# Patient Record
Sex: MALE | Race: WHITE | Employment: OTHER | URBAN - METROPOLITAN AREA
[De-identification: names, ages, dates, MRNs, and addresses within clinical notes are randomized per-mention and may not be internally consistent; named-entity substitution may affect disease eponyms.]

---

## 2022-03-16 DIAGNOSIS — R06.02 SHORTNESS OF BREATH: ICD-10-CM

## 2022-03-16 DIAGNOSIS — C82.90 NHL (NODULAR HISTIOCYTIC LYMPHOMA) (HCC): Primary | ICD-10-CM

## 2022-03-21 ENCOUNTER — TELEPHONE (OUTPATIENT)
Dept: SURGERY | Age: 82
End: 2022-03-21

## 2022-03-21 ENCOUNTER — HOSPITAL ENCOUNTER (OUTPATIENT)
Dept: NON INVASIVE DIAGNOSTICS | Age: 82
Discharge: HOME | End: 2022-03-21
Payer: MEDICARE

## 2022-03-21 ENCOUNTER — HOSPITAL ENCOUNTER (OUTPATIENT)
Dept: PULMONOLOGY | Age: 82
Discharge: HOME | End: 2022-03-21
Payer: MEDICARE

## 2022-03-21 ENCOUNTER — HOSPITAL ENCOUNTER (OUTPATIENT)
Dept: ONCOLOGY | Age: 82
Setting detail: INFUSION SERIES
Discharge: HOME | End: 2022-03-21
Payer: MEDICARE

## 2022-03-21 ENCOUNTER — HOSPITAL ENCOUNTER (OUTPATIENT)
Dept: GENERAL RADIOLOGY | Age: 82
Discharge: HOME | End: 2022-03-21
Payer: MEDICARE

## 2022-03-21 VITALS
DIASTOLIC BLOOD PRESSURE: 77 MMHG | SYSTOLIC BLOOD PRESSURE: 153 MMHG | WEIGHT: 188.49 LBS | HEART RATE: 77 BPM | BODY MASS INDEX: 25.53 KG/M2 | RESPIRATION RATE: 17 BRPM | HEIGHT: 72 IN | OXYGEN SATURATION: 97 % | TEMPERATURE: 98 F

## 2022-03-21 DIAGNOSIS — R06.02 SHORTNESS OF BREATH: ICD-10-CM

## 2022-03-21 DIAGNOSIS — C82.90 NHL (NODULAR HISTIOCYTIC LYMPHOMA) (HCC): ICD-10-CM

## 2022-03-21 DIAGNOSIS — C82.00 FOLLICULAR LYMPHOMA GRADE I, UNSPECIFIED BODY REGION (HCC): Primary | ICD-10-CM

## 2022-03-21 DIAGNOSIS — C83.30 DIFFUSE LARGE B-CELL LYMPHOMA, UNSPECIFIED BODY REGION (HCC): ICD-10-CM

## 2022-03-21 LAB
A/G RATIO: 1.4 (ref 1.1–2.2)
ABO/RH: NORMAL
ALBUMIN SERPL-MCNC: 3.9 G/DL (ref 3.4–5)
ALP BLD-CCNC: 88 U/L (ref 40–129)
ALT SERPL-CCNC: 13 U/L (ref 10–40)
AMPHETAMINE SCREEN, URINE: NORMAL
ANION GAP SERPL CALCULATED.3IONS-SCNC: 13 MMOL/L (ref 3–16)
ANTIBODY SCREEN: NORMAL
AST SERPL-CCNC: 17 U/L (ref 15–37)
BARBITURATE SCREEN URINE: NORMAL
BASOPHILS ABSOLUTE: 0.1 K/UL (ref 0–0.2)
BASOPHILS RELATIVE PERCENT: 0.4 %
BENZODIAZEPINE SCREEN, URINE: NORMAL
BILIRUB SERPL-MCNC: 0.3 MG/DL (ref 0–1)
BILIRUBIN URINE: NEGATIVE
BLOOD, URINE: NEGATIVE
BUN BLDV-MCNC: 19 MG/DL (ref 7–20)
C-REACTIVE PROTEIN: 22.2 MG/L (ref 0–5.1)
CALCIUM SERPL-MCNC: 9.5 MG/DL (ref 8.3–10.6)
CANNABINOID SCREEN URINE: NORMAL
CHLORIDE BLD-SCNC: 101 MMOL/L (ref 99–110)
CLARITY: CLEAR
CO2: 24 MMOL/L (ref 21–32)
COCAINE METABOLITE SCREEN URINE: NORMAL
COLOR: YELLOW
CREAT SERPL-MCNC: 1 MG/DL (ref 0.8–1.3)
EOSINOPHILS ABSOLUTE: 0.4 K/UL (ref 0–0.6)
EOSINOPHILS RELATIVE PERCENT: 3.2 %
FERRITIN: 551.3 NG/ML (ref 30–400)
GFR AFRICAN AMERICAN: >60
GFR NON-AFRICAN AMERICAN: >60
GLUCOSE BLD-MCNC: 122 MG/DL (ref 70–99)
GLUCOSE URINE: NEGATIVE MG/DL
HBV SURFACE AB TITR SER: <3.5 MIU/ML
HCT VFR BLD CALC: 30.6 % (ref 40.5–52.5)
HEMOGLOBIN: 9.9 G/DL (ref 13.5–17.5)
HEPATITIS B CORE IGM ANTIBODY: NORMAL
IGG: 776 MG/DL (ref 700–1600)
INR BLD: 1.31 (ref 0.88–1.12)
KETONES, URINE: NEGATIVE MG/DL
LACTATE DEHYDROGENASE: 231 U/L (ref 100–190)
LEUKOCYTE ESTERASE, URINE: NEGATIVE
LV EF: 58 %
LVEF MODALITY: NORMAL
LYMPHOCYTES ABSOLUTE: 1.9 K/UL (ref 1–5.1)
LYMPHOCYTES RELATIVE PERCENT: 15.7 %
Lab: NORMAL
MAGNESIUM: 1.8 MG/DL (ref 1.8–2.4)
MCH RBC QN AUTO: 30.4 PG (ref 26–34)
MCHC RBC AUTO-ENTMCNC: 32.2 G/DL (ref 31–36)
MCV RBC AUTO: 94.6 FL (ref 80–100)
METHADONE SCREEN, URINE: NORMAL
MICROSCOPIC EXAMINATION: NORMAL
MONOCYTES ABSOLUTE: 1 K/UL (ref 0–1.3)
MONOCYTES RELATIVE PERCENT: 7.8 %
NEUTROPHILS ABSOLUTE: 9 K/UL (ref 1.7–7.7)
NEUTROPHILS RELATIVE PERCENT: 72.9 %
NITRITE, URINE: NEGATIVE
OPIATE SCREEN URINE: NORMAL
OXYCODONE URINE: NORMAL
PDW BLD-RTO: 16.2 % (ref 12.4–15.4)
PH UA: 6
PH UA: 6 (ref 5–8)
PHENCYCLIDINE SCREEN URINE: NORMAL
PLATELET # BLD: 195 K/UL (ref 135–450)
PMV BLD AUTO: 9 FL (ref 5–10.5)
POTASSIUM SERPL-SCNC: 3.9 MMOL/L (ref 3.5–5.1)
PROPOXYPHENE SCREEN: NORMAL
PROTEIN UA: NEGATIVE MG/DL
PROTHROMBIN TIME: 15 SEC (ref 9.9–12.7)
RBC # BLD: 3.24 M/UL (ref 4.2–5.9)
SODIUM BLD-SCNC: 138 MMOL/L (ref 136–145)
SPECIFIC GRAVITY UA: 1.02 (ref 1–1.03)
TOTAL PROTEIN: 6.7 G/DL (ref 6.4–8.2)
URINE TYPE: NORMAL
UROBILINOGEN, URINE: 0.2 E.U./DL
WBC # BLD: 12.3 K/UL (ref 4–11)

## 2022-03-21 PROCEDURE — 82784 ASSAY IGA/IGD/IGG/IGM EACH: CPT

## 2022-03-21 PROCEDURE — 86787 VARICELLA-ZOSTER ANTIBODY: CPT

## 2022-03-21 PROCEDURE — 81003 URINALYSIS AUTO W/O SCOPE: CPT

## 2022-03-21 PROCEDURE — 86665 EPSTEIN-BARR CAPSID VCA: CPT

## 2022-03-21 PROCEDURE — 94761 N-INVAS EAR/PLS OXIMETRY MLT: CPT

## 2022-03-21 PROCEDURE — 93356 MYOCRD STRAIN IMG SPCKL TRCK: CPT

## 2022-03-21 PROCEDURE — 85610 PROTHROMBIN TIME: CPT

## 2022-03-21 PROCEDURE — 86707 HEPATITIS BE ANTIBODY: CPT

## 2022-03-21 PROCEDURE — 94060 EVALUATION OF WHEEZING: CPT

## 2022-03-21 PROCEDURE — 86645 CMV ANTIBODY IGM: CPT

## 2022-03-21 PROCEDURE — 6360000002 HC RX W HCPCS: Performed by: NURSE PRACTITIONER

## 2022-03-21 PROCEDURE — 86901 BLOOD TYPING SEROLOGIC RH(D): CPT

## 2022-03-21 PROCEDURE — 94729 DIFFUSING CAPACITY: CPT

## 2022-03-21 PROCEDURE — 86140 C-REACTIVE PROTEIN: CPT

## 2022-03-21 PROCEDURE — 86664 EPSTEIN-BARR NUCLEAR ANTIGEN: CPT

## 2022-03-21 PROCEDURE — 85025 COMPLETE CBC W/AUTO DIFF WBC: CPT

## 2022-03-21 PROCEDURE — 83735 ASSAY OF MAGNESIUM: CPT

## 2022-03-21 PROCEDURE — 94760 N-INVAS EAR/PLS OXIMETRY 1: CPT

## 2022-03-21 PROCEDURE — 86850 RBC ANTIBODY SCREEN: CPT

## 2022-03-21 PROCEDURE — 86694 HERPES SIMPLEX NES ANTBDY: CPT

## 2022-03-21 PROCEDURE — 94726 PLETHYSMOGRAPHY LUNG VOLUMES: CPT

## 2022-03-21 PROCEDURE — 36591 DRAW BLOOD OFF VENOUS DEVICE: CPT

## 2022-03-21 PROCEDURE — 83615 LACTATE (LD) (LDH) ENZYME: CPT

## 2022-03-21 PROCEDURE — 80053 COMPREHEN METABOLIC PANEL: CPT

## 2022-03-21 PROCEDURE — 94664 DEMO&/EVAL PT USE INHALER: CPT

## 2022-03-21 PROCEDURE — 6360000002 HC RX W HCPCS: Performed by: INTERNAL MEDICINE

## 2022-03-21 PROCEDURE — 82728 ASSAY OF FERRITIN: CPT

## 2022-03-21 PROCEDURE — 80307 DRUG TEST PRSMV CHEM ANLYZR: CPT

## 2022-03-21 PROCEDURE — 86706 HEP B SURFACE ANTIBODY: CPT

## 2022-03-21 PROCEDURE — 93005 ELECTROCARDIOGRAM TRACING: CPT | Performed by: INTERNAL MEDICINE

## 2022-03-21 PROCEDURE — 86705 HEP B CORE ANTIBODY IGM: CPT

## 2022-03-21 PROCEDURE — 86663 EPSTEIN-BARR ANTIBODY: CPT

## 2022-03-21 PROCEDURE — 93306 TTE W/DOPPLER COMPLETE: CPT

## 2022-03-21 PROCEDURE — 86900 BLOOD TYPING SEROLOGIC ABO: CPT

## 2022-03-21 PROCEDURE — 71046 X-RAY EXAM CHEST 2 VIEWS: CPT

## 2022-03-21 RX ORDER — HEPARIN SODIUM (PORCINE) LOCK FLUSH IV SOLN 100 UNIT/ML 100 UNIT/ML
500 SOLUTION INTRAVENOUS PRN
Status: DISCONTINUED | OUTPATIENT
Start: 2022-03-21 | End: 2022-03-22 | Stop reason: HOSPADM

## 2022-03-21 RX ORDER — HEPARIN SODIUM (PORCINE) LOCK FLUSH IV SOLN 100 UNIT/ML 100 UNIT/ML
500 SOLUTION INTRAVENOUS ONCE
Status: COMPLETED | OUTPATIENT
Start: 2022-03-21 | End: 2022-03-21

## 2022-03-21 RX ORDER — ALBUTEROL SULFATE 2.5 MG/3ML
2.5 SOLUTION RESPIRATORY (INHALATION) ONCE
Status: COMPLETED | OUTPATIENT
Start: 2022-03-21 | End: 2022-03-21

## 2022-03-21 RX ADMIN — HEPARIN 500 UNITS: 100 SYRINGE at 09:51

## 2022-03-21 RX ADMIN — ALBUTEROL SULFATE 2.5 MG: 2.5 SOLUTION RESPIRATORY (INHALATION) at 14:42

## 2022-03-21 NOTE — TELEPHONE ENCOUNTER
Patient has been scheduled for:    Procedure: Insert Central Line Trifusion  Date: 4/18/22  Time: 7:15AM  Arrival: 5:30AM  [facility]  ASA?: N/A  Prep? NPO    Pre-op? N/A  Post-op Appt? N/A      Orders faxed to surgery scheduling. [facility] has contacted patient about procedure. I will still call to remind him of procedure.

## 2022-03-21 NOTE — ONCOLOGY
Transplant coordinator met with patient for pre-CAR-T evaluation and education on CAR-T therapy. Discussed purpose of CAR-T (chimergic antigen receptor) T-cells. The entire process of collection of T cells, the re-engineering process, lymphocyte depleting chemotherapy, reinfusion of CAR cells and associated side effects and toxicities were discussed. I specifically went over in detail the expected side effects such as cytokine release syndrome as well as the potential neuro toxicities associated with CAR T-cell infusion. Patient and caregiver made aware of need to have 24 hour caregiver for 30 days after CAR-T infusion and must be within 45 minutes of the hospital. Keegan Yang will be given CAR-T wallet card to carry at all times once CAR-T is infused and will need to present the wallet card when coming to the [facility] Emergency Department . Aware will be on anti-seizure medication for 8 weeks and can not drive or operate machinery x 8 weeks. Discussed daily Outpatient Infusion Department routine up to 30 days. I also explained the potential long-term hypogammaglobulinemia. Patient is interested in proceeding with CAR T-cell infusion. Information on CAR-T given and reviewed. Tentative calendar including collection date, return of cells, dates of lymphodepleting chemotherapy and infusion of cells all discussed.

## 2022-03-21 NOTE — PROGRESS NOTES
Pre-CAR-T Psychological Evaluation- 3/21/2022    PSYCHOSOCIAL ASSESSMENT/RECOMMENDATIONS    Based on this evaluation, the recommendation is to continue without reservation. No recommendations made. Identified caregivers: Donald Allan (Spouse), Olimpia Paredes (daughter), 5 other adult children  Protective factors: motivation for treatment, positive mindset, physically active  DSM-5 Diagnoses: None  ____________________________________________________________________________________________    Sanna Rapp is an 72-year-old   male with diffuse large B-cell non-Hodgkin's lymphoma referred for a psychological evaluation in preparation for CAR-T. He presented with his identified primary caregiver, Donald Allan (his spouse). PAST MEDICAL HISTORY  Sanna Rapp reports that he was diagnosed with diffuse large B cell lymphoma in 1/2022. He explains his experience with treatment thus far to be \"go and get treated and go home\". Explains that it has not been difficult at all. Past medical history is significant for follicular lymphoma (diagnosed 1995) and no chronic medical conditions.     KNOWLEDGE RELATED TO PROCEDURE    Diagnosis: Excellent  Procedure: Excellent  Risks and Benefits: Excellent  Lifestyle changes:    Hand-washing and hygiene:Excellent   People and Places: Excellent   Bleeding precautions: Poor, was provided additional education and expressed understanding   Pets: Excellent   Sun exposure: Poor, was provided additional education and expressed understanding   Driving restrictions: Excellent   Staying within 45 minutes of the hospital: Poor, was provided additional education and expressed understanding   Physical activity: Poor, was provided additional education and expressed understanding   Sexual activity: Poor, was provided additional education and expressed understanding   Tobacco use: Excellent   Alcohol use: Excellent   Illicit drug use (including marijuana): Excellent   Food safety: Excellent   Medication management: Excellent   Frequency of appointments: Excellent   When to call BMT doctor: Excellent   Going to the [facility]. in case of emergency: Excellent   Possibility for admission and re-admission to the hospital: Excellent    ADHERENCE    No-showed appointments: Denies  Reports forgetting medications: Denies  Using pill box: No; Use of pill box was recommended: Yes  Is willing to allow caregiver to manage medications immediately following procedure: Yes  Currently requires assistance with taking medications: Denies  Endorses history of going against the medical advise of a provider: Denies  History of issues with adherence to medical recommendations for comorbidities: Denies  Motivation for procedure: 10/10    PSYCHOSOCIAL HISTORY    Marital Status:  for 61 years   If , describes marriage as: \"typical\"  Race/Ethnicity: \"\"  Lives: [place], [place] (approximately 15 minutes from [facility] with wife  Reports stable housing: Yes  Reliable transportation: Yes  Children: 6 (ages 57-41)  Employment: Retired 12 years ago from [employer] design  Financial concerns related to this procedure: Denies  Childhood history: He was raised by mother, father, and grandparents in [place], [place] and has 1 sibling. Sonam Simms describes his childhood as \"perfect\"  Educational history: Denies difficulties in school (e.g., learning disability, accommodations). Highest level of education completed is a bachelor's degree in architecture.    Amish/spiritual beliefs: Catholic and denies Bahai beliefs that would affect his medical care   service: Denies  Legal history (e.g. 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   Coping mechanisms: \"face them and solve problems\", hunting, fishing, gardening  Physical activity: 3 times a week, but stays busy throughout the week  Specific dietary needs: Denies; denies being a picky eater  Supplements or over-the-counter drugs: daily vitamins, cats claw, astragalus root    SUPPORT SYSTEM    Identified primary caregiver: Flower Navarro  Relationship to patient: Spouse  Age: 80  Lives: [place] to stay with patient: Yes  Employment: Denies  Adequate knowledge regarding procedure: Yes  Diagnosis: Excellent  Procedure: Excellent  Risks and Benefits: Excellent  Lifestyle changes:     Hand-washing and hygiene:Excellent    People and Places: Excellent    Bleeding precautions: Excellent    Pets: Excellent    Sun exposure: Excellent    Driving restrictions: Excellent    Staying within 45 minutes of the hospital: Excellent    Physical activity: Excellent    Sexual activity: Excellent    Tobacco use: Excellent    Alcohol use: Excellent    Illicit drug use (including marijuana): Excellent    Food safety: Excellent    Medication management: Excellent    Frequency of appointments: Excellent    When to call BMT doctor: Excellent    Going to the [facility] in case of emergency: Excellent    Possibility for admission and re-admission to the hospital: Excellent  Willingness to be a caregiver: Yes  Significant mental health conditions that would affect care giving: Denies  Significant substance abuse that would affect care giving: Denies  Significant medical conditions that would affect care giving: Denies  Smoke tobacco in the home: Denies  Known cognitive impairment: Denies  COVID vaccine: Yes    CAREGIVER: RAPID ESTIMATE OF ADULT LITERACY IN MEDICINE  REALM-R: 8/8; REALM-SF Score: N/A  (0= <3rd grade reading level; 1-3= 4-6th grade reading level; 4-6= 7-8th grade reading level; >7= >9th grade reading level)    CAREGIVER: ALAN COGNITIVE ASSESSMENT (Version 1): 28/30 (Within Normal Limits)  Difficulty noted in the area(s) of: None  Normal scores are ?26     Additional support people:   1) Lamonteemory ÁlvarezChristina   Relationship to patient: Daughter  Age: 66  Lives: [place], [place]  Employment: physical therapist full-time  COVID vaccine: Yes      3) 5 other adult children    Has completed an advanced directive: Yes    PSYCHIATRIC HISTORY    Current mental health treatment: Denies  Past mental health treatment: Denies  Family psychiatric history: Denies    Depression: Denies  Suicidal ideation: Denies  Homicidal ideation: Denies  Past suicide attempts: Denies  Anxiety (i.e. Generalized Anxiety Disorder, health or treatment related anxiety, panic attacks, obsessions, or compulsions): Denies  Medical Anxiety (e.g. needles, pills, MRI, hospitals): Denies  Eating disordered behavior (I.e. binging, purging, other compensatory behaviors, concern about body image): Denies  Hanh: Denies   Psychosis: Denies  Post-traumatic stress disorder: Denies  Developmental or learning disability: Denies  Sleep: 8 hours per night; Denies difficulties with sleep    THE DEPRESSION, ANXIETY, STRESS SCALE- 21 ITEMS (JORDYN-21) : Typical cutoff scores:  Depression = 0 (Normal) (0-9=Normal, 10-13= Mild, 14-20= Moderate, 21-27= Severe, 28+= Extremely Severe)  Anxiety = 0 (Normal) (0-9=Normal, 10-13= Mild, 14-20= Moderate, 21-27= Severe, 28+= Extremely Severe)  Stress= 0 (Normal) (0-14=Normal, 15-18= Mild, 19-25= Moderate, 26-33= Severe, 34+= Extremely Severe)    When compared to a sample of the general population he scored in the following percentiles:  Depression= 23.2 (Normal range)  Anxiety= 26.2 (Normal range)  Stress= 13 (Normal range)    BRIEF COPING ORIENTATION TO PROBLEMS EXPERIENCED INVENTORY (BRIEF-COPE):    Results indicate primarily an Approach coping style. This coping style is characterized by the subscales of active coping, positive reframing, planning, acceptance, seeking emotional support, and seeking informational support.  Approach Coping is associated with more helpful responses to adversity, including adaptive practical adjustment, better physical health outcomes and more stable emotional responding. Approach Coping:   Percentile= 23.5 (Raw score= 15)  Avoidant Coping   Percentile= 0.5 (Raw score=15)    Notably, patient does not identify with utilizing many coping strategies with the exception of avoidance and prayer. WORLD HEALTH ORGANIZATION QUALITY OF LIFE ASSESSMENT (WHOQOL-BREF):     Scores indicate his quality of life in the following areas in percentiles:  Physical Health: 92.8 percentile (Transformed score= 100)  Psychological Health: 98.2 percentile (Transformed score= 100)  Social Relationships: 94.1 percentile (Transformed score= 100)  Environment: 97.3 percentile (Transformed score= 100)    SUBSTANCE USE HISTORY    Tobacco: Denies  Alcohol: Reports drinking once per week, 2 drinks per occasion. Denies history of problematic alcohol use. Illicit drugs: Denies     Kaye Artis does not foresee difficulties from abstaining from these substances immediately following his procedure. NEUROCOGNITIVE FUNCTIONING    Kaye Artis denies a history of closed head injury, seizures, or stroke. PATIENT: RAPID ESTIMATE OF ADULT LITERACY IN MEDICINE  REALM-R: 8/8; REALM-SF Score: N/A  (0= <3rd grade reading level; 1-3= 4-6th grade reading level; 4-6= 7-8th grade reading level; >7= >9th grade reading level)    PATIENT ALAN COGNITIVE ASSESSMENT (Version 1): 28/30 (Within Normal Limits)  Difficulty noted in the area(s) of: None  Normal scores are ?26     BEHAVIORAL OBSERVATIONS    He arrived on time for his scheduled appointment. He presented with his wife as his primary caregiver and was dressed appropriately and groomed neatly. During the evaluation, he presented with normal speech and logical/linear thought processes. He was cooperative and friendly. His mood was good and affect was euthymic. Notably, patient had significant difficulties hearing writer.  He wore hearing aids, but still reported difficulty hearing. Patient's primary caregiver presented as cooperative and friendly. STANDARD ASSESSMENT FOR INTEGRATING DATA  WILMER INTEGRATED PSYCHOSOCIAL ASSESSMENT FOR TRANSPLANT (SIPAT)= 6 ((0-6) Excellent Candidate: Recommendation is to continue without reservations)   SIPAT Score Interpretation: 0-6 Excellent Candidate, 7-20 Good Candidate, 21-39 Minimally Acceptance Candidate, 40-69 Poor Candidate, >70 High Risk Candidate  The total score was obtained through summing patient's score on the following categories:    Patient's Readiness Level  Knowledge and Understanding of Medical Illness Process: 0) Excellent Understanding: Patient and support system are fully aware of the causes of illness leading to need for transplantation. Both patient and support system demonstrate a high degree of self-directed learning  Knowledge and Understanding of the Process of Transplantation: 1) Good Understanding: Patient & support have studied & understood provided literature. Willingness/Desire for Treatment: 0) Excellent: Patient is highly motivated and proactively involved in his/her medical care. Treatment Compliance/Adherence: 0) Excellent: Patient is fully compliant and is an effective partner with medical staff. Lifestyle Factors: 0) Able to modify & sustain needed changes- self initiated. Patient's Readiness Level Total Score: 1    Social Support System  Availability of Social Support System: 2) Good: Various individuals (e.g., minimum of two people) have been identified and are actively engaged in the patient's care. A back-up system, albeit limited, seems feasible. Functionality of Social Support System: 0) Excellent: Members of the support team have demonstrated initiative in learning and are already committed to and actively and effectively engaged in the patient's care.   Appropriateness of Physical Living Space and Environment: 0) Excellent: Patient has excellent, long-term, permanent and adequate housing. Social Support System Total Score: 2    Psychological Stability and Psychopathology   Presence of Psychopathology: 0) None: No history of psychiatric problems. Assessment of Depression: 0) No Clinical Depression  Assessment of Anxiety: 0) No Clinical Anxiety  History of Organic Psychopathology or Neurocognitive Impairment: 0) None: No history of disease or treatment induced psychiatric problem. Assessment of Current Cognitive Functionin) Cognitive Functioning Within Normal Limits; or MoCA / MMSE . 26. Influence of Personality Traits vs. Disorder: 0) None: No history of significant personality disorder or psychopathology/traits. Effect of Truthfulness vs. Deceptive Behavior in Presentation: 0) No evidence of deceptive behavior in history or at present. Overall Risk for Psychopathology: 0) None: No history of personal or familial psychiatric problems; no psychiatric complications in response to illness, medical treatment or psychosocial stressors. Psychological Stability and Psychopathology Total Score: 0    Lifestyle and Effect of Substance Use  Alcohol Use/Abuse/Dependence: 2) ALCOHOL USE - NO ABUSE: History of minimal alcohol use which has caused no social or medical problems (i.e., no abuse). If requested by the team the patient promptly discontinued all alcohol use. Alcohol Risk for Recidivism: 1) Low Risk: (AUDIT 1 - 7). Substance Use/Abuse/Dependence: 0) None: No history of illicit substance use; or abuse of prescribed substances. Substance Use Risk for Recidivism: 0) None: No history of illicit substance Use; or abuse of prescribed substances (DAST = 0). Nicotine Use/Abuse/Dependence: 0) None: Never used tobacco in any form. No history of Nicotine Use/Abuse.    Lifestyle and Effect of Substance Use Total Score: 3    -------------------------------------------------------------------------------------    Jennifer Keys Psy.D., Clinical Psychologist      Dave Bazan was seen for a pre-BMT/CAR-T psychological evaluation. There were no urgent psychological concerns (e.g. suicidal or homicidal ideation). Full report to follow. Prior to the evaluation, patient and/or support system was informed of the purposes of the evaluation, which include identification of any psychosocial barriers to successful BMT/CAR-T. Educated patient regarding psychologist's role in making treatment recommendations to patient's treatment team regarding patient's candidacy for BMT/CAR-T from a psychological standpoint. Discussed limits of confidentiality (e.g., documenting in patient's medical record, coordinating with team, abuse of vulnerable person, court subpoena, and/or being a risk to self or others). Also explained to patient that provider sees patient's while hospitalized in the [place] [address]. Patient expressed understanding and was agreeable to complete the evaluation. All measures completed today were sent to medical records to be uploaded into patient's chart.

## 2022-03-22 DIAGNOSIS — C83.30 DIFFUSE LARGE B-CELL LYMPHOMA, UNSPECIFIED BODY REGION (HCC): Primary | ICD-10-CM

## 2022-03-22 LAB
EKG ATRIAL RATE: 70 BPM
EKG DIAGNOSIS: NORMAL
EKG P AXIS: 70 DEGREES
EKG P-R INTERVAL: 214 MS
EKG Q-T INTERVAL: 392 MS
EKG QRS DURATION: 116 MS
EKG QTC CALCULATION (BAZETT): 423 MS
EKG R AXIS: -43 DEGREES
EKG T AXIS: 46 DEGREES
EKG VENTRICULAR RATE: 70 BPM
VARICELLA-ZOSTER VIRUS AB, IGG: NORMAL

## 2022-03-22 PROCEDURE — 93010 ELECTROCARDIOGRAM REPORT: CPT | Performed by: INTERNAL MEDICINE

## 2022-03-23 LAB
HEPATITIS BE ANTIBODY: NEGATIVE
HERPES TYPE 1/2 IGM COMBINED: 0.52 IV
VARICELLA ZOSTER AB IGM: 0.04 ISR

## 2022-03-23 NOTE — PROCEDURES
[address]               [address]                               PULMONARY FUNCTION    PATIENT NAME: Claude Songster              :        1940  MED REC NO:   [id number]                          ROOM:  ACCOUNT NO:   [de-identified]                           ADMIT DATE: 2022  PROVIDER:     Jessica Peoples MD    DATE OF PROCEDURE:  2022    Forced vital capacity and FEV1 mildly reduced, FEV1 to FVC ratio normal.  No change after bronchodilator. Lung volume determinations by  plethysmography show normal total lung capacity and FRC, mildly reduced  residual volume. Single-breath diffusion capacity moderately reduced,  but normal when compared to alveolar volume. IMPRESSION:  Mild restrictive ventilatory defect seen on spirometry. Findings may be consistent with neuromuscular, chest wall, pleural or  parenchymal disorder.         Vidya Chandler MD    D: 2022 15:52:28       T: 2022 15:54:42     [email or web address]  Job#: [id number]     Doc#: [id number]    CC:

## 2022-03-24 ENCOUNTER — HOSPITAL ENCOUNTER (OUTPATIENT)
Age: 82
Setting detail: SPECIMEN
Discharge: HOME | End: 2022-03-24

## 2022-03-24 LAB — HERPES TYPE I/II IGG COMBINED: 11.4 IV

## 2022-03-26 LAB
CYTOMEGALOVIRUS IGM ANTIBODY: <8 AU/ML
EPSTEIN BARR VIRUS NUCLEAR AB IGG: 185 U/ML (ref 0–21.9)
EPSTEIN-BARR EARLY ANTIGEN ANTIBODY: <5 U/ML (ref 0–10.9)
EPSTEIN-BARR VCA IGG: 25.4 U/ML (ref 0–21.9)
EPSTEIN-BARR VCA IGM: 18.9 U/ML (ref 0–43.9)

## 2022-03-29 ENCOUNTER — HOSPITAL ENCOUNTER (OUTPATIENT)
Dept: PHYSICAL THERAPY | Age: 82
Setting detail: THERAPIES SERIES
Discharge: HOME | End: 2022-03-29
Payer: MEDICARE

## 2022-03-29 PROCEDURE — 97110 THERAPEUTIC EXERCISES: CPT

## 2022-03-29 PROCEDURE — 97161 PT EVAL LOW COMPLEX 20 MIN: CPT

## 2022-03-29 NOTE — FLOWSHEET NOTE
[facility] Outpatient Therapy  [address] [place] [address], [address] [address]  Phone: [phone]   Fax: [phone]    Physical Therapy Treatment Note/ Progress Report:     Date:  3/29/2022    Patient Name:  Cristhian Valdovinos    :  1940  MRN: [id number]    Medical/Treatment Diagnosis Information:  Diagnosis: Diffuse large B-cell lymphoma, unspecified body region ([facility] [address] (C83.30)  Treatment Diagnosis: Cancer related fatigue U42.7  Insurance/Certification information:  PT Insurance Information: Medicare, [id number], [address] [facility] [place]  Physician Information:  Referring Practitioner: Destinee Rea MD  Plan of care signed:    [] Yes  [x] No    Date of Patient follow up with Physician: 2022     Progress Report: []  Yes  [x]  No     Date Range for reporting period:  Beginnin2022  Ending:  NA    Progress report due (10 Rx/or 30 days whichever is less):      Recertification due (POC duration/ or 90 days whichever is less):      Visit # Insurance Allowable Auth Needed    BMN []Yes   [x]No     RESTRICTIONS/PRECAUTIONS: lymphoma history and current treatment, hard of hearing  Latex Allergy:  [x]NO      []YES  Preferred Language for Healthcare:   [x]English       []other:  Functional Scale: FOTO Med. Neuro Date assessed:2022    Pain level:  0/10     SUBJECTIVE:  See eval    OBJECTIVE: See eval   Observation:    Test measurements:      Exercises/Interventions: Exercises in bold performed in department today. Items not bolded are carried forward from prior visits for continuity of the record. Exercise/Equipment Resistance/Repetitions Other comments                                                                                               Home Exercise Program:Pt. demonstrated good understanding and knowledge of HEP. Written instructions provided. Access Code: [email or web address]  URL: [email or web address]  Date: 2022  Prepared by:  Lilly Deleon allowing for proper ROM for normal function with self-care, mobility, lifting and ambulation. Home Exercise Program:    [x] (90294) Reviewed/Progressed HEP activities related to strengthening, flexibility, endurance, ROM of core, proximal hip and LE for functional self-care, mobility, lifting and ambulation/stair navigation   [] (00900) Reviewed/Progressed HEP activities related to improving balance, coordination, kinesthetic sense, posture, motor skill, proprioception of core, proximal hip and LE for self-care, mobility, lifting, and ambulation/stair navigation      Modalities:    [] Electric Stimulation:   [] Ultrasound:   [] Other:       Charges:  Timed Code Treatment Minutes: TE: 8   Total Treatment Minutes: 35      [x] EVAL (LOW) 56847 (typically 20 minutes face-to-face)  [] EVAL (MOD) 80145 (typically 30 minutes face-to-face)  [] EVAL (HIGH) 54142 (typically 45 minutes face-to-face)  [] RE-EVAL     [x] FK(47650) x 1      [] NMR (53086) x       [] Manual (44419) x       [] TA (36046) x       [] Gait Training [phone] x       [] ES(attended) (85434)  [] ES (un) ([phone])   [] DRY NEEDLE 1 OR 2 MUSCLES  [] DRY NEEDLE 3+ MUSCLES  [] Mech Traction (95587)  [] Ultrasound (00130)  [] Other:    GOALS:    Patient stated goal: \"evaluation\"  []? Progressing: [x]? Met: []? Not Met: []? Adjusted     Therapist goals for Patient:   Short Term Goals: To be achieved in: 1 weeks  1. Dispense and review with pt HEP to initiate after CAR-T immunotherapy for maximizing functional mobility   []? Progressing: [x]? Met: []? Not Met: []? Adjusted    ASSESSMENT:  See eval      Treatment/Activity Tolerance:  [x] Patient tolerated treatment well [] Patient limited by fatique  [] Patient limited by pain  [] Patient limited by other medical complications  [] Other:     Overall Progression Towards Functional goals/ Treatment Progress Update:  [] Patient is progressing as expected towards functional goals listed.     [] Progression is slowed due to complexities/Impairments listed. [] Progression has been slowed due to co-morbidities. [] Plan just implemented, too soon to assess goals progression <30days   [] Goals require adjustment due to lack of progress  [] Patient is not progressing as expected and requires additional follow up with physician  [x] Other: goals met    Prognosis for POC: [x] Good [] Fair  [] Poor    Patient requires continued skilled intervention: [] Yes  [x] No        PLAN: See eval  [] Continue per plan of care [] Alter current plan (see comments)  [x] Plan of care initiated [] Hold pending MD visit [x] Discharge    Electronically signed by: Evangelina Louise, PT, DPT [id number]    Note: If patient does not return for scheduled/recommended follow up visits, this note will serve as a discharge from care along with the most recent update on progress.

## 2022-03-29 NOTE — PLAN OF CARE
[facility] Outpatient Therapy  [address], [address]  Phone: [phone]   Fax: [phone]       Physical Therapy Certification/Treatment/Discharge    Dear Referring Practitioner: Armani Hancock MD,    We had the pleasure of evaluating the following patient for physical therapy services at [facility] ([facility]). A summary of our findings can be found in the initial assessment below. This includes our plan of care. If you have any questions or concerns regarding these findings, please do not hesitate to contact me at the office phone number checked above. Thank you for the referral.       Physician Signature:_______________________________Date:__________________  By signing above (or electronic signature), therapist's plan is approved by physician      Patient: Zaid Peter   : 1940   MRN: [id number]  Referring Physician: Referring Practitioner: Armani Hancock MD      Evaluation Date: 3/29/2022      Medical Diagnosis Information:  Diagnosis: Diffuse large B-cell lymphoma, unspecified body region ([facility] [address] (C83.30)   Treatment Diagnosis: Cancer related fatigue R53.0                                         Insurance information: PT Insurance Information: Medicare, BMN    Preferred Language for Healthcare:   [x]English       []Other:    C-SSRS Triggered by Intake questionnaire (Past 2 wk assessment ):   [x] No, Questionnaire did not trigger screening.   [] Yes, Patient intake triggered C-SSRS Screening     [] Completed, no further action required. [] Completed, PCP notified via Epic      Precautions/ Contra-indications: lymphoma history and current treatment, hard of hearing  Latex Allergy:  [x]NO      []YES    Relevant Medical History:  [x] Patient history, allergies, meds reviewed. Medical chart reviewed. See intake form. Review Of Systems (ROS):  [x]Performed Review of systems (Integumentary, CardioPulmonary, Neurological) by intake and observation. Intake form has been scanned into medical record. Patient has been instructed to contact their primary care physician regarding ROS issues if not already being addressed at this time. Co-morbidities/Complexities (which will affect course of rehabilitation):   []None        []Hx of COVID   Arthritic conditions   []Rheumatoid arthritis (M05.9)  []Osteoarthritis (M19.91)  []Gout   Cardiovascular conditions   []Hypertension (I10)  []Hyperlipidemia (E78.5)  []Angina pectoris (I20)  []Atherosclerosis (I70)  []Pacemaker  []Hx of CABG/stent/  cardiac surgeries   Musculoskeletal conditions   []Disc pathology   []Congenital spine pathologies   []Osteoporosis (M81.8)  []Osteopenia (M85.8)  []Scoliosis       Endocrine conditions   []Hypothyroid (E03.9)  []Hyperthyroid Gastrointestinal conditions   []Constipation (K59.00)  [] Diarrhea   Metabolic conditions   []Morbid obesity (E66.01)  []Diabetes type 1(E10.65) or 2 (E11.65)   []Neuropathy (G60.9)     Cardio/Pulmonary conditions   []Asthma (J45)  []Coughing   []COPD (J44.9)  []CHF  []A-fib   Psychological Disorders  []Anxiety (F41.9)  []Depression (F32.9)   []Other:   Developmental Disorders  []Autism (F84.0)  []CP (G80)  []Down Syndrome (Q90.9)  []Developmental delay     Neurological conditions  []Prior Stroke (I69.30)  []Parkinson's (G20)  []Encephalopathy (G93.40)  []MS (G35)  []Post-polio (G14)  []SCI  []TBI  []ALS Other conditions  []Fibromyalgia (M79.7)  []Vertigo  []Syncope  []Kidney Failure  [x]Cancer      [x]currently undergoing                treatment  []Pregnancy  []Incontinence   Prior surgeries  []involved limb  []previous spinal surgery  [] section birth  []hysterectomy  []bowel / bladder surgery  []other relevant surgeries   [x]Other: hard of hearing             SUBJECTIVE:    ONSET: History of non-hodgkins lymphoma in ,  and  each treated with chemo.   This year noted swelling in left leg and found issues with lymph nodes in left knee - has had a couple rounds of chemo, most recently last [de-identified]. Treatment Plan for cancer:  Plan for CAR-T immunotherapy - harvest 4/19 and reinfuse 5/23        Current Level of Function:  Do you use ambulatory aids? No   How far on average can you walk? [x] 2 blocks+  [] 1-2 blocks  [] mostly house bound   What is you physical activity level? [x] highly active [] active  [] somewhat active  [] sedentary    Prior Level of Function:Prior to this injury/incident, pt was independent with ADLs and IADLs    Did you use ambulatory aids? No   How far on average could you walk? [x] 2 blocks+  [] 1-2 blocks  [] mostly house bound   What wass you physical activity level? [x] highly active [] active  [] somewhat active  [] sedentary    Occupation/School: retired    Sports/Hobbies/Recreational Activities: just enjoy life - whatever is at hand    Functional Outcomes:   Brief Fatigue Inventory: not given to pt as pt reports no fatigue  Fatigue: 0/10  FOTO Med. Neuro. = 97 = 3% impaired  Frailty Assessment:  Shrinking       Have you lost > 10 lbs in the past year? []YES [x]NO Yes to any = 1 point    Unintentional loss of > 5%? []YES [x]NO     Or BMI < 18.5 []YES [x]NO  []          Physical Endurance/Energy       Do you feel full of energy? [x]YES []NO Must answer \"NO\", AND \"every day\" or \"every week\"  = 1 point           During last 4 weeks, how often have you rested in bed during the day?  []Every Day       []Every Week       []Once       [x]Not at all   []          Low Physical Activity       Frequency of Mildly Energetic Activity [x]>3 times per week      []1-2 times per week      []1-3 times per month      []hardly ever/never        Must answer \"hardly ever/never\" for Moderately AND Very Energetic Activity = 1 point    Frequency of Moderately Energetic Activity [x]> 3 times per week      []1-2 times per week      []1-3 times per month      []hardly ever/never            Frequency of Very Energetic Activity []> 3 times per week [x]1-2 times per week      []1-3 times per month      []hardly ever/never  []          Weakness       Hand Held Dynamometer  Strength in kg, avg of 3 measures on dominant hand Men kg      []BMI < 24 < 29      [x]BMI 24.1-26 < 30 If less than or equal to kg cut-off (lowest 20%) for gender and BMI range = 1 point 39.7    []BMI 26.1-28 < 30      []BMI > 28 < 32             Women kg      []BMI < 23 < 17      []BMI 23.1-26 < 17.3      []BMI 26.1-29 < 18      []BMI >c29 < 21  []          Slow Walking Speed       Walking time in seconds over 15 ft Men Speed      []height <173 cm []>7 sec      [x]height >173 cm []>6 sec 3.61       Slowest 20% speed based on gender and height, OR TUG score = 1 point     Women Speed      []height <159 cm []>7 sec      []height > 159 cm []>6 sec     OR              TUG  []> 19 sec  9.58 []          []Frail = > 3 points       []Intermediate or Pre-Frail = 1-2 points       [x]Robust = 0 points           PAIN:  Pain Scale:0/10  Easing factors: NA  Provocative factors: NA        OBJECTIVE:     Body weight: 188  Height: 5'11.58\"  BMI: 25.87    Observation:    Gait/Steps/Balance       [x]Gait WNL                           []Deviations on a level linoleum surface include:      Posture: [x]WNL  []Forward head  []Forward flexed trunk   []Pronounced CT junction    []Increased thoracic kyphosis   []Scoliosis  []Scapular winging  []Decreased WB on   []R   []L     []Other:         Functional Testing  Intervention Date  Prehab Date   03/29/2022  Re-Eval Date  4 week F/U date  8 week F/U date  D/C Date       TUG (sec) 9.58       30 second sit to stand (reps) 15       6 minute walk (m) 398          Balance:    Narrowed JESSENIA (sec)        Semi Tandem (sec) R LE in back:  L LE in back: R LE in back:  L LE in back: R LE in back:  L LE in back: R LE in back:  L LE in back: R LE in back:  L LE in back:   Tandem (sec) R LE in back:  L LE in back: R LE in back:  L LE in back: R LE in back:  L LE in back: R LE in back:  L LE in back: R LE in back:  L LE in back:   SLS (sec) R:  L: R:  L: R:  L: R:  L: R:  L:          Upper Extremity ROM & Strength AROM  Right AROM  Left PROM  Right PROM Left Strength Right Strength Left Comments   Shoulder flex     5/5 5/5    Shoulder AB     5/5 5/5    Shoulder ER     5/5 5/5    Shoulder IR     5/5 5/5     strength in kg (3 trials)      39.7 39              Lower Extremity ROM & Strength          Hip flexion      5/5 5/5    Hip ER            Hip IR          Hip extension          Hip abduction          Knee extension      5/5 5/5    Knee flexion      5/5 5/5    Ankle DF      5/5 5/5    Ankle PF          Ankle inversion           Ankle eversion                     Trunk strength          TrA  iso                    Flexibility                            Barriers to/and or personal factors that will affect rehab potential:              []Age  []Sex    []Smoker              []Motivation/Lack of Motivation                        [x]Co-Morbidities              []Cognitive Function, education/learning barriers              []Environmental, home barriers              []profession/work barriers  []past PT/medical experience  []other:    Falls Risk Assessment (30 days):   [x] Falls Risk assessed and no intervention required. [] Falls Risk assessed and Patient requires intervention due to being higher risk   TUG score (>12s at risk):     [] Falls education provided, including         ASSESSMENT: Pt is an [de-identified] y.o.male presenting with a frailty assessment score of 0 indicating that the patient falls into a robust category as no frailty deficits were noted. Pt performing well on  strength and walking speed categories of frailty assessment and not meeting criteria for frail for the shrinking, physical endurance/energy and physical activity categories. Pt also exceeding averages in 30 second sit to stand test for males his age indicating good functional strength of lower extremities.   Pt already performing exercise routine including weight training 3x per week at this time currently. Dispensed and reviewed handout on HEP for pt to perform in hospital after procedure. No further therapy needs at this time. Pt education and HEP instruction:  Patient was thoroughly educated regarding prehabilitation goals, importance of PT sessions in improving overall ROM, strength, function prior to medical intervention. The patient was educated on and instructed in HEP as listed. The patient was given a detailed handout of exercises with frequency, duration, to initiate in the hospital post-operatively with guidance/modifications as needed from IP PT:     Access Code: [email or web address]  URL: [email or web address]  Date: 03/29/2022  Prepared by: Rafael Gallardo    Exercises  Seated Long Arc Quad - 1 x daily - 7 x weekly - 3 sets - 10 reps  Seated March - 1 x daily - 7 x weekly - 3 sets - 10 reps  Supine Heel Slide - 1 x daily - 7 x weekly - 3 sets - 10 reps  Supine Ankle Pumps - 1 x daily - 7 x weekly - 3 sets - 10 reps  Supine Hip Abduction - 1 x daily - 7 x weekly - 3 sets - 10 reps  Supine Quad Set - 1 x daily - 7 x weekly - 3 sets - 10 reps - 5 hold  Supine Gluteal Sets - 1 x daily - 7 x weekly - 3 sets - 10 reps - 5 hold    The patient was educated regarding the benefits of early physical therapy post-intervention to regain normal ROM, strength, functional mobility. The patient was also educated regarding goals and expectations of physical therapy to restore normal function.     Functional Impairments:   [] Impaired gait  [] Impaired functional mobility   [] Decreased functional strength of   [] Reduced balance/proprioceptive control    [] Reduced functional ROM of    [] Swelling of extremity   [] Myofascial changes and pain at    [] Postural impairments:   [x] Other: NA     Functional Activity Limitations (from functional questionnaire and intake)  [] Reduced ability to use affected limb for ADLs/IADLs due to swelling causing symptoms of heaviness, skin tightness, pain  [] Reduced ability to perform lifting, reaching, carrying tasks  [] Reduced ability to wear his/her normal clothes/shoes due to swelling    [] Reduced ability to tolerate prolonged functional positions  [] Reduced ability or difficulty with changes of positions or transfers between positions  [] Reduced ability to maintain good posture and demonstrate good body mechanics with sitting, bending, and lifting   [] Reduced ability to sleep   [] Reduced ability or tolerance with driving and/or computer work   [] Reduced ability to squat   [] Reduced ability to forward bend   [] Reduced ability to ambulate prolonged functional periods/distances/surfaces   [] Reduced ability to ascend/descend stairs    [] Reduced ability to tolerate any impact through UE or spine   [x] Other: NA     Participation Restrictions - NA   [] Reduced participation in self care activities   [] Reduced participation in home management activities   [] Reduced participation in work activities   [] Reduced participation in social activities. [] Reduced participation in sport/recreational activities.     Prognosis/Rehab Potential:      [] Excellent   [x] Good    [] Fair   [] Poor    Tolerance of evaluation/treatment:    [] Excellent   [x] Good    [] Fair   [] Poor     Physical Therapy Evaluation Complexity Justification  [x] A history of present problem with:  [] no personal factors and/or comorbidities that impact the plan of care;  [x] 1-2 personal factors and/or comorbidities that impact the plan of care  [] 3 personal factors and/or comorbidities that impact the plan of care  [x] An examination of body systems using standardized tests and measures addressing any of the following: body structures and functions (impairments), activity limitations, and/or participation restrictions;  [] a total of 1-2 or more elements   [] a total of 3 or more elements   [x] a total of 4 or more elements [x] A clinical presentation with:  [x] stable and/or uncomplicated characteristics   [] evolving clinical presentation with changing characteristics  [] unstable and unpredictable characteristics;   [x] Clinical decision making of [x] low, [] moderate, [] high complexity using standardized patient assessment instrument and/or measurable assessment of functional outcome. [x] EVAL (LOW) 39576 (typically 15 minutes face-to-face)  [] EVAL (MOD) 39013 (typically 30 minutes face-to-face)  [] EVAL (HIGH) 81693 (typically 45 minutes face-to-face)  [] RE-EVAL     PLAN:    [x] PT intervention to include ROM/strength/balance exercises, gait, NR re-ed, energy conservation, postural/body mechs ed, manual therapy, HEP, education on cancer/lymphedema management, to restore PLOF. [x] Pt instructed to contact PT clinic to schedule f/u appts after upcoming medical intervention per Dr recommendation    Frequency/Duration: 1 days per week for  1 Weeks:  Interventions:  []  Neuro re-ed to restore balance, posture, muscle balance  []  Manual therapy as indicated for  to include: STM, ROM as appropriate. []  Modalities as needed that may include: thermal agents, kinesiotape as indicated  [x]  Patient education on symptom management, activity modification, progression of HEP. [x]  Therapeutic exercise including: strength/ROM/flexibility  []  NMR activation and proprioception  including postural re-education  []  Therapeutic Activities  []  Gait Training    GOALS:  Patient stated goal: \"evaluation\"  [] Progressing: [x] Met: [] Not Met: [] Adjusted    Therapist goals for Patient:   Short Term Goals: To be achieved in: 1 weeks  1. Dispense and review with pt HEP to initiate after CAR-T immunotherapy for maximizing functional mobility   [] Progressing: [x] Met: [] Not Met: [] Adjusted    Expected Discharge Disposition:  [x] Home   [] [facility]   [] SNF/Inpatient Rehab  [] Other     Electronically signed by:   Karrie Contreras Mayelin Oliver, [place], [address]

## 2022-04-07 NOTE — PROGRESS NOTES
Pt Inr Ptt done 3/21 routed to Dr Kait Nunez, also, will repeat day of surgery per Dr. Kemal Dixon, anesthesia. -db  4/8 CBC from 3/21 routed to Dr. Kait Nunez in epic. Anil Myers, at [facility]. Patient saw Dr. Jordyn Anderson on 3/30. She will fax that office note and labs.  Tone Rowe

## 2022-04-07 NOTE — PROGRESS NOTES
Place patient label inside box (if no patient label, complete below)  Name:  :  MR#:   Og Montez / PROCEDURE  1. I (we), iSncere Peralta (Patient Name) authorize Courtney Wynn MD (Provider / Alondra Rhoades) and/or such assistants as may be selected by him/her, to perform the following operation/procedure(s): INSERT TRIFUSION CENTRAL LINE       Note: If unable to obtain consent prior to an emergent procedure, document the emergent reason in the medical record. This procedure has been explained to my (our) satisfaction and included in the explanation was:  A) The intended benefit, nature, and extent of the procedure to be performed;  B) The significant risks involved and the probability of success;  C) Alternative procedures and methods of treatment;  D) The dangers and probable consequences of such alternatives (including no procedure or treatment); E) The expected consequences of the procedure on my future health;  F) Whether other qualified individuals would be performing important surgical tasks and/or whether  would be present to advise or support the procedure. I (we) understand that there are other risks of infection and other serious complications in the pre-operative/procedural and postoperative/procedural stages of my (our) care. I (we) have asked all of the questions which I (we) thought were important in deciding whether or not to undergo treatment or diagnosis. These questions have been answered to my (our) satisfaction. I (we) understand that no assurance can be given that the procedure will be a success, and no guarantee or warranty of success has been given to me (us). 2. It has been explained to me (us) that during the course of the operation/procedure, unforeseen conditions may be revealed that necessitate extension of the original procedure(s) or different procedure(s) than those set forth in Paragraph 1.  I (we) authorize and request that the above-named physician, his/her assistants or his/her designees, perform procedures as necessary and desirable if deemed to be in my (our) best interest.     Revised 8/2/2021                                                                          Page 1 of 2       3. I acknowledge that health care personnel may be observing this procedure for the purpose of medical education or other specified purposes as may be necessary as requested and/or approved by my (our) physician. 4. I (we) consent to the disposal by the hospital Pathologist of the removed tissue, parts or organs in accordance with hospital policy. 5. I do ____ do not ____ consent to the use of a local infiltration pain blocking agent that will be used by my provider/surgical provider to help alleviate pain during my procedure. 6. I do ____ do not ____ consent to an emergent blood transfusion in the case of a life-threatening situation that requires blood components to be administered. This consent is valid for 24 hours from the beginning of the procedure. 7. This patient does ____ or does not ____ currently have a DNR status/order. If DNR order is in place, obtain Addendum to the Surgical Consent for ALL Patients with a DNR Order to address blake-operative status for limited intervention or DNR suspension.      8. I have read and fully understand the above Consent for Operation/Procedure and that all blanks were completed before I signed the consent.   _____________________________       _____________________      ____/____am/pm  Signature of Patient or legal representative      Printed Name / Relationship            Date / Time   ____________________________       _____________________      ____/____am/pm  Witness to Signature                                    Printed Name                    Date / Time     If patient is unable to sign or is a minor, complete the following)  Patient is a minor, ____ years of age, or unable to sign because:   ______________________________________________________________________________________________    [employer] If a phone consent is obtained, consent will be documented by using two health care professionals, each affirming that the consenting party has no questions and gives consent for the procedure discussed with the physician/provider.   _____________________          ____________________       _____/_____am/pm   2nd witness to phone consent        Printed name           Date / Time    Informed Consent:  I have provided the explanation described above in section 1 to the patient and/or legal representative.  I have provided the patient and/or legal representative with an opportunity to ask any questions about the proposed operation/procedure.   ___________________________          ____________________         ____/____am/pm  Provider / Proceduralist                            Printed name            Date / Time  Revised 8/2/2021                                                                      Page 2 of 2

## 2022-04-08 RX ORDER — PROCHLORPERAZINE MALEATE 10 MG
TABLET ORAL
COMMUNITY
Start: 2022-01-24 | End: 2022-04-19

## 2022-04-08 RX ORDER — APIXABAN 2.5 MG/1
TABLET, FILM COATED ORAL
COMMUNITY
Start: 2022-03-18 | End: 2022-05-18 | Stop reason: HOSPADM

## 2022-04-08 RX ORDER — PREDNISONE 20 MG/1
TABLET ORAL
COMMUNITY
Start: 2022-01-24 | End: 2022-04-19

## 2022-04-08 RX ORDER — PROCHLORPERAZINE MALEATE 10 MG
10 TABLET ORAL
COMMUNITY
Start: 2022-01-30

## 2022-04-08 RX ORDER — DEXAMETHASONE 4 MG/1
TABLET ORAL
COMMUNITY
Start: 2022-01-25 | End: 2022-04-19

## 2022-04-08 RX ORDER — ALLOPURINOL 300 MG/1
TABLET ORAL
COMMUNITY
Start: 2022-01-31 | End: 2022-05-19 | Stop reason: HOSPADM

## 2022-04-08 RX ORDER — DEXAMETHASONE 4 MG/1
4 TABLET ORAL 2 TIMES DAILY
COMMUNITY
Start: 2022-01-30 | End: 2022-04-19

## 2022-04-08 RX ORDER — ACYCLOVIR 400 MG/1
TABLET ORAL
COMMUNITY
Start: 2022-03-03 | End: 2022-05-18 | Stop reason: SDUPTHER

## 2022-04-08 NOTE — PROGRESS NOTES
PRE-OP INSTRUCTIONS FOR THE SURGICAL PATIENT YOU ARE UNABLE TO MAKE CONTACT FOR AN INTERVIEW:        1. Follow all instructions provided to you from your surgeons office, including your ARRIVAL TIME. 2. Enter the MAIN entrance located on [address] and report to the desk. 3. Bring your insurance & photo ID with you. You may also be asked to pay a co-pay, as you may want to bring a check or credit card with you. 4. Leave all other valuables at home. 5. Arrange for someone to drive you home and be with you for the first 24 hours after discharge. 6. Bring your medication list with you day of surgery with doses and frequency listed (including over the counter medications)  7. You must contact your surgeon for Instructions regarding:              - ALL medication instructions, especially if taking blood thinners, aspirin, or diabetic medication.         -Bariatric patients call surgeon if on diabetic medications as some need to be stopped 1 week preop  - IF  there is a change in your physical condition such as a cold, fever, rash, cuts, sores or any other infection, especially near your surgical site. 8. A Pre-op History and Physical for surgery MUST be completed by your Physician or an Urgent Care within 30 days of your procedure date. Please bring a copy with you on the day of your procedure and along with any other testing performed. 9. DO NOT EAT ANYTHING eight hours prior to arrival for surgery. May have up to 8 ounces of water 4 hours prior to arrival for surgery. NOTE: ALL Gastric, Bariatric and Bowel surgery patients MUST follow their surgeon's instructions. 10. No gum, candy, mints, or ice chips day of procedure. 11. Please refrain from drinking alcohol the day before or day of your procedure. 12. Please do not smoke the day of your procedure. 13. Dress in loose, comfortable clothing appropriate for redressing after your procedure.  Do not wear jewelry (including body piercings), make-up, fingernail polish, lotion, powders or metal hairclips. 15. Contacts will need to be removed prior to surgery. You may want to bring your eye glasses to wear immediately before and after surgery. 14. Dentures will need to be removed before your procedure. 13. Bring cases for your glasses, contacts, dentures, or hearing aids to protect them while you are in surgery. 16. If you use a CPAP, please bring it with you on the day of your procedure. 17. Do not shave or wax for 72 hours prior to procedure near your operative site  18. FOR WOMAN OF CHILDBEARING AGE ONLY- please make sure we can collect a urine sample on arrival.    FOLLOW SURGEON INSTRUCTIONS REGARDING WHEN TO HOLD ELIQUIS. IF NOT INSTRUCTIONS RECEIVED YET, CALL SURGEON IMMEDIATELY. HOLD GLUCOSAMINE, VIT E, AND ALL HERBAL PRODUCTS WEEK PRIOR TO SURGERY. If you have further questions, you may contact your surgeon's office or us at [phone]     Left instructions on patient's voicemail AND PATIENT'S WIFE VM .     Dania Oakes RN.4/8/2022 .1:50 PM

## 2022-04-08 NOTE — PROGRESS NOTES
Place patient label inside box (if no patient label, complete below)  Name:  :  MR#:   Marino Orozco / PROCEDURE  1. I (we), Juani Leija (Patient Name) authorize DR. Kiera Naqvi  (Provider / Drew Berumen) and/or such assistants as may be selected by him/her, to perform the following operation/procedure(s): INSERT TRIFUSION CENTRAL LINE       Note: If unable to obtain consent prior to an emergent procedure, document the emergent reason in the medical record. This procedure has been explained to my (our) satisfaction and included in the explanation was:  A) The intended benefit, nature, and extent of the procedure to be performed;  B) The significant risks involved and the probability of success;  C) Alternative procedures and methods of treatment;  D) The dangers and probable consequences of such alternatives (including no procedure or treatment); E) The expected consequences of the procedure on my future health;  F) Whether other qualified individuals would be performing important surgical tasks and/or whether  would be present to advise or support the procedure. I (we) understand that there are other risks of infection and other serious complications in the pre-operative/procedural and postoperative/procedural stages of my (our) care. I (we) have asked all of the questions which I (we) thought were important in deciding whether or not to undergo treatment or diagnosis. These questions have been answered to my (our) satisfaction. I (we) understand that no assurance can be given that the procedure will be a success, and no guarantee or warranty of success has been given to me (us). 2. It has been explained to me (us) that during the course of the operation/procedure, unforeseen conditions may be revealed that necessitate extension of the original procedure(s) or different procedure(s) than those set forth in Paragraph 1.  I (we) authorize and request that the above-named physician, his/her assistants or his/her designees, perform procedures as necessary and desirable if deemed to be in my (our) best interest.     Revised 8/2/2021                                                                          Page 1 of 2       3. I acknowledge that health care personnel may be observing this procedure for the purpose of medical education or other specified purposes as may be necessary as requested and/or approved by my (our) physician. 4. I (we) consent to the disposal by the hospital Pathologist of the removed tissue, parts or organs in accordance with hospital policy. 5. I do ____ do not ____ consent to the use of a local infiltration pain blocking agent that will be used by my provider/surgical provider to help alleviate pain during my procedure. 6. I do ____ do not ____ consent to an emergent blood transfusion in the case of a life-threatening situation that requires blood components to be administered. This consent is valid for 24 hours from the beginning of the procedure. 7. This patient does ____ or does not ____ currently have a DNR status/order. If DNR order is in place, obtain Addendum to the Surgical Consent for ALL Patients with a DNR Order to address blake-operative status for limited intervention or DNR suspension.      8. I have read and fully understand the above Consent for Operation/Procedure and that all blanks were completed before I signed the consent.   _____________________________       _____________________      ____/____am/pm  Signature of Patient or legal representative      Printed Name / Relationship            Date / Time   ____________________________       _____________________      ____/____am/pm  Witness to Signature                                    Printed Name                    Date / Time     If patient is unable to sign or is a minor, complete the following)  Patient is a minor, ____ years of age, or unable to sign because:   ______________________________________________________________________________________________    [place]mahon If a phone consent is obtained, consent will be documented by using two health care professionals, each affirming that the consenting party has no questions and gives consent for the procedure discussed with the physician/provider.   _____________________          ____________________       _____/_____am/pm   2nd witness to phone consent        Printed name           Date / Time    Informed Consent:  I have provided the explanation described above in section 1 to the patient and/or legal representative.  I have provided the patient and/or legal representative with an opportunity to ask any questions about the proposed operation/procedure.   ___________________________          ____________________         ____/____am/pm  Provider / Proceduralist                            Printed name            Date / Time  Revised 8/2/2021                                                                      Page 2 of 2

## 2022-04-13 DIAGNOSIS — C83.30 DIFFUSE LARGE B-CELL LYMPHOMA, UNSPECIFIED BODY REGION (HCC): ICD-10-CM

## 2022-04-13 RX ORDER — HEPARIN SODIUM (PORCINE) LOCK FLUSH IV SOLN 100 UNIT/ML 100 UNIT/ML
1500 SOLUTION INTRAVENOUS PRN
Status: CANCELLED
Start: 2022-04-19

## 2022-04-13 RX ORDER — PROCHLORPERAZINE MALEATE 10 MG
10 TABLET ORAL EVERY 6 HOURS PRN
Status: CANCELLED | OUTPATIENT
Start: 2022-04-19

## 2022-04-15 ENCOUNTER — TELEPHONE (OUTPATIENT)
Dept: SURGERY | Age: 82
End: 2022-04-15

## 2022-04-15 NOTE — TELEPHONE ENCOUNTER
I have placed a reminder call to patient for upcoming procedure. Did you speak directly to patient or leave a voicemail? Voicemail    Prep? NPO 12AM    Must have a  that is over the age of 25. Must be a friend or family member that can be responsible for signing them out after surgery.     Arrive at the main entrance [facility] at 5:30AM

## 2022-04-17 ENCOUNTER — ANESTHESIA EVENT (OUTPATIENT)
Dept: OPERATING ROOM | Age: 82
End: 2022-04-17
Payer: MEDICARE

## 2022-04-18 ENCOUNTER — HOSPITAL ENCOUNTER (OUTPATIENT)
Age: 82
Setting detail: OUTPATIENT SURGERY
Discharge: HOME | End: 2022-04-18
Attending: SURGERY | Admitting: SURGERY
Payer: MEDICARE

## 2022-04-18 ENCOUNTER — APPOINTMENT (OUTPATIENT)
Dept: GENERAL RADIOLOGY | Age: 82
End: 2022-04-18
Attending: SURGERY
Payer: MEDICARE

## 2022-04-18 ENCOUNTER — ANESTHESIA (OUTPATIENT)
Dept: OPERATING ROOM | Age: 82
End: 2022-04-18
Payer: MEDICARE

## 2022-04-18 VITALS
BODY MASS INDEX: 25.19 KG/M2 | DIASTOLIC BLOOD PRESSURE: 84 MMHG | HEART RATE: 67 BPM | WEIGHT: 186 LBS | OXYGEN SATURATION: 100 % | TEMPERATURE: 97 F | SYSTOLIC BLOOD PRESSURE: 152 MMHG | RESPIRATION RATE: 14 BRPM | HEIGHT: 72 IN

## 2022-04-18 VITALS
RESPIRATION RATE: 10 BRPM | SYSTOLIC BLOOD PRESSURE: 106 MMHG | TEMPERATURE: 96.8 F | OXYGEN SATURATION: 87 % | DIASTOLIC BLOOD PRESSURE: 66 MMHG

## 2022-04-18 LAB
APTT: 34.7 SEC (ref 26.2–38.6)
GLUCOSE BLD-MCNC: 112 MG/DL (ref 70–99)
INR BLD: 1.07 (ref 0.88–1.12)
PERFORMED ON: ABNORMAL
PROTHROMBIN TIME: 12.1 SEC (ref 9.9–12.7)

## 2022-04-18 PROCEDURE — 3700000000 HC ANESTHESIA ATTENDED CARE: Performed by: SURGERY

## 2022-04-18 PROCEDURE — 6360000002 HC RX W HCPCS: Performed by: SURGERY

## 2022-04-18 PROCEDURE — 77001 FLUOROGUIDE FOR VEIN DEVICE: CPT | Performed by: SURGERY

## 2022-04-18 PROCEDURE — 2580000003 HC RX 258: Performed by: SURGERY

## 2022-04-18 PROCEDURE — 6360000002 HC RX W HCPCS: Performed by: NURSE ANESTHETIST, CERTIFIED REGISTERED

## 2022-04-18 PROCEDURE — 77001 FLUOROGUIDE FOR VEIN DEVICE: CPT

## 2022-04-18 PROCEDURE — 7100000011 HC PHASE II RECOVERY - ADDTL 15 MIN: Performed by: SURGERY

## 2022-04-18 PROCEDURE — 2500000003 HC RX 250 WO HCPCS: Performed by: SURGERY

## 2022-04-18 PROCEDURE — 2500000003 HC RX 250 WO HCPCS: Performed by: NURSE ANESTHETIST, CERTIFIED REGISTERED

## 2022-04-18 PROCEDURE — 36558 INSERT TUNNELED CV CATH: CPT | Performed by: SURGERY

## 2022-04-18 PROCEDURE — 3600000002 HC SURGERY LEVEL 2 BASE: Performed by: SURGERY

## 2022-04-18 PROCEDURE — 2580000003 HC RX 258: Performed by: ANESTHESIOLOGY

## 2022-04-18 PROCEDURE — 71045 X-RAY EXAM CHEST 1 VIEW: CPT

## 2022-04-18 PROCEDURE — 85730 THROMBOPLASTIN TIME PARTIAL: CPT

## 2022-04-18 PROCEDURE — 7100000010 HC PHASE II RECOVERY - FIRST 15 MIN: Performed by: SURGERY

## 2022-04-18 PROCEDURE — 3600000012 HC SURGERY LEVEL 2 ADDTL 15MIN: Performed by: SURGERY

## 2022-04-18 PROCEDURE — 76937 US GUIDE VASCULAR ACCESS: CPT | Performed by: SURGERY

## 2022-04-18 PROCEDURE — 3700000001 HC ADD 15 MINUTES (ANESTHESIA): Performed by: SURGERY

## 2022-04-18 PROCEDURE — 85610 PROTHROMBIN TIME: CPT

## 2022-04-18 PROCEDURE — 2709999900 HC NON-CHARGEABLE SUPPLY: Performed by: SURGERY

## 2022-04-18 PROCEDURE — 7100000000 HC PACU RECOVERY - FIRST 15 MIN: Performed by: SURGERY

## 2022-04-18 PROCEDURE — C1751 CATH, INF, PER/CENT/MIDLINE: HCPCS | Performed by: SURGERY

## 2022-04-18 PROCEDURE — A4217 STERILE WATER/SALINE, 500 ML: HCPCS | Performed by: SURGERY

## 2022-04-18 PROCEDURE — 7100000001 HC PACU RECOVERY - ADDTL 15 MIN: Performed by: SURGERY

## 2022-04-18 RX ORDER — PHENYLEPHRINE HYDROCHLORIDE 10 MG/ML
INJECTION INTRAVENOUS PRN
Status: DISCONTINUED | OUTPATIENT
Start: 2022-04-18 | End: 2022-04-18 | Stop reason: SDUPTHER

## 2022-04-18 RX ORDER — BUPIVACAINE HYDROCHLORIDE AND EPINEPHRINE 5; 5 MG/ML; UG/ML
INJECTION, SOLUTION EPIDURAL; INTRACAUDAL; PERINEURAL PRN
Status: DISCONTINUED | OUTPATIENT
Start: 2022-04-18 | End: 2022-04-18 | Stop reason: ALTCHOICE

## 2022-04-18 RX ORDER — PROPOFOL 10 MG/ML
INJECTION, EMULSION INTRAVENOUS PRN
Status: DISCONTINUED | OUTPATIENT
Start: 2022-04-18 | End: 2022-04-18 | Stop reason: SDUPTHER

## 2022-04-18 RX ORDER — SODIUM CHLORIDE 0.9 % (FLUSH) 0.9 %
5-40 SYRINGE (ML) INJECTION EVERY 12 HOURS SCHEDULED
Status: DISCONTINUED | OUTPATIENT
Start: 2022-04-18 | End: 2022-04-18 | Stop reason: HOSPADM

## 2022-04-18 RX ORDER — ONDANSETRON 2 MG/ML
4 INJECTION INTRAMUSCULAR; INTRAVENOUS
Status: DISCONTINUED | OUTPATIENT
Start: 2022-04-18 | End: 2022-04-18 | Stop reason: HOSPADM

## 2022-04-18 RX ORDER — HYDRALAZINE HYDROCHLORIDE 20 MG/ML
10 INJECTION INTRAMUSCULAR; INTRAVENOUS
Status: DISCONTINUED | OUTPATIENT
Start: 2022-04-18 | End: 2022-04-18 | Stop reason: HOSPADM

## 2022-04-18 RX ORDER — DIPHENHYDRAMINE HYDROCHLORIDE 50 MG/ML
12.5 INJECTION INTRAMUSCULAR; INTRAVENOUS
Status: DISCONTINUED | OUTPATIENT
Start: 2022-04-18 | End: 2022-04-18 | Stop reason: HOSPADM

## 2022-04-18 RX ORDER — SODIUM CHLORIDE 0.9 % (FLUSH) 0.9 %
5-40 SYRINGE (ML) INJECTION PRN
Status: DISCONTINUED | OUTPATIENT
Start: 2022-04-18 | End: 2022-04-18 | Stop reason: HOSPADM

## 2022-04-18 RX ORDER — MAGNESIUM HYDROXIDE 1200 MG/15ML
LIQUID ORAL CONTINUOUS PRN
Status: COMPLETED | OUTPATIENT
Start: 2022-04-18 | End: 2022-04-18

## 2022-04-18 RX ORDER — DEXAMETHASONE SODIUM PHOSPHATE 4 MG/ML
INJECTION, SOLUTION INTRA-ARTICULAR; INTRALESIONAL; INTRAMUSCULAR; INTRAVENOUS; SOFT TISSUE PRN
Status: DISCONTINUED | OUTPATIENT
Start: 2022-04-18 | End: 2022-04-18 | Stop reason: SDUPTHER

## 2022-04-18 RX ORDER — BUPIVACAINE HYDROCHLORIDE 5 MG/ML
INJECTION, SOLUTION EPIDURAL; INTRACAUDAL PRN
Status: DISCONTINUED | OUTPATIENT
Start: 2022-04-18 | End: 2022-04-18 | Stop reason: ALTCHOICE

## 2022-04-18 RX ORDER — HEPARIN SODIUM (PORCINE) LOCK FLUSH IV SOLN 100 UNIT/ML 100 UNIT/ML
SOLUTION INTRAVENOUS PRN
Status: DISCONTINUED | OUTPATIENT
Start: 2022-04-18 | End: 2022-04-18 | Stop reason: ALTCHOICE

## 2022-04-18 RX ORDER — SODIUM CHLORIDE, SODIUM LACTATE, POTASSIUM CHLORIDE, CALCIUM CHLORIDE 600; 310; 30; 20 MG/100ML; MG/100ML; MG/100ML; MG/100ML
INJECTION, SOLUTION INTRAVENOUS CONTINUOUS
Status: DISCONTINUED | OUTPATIENT
Start: 2022-04-18 | End: 2022-04-18 | Stop reason: HOSPADM

## 2022-04-18 RX ORDER — SODIUM CHLORIDE 9 MG/ML
INJECTION, SOLUTION INTRAVENOUS PRN
Status: DISCONTINUED | OUTPATIENT
Start: 2022-04-18 | End: 2022-04-18 | Stop reason: HOSPADM

## 2022-04-18 RX ORDER — LIDOCAINE HYDROCHLORIDE 20 MG/ML
INJECTION, SOLUTION INTRAVENOUS PRN
Status: DISCONTINUED | OUTPATIENT
Start: 2022-04-18 | End: 2022-04-18 | Stop reason: SDUPTHER

## 2022-04-18 RX ORDER — ONDANSETRON 2 MG/ML
INJECTION INTRAMUSCULAR; INTRAVENOUS PRN
Status: DISCONTINUED | OUTPATIENT
Start: 2022-04-18 | End: 2022-04-18 | Stop reason: SDUPTHER

## 2022-04-18 RX ORDER — FENTANYL CITRATE 50 UG/ML
INJECTION, SOLUTION INTRAMUSCULAR; INTRAVENOUS PRN
Status: DISCONTINUED | OUTPATIENT
Start: 2022-04-18 | End: 2022-04-18 | Stop reason: SDUPTHER

## 2022-04-18 RX ORDER — MEPERIDINE HYDROCHLORIDE 25 MG/ML
12.5 INJECTION INTRAMUSCULAR; INTRAVENOUS; SUBCUTANEOUS EVERY 5 MIN PRN
Status: DISCONTINUED | OUTPATIENT
Start: 2022-04-18 | End: 2022-04-18 | Stop reason: HOSPADM

## 2022-04-18 RX ADMIN — LIDOCAINE HYDROCHLORIDE 80 MG: 20 INJECTION, SOLUTION INTRAVENOUS at 07:16

## 2022-04-18 RX ADMIN — PROPOFOL 80 MCG/KG/MIN: 10 INJECTION, EMULSION INTRAVENOUS at 07:17

## 2022-04-18 RX ADMIN — SODIUM CHLORIDE, POTASSIUM CHLORIDE, SODIUM LACTATE AND CALCIUM CHLORIDE: 600; 310; 30; 20 INJECTION, SOLUTION INTRAVENOUS at 06:14

## 2022-04-18 RX ADMIN — PROPOFOL 50 MG: 10 INJECTION, EMULSION INTRAVENOUS at 07:16

## 2022-04-18 RX ADMIN — PHENYLEPHRINE HYDROCHLORIDE 100 MCG: 10 INJECTION INTRAVENOUS at 07:41

## 2022-04-18 RX ADMIN — PHENYLEPHRINE HYDROCHLORIDE 100 MCG: 10 INJECTION INTRAVENOUS at 07:34

## 2022-04-18 RX ADMIN — DEXAMETHASONE SODIUM PHOSPHATE 4 MG: 4 INJECTION, SOLUTION INTRAMUSCULAR; INTRAVENOUS at 07:21

## 2022-04-18 RX ADMIN — ONDANSETRON 4 MG: 2 INJECTION INTRAMUSCULAR; INTRAVENOUS at 07:21

## 2022-04-18 RX ADMIN — CEFAZOLIN SODIUM 2000 MG: 10 INJECTION, POWDER, FOR SOLUTION INTRAVENOUS at 07:08

## 2022-04-18 RX ADMIN — FENTANYL CITRATE 25 MCG: 50 INJECTION, SOLUTION INTRAMUSCULAR; INTRAVENOUS at 07:16

## 2022-04-18 RX ADMIN — PHENYLEPHRINE HYDROCHLORIDE 100 MCG: 10 INJECTION INTRAVENOUS at 07:26

## 2022-04-18 RX ADMIN — FENTANYL CITRATE 25 MCG: 50 INJECTION, SOLUTION INTRAMUSCULAR; INTRAVENOUS at 07:20

## 2022-04-18 RX ADMIN — PHENYLEPHRINE HYDROCHLORIDE 100 MCG: 10 INJECTION INTRAVENOUS at 07:46

## 2022-04-18 RX ADMIN — PHENYLEPHRINE HYDROCHLORIDE 100 MCG: 10 INJECTION INTRAVENOUS at 07:29

## 2022-04-18 RX ADMIN — FAMOTIDINE 20 MG: 10 INJECTION, SOLUTION INTRAVENOUS at 07:09

## 2022-04-18 ASSESSMENT — PULMONARY FUNCTION TESTS
PIF_VALUE: 0

## 2022-04-18 ASSESSMENT — PAIN SCALES - GENERAL
PAINLEVEL_OUTOF10: 0
PAINLEVEL_OUTOF10: 0

## 2022-04-18 ASSESSMENT — LIFESTYLE VARIABLES: SMOKING_STATUS: 0

## 2022-04-18 ASSESSMENT — PAIN - FUNCTIONAL ASSESSMENT: PAIN_FUNCTIONAL_ASSESSMENT: 0-10

## 2022-04-18 NOTE — OP NOTE
OPERATIVE NOTE     Patient name: Lyndon Elliott  : 1940  MRN: [id number]     Pre-operative Diagnosis: Diffuse Large B Cell Lymphoma     Post-operative Diagnosis: same     Procedure: RIGHT INTERNAL JUGULAR TUNNELED TRIPLE LUMEN TRIFUSION CATHETER (23 cm) INSERTION WITH ULTRASOUND AND FLUOROSCOPIC GUIDANCE     Surgeon: Florecita Boyd MD    Assistant: Diomedes Aldana, PGY-2 resident    Anesthesia: MAC and Local     Estimated Blood Loss: minimal     Complications: None    Indication for procedure: Patient required Trifusion catheter placement as recommended by patient's oncologist.    Risks, benefits, and alternatives discussed with patient and any available family members in the preoperative area. Risks including but not limited to: bleeding, infection, hematoma, malposition, need for re-operation or removal, and pneumothorax. All questions answered and patient consented to proceed. Procedure Details: The patient was brought to the operating room and placed in the supine position with arms padded and tucked at sides. A towel roll was placed between the scapulae. The patient's bilateral upper chest and neck was then prepped and draped in sterile fashion using chlorhexidine solution. A time-out was performed confirming the patient's identity and operative site. Antibiotics were confirmed to be infusing. SCDs were on and functioning. Sedation was started by anesthesia. Patient was placed in Trendelenburg position. The right infraclavicular fossa was anesthetized with local anesthetic. The right subclavian vein was then attempted but unable to be cannulated after 2 attempts. The ultrasound probe was prepped into the sterile field. After additional local anesthetic, right internal jugular vein was entered  with return of venous blood using ultrasound guidance on the first attempt. Ultrasound picture obtained, printed, and placed into the chart for photodocumentation.  Guidewire was then positioned in the

## 2022-04-18 NOTE — H&P
Last H & P within the last 30 days. DIAGNOSIS:   FOLLICULAR DIFFUSE LARGE B CELL LYMPHOMA    Procedure(s):  INSERT TRIFUSION CENTRAL LINE     History obtained from: Patient interview and EHR     HISTORY OF PRESENT ILLNESS:   The patient is a [de-identified] y.o. male who has been diagnosed with follicular diffuse large B cell lymphoma. They present today for central line placement in anticipation of planned treatment. Illness Screening: Patient denies fever, chills, worsening cough, or close contact with sick individuals. Past Medical History: Follicular lymphoma ([facility] [address]     Past Surgical History:    ANKLE FRACTURE SURGERY Left   BIOPSY/EXCISION LYMPH NODE   FRACTURE SURGERY Left   ankle & leg   TONSILECTOMY, ADENOIDECTOMY, BILATERAL MYRINGOTOMY AND TUBES 1948           Medications Prior to Admission:      Prior to Admission medications    Medication Sig Start Date End Date Taking?  Authorizing Provider   prochlorperazine (COMPAZINE) 10 MG tablet Take 10 mg by mouth every 4-6 hours as needed 1/30/22  Yes Historical Provider, MD   prochlorperazine (COMPAZINE) 10 MG tablet  1/24/22   Historical Provider, MD   predniSONE (DELTASONE) 20 MG tablet  1/24/22   Historical Provider, MD   dexamethasone (DECADRON) 4 MG tablet  1/25/22   Historical Provider, MD   dexamethasone (DECADRON) 4 MG tablet Take 4 mg by mouth 2 times daily  Patient not taking: Reported on 4/18/2022 1/30/22   Historical Provider, MD   ELIQUIS 2.5 MG TABS tablet  3/18/22   Historical Provider, MD   allopurinol (ZYLOPRIM) 300 MG tablet  1/31/22   Historical Provider, MD   acyclovir (ZOVIRAX) 400 MG tablet  3/3/22   Historical Provider, MD   VITAMIN E PO Take 1 capsule by mouth daily    Historical Provider, MD   MULTIPLE VITAMINS PO Take 1 tablet by mouth 2 times daily    Historical Provider, MD   GLUCOSAMINE HCL PO Take 2 capsules by mouth 2 times daily    Historical Provider, MD   BETA CAROTENE PO Take 1 capsule by mouth daily    Historical Provider, MD   ASTRAGALUS PO Take 2 capsules by mouth 2 times daily    Historical Provider, MD         Allergies:  Patient has no known allergies. PHYSICAL EXAM:      /68   Pulse 72   Temp 97.8 °F (36.6 °C) (Temporal)   Resp 15   Ht 5' 11.75\" (1.822 m)   Wt 186 lb (84.4 kg)   SpO2 98%   BMI 25.40 kg/m²      Airway:  Airway patent with no audible stridor    Heart:  Regular rate and rhythm, No murmur noted    Lungs:  No increased work of breathing, good air exchange, clear to auscultation bilaterally, no crackles or wheezing    Abdomen:  Soft, non-distended, non-tender, no rebound tenderness or guarding, and no masses palpated    ASSESSMENT AND PLAN     Patient is a [de-identified] y.o. male with above specified procedure planned. 1.  The patients history and physical was obtained and signed off by the pre-admission testing department. Patient seen and focused exam done today- no new changes since last physical exam on 3/30/22    2. Access to ancillary services are available per request of the provider.     JOAQUIM Daniels - CNP     4/18/2022

## 2022-04-18 NOTE — BRIEF OP NOTE
Brief Postoperative Note      Patient: Aniya Dean  YOB: 1940  MRN: [id number]    Date of Procedure: 4/18/2022    Pre-Op Diagnosis: FOLLICULAR DIFFUSE LARGE B CELL LYMPHOMA    Post-Op Diagnosis: Same       Procedure(s):  INSERT TRIFUSION CENTRAL LINE     Surgeon(s):  Sj Lincoln MD    Assistant:  Resident: Michelle Wilson MD    Anesthesia: Monitor Anesthesia Care    Estimated Blood Loss (mL): 20    Complications: None    Specimens:   * No specimens in log *    Implants:  * No implants in log *      Drains: * No LDAs found *    Findings: Tunneled R IJ triple-lumen central venous catheter placement under ultrasound and fluoroscopic guidance; R subclavian access attempts unsuccessful    Electronically signed by Michelle Wilson MD on 4/18/2022 at 8:02 AM

## 2022-04-18 NOTE — ANESTHESIA POSTPROCEDURE EVALUATION
Department of Anesthesiology  Postprocedure Note    Patient: Silvina Rodríguez  MRN: [id number]  YOB: 1940  Date of evaluation: 4/18/2022  Time:  8:36 AM     Procedure Summary     Date: 04/18/22 Room / Location: [address] [place] [address]    Anesthesia Start: 0835 Anesthesia Stop: 7748    Procedure: RIGHT INTERNAL JUGULAR TRIFUSION CENTRAL LINE PLACEMENT (N/A Chest) Diagnosis: (FOLLICULAR DIFFUSE LARGE B CELL LYMPHOMA)    Surgeons: Blessing Zuniga MD Responsible Provider: Dahiana Loo DO    Anesthesia Type: MAC ASA Status: 3          Anesthesia Type: MAC    Gillian Phase I: Gillian Score: 9    Gillian Phase II:      Last vitals: Reviewed and per EMR flowsheets.        Anesthesia Post Evaluation    Patient location during evaluation: PACU  Patient participation: complete - patient participated  Level of consciousness: awake and alert  Pain score: 0  Airway patency: patent  Nausea & Vomiting: no nausea and no vomiting  Complications: no  Cardiovascular status: hemodynamically stable  Respiratory status: acceptable  Hydration status: stable

## 2022-04-18 NOTE — PROGRESS NOTES
Pt arrived from [address] no CO pain or nausea report received from DR oakes and MARIUSZ Drake

## 2022-04-18 NOTE — ANESTHESIA PRE PROCEDURE
Department of Anesthesiology  Preprocedure Note       Name:  Ngoc Neff   Age:  [de-identified] y.o.  :  1940                                          MRN:  [id number]         Date:  2022      Surgeon: Angela Clark):  Isabel Piper MD    Procedure: Procedure(s):  INSERT TRIFUSION CENTRAL LINE    Medications prior to admission:   Prior to Admission medications    Medication Sig Start Date End Date Taking?  Authorizing Provider   prochlorperazine (COMPAZINE) 10 MG tablet Take 10 mg by mouth every 4-6 hours as needed 22  Yes Historical Provider, MD   prochlorperazine (COMPAZINE) 10 MG tablet  22   Historical Provider, MD   predniSONE (DELTASONE) 20 MG tablet  22   Historical Provider, MD   dexamethasone (DECADRON) 4 MG tablet  22   Historical Provider, MD   dexamethasone (DECADRON) 4 MG tablet Take 4 mg by mouth 2 times daily  Patient not taking: Reported on 2022   Historical Provider, MD   ELIQUIS 2.5 MG TABS tablet  3/18/22   Historical Provider, MD   allopurinol (ZYLOPRIM) 300 MG tablet  22   Historical Provider, MD   acyclovir (ZOVIRAX) 400 MG tablet  3/3/22   Historical Provider, MD   VITAMIN E PO Take 1 capsule by mouth daily    Historical Provider, MD   MULTIPLE VITAMINS PO Take 1 tablet by mouth 2 times daily    Historical Provider, MD   GLUCOSAMINE HCL PO Take 2 capsules by mouth 2 times daily    Historical Provider, MD   BETA CAROTENE PO Take 1 capsule by mouth daily    Historical Provider, MD   ASTRAGALUS PO Take 2 capsules by mouth 2 times daily    Historical Provider, MD       Current medications:    Current Facility-Administered Medications   Medication Dose Route Frequency Provider Last Rate Last Admin    ceFAZolin (ANCEF) 2000 mg in dextrose 5 % 50 mL IVPB  2,000 mg IntraVENous Once Isabel Piper MD        lactated ringers infusion   IntraVENous Continuous Isaiah Ames  mL/hr at 22 0614 New Bag at 22 0614    sodium chloride flush 0.9 % injection 5-40 mL  5-40 mL IntraVENous 2 times per day Eusebio Platts, DO        sodium chloride flush 0.9 % injection 5-40 mL  5-40 mL IntraVENous PRN Eusebio Platts, DO        0.9 % sodium chloride infusion   IntraVENous PRN Eusebio Platts, DO        meperidine (DEMEROL) injection 12.5 mg  12.5 mg IntraVENous Q5 Min PRN Eusebio Platts, DO        HYDROmorphone (DILAUDID) injection 0.25 mg  0.25 mg IntraVENous Q5 Min PRN Eusebio Platts, DO        HYDROmorphone (DILAUDID) injection 0.5 mg  0.5 mg IntraVENous Q5 Min PRN Eusebio Platts, DO        ondansetron [facility]) injection 4 mg  4 mg IntraVENous Once PRN Eusebio Platts, DO        prochlorperazine (COMPAZINE) 5 mg in sodium chloride (PF) 10 mL injection  5 mg IntraVENous Once PRN Eusebio Platts, DO        diphenhydrAMINE (BENADRYL) injection 12.5 mg  12.5 mg IntraVENous Once PRN Eusebio Platts, DO        hydrALAZINE (APRESOLINE) injection 10 mg  10 mg IntraVENous Q15 Min PRN Eusebio Platts, DO           Allergies:  No Known Allergies    Problem List:    Patient Active Problem List   Diagnosis Code    [facility] cell [facility] C83.30       Past Medical History:  History reviewed. No pertinent past medical history. Past Surgical History:  History reviewed. No pertinent surgical history.     Social History:    Social History     Tobacco Use    Smoking status: Not on file    Smokeless tobacco: Not on file   Substance Use Topics    Alcohol use: Not on file                                Counseling given: Not Answered      Vital Signs (Current):   Vitals:    04/07/22 1535 04/18/22 0558 04/18/22 0601   BP:   134/68   Pulse:  72    Resp:  15    Temp:  97.8 °F (36.6 °C)    TempSrc:  Temporal    SpO2:  98%    Weight: 188 lb (85.3 kg) 186 lb (84.4 kg)    Height: 5' 11.5\" (1.816 m) 5' 11.75\" (1.822 m)                                               BP Readings from Last 3 Encounters:   04/18/22 134/68   03/21/22 (!) 153/77       NPO Status: Time of last liquid consumption: 2300                        Time of last solid consumption: 1800                        Date of last liquid consumption: 04/17/22                        Date of last solid food consumption: 04/17/22    BMI:   Wt Readings from Last 3 Encounters:   04/18/22 186 lb (84.4 kg)   03/21/22 188 lb 7.9 oz (85.5 kg)     Body mass index is 25.4 kg/m².     CBC:   Lab Results   Component Value Date    WBC 12.3 03/21/2022    RBC 3.24 03/21/2022    HGB 9.9 03/21/2022    HCT 30.6 03/21/2022    MCV 94.6 03/21/2022    RDW 16.2 03/21/2022     03/21/2022       CMP:   Lab Results   Component Value Date     03/21/2022    K 3.9 03/21/2022     03/21/2022    CO2 24 03/21/2022    BUN 19 03/21/2022    CREATININE 1.0 03/21/2022    GFRAA >60 03/21/2022    AGRATIO 1.4 03/21/2022    LABGLOM >60 03/21/2022    GLUCOSE 122 03/21/2022    PROT 6.7 03/21/2022    CALCIUM 9.5 03/21/2022    BILITOT 0.3 03/21/2022    ALKPHOS 88 03/21/2022    AST 17 03/21/2022    ALT 13 03/21/2022       POC Tests:   Recent Labs     04/18/22  0634   POCGLU 112*       Coags:   Lab Results   Component Value Date    PROTIME 12.1 04/18/2022    INR 1.07 04/18/2022    APTT 34.7 04/18/2022       HCG (If Applicable): No results found for: PREGTESTUR, PREGSERUM, HCG, HCGQUANT     ABGs: No results found for: PHART, PO2ART, ECS5OKX, KIC9URQ, BEART, [id number]     Type & Screen (If Applicable):  No results found for: LABABO, LABRH    Drug/Infectious Status (If Applicable):  No results found for: HIV, HEPCAB    COVID-19 Screening (If Applicable): No results found for: COVID19        Anesthesia Evaluation  Patient summary reviewed and Nursing notes reviewed no history of anesthetic complications:   Airway: Mallampati: II  TM distance: >3 FB   Neck ROM: full  Mouth opening: > = 3 FB Dental: normal exam         Pulmonary: breath sounds clear to auscultation      (-) not a current smoker (never)                           Cardiovascular:  Exercise tolerance: good (>4 METS),       (-) past MI    NYHA Classification: II    Rhythm: regular  Rate: normal           Beta Blocker:  Not on Beta Blocker         Neuro/Psych:   Negative Neuro/Psych ROS              GI/Hepatic/Renal:        (-) GERD       Endo/Other:    (+) malignancy/cancer (lymphoma). Abdominal:             Vascular: negative vascular ROS. Other Findings:             Anesthesia Plan      MAC     ASA 3       Induction: intravenous. MIPS: Prophylactic antiemetics administered. Anesthetic plan and risks discussed with patient and spouse. Plan discussed with CRNA.     Attending anesthesiologist reviewed and agrees with Preprocedure content              Abel Bain DO   4/18/2022

## 2022-04-18 NOTE — PROGRESS NOTES
Ambulatory Surgery/Procedure Discharge Note    Vitals:    04/18/22 0931   BP: (!) 152/84   Pulse: 67   Resp: 14   Temp: 97 °F (36.1 °C)   SpO2: 100%       In: 960 [P.O.:360; I.V.:550]  Out: 20      BP meets vanessa standards. Restroom use offered before discharge. Yes - void per bathroom    Pain assessment:  none  Pain Level: 0    Tegaderm on right trifusion catheter surgical site is clean;dry;intact. Pt tolerating water with no nausea or vomiting. Patient discharged to home/self care.  Patient discharged via wheel chair by transporter to waiting family/S.O.       4/18/2022 9:49 AM

## 2022-04-18 NOTE — PROGRESS NOTES
PACU Transfer to [facility]    Vitals:    04/18/22 0855   BP: 135/84   Pulse: 53   Resp: 11   Temp: 97.4 °F (36.3 °C)   SpO2: 100%         Intake/Output Summary (Last 24 hours) at 4/18/2022 0923  Last data filed at 4/18/2022 0855  Gross per 24 hour   Intake 960 ml   Output 20 ml   Net 940 ml       Pain assessment:  present - adequately treated  Pain Level: 0    Patient transferred to care of MINNIE RN.    4/18/2022 9:23 AM

## 2022-04-19 ENCOUNTER — HOSPITAL ENCOUNTER (OUTPATIENT)
Dept: ONCOLOGY | Age: 82
Setting detail: INFUSION SERIES
Discharge: HOME | End: 2022-04-19
Payer: MEDICARE

## 2022-04-19 DIAGNOSIS — C83.30 DIFFUSE LARGE B-CELL LYMPHOMA, UNSPECIFIED BODY REGION (HCC): Primary | ICD-10-CM

## 2022-04-19 LAB
A/G RATIO: 2 (ref 1.1–2.2)
ALBUMIN SERPL-MCNC: 4.3 G/DL (ref 3.4–5)
ALP BLD-CCNC: 71 U/L (ref 40–129)
ALT SERPL-CCNC: 13 U/L (ref 10–40)
ANION GAP SERPL CALCULATED.3IONS-SCNC: 12 MMOL/L (ref 3–16)
AST SERPL-CCNC: 16 U/L (ref 15–37)
BASOPHILS ABSOLUTE: 0 K/UL (ref 0–0.2)
BASOPHILS ABSOLUTE: 0 K/UL (ref 0–0.2)
BASOPHILS RELATIVE PERCENT: 0.5 %
BASOPHILS RELATIVE PERCENT: 0.6 %
BILIRUB SERPL-MCNC: <0.2 MG/DL (ref 0–1)
BUN BLDV-MCNC: 22 MG/DL (ref 7–20)
CALCIUM SERPL-MCNC: 9.2 MG/DL (ref 8.3–10.6)
CHLORIDE BLD-SCNC: 103 MMOL/L (ref 99–110)
CO2: 23 MMOL/L (ref 21–32)
CREAT SERPL-MCNC: 1.1 MG/DL (ref 0.8–1.3)
EOSINOPHILS ABSOLUTE: 0.3 K/UL (ref 0–0.6)
EOSINOPHILS ABSOLUTE: 0.3 K/UL (ref 0–0.6)
EOSINOPHILS RELATIVE PERCENT: 3.8 %
EOSINOPHILS RELATIVE PERCENT: 4.5 %
GFR AFRICAN AMERICAN: >60
GFR NON-AFRICAN AMERICAN: >60
GLUCOSE BLD-MCNC: 148 MG/DL (ref 70–99)
HCT VFR BLD CALC: 29.2 % (ref 40.5–52.5)
HCT VFR BLD CALC: 31.3 % (ref 40.5–52.5)
HEMOGLOBIN: 10.5 G/DL (ref 13.5–17.5)
HEMOGLOBIN: 9.7 G/DL (ref 13.5–17.5)
LYMPHOCYTES ABSOLUTE: 0.9 K/UL (ref 1–5.1)
LYMPHOCYTES ABSOLUTE: 1.6 K/UL (ref 1–5.1)
LYMPHOCYTES RELATIVE PERCENT: 13 %
LYMPHOCYTES RELATIVE PERCENT: 18.7 %
MAGNESIUM: 1.9 MG/DL (ref 1.8–2.4)
MCH RBC QN AUTO: 32.1 PG (ref 26–34)
MCH RBC QN AUTO: 32.3 PG (ref 26–34)
MCHC RBC AUTO-ENTMCNC: 33.2 G/DL (ref 31–36)
MCHC RBC AUTO-ENTMCNC: 33.5 G/DL (ref 31–36)
MCV RBC AUTO: 96.5 FL (ref 80–100)
MCV RBC AUTO: 96.7 FL (ref 80–100)
MONOCYTES ABSOLUTE: 0.6 K/UL (ref 0–1.3)
MONOCYTES ABSOLUTE: 0.8 K/UL (ref 0–1.3)
MONOCYTES RELATIVE PERCENT: 8.1 %
MONOCYTES RELATIVE PERCENT: 9.5 %
NEUTROPHILS ABSOLUTE: 5.3 K/UL (ref 1.7–7.7)
NEUTROPHILS ABSOLUTE: 5.7 K/UL (ref 1.7–7.7)
NEUTROPHILS RELATIVE PERCENT: 67.5 %
NEUTROPHILS RELATIVE PERCENT: 73.8 %
PDW BLD-RTO: 16.2 % (ref 12.4–15.4)
PDW BLD-RTO: 16.6 % (ref 12.4–15.4)
PLATELET # BLD: 138 K/UL (ref 135–450)
PLATELET # BLD: 178 K/UL (ref 135–450)
PMV BLD AUTO: 8.6 FL (ref 5–10.5)
PMV BLD AUTO: 9.1 FL (ref 5–10.5)
POTASSIUM SERPL-SCNC: 3.9 MMOL/L (ref 3.5–5.1)
RBC # BLD: 3.02 M/UL (ref 4.2–5.9)
RBC # BLD: 3.25 M/UL (ref 4.2–5.9)
SODIUM BLD-SCNC: 138 MMOL/L (ref 136–145)
TOTAL PROTEIN: 6.4 G/DL (ref 6.4–8.2)
WBC # BLD: 7.2 K/UL (ref 4–11)
WBC # BLD: 8.4 K/UL (ref 4–11)

## 2022-04-19 PROCEDURE — 36592 COLLECT BLOOD FROM PICC: CPT

## 2022-04-19 PROCEDURE — 80053 COMPREHEN METABOLIC PANEL: CPT

## 2022-04-19 PROCEDURE — 83735 ASSAY OF MAGNESIUM: CPT

## 2022-04-19 PROCEDURE — 85025 COMPLETE CBC W/AUTO DIFF WBC: CPT

## 2022-04-19 RX ORDER — CALCIUM GLUCONATE 20 MG/ML
2000 INJECTION, SOLUTION INTRAVENOUS
Status: ACTIVE | OUTPATIENT
Start: 2022-04-19 | End: 2022-04-19

## 2022-04-19 RX ORDER — PROCHLORPERAZINE MALEATE 10 MG
10 TABLET ORAL EVERY 6 HOURS PRN
Status: CANCELLED | OUTPATIENT
Start: 2022-04-20

## 2022-04-19 RX ORDER — PROCHLORPERAZINE MALEATE 10 MG
10 TABLET ORAL EVERY 6 HOURS PRN
Status: DISCONTINUED | OUTPATIENT
Start: 2022-04-19 | End: 2022-04-19

## 2022-04-19 RX ORDER — HEPARIN SODIUM (PORCINE) LOCK FLUSH IV SOLN 100 UNIT/ML 100 UNIT/ML
1500 SOLUTION INTRAVENOUS PRN
Status: CANCELLED
Start: 2022-04-20

## 2022-04-19 NOTE — H&P
CAR-T Cell Collection History and Physical        Attending Physician: No att. providers found    Primary Care: Hailey Cohen       Referring MD: Crystal Gonzalez MD  [address] [facility] [address]  [place],  [address]    Name: Miko Hall :  1940  MRN:  [id number]    Admission: 2022      Date: 2022    CAR-T Cell Collection    Dx: [facility] NHL    Indication: Anticipation of CAR-T cell Infusion    Day 1 of CAR-T cell collection     History of Present Illness: The patient is an 47-year-old man with a history of diffuse large cell B-cell non-Hodgkin's lymphoma. He presented 2021 with knee pain and swelling. Biopsy showed B-cell non-Hodgkin's lymphoma with a Ki-67 of about 60%. Baseline PET/CT from 2022 shows extensive hypermetabolic metabolism in the retroperitoneum, left pelvis and left lower extremity consistent with lymphoma. He has a history of follicular lymphoma diagnosed  (bone marrow involvement). He was treated with CHOP chemotherapy from 1995 to 1996. He then had a recurrence and received rituximab times 2003 followed by maintenance rituximab 2004 and 2004. He then received R-CHOP beginning 2007 followed by maintenance rituximab from 2008 to 2009. Most recently he received R-[place]/Ox X 2 with F/U PET 3/1/22 showing progression in the retroperitoneum, left hemipelvis and left LE. He now presents to [facility] for CAR-T cell collection in preporation for [place] CAR-T Infusion. He will receive Bridging chemotherapy tomorrow at [facility] and then return to [facility] on , , &  for Lymphodepleting Chemo, followed by [facility] CAR-T Infusion on 2022. He feels well today but has a chronically swollen left leg. He remains active on a daily basis. He denies fever, chills, bleeding, or GI changes. Eating and drinking well.          Pre Transplant Workup:        Pre Transplant labs:     3/21/2022 09:40   Total IgG 776.0   Herpes Type 1/2 IgM Combined 0.52   Hep B E Ab Negative   Hep B S Ab <3.50   Hep B Core Ab, IgM Non-reactive        3/21/2022 09:40   EBV VCA IgG 25.4 (H)   EBV VCA IgM 18.9   CMV IgM <8.0   EBV Early Ag Ab <5.0   VARICELLA ZOSTER AB IGM 0.04   Herpes Type 1/2 IgM Combined 0.52   Ari Barr virus nuclear Ab IgG 185.0 (H)       Past Surgical History:   Procedure Laterality Date    CATHETER INSERTION N/A 4/18/2022    RIGHT INTERNAL JUGULAR TRIFUSION CENTRAL LINE PLACEMENT performed by César Conte MD at [address]       No past medical history on file. Prior to Admission medications    Medication Sig Start Date End Date Taking? Authorizing Provider   prochlorperazine (COMPAZINE) 10 MG tablet Take 10 mg by mouth every 4-6 hours as needed 1/30/22   Historical Provider, MD   ELIQUIS 2.5 MG TABS tablet  3/18/22   Historical Provider, MD   allopurinol (ZYLOPRIM) 300 MG tablet  1/31/22   Historical Provider, MD   acyclovir (ZOVIRAX) 400 MG tablet  3/3/22   Historical Provider, MD   VITAMIN E PO Take 1 capsule by mouth daily    Historical Provider, MD   MULTIPLE VITAMINS PO Take 1 tablet by mouth 2 times daily    Historical Provider, MD   GLUCOSAMINE HCL PO Take 2 capsules by mouth 2 times daily    Historical Provider, MD   BETA CAROTENE PO Take 1 capsule by mouth daily  Patient not taking: Reported on 4/19/2022    Historical Provider, MD   ASTRAGALUS PO Take 2 capsules by mouth 2 times daily    Historical Provider, MD       No Known Allergies    No family history on file.      Social History     Socioeconomic History    Marital status:      Spouse name: Not on file    Number of children: Not on file    Years of education: Not on file    Highest education level: Not on file   Occupational History    Not on file   Tobacco Use    Smoking status: Not on file    Smokeless tobacco: Not on file   Substance and Sexual Activity    Alcohol use: Not on file    Drug use: Not on file    Sexual activity: Not on file   Other Topics Concern    Not on file   Social History Narrative    Not on file     Social Determinants of Health     Financial Resource Strain:     Difficulty of Paying Living Expenses: Not on file   Food Insecurity:     Worried About Running Out of Food in the Last Year: Not on file    Xavi of Food in the Last Year: Not on file   Transportation Needs:     Lack of Transportation (Medical): Not on file    Lack of Transportation (Non-Medical): Not on file   Physical Activity:     Days of Exercise per Week: Not on file    Minutes of Exercise per Session: Not on file   Stress:     Feeling of Stress : Not on file   Social Connections:     Frequency of Communication with Friends and Family: Not on file    Frequency of Social Gatherings with Friends and Family: Not on file    Attends Sikh Services: Not on file    Active Member of [address] [place] [address] or Organizations: Not on file    Attends Club or Organization Meetings: Not on file    Marital Status: Not on file   Intimate Partner Violence:     Fear of Current or Ex-Partner: Not on file    Emotionally Abused: Not on file    Physically Abused: Not on file    Sexually Abused: Not on file   Housing Stability:     Unable to Pay for Housing in the Last Year: Not on file    Number of [place] in the Last Year: Not on file    Unstable Housing in the Last Year: Not on file        ROS:  As noted above, otherwise remainder of 10-point ROS negative      Physical Exam:     Vital Signs: There were no vitals taken for this visit. Weight:    Wt Readings from Last 3 Encounters:   04/18/22 186 lb (84.4 kg)   03/21/22 188 lb 7.9 oz (85.5 kg)       KPS: 90% Able to carry on normal activity; minor signs or symptoms of disease    ECOG PS:  (1) Restricted in physically strenuous activity, ambulatory and able to do work of light nature    General: Awake, alert and oriented.   HEENT: normocephalic, alopecia, PERRL, no scleral erythema or icterus, Oral mucosa moist and intact, throat clear  NECK: supple without palpable adenopathy  BACK: Straight negative CVAT  SKIN: warm dry and intact without lesions rashes or masses  CHEST: CTA bilaterally without use of accessory muscles  CV: Normal S1 S2, RRR, no MRG  ABD: NT ND normoactive BS, no palpable masses or hepatosplenomegaly  EXTREMITIES: LLE Edema from the inguinal region distally. It is improved. He can extend to 180 degrees and flex to 90. The right lower extremity is normal, denies calf tenderness  NEURO: CN II - XII grossly intact  CATHETER: RIJ Trifusion (IR: Jermaine, 4/15/22): CDI    Laboratory Data:  CBC:   Recent Labs     04/19/22  0757 04/19/22  1015   WBC 8.4 7.2   HGB 10.5* 9.7*   HCT 31.3* 29.2*   MCV 96.5 96.7    138     BMP/Mag:  Recent Labs     04/19/22  0757      K 3.9      CO2 23   BUN 22*   CREATININE 1.1   MG 1.90     LIVP:   Recent Labs     04/19/22  0757   AST 16   ALT 13   BILITOT <0.2   ALKPHOS 71     Coags:   Recent Labs     04/18/22  0623   PROTIME 12.1   INR 1.07   APTT 34.7     Uric Acid No results for input(s): LABURIC in the last 72 hours. PROBLEM LIST:          1. Follicular lymphoma with BM involvement  2. DLBC NHL      TREATMENT:            1. FL: 1/1995 CHOP             11/2003 to 8/2004 Rituxan             7/2007 R-CHOP  2. DLBCL: R-[place]/Ox C1 1/31/2022, C2 02/14/2022                     Chip-BR C1D1: 3/22/22              C2D1: 4/20/22     ASSESSMENT AND PLAN:            1. FL transformed into DLBC NHL: progressive disease     Description of procedure:   Pt underwent a 7-12 lt collection at a flow rate of 20mls/min. Pt tolerated the procedure well with no complications. He received Ca gluconate empirically. Access was via [email or web address] cath which is intact at the insertion site. Day + 1 CAR-T cell collection, goal is ALL cd 3+/kg.  he will be notified of the cell number later this evening. Toxicities: mild weakness and fatigue     2. ID:  Afebrile, no evidence of infection.    - Start Levaquin, Valtrex & Diflucan when ANC < 1.5    3. Heme: Anemia d/t chemotherapy  - Transfuse for Hgb < 7 and Platelets < 77P  - No transfusion today    4. Metabolic: Mild Hyperglycemia, otherwise Electrolytes are WNL and renal fxn stable. - Replace potassium and magnesium per policy. 5. Nutrition:  Appetite and oral intake is good. - Start low microbial diet   - Follow closely with dietary    - Disposition: Patient will be notified of cell number later this evening. He will return to [facility] tomorrow, 4/20/22, for bridging chemotherapy. The patient was seen and examined by Dr. Michael Mancuso. This admission history and physical has been discussed and agreed upon by Dr. Michael Mancuso.     Sissy Mcqueen MD

## 2022-04-19 NOTE — H&P
CAR-T Cell Collection History and Physical        Attending Physician: No att. providers found    Primary Care: Mylene Lam       Referring MD: Thomas Tarango MD  [address] [facility] [address]  [place],  [address]    Name: Jose Garner :  1940  MRN:  [id number]    Admission: 2022      Date: 2022    CAR-T Cell Collection    Dx: [facility] NHL    Indication: Anticipation of CAR-T cell Infusion    Day 1 of CAR-T cell collection     History of Present Illness: The patient is an 57-year-old man with a history of diffuse large cell B-cell non-Hodgkin's lymphoma. He presented 2021 with knee pain and swelling. Biopsy showed B-cell non-Hodgkin's lymphoma with a Ki-67 of about 60%. Baseline PET/CT from 2022 shows extensive hypermetabolic metabolism in the retroperitoneum, left pelvis and left lower extremity consistent with lymphoma. He has a history of follicular lymphoma diagnosed  (bone marrow involvement). He was treated with CHOP chemotherapy from 1995 to 1996. He then had a recurrence and received rituximab times 2003 followed by maintenance rituximab 2004 and 2004. He then received R-CHOP beginning 2007 followed by maintenance rituximab from 2008 to 2009. Most recently he received R-[place]/Ox X 2 with F/U PET 3/1/22 showing progression in the retroperitoneum, left hemipelvis and left LE. He now presents to [facility] for CAR-T cell collection in preporation for [facility] CAR-T Infusion. He will receive Bridging chemotherapy tomorrow at [facility] and then return to [facility] on , , &  for Lymphodepleting Chemo, followed by [facility] CAR-T Infusion on 2022. He feels well today but has a chronically swollen left leg. He remains active on a daily basis. He denies fever, chills, bleeding, or GI changes. Eating and drinking well.          Pre Transplant Workup:        Pre Transplant labs:     3/21/2022 09:40   Total IgG 776.0   Herpes Type 1/2 IgM Combined 0.52   Hep B E Ab Negative   Hep B S Ab <3.50   Hep B Core Ab, IgM Non-reactive        3/21/2022 09:40   EBV VCA IgG 25.4 (H)   EBV VCA IgM 18.9   CMV IgM <8.0   EBV Early Ag Ab <5.0   VARICELLA ZOSTER AB IGM 0.04   Herpes Type 1/2 IgM Combined 0.52   Ari Barr virus nuclear Ab IgG 185.0 (H)       Past Surgical History:   Procedure Laterality Date    CATHETER INSERTION N/A 4/18/2022    RIGHT INTERNAL JUGULAR TRIFUSION CENTRAL LINE PLACEMENT performed by Gaby Bowman MD at [address]       No past medical history on file. Prior to Admission medications    Medication Sig Start Date End Date Taking? Authorizing Provider   prochlorperazine (COMPAZINE) 10 MG tablet Take 10 mg by mouth every 4-6 hours as needed 1/30/22   Historical Provider, MD   ELIQUIS 2.5 MG TABS tablet  3/18/22   Historical Provider, MD   allopurinol (ZYLOPRIM) 300 MG tablet  1/31/22   Historical Provider, MD   acyclovir (ZOVIRAX) 400 MG tablet  3/3/22   Historical Provider, MD   VITAMIN E PO Take 1 capsule by mouth daily    Historical Provider, MD   MULTIPLE VITAMINS PO Take 1 tablet by mouth 2 times daily    Historical Provider, MD   GLUCOSAMINE HCL PO Take 2 capsules by mouth 2 times daily    Historical Provider, MD   BETA CAROTENE PO Take 1 capsule by mouth daily  Patient not taking: Reported on 4/19/2022    Historical Provider, MD   ASTRAGALUS PO Take 2 capsules by mouth 2 times daily    Historical Provider, MD       No Known Allergies    No family history on file.      Social History     Socioeconomic History    Marital status:      Spouse name: Not on file    Number of children: Not on file    Years of education: Not on file    Highest education level: Not on file   Occupational History    Not on file   Tobacco Use    Smoking status: Not on file    Smokeless tobacco: Not on file   Substance and Sexual Activity    Alcohol use: Not on file    Drug use: Not on file    Sexual activity: Not on file   Other Topics Concern    Not on file   Social History Narrative    Not on file     Social Determinants of Health     Financial Resource Strain:     Difficulty of Paying Living Expenses: Not on file   Food Insecurity:     Worried About Running Out of Food in the Last Year: Not on file    Xavi of Food in the Last Year: Not on file   Transportation Needs:     Lack of Transportation (Medical): Not on file    Lack of Transportation (Non-Medical): Not on file   Physical Activity:     Days of Exercise per Week: Not on file    Minutes of Exercise per Session: Not on file   Stress:     Feeling of Stress : Not on file   Social Connections:     Frequency of Communication with Friends and Family: Not on file    Frequency of Social Gatherings with Friends and Family: Not on file    Attends Jainism Services: Not on file    Active Member of [address] [place] [address] or Organizations: Not on file    Attends Club or Organization Meetings: Not on file    Marital Status: Not on file   Intimate Partner Violence:     Fear of Current or Ex-Partner: Not on file    Emotionally Abused: Not on file    Physically Abused: Not on file    Sexually Abused: Not on file   Housing Stability:     Unable to Pay for Housing in the Last Year: Not on file    Number of [place] in the Last Year: Not on file    Unstable Housing in the Last Year: Not on file        ROS:  As noted above, otherwise remainder of 10-point ROS negative      Physical Exam:     Vital Signs: There were no vitals taken for this visit. Weight:    Wt Readings from Last 3 Encounters:   04/18/22 186 lb (84.4 kg)   03/21/22 188 lb 7.9 oz (85.5 kg)       KPS: 90% Able to carry on normal activity; minor signs or symptoms of disease    ECOG PS:  (1) Restricted in physically strenuous activity, ambulatory and able to do work of light nature    General: Awake, alert and oriented.   HEENT: normocephalic, alopecia, PERRL, no scleral erythema or icterus, Oral mucosa moist and intact, throat clear  NECK: supple without palpable adenopathy  BACK: Straight negative CVAT  SKIN: warm dry and intact without lesions rashes or masses  CHEST: CTA bilaterally without use of accessory muscles  CV: Normal S1 S2, RRR, no MRG  ABD: NT ND normoactive BS, no palpable masses or hepatosplenomegaly  EXTREMITIES: LLE Edema from the inguinal region distally. It is improved. He can extend to 180 degrees and flex to 90. The right lower extremity is normal, denies calf tenderness  NEURO: CN II - XII grossly intact  CATHETER: RIJ Trifusion (IR: Jermaine, 4/15/22): CDI    Laboratory Data:  CBC:   Recent Labs     04/19/22  0757   WBC 8.4   HGB 10.5*   HCT 31.3*   MCV 96.5        BMP/Mag:  Recent Labs     04/19/22  0757      K 3.9      CO2 23   BUN 22*   CREATININE 1.1   MG 1.90     LIVP:   Recent Labs     04/19/22  0757   AST 16   ALT 13   BILITOT <0.2   ALKPHOS 71     Coags:   Recent Labs     04/18/22  0623   PROTIME 12.1   INR 1.07   APTT 34.7     Uric Acid No results for input(s): LABURIC in the last 72 hours. PROBLEM LIST:          1. Follicular lymphoma with BM involvement  2. DLBC NHL      TREATMENT:            1. FL: 1/1995 CHOP             11/2003 to 8/2004 Rituxan             7/2007 R-CHOP  2. DLBCL: R-[place]/Ox C1 1/31/2022, C2 02/14/2022                     Chip-BR C1D1: 3/22/22              C2D1: 4/20/22     ASSESSMENT AND PLAN:            1. FL transformed into DLBC NHL: progressive disease     Description of procedure:   Pt underwent a 7-12 lt collection at a flow rate of 20mls/min. Pt tolerated the procedure well with no complications. He received Ca gluconate empirically. Access was via [email or web address] cath which is intact at the insertion site. Day + 1 CAR-T cell collection, goal is ALL cd 3+/kg. he will be notified of the cell number later this evening.      Toxicities: mild weakness and fatigue     2. ID:  Afebrile, no evidence of infection.    - Start Levaquin, Valtrex & Diflucan when ANC < 1.5    3. Heme: Anemia d/t chemotherapy  - Transfuse for Hgb < 7 and Platelets < 65A  - No transfusion today    4. Metabolic: Mild Hyperglycemia, otherwise Electrolytes are WNL and renal fxn stable. - Replace potassium and magnesium per policy. 5. Nutrition:  Appetite and oral intake is good. - Start low microbial diet   - Follow closely with dietary    - Disposition: Patient will be notified of cell number later this evening. He will return to [facility] tomorrow, 4/20/22, for bridging chemotherapy. The patient was seen and examined by Dr. Kendrick Brooks. This admission history and physical has been discussed and agreed upon by Dr. Kendrick Brooks.     JOAQUIM Vega - NP

## 2022-04-21 RX ORDER — HEPARIN SODIUM (PORCINE) LOCK FLUSH IV SOLN 100 UNIT/ML 100 UNIT/ML
1500 SOLUTION INTRAVENOUS PRN
Status: CANCELLED
Start: 2022-04-22

## 2022-04-21 RX ORDER — PROCHLORPERAZINE MALEATE 10 MG
10 TABLET ORAL EVERY 6 HOURS PRN
Status: CANCELLED | OUTPATIENT
Start: 2022-04-22

## 2022-05-10 RX ORDER — 0.9 % SODIUM CHLORIDE 0.9 %
1000 INTRAVENOUS SOLUTION INTRAVENOUS ONCE
Status: CANCELLED | OUTPATIENT
Start: 2022-05-18

## 2022-05-10 RX ORDER — ONDANSETRON 4 MG/1
16 TABLET, FILM COATED ORAL ONCE
Status: CANCELLED | OUTPATIENT
Start: 2022-05-18

## 2022-05-10 RX ORDER — ALLOPURINOL 300 MG/1
300 TABLET ORAL ONCE
Status: CANCELLED | OUTPATIENT
Start: 2022-05-18 | End: 2022-05-18

## 2022-05-10 RX ORDER — PROCHLORPERAZINE MALEATE 10 MG
5 TABLET ORAL EVERY 6 HOURS PRN
Status: CANCELLED | OUTPATIENT
Start: 2022-05-18

## 2022-05-18 ENCOUNTER — HOSPITAL ENCOUNTER (OUTPATIENT)
Dept: ONCOLOGY | Age: 82
Setting detail: INFUSION SERIES
Discharge: HOME | End: 2022-05-18
Payer: MEDICARE

## 2022-05-18 VITALS
TEMPERATURE: 97.7 F | DIASTOLIC BLOOD PRESSURE: 69 MMHG | RESPIRATION RATE: 16 BRPM | SYSTOLIC BLOOD PRESSURE: 136 MMHG | BODY MASS INDEX: 24.4 KG/M2 | OXYGEN SATURATION: 98 % | HEIGHT: 72 IN | WEIGHT: 180.12 LBS | HEART RATE: 59 BPM

## 2022-05-18 DIAGNOSIS — C83.30 DIFFUSE LARGE B-CELL LYMPHOMA, UNSPECIFIED BODY REGION (HCC): Primary | ICD-10-CM

## 2022-05-18 LAB
ALBUMIN SERPL-MCNC: 3.9 G/DL (ref 3.4–5)
ALP BLD-CCNC: 63 U/L (ref 40–129)
ALT SERPL-CCNC: 12 U/L (ref 10–40)
ANION GAP SERPL CALCULATED.3IONS-SCNC: 12 MMOL/L (ref 3–16)
AST SERPL-CCNC: 18 U/L (ref 15–37)
BASOPHILS ABSOLUTE: 0.1 K/UL (ref 0–0.2)
BASOPHILS RELATIVE PERCENT: 1.4 %
BILIRUB SERPL-MCNC: <0.2 MG/DL (ref 0–1)
BILIRUBIN DIRECT: <0.2 MG/DL (ref 0–0.3)
BILIRUBIN, INDIRECT: NORMAL MG/DL (ref 0–1)
BUN BLDV-MCNC: 22 MG/DL (ref 7–20)
CALCIUM SERPL-MCNC: 9.1 MG/DL (ref 8.3–10.6)
CHLORIDE BLD-SCNC: 102 MMOL/L (ref 99–110)
CO2: 24 MMOL/L (ref 21–32)
CREAT SERPL-MCNC: 1.1 MG/DL (ref 0.8–1.3)
EOSINOPHILS ABSOLUTE: 0.4 K/UL (ref 0–0.6)
EOSINOPHILS RELATIVE PERCENT: 10.1 %
GFR AFRICAN AMERICAN: >60
GFR NON-AFRICAN AMERICAN: >60
GLUCOSE BLD-MCNC: 131 MG/DL (ref 70–99)
HCT VFR BLD CALC: 32.4 % (ref 40.5–52.5)
HEMOGLOBIN: 10.9 G/DL (ref 13.5–17.5)
LACTATE DEHYDROGENASE: 208 U/L (ref 100–190)
LYMPHOCYTES ABSOLUTE: 0.5 K/UL (ref 1–5.1)
LYMPHOCYTES RELATIVE PERCENT: 11.8 %
MAGNESIUM: 1.9 MG/DL (ref 1.8–2.4)
MCH RBC QN AUTO: 32.5 PG (ref 26–34)
MCHC RBC AUTO-ENTMCNC: 33.7 G/DL (ref 31–36)
MCV RBC AUTO: 96.4 FL (ref 80–100)
MONOCYTES ABSOLUTE: 0.5 K/UL (ref 0–1.3)
MONOCYTES RELATIVE PERCENT: 10.2 %
NEUTROPHILS ABSOLUTE: 3 K/UL (ref 1.7–7.7)
NEUTROPHILS RELATIVE PERCENT: 66.5 %
PDW BLD-RTO: 15.3 % (ref 12.4–15.4)
PHOSPHORUS: 3.4 MG/DL (ref 2.5–4.9)
PLATELET # BLD: 155 K/UL (ref 135–450)
PMV BLD AUTO: 8.9 FL (ref 5–10.5)
POTASSIUM SERPL-SCNC: 4 MMOL/L (ref 3.5–5.1)
RBC # BLD: 3.36 M/UL (ref 4.2–5.9)
SODIUM BLD-SCNC: 138 MMOL/L (ref 136–145)
TOTAL PROTEIN: 6.4 G/DL (ref 6.4–8.2)
URIC ACID, SERUM: 6.4 MG/DL (ref 3.5–7.2)
WBC # BLD: 4.4 K/UL (ref 4–11)

## 2022-05-18 PROCEDURE — 96417 CHEMO IV INFUS EACH ADDL SEQ: CPT

## 2022-05-18 PROCEDURE — 83735 ASSAY OF MAGNESIUM: CPT

## 2022-05-18 PROCEDURE — 84550 ASSAY OF BLOOD/URIC ACID: CPT

## 2022-05-18 PROCEDURE — 2580000003 HC RX 258: Performed by: INTERNAL MEDICINE

## 2022-05-18 PROCEDURE — 36592 COLLECT BLOOD FROM PICC: CPT

## 2022-05-18 PROCEDURE — 80076 HEPATIC FUNCTION PANEL: CPT

## 2022-05-18 PROCEDURE — 96360 HYDRATION IV INFUSION INIT: CPT

## 2022-05-18 PROCEDURE — 83615 LACTATE (LD) (LDH) ENZYME: CPT

## 2022-05-18 PROCEDURE — 96413 CHEMO IV INFUSION 1 HR: CPT

## 2022-05-18 PROCEDURE — 6370000000 HC RX 637 (ALT 250 FOR IP): Performed by: INTERNAL MEDICINE

## 2022-05-18 PROCEDURE — 6360000002 HC RX W HCPCS: Performed by: INTERNAL MEDICINE

## 2022-05-18 PROCEDURE — 80069 RENAL FUNCTION PANEL: CPT

## 2022-05-18 PROCEDURE — 85025 COMPLETE CBC W/AUTO DIFF WBC: CPT

## 2022-05-18 PROCEDURE — 96361 HYDRATE IV INFUSION ADD-ON: CPT

## 2022-05-18 RX ORDER — 0.9 % SODIUM CHLORIDE 0.9 %
1000 INTRAVENOUS SOLUTION INTRAVENOUS ONCE
Status: CANCELLED | OUTPATIENT
Start: 2022-05-19

## 2022-05-18 RX ORDER — HEPARIN SODIUM (PORCINE) LOCK FLUSH IV SOLN 100 UNIT/ML 100 UNIT/ML
500 SOLUTION INTRAVENOUS PRN
Status: CANCELLED
Start: 2022-05-19

## 2022-05-18 RX ORDER — PROCHLORPERAZINE MALEATE 10 MG
5 TABLET ORAL EVERY 6 HOURS PRN
Status: CANCELLED | OUTPATIENT
Start: 2022-05-19

## 2022-05-18 RX ORDER — 0.9 % SODIUM CHLORIDE 0.9 %
1000 INTRAVENOUS SOLUTION INTRAVENOUS ONCE
Status: COMPLETED | OUTPATIENT
Start: 2022-05-18 | End: 2022-05-18

## 2022-05-18 RX ORDER — LEVOFLOXACIN 500 MG/1
500 TABLET, FILM COATED ORAL DAILY
Qty: 30 TABLET | Refills: 2 | Status: SHIPPED | OUTPATIENT
Start: 2022-05-18 | End: 2022-05-27 | Stop reason: SDUPTHER

## 2022-05-18 RX ORDER — ONDANSETRON 4 MG/1
16 TABLET, FILM COATED ORAL ONCE
Status: COMPLETED | OUTPATIENT
Start: 2022-05-18 | End: 2022-05-18

## 2022-05-18 RX ORDER — ONDANSETRON 4 MG/1
16 TABLET, FILM COATED ORAL ONCE
Status: CANCELLED | OUTPATIENT
Start: 2022-05-19

## 2022-05-18 RX ORDER — ALLOPURINOL 300 MG/1
300 TABLET ORAL ONCE
Status: DISCONTINUED | OUTPATIENT
Start: 2022-05-18 | End: 2022-05-19 | Stop reason: HOSPADM

## 2022-05-18 RX ORDER — ACYCLOVIR 800 MG/1
800 TABLET ORAL 2 TIMES DAILY
Qty: 60 TABLET | Refills: 2 | Status: SHIPPED | OUTPATIENT
Start: 2022-05-18 | End: 2022-05-27 | Stop reason: SDUPTHER

## 2022-05-18 RX ORDER — FLUCONAZOLE 200 MG/1
200 TABLET ORAL DAILY
Qty: 30 TABLET | Refills: 2 | Status: SHIPPED | OUTPATIENT
Start: 2022-05-18 | End: 2022-05-27 | Stop reason: SDUPTHER

## 2022-05-18 RX ORDER — PROCHLORPERAZINE MALEATE 10 MG
5 TABLET ORAL EVERY 6 HOURS PRN
Status: DISCONTINUED | OUTPATIENT
Start: 2022-05-18 | End: 2022-05-19 | Stop reason: HOSPADM

## 2022-05-18 RX ORDER — ALLOPURINOL 300 MG/1
300 TABLET ORAL ONCE
Status: CANCELLED | OUTPATIENT
Start: 2022-05-19 | End: 2022-05-19

## 2022-05-18 RX ORDER — LEVETIRACETAM 500 MG/1
500 TABLET ORAL 2 TIMES DAILY
Qty: 60 TABLET | Refills: 3
Start: 2022-05-23

## 2022-05-18 RX ORDER — HEPARIN SODIUM (PORCINE) LOCK FLUSH IV SOLN 100 UNIT/ML 100 UNIT/ML
500 SOLUTION INTRAVENOUS PRN
Status: DISCONTINUED | OUTPATIENT
Start: 2022-05-18 | End: 2022-05-19 | Stop reason: HOSPADM

## 2022-05-18 RX ORDER — ONDANSETRON 4 MG/1
4 TABLET, ORALLY DISINTEGRATING ORAL EVERY 8 HOURS PRN
Qty: 30 TABLET | Refills: 2 | Status: SHIPPED | OUTPATIENT
Start: 2022-05-18

## 2022-05-18 RX ADMIN — SODIUM CHLORIDE 1000 ML: 9 INJECTION, SOLUTION INTRAVENOUS at 08:37

## 2022-05-18 RX ADMIN — ONDANSETRON HYDROCHLORIDE 16 MG: 4 TABLET, FILM COATED ORAL at 10:48

## 2022-05-18 RX ADMIN — SODIUM CHLORIDE, PRESERVATIVE FREE 500 UNITS: 5 INJECTION INTRAVENOUS at 14:09

## 2022-05-18 RX ADMIN — SODIUM CHLORIDE 1000 ML: 9 INJECTION, SOLUTION INTRAVENOUS at 13:00

## 2022-05-18 RX ADMIN — CYCLOPHOSPHAMIDE 600 MG: 1 INJECTION, POWDER, FOR SOLUTION INTRAVENOUS; ORAL at 12:15

## 2022-05-18 RX ADMIN — FLUDARABINE PHOSPHATE 60 MG: 25 INJECTION, SOLUTION INTRAVENOUS at 11:28

## 2022-05-18 RX ADMIN — SODIUM CHLORIDE, PRESERVATIVE FREE 500 UNITS: 5 INJECTION INTRAVENOUS at 14:11

## 2022-05-18 NOTE — H&P
[facility] History and Physical        Attending Physician: No att. providers found    Primary Care: Shashank Shelby       Referring MD: Sammie Jean MD  [address] [facility] [address], [address],  [address]    Name: Valdez Vieira :  1940  MRN:  [id number]    Admission: 2022      Date: 2022    Reason for Admission: Lymphodepleting chemotherapy in preparation of Breyanzi CAR-T Infusion on 22 for diagnosis of DLBC NHL    History of Present Illness: The patient is an 47-year-old man with a history of diffuse large cell B-cell non-Hodgkin's lymphoma. He presented 2021 with knee pain and swelling. Biopsy showed B-cell non-Hodgkin's lymphoma with a Ki-67 of about 60%. Baseline PET/CT from 2022 shows extensive hypermetabolic metabolism in the retroperitoneum, left pelvis and left lower extremity consistent with lymphoma.       He has a history of follicular lymphoma diagnosed  (bone marrow involvement). He was treated with CHOP chemotherapy from 1995 to 1996. He then had a recurrence and received rituximab times 2003 followed by maintenance rituximab 2004 and 2004. He then received R-CHOP beginning 2007 followed by maintenance rituximab from 2008 to 2009. Most recently he received R-Garrard/Ox X 2 with F/U PET 3/1/22 showing progression in the retroperitoneum, left hemipelvis and left LE. He was then collected for CAR-T cell collection in preporation for Breyanzi CAR-T Infusion on 22. He is status post Bridging chemotherapy of Chip BR on 3/22/22 and . His most recent PET CT on 22 showed new pulmonary nudules in the right upper lung measuring 1.2 cm max SUV 3.9. Previously described lymphadenopathy and FDG in left retroperitoneum, left iliac region and left upper thigh has completely resolved.      Mr. Hailey Rodriguez now presents to OP Infusion to initiate his Lymphodepleting Chemo, followed by Breyanzi CAR-T Infusion on 5/23/2022. He will then be followed daily for any signs / symptoms of toxicity including CRS or EDUARD. He feels well today but has a chronically swollen left leg. He remains active on a daily basis. He denies fever, chills, bleeding, or GI changes. Eating and drinking well.                     Pre Transplant Workup:         Pre Transplant labs:       3/21/2022 09:40   Total IgG 776.0   Herpes Type 1/2 IgM Combined 0.52   Hep B E Ab Negative   Hep B S Ab <3.50   Hep B Core Ab, IgM Non-reactive           3/21/2022 09:40   EBV VCA IgG 25.4 (H)   EBV VCA IgM 18.9   CMV IgM <8.0   EBV Early Ag Ab <5.0   VARICELLA ZOSTER AB IGM 0.04   Herpes Type 1/2 IgM Combined 0.52   Ari Barr virus nuclear Ab IgG 185.0 (H)          Past Surgical History:   Procedure Laterality Date    CATHETER INSERTION N/A 4/18/2022    RIGHT INTERNAL JUGULAR TRIFUSION CENTRAL LINE PLACEMENT performed by Serena North MD at [facility] [address]       No past medical history on file. Prior to Admission medications    Medication Sig Start Date End Date Taking?  Authorizing Provider   prochlorperazine (COMPAZINE) 10 MG tablet Take 10 mg by mouth every 4-6 hours as needed 1/30/22   Historical Provider, MD   ELIQUIS 2.5 MG TABS tablet  3/18/22   Historical Provider, MD   allopurinol (ZYLOPRIM) 300 MG tablet  1/31/22   Historical Provider, MD   acyclovir (ZOVIRAX) 400 MG tablet  3/3/22   Historical Provider, MD   VITAMIN E PO Take 1 capsule by mouth daily    Historical Provider, MD   MULTIPLE VITAMINS PO Take 1 tablet by mouth 2 times daily    Historical Provider, MD   GLUCOSAMINE HCL PO Take 2 capsules by mouth 2 times daily    Historical Provider, MD   BETA CAROTENE PO Take 1 capsule by mouth daily  Patient not taking: Reported on 4/19/2022    Historical Provider, MD   ASTRAGALUS PO Take 2 capsules by mouth 2 times daily    Historical Provider, MD       Allergies   Allergen Reactions    Decadron [Dexamethasone]      Patient is receiving CAR-T. No steroids unless approved by [facility] physician. No family history on file. Social History     Socioeconomic History    Marital status:      Spouse name: Not on file    Number of children: Not on file    Years of education: Not on file    Highest education level: Not on file   Occupational History    Not on file   Tobacco Use    Smoking status: Not on file    Smokeless tobacco: Not on file   Substance and Sexual Activity    Alcohol use: Not on file    Drug use: Not on file    Sexual activity: Not on file   Other Topics Concern    Not on file   Social History Narrative    Not on file     Social Determinants of Health     Financial Resource Strain:     Difficulty of Paying Living Expenses: Not on file   Food Insecurity:     Worried About Running Out of Food in the Last Year: Not on file    Xavi of Food in the Last Year: Not on file   Transportation Needs:     Lack of Transportation (Medical): Not on file    Lack of Transportation (Non-Medical):  Not on file   Physical Activity:     Days of Exercise per Week: Not on file    Minutes of Exercise per Session: Not on file   Stress:     Feeling of Stress : Not on file   Social Connections:     Frequency of Communication with Friends and Family: Not on file    Frequency of Social Gatherings with Friends and Family: Not on file    Attends Latter-day Services: Not on file    Active Member of [address] [place] [address] or Organizations: Not on file    Attends Club or Organization Meetings: Not on file    Marital Status: Not on file   Intimate Partner Violence:     Fear of Current or Ex-Partner: Not on file    Emotionally Abused: Not on file    Physically Abused: Not on file    Sexually Abused: Not on file   Housing Stability:     Unable to Pay for Housing in the Last Year: Not on file    Number of [place] in the Last Year: Not on file    Unstable Housing in the Last Year: Not on file        ROS: As noted above, otherwise remainder of 10-point ROS negative      Physical Exam:     Vital Signs:  BP (!) 156/79   Pulse 68   Temp 97.8 °F (36.6 °C) (Oral)   Resp 16   Ht 6' (1.829 m)   Wt 180 lb 1.9 oz (81.7 kg)   SpO2 95%   BMI 24.43 kg/m²     Weight:    Wt Readings from Last 3 Encounters:   05/18/22 180 lb 1.9 oz (81.7 kg)   04/18/22 186 lb (84.4 kg)   03/21/22 188 lb 7.9 oz (85.5 kg)       KPS: 90% Able to carry on normal activity; minor signs or symptoms of disease    ECOG PS:  (1) Restricted in physically strenuous activity, ambulatory and able to do work of light nature    General: Awake, alert and oriented. HEENT: normocephalic, alopecia, PERRL, no scleral erythema or icterus, Oral mucosa moist and intact, throat clear  NECK: supple without palpable adenopathy  BACK: Straight negative CVAT  SKIN: warm dry and intact without lesions rashes or masses  CHEST: CTA bilaterally without use of accessory muscles  CV: Normal S1 S2, RRR, no MRG  ABD: NT ND normoactive BS, no palpable masses or hepatosplenomegaly  EXTREMITIES: LLE Edema from the inguinal region distally. It is improved. He can extend to 180 degrees and flex to 90. The right lower extremity is normal, denies calf tenderness  NEURO: CN II - XII grossly intact  CATHETER: RIJ Trifusion (IR: Dean, 4/15/22): CDI      Laboratory Data:  CBC:   Recent Labs     05/18/22  0831   WBC 4.4   HGB 10.9*   HCT 32.4*   MCV 96.4        BMP/Mag:  Recent Labs     05/18/22  0831      K 4.0      CO2 24   BUN 22*   CREATININE 1.1   MG 1.90     LIVP:   Recent Labs     05/18/22  0831   BILITOT <0.2     Coags: No results for input(s): PROTIME, INR, APTT in the last 72 hours. Uric Acid No results for input(s): LABURIC in the last 72 hours.   Lab Results   Component Value Date    CRP 22.2 (H) 03/21/2022     Lab Results   Component Value Date    FERRITIN 551.3 (H) 03/21/2022       PROBLEM LIST:            1. Follicular lymphoma with BM CRS toxicity, neurological toxicity, infection, bleeding,  inability to obtain a long term remission and death. He/She understands these risks and is willing to proceed. The consent is signed and on the chart. Two doses of Tocilizumab will remain available in the inpatient pharmacy until day + 28 post - infusion. Lymphodepleting Chemotherapy:  Fludarabine and cyclophosphamide   Disease Status at time of Infusion:  Progressive Disease  CAR-T Infusion Date: 5/23/22  CAR-T Product:  Breyanzi  Batch ID Number:  [id number]    Day - 5    2. CRS / Neuro:  No evidence  - Monitor CRP and Ferrtin closely   Lab Results   Component Value Date    CRP 22.2 (H) 03/21/2022     Lab Results   Component Value Date    FERRITIN 551.3 (H) 03/21/2022   - Start Keppra 500 mg BID on Monday, 5/23/22  - Neuro checks w/ CARTOX 10-point assessment Q4hrs    Toxicity Grading      CRS Grade: N/A    ICANS Grade:  N/A    ICE Score:  N/A    3. ID:  Afebrile, no evidence of infection.    - Start Diflucan, Valtrex and Levaquin when ANC < 1.5 (Scripts called in 5/18/22)     4. Heme: Anemia from chemotherapy   - Transfuse for Hgb < 7 and Platelets < 98B  - No transfusion today    5. Metabolic:  Electrolytes are WNL and renal fxn stable. TLS:  No evidence, cont allopurinol and daily TLS labs   - Start IVF hydration: 2 L NS bolus daily with [facility] & CAR-T Infusion, then 1 L NS daily in OP Infusion  - Replace potassium and magnesium per policy. 6. Nutrition:  Appetite and oral intake is good. - Start low microbial diet   - Follow closely with dietary    7. DVT ppx:   - S/p Eliquis 2.5 mg BID (stopped 5/17/22); if the leg edema resolves with CAR-T, plan to stop Eliquis. - Disposition: Patient will receive [facility] for 3 days (5/18/22 - 5/20/22) and return on 5/23/22 for Breyanzi CAR-T Infusion.  Patient will then follow up daily in OP Infusion for labs, CAR-T assessment for signs / symptoms of toxicity including CRS or EDUARD and MD visit.     The patient was seen and examined by Dr. Deanna Montaño. This admission history and physical has been discussed and agreed upon by Dr. Deanna Montaño.     JOAQUIM Garcia NP

## 2022-05-18 NOTE — PROGRESS NOTES
Original chemotherapy orders reviewed and acknowledged. Appropriateness of chemotherapy treatment regimen CAR-T Sjzi for diagnosis of Diffuse Large B Cell Lymphoma was verified. Patient educated on chemotherapy regimen. Acknowledgement of informed consent for chemotherapy obtained. Estimated body surface area is 2.04 meters squared as calculated from the following:    Height as of this encounter: 6' (1.829 m). Weight as of this encounter: 180 lb 1.9 oz (81.7 kg). verified. Appropriate dosing calculations of chemotherapy based on above height, weight, and BSA verified. Administration: Chemotherapy drug Fludarabine & Cytoxan independently verified with Musa Tatum RN prior to administration. Acknowledgement of informed consent for chemotherapy administration verified. Original order, appropriateness of regimen, drug supplied, height, weight, BSA, dose calculations, expiration dates/times, drug appearance, and two patient identifiers were verified by both RNs. Drug checked for vesicant/irritant status and for risk of hypersensitivity. Most recent laboratory values and allergies, were reviewed. Positive, brisk blood return via CVC was confirmed prior to administration. Chest x-ray for correct line placement reviewed. Genesis Lux RN and Musa Tatum RN verified correct rate of chemotherapy and maintenance IV fluids. Patient was educated on chemotherapy regimen prior to administration including indication for treatment related to disease & side effects of chemotherapy drug. Patient verbalizes understanding of all instructions. Completion of Chemotherapy: Monitoring during infusion done per policy, see Flowsheets. Blood return verified before, during, and after infusion per policy; no signs of extravasation. Pt tolerated chemotherapy well and without incident. Chemotherapy infusion end time on the [facility] Will continue to monitor.  Electronically signed by Genesis Lux RN on 5/18/2022 at 2:20 PM

## 2022-05-18 NOTE — ONCOLOGY
Breyanzi wallet cards given to patient. Reviewed signs and symptoms of CRS and neurotoxicity and when to call [facility] on call service if after hours.

## 2022-05-18 NOTE — PROGRESS NOTES
Okay to run fluids over 1.5 hrs, per Sreedhar Adams NP. Pt has an eye appointment immediately after this appointment.  Must be there at 2:20pm. Electronically signed by Tonya Barriga RN on 5/18/2022 at 1:03 PM

## 2022-05-19 ENCOUNTER — HOSPITAL ENCOUNTER (OUTPATIENT)
Dept: ONCOLOGY | Age: 82
Setting detail: INFUSION SERIES
Discharge: HOME | End: 2022-05-19
Payer: MEDICARE

## 2022-05-19 VITALS
HEART RATE: 67 BPM | RESPIRATION RATE: 18 BRPM | WEIGHT: 182.98 LBS | OXYGEN SATURATION: 98 % | TEMPERATURE: 97 F | HEIGHT: 72 IN | DIASTOLIC BLOOD PRESSURE: 70 MMHG | BODY MASS INDEX: 24.78 KG/M2 | SYSTOLIC BLOOD PRESSURE: 135 MMHG

## 2022-05-19 DIAGNOSIS — C83.30 DIFFUSE LARGE B-CELL LYMPHOMA, UNSPECIFIED BODY REGION (HCC): Primary | ICD-10-CM

## 2022-05-19 LAB
ALBUMIN SERPL-MCNC: 3.8 G/DL (ref 3.4–5)
ALP BLD-CCNC: 62 U/L (ref 40–129)
ALT SERPL-CCNC: 11 U/L (ref 10–40)
ANION GAP SERPL CALCULATED.3IONS-SCNC: 9 MMOL/L (ref 3–16)
AST SERPL-CCNC: 15 U/L (ref 15–37)
BASOPHILS ABSOLUTE: 0.1 K/UL (ref 0–0.2)
BASOPHILS RELATIVE PERCENT: 1.1 %
BILIRUB SERPL-MCNC: <0.2 MG/DL (ref 0–1)
BILIRUBIN DIRECT: <0.2 MG/DL (ref 0–0.3)
BILIRUBIN, INDIRECT: ABNORMAL MG/DL (ref 0–1)
BUN BLDV-MCNC: 18 MG/DL (ref 7–20)
CALCIUM SERPL-MCNC: 8.9 MG/DL (ref 8.3–10.6)
CHLORIDE BLD-SCNC: 107 MMOL/L (ref 99–110)
CO2: 24 MMOL/L (ref 21–32)
CREAT SERPL-MCNC: 1.1 MG/DL (ref 0.8–1.3)
EOSINOPHILS ABSOLUTE: 0.4 K/UL (ref 0–0.6)
EOSINOPHILS RELATIVE PERCENT: 9 %
GFR AFRICAN AMERICAN: >60
GFR NON-AFRICAN AMERICAN: >60
GLUCOSE BLD-MCNC: 142 MG/DL (ref 70–99)
HCT VFR BLD CALC: 31.7 % (ref 40.5–52.5)
HEMOGLOBIN: 10.8 G/DL (ref 13.5–17.5)
LACTATE DEHYDROGENASE: 163 U/L (ref 100–190)
LYMPHOCYTES ABSOLUTE: 0.3 K/UL (ref 1–5.1)
LYMPHOCYTES RELATIVE PERCENT: 5.8 %
MAGNESIUM: 1.9 MG/DL (ref 1.8–2.4)
MCH RBC QN AUTO: 32.9 PG (ref 26–34)
MCHC RBC AUTO-ENTMCNC: 34.1 G/DL (ref 31–36)
MCV RBC AUTO: 96.3 FL (ref 80–100)
MONOCYTES ABSOLUTE: 0.3 K/UL (ref 0–1.3)
MONOCYTES RELATIVE PERCENT: 6.7 %
NEUTROPHILS ABSOLUTE: 3.8 K/UL (ref 1.7–7.7)
NEUTROPHILS RELATIVE PERCENT: 77.4 %
PDW BLD-RTO: 15.3 % (ref 12.4–15.4)
PHOSPHORUS: 3.2 MG/DL (ref 2.5–4.9)
PLATELET # BLD: 147 K/UL (ref 135–450)
PMV BLD AUTO: 8.8 FL (ref 5–10.5)
POTASSIUM SERPL-SCNC: 4.2 MMOL/L (ref 3.5–5.1)
RBC # BLD: 3.29 M/UL (ref 4.2–5.9)
SODIUM BLD-SCNC: 140 MMOL/L (ref 136–145)
TOTAL PROTEIN: 6.2 G/DL (ref 6.4–8.2)
URIC ACID, SERUM: 5.5 MG/DL (ref 3.5–7.2)
WBC # BLD: 4.9 K/UL (ref 4–11)

## 2022-05-19 PROCEDURE — 96413 CHEMO IV INFUSION 1 HR: CPT

## 2022-05-19 PROCEDURE — 36592 COLLECT BLOOD FROM PICC: CPT

## 2022-05-19 PROCEDURE — 85025 COMPLETE CBC W/AUTO DIFF WBC: CPT

## 2022-05-19 PROCEDURE — 6370000000 HC RX 637 (ALT 250 FOR IP): Performed by: INTERNAL MEDICINE

## 2022-05-19 PROCEDURE — 80069 RENAL FUNCTION PANEL: CPT

## 2022-05-19 PROCEDURE — 84550 ASSAY OF BLOOD/URIC ACID: CPT

## 2022-05-19 PROCEDURE — 2580000003 HC RX 258: Performed by: INTERNAL MEDICINE

## 2022-05-19 PROCEDURE — 80076 HEPATIC FUNCTION PANEL: CPT

## 2022-05-19 PROCEDURE — 83735 ASSAY OF MAGNESIUM: CPT

## 2022-05-19 PROCEDURE — 96417 CHEMO IV INFUS EACH ADDL SEQ: CPT

## 2022-05-19 PROCEDURE — 83615 LACTATE (LD) (LDH) ENZYME: CPT

## 2022-05-19 PROCEDURE — 96361 HYDRATE IV INFUSION ADD-ON: CPT

## 2022-05-19 PROCEDURE — 6360000002 HC RX W HCPCS: Performed by: INTERNAL MEDICINE

## 2022-05-19 PROCEDURE — 96360 HYDRATION IV INFUSION INIT: CPT

## 2022-05-19 RX ORDER — 0.9 % SODIUM CHLORIDE 0.9 %
1000 INTRAVENOUS SOLUTION INTRAVENOUS ONCE
Status: CANCELLED | OUTPATIENT
Start: 2022-05-20

## 2022-05-19 RX ORDER — PETROLATUM, MENTHOL, UNSPECIFIED FORM, CAMPHOR (SYNTHETIC), AND PHENOL 59.14; 1; 1; .6 G/100G; G/100G; G/100G; G/100G
PASTE TOPICAL PRN
Status: CANCELLED | OUTPATIENT
Start: 2022-05-23

## 2022-05-19 RX ORDER — MAGNESIUM SULFATE IN WATER 40 MG/ML
4000 INJECTION, SOLUTION INTRAVENOUS PRN
Status: CANCELLED | OUTPATIENT
Start: 2022-05-23

## 2022-05-19 RX ORDER — PROCHLORPERAZINE MALEATE 10 MG
5 TABLET ORAL EVERY 6 HOURS PRN
Status: DISCONTINUED | OUTPATIENT
Start: 2022-05-19 | End: 2022-05-20 | Stop reason: HOSPADM

## 2022-05-19 RX ORDER — HEPARIN SODIUM (PORCINE) LOCK FLUSH IV SOLN 100 UNIT/ML 100 UNIT/ML
500 SOLUTION INTRAVENOUS PRN
Status: CANCELLED
Start: 2022-05-20

## 2022-05-19 RX ORDER — 0.9 % SODIUM CHLORIDE 0.9 %
1000 INTRAVENOUS SOLUTION INTRAVENOUS ONCE
Status: COMPLETED | OUTPATIENT
Start: 2022-05-19 | End: 2022-05-19

## 2022-05-19 RX ORDER — ALLOPURINOL 300 MG/1
300 TABLET ORAL ONCE
Status: CANCELLED | OUTPATIENT
Start: 2022-05-20 | End: 2022-05-20

## 2022-05-19 RX ORDER — ONDANSETRON 4 MG/1
16 TABLET, FILM COATED ORAL ONCE
Status: COMPLETED | OUTPATIENT
Start: 2022-05-19 | End: 2022-05-19

## 2022-05-19 RX ORDER — ONDANSETRON 4 MG/1
16 TABLET, FILM COATED ORAL ONCE
Status: CANCELLED | OUTPATIENT
Start: 2022-05-20

## 2022-05-19 RX ORDER — SODIUM CHLORIDE 9 MG/ML
INJECTION, SOLUTION INTRAVENOUS ONCE
Status: CANCELLED | OUTPATIENT
Start: 2022-05-23 | End: 2022-05-23

## 2022-05-19 RX ORDER — HEPARIN SODIUM (PORCINE) LOCK FLUSH IV SOLN 100 UNIT/ML 100 UNIT/ML
500 SOLUTION INTRAVENOUS PRN
Status: DISCONTINUED | OUTPATIENT
Start: 2022-05-19 | End: 2022-05-20 | Stop reason: HOSPADM

## 2022-05-19 RX ORDER — POTASSIUM CHLORIDE 29.8 MG/ML
80 INJECTION INTRAVENOUS PRN
Status: CANCELLED | OUTPATIENT
Start: 2022-05-23

## 2022-05-19 RX ORDER — 0.9 % SODIUM CHLORIDE 0.9 %
1000 INTRAVENOUS SOLUTION INTRAVENOUS ONCE
Status: CANCELLED | OUTPATIENT
Start: 2022-05-23 | End: 2022-05-23

## 2022-05-19 RX ORDER — ALLOPURINOL 300 MG/1
300 TABLET ORAL DAILY
Qty: 30 TABLET | Refills: 5 | Status: SHIPPED
Start: 2022-05-19 | End: 2022-06-02 | Stop reason: HOSPADM

## 2022-05-19 RX ORDER — PROCHLORPERAZINE MALEATE 10 MG
5 TABLET ORAL EVERY 6 HOURS PRN
Status: CANCELLED | OUTPATIENT
Start: 2022-05-20

## 2022-05-19 RX ORDER — POTASSIUM CHLORIDE 20 MEQ/1
40 TABLET, EXTENDED RELEASE ORAL PRN
Status: CANCELLED | OUTPATIENT
Start: 2022-05-23

## 2022-05-19 RX ORDER — ALLOPURINOL 300 MG/1
300 TABLET ORAL ONCE
Status: COMPLETED | OUTPATIENT
Start: 2022-05-19 | End: 2022-05-19

## 2022-05-19 RX ADMIN — CYCLOPHOSPHAMIDE 600 MG: 1 INJECTION, POWDER, FOR SOLUTION INTRAVENOUS; ORAL at 11:31

## 2022-05-19 RX ADMIN — ONDANSETRON HYDROCHLORIDE 16 MG: 4 TABLET, FILM COATED ORAL at 10:02

## 2022-05-19 RX ADMIN — FLUDARABINE PHOSPHATE 60 MG: 25 INJECTION, SOLUTION INTRAVENOUS at 10:36

## 2022-05-19 RX ADMIN — SODIUM CHLORIDE, PRESERVATIVE FREE 500 UNITS: 5 INJECTION INTRAVENOUS at 14:36

## 2022-05-19 RX ADMIN — SODIUM CHLORIDE 1000 ML: 9 INJECTION, SOLUTION INTRAVENOUS at 12:34

## 2022-05-19 RX ADMIN — SODIUM CHLORIDE 1000 ML: 9 INJECTION, SOLUTION INTRAVENOUS at 08:14

## 2022-05-19 RX ADMIN — ALLOPURINOL 300 MG: 300 TABLET ORAL at 10:02

## 2022-05-19 NOTE — PROGRESS NOTES
[facility]      2022    Talat Camara    :  1940    MRN:  [id number]    Referring MD: Danny Sarah MD  [address] [place] [address] [address], [address],  [address]      Subjective: Feels well today, went over all medications this morning, denies fever, denies GI changes, tolerating chemotherapy well, no complaints at this time. ECOG PS:  (1) Restricted in physically strenuous activity, ambulatory and able to do work of light nature    KPS: 90% Able to carry on normal activity; minor signs or symptoms of disease    Isolation:  None     Medications    Scheduled Meds:   sodium chloride  1,000 mL IntraVENous Once    fludarabine (FLUDARA) chemo IVPB  60 mg IntraVENous Once    cyclophosphamide (CYTOXAN) chemo infusion  600 mg IntraVENous Once    sodium chloride  1,000 mL IntraVENous Once     Continuous Infusions:  PRN Meds:.prochlorperazine (COMPAZINE) with normal saline injection, prochlorperazine, heparin flush      ROS:  As noted above, otherwise remainder of 10-point ROS negative      Physical Exam:     Vital Signs:  /71   Pulse 63   Temp 97.8 °F (36.6 °C) (Oral)   Resp 18   Ht 6' (1.829 m)   Wt 182 lb 15.7 oz (83 kg)   SpO2 98%   BMI 24.82 kg/m²     Weight:    Wt Readings from Last 3 Encounters:   22 182 lb 15.7 oz (83 kg)   22 180 lb 1.9 oz (81.7 kg)   22 186 lb (84.4 kg)       General: Awake, alert and oriented.   HEENT: normocephalic, alopecia, PERRL, no scleral erythema or icterus, Oral mucosa moist and intact, throat clear  NECK: supple without palpable adenopathy  BACK: Straight negative CVAT  SKIN: warm dry and intact without lesions rashes or masses  CHEST: CTA bilaterally without use of accessory muscles  CV: Normal S1 S2, RRR, no MRG  ABD: NT ND normoactive BS, no palpable masses or hepatosplenomegaly  EXTREMITIES: LLE Edema from the inguinal region distally.  It is improved.  He can extend to 180 degrees and flex to 90.  The right lower extremity is normal, denies calf tenderness  NEURO: CN II - XII grossly intact  CATHETER: RIJ Trifusion (IR: Jermaine, 4/15/22): CDI    Laboratory Data:  CBC:   Recent Labs     05/18/22  0831 05/19/22  0817   WBC 4.4 4.9   HGB 10.9* 10.8*   HCT 32.4* 31.7*   MCV 96.4 96.3    147     BMP/Mag:  Recent Labs     05/18/22  0831 05/19/22  0817    140   K 4.0 4.2    107   CO2 24 24   PHOS 3.4 3.2   BUN 22* 18   CREATININE 1.1 1.1   MG 1.90 1.90     LIVP:   Recent Labs     05/18/22  0831 05/19/22  0817   AST 18 15   ALT 12 11   BILIDIR <0.2 <0.2   BILITOT <0.2 <0.2   ALKPHOS 63 62     Coags: No results for input(s): PROTIME, INR, APTT in the last 72 hours. Uric Acid   Recent Labs     05/18/22  0831 05/19/22  0817   LABURIC 6.4 5.5     Lab Results   Component Value Date    CRP 22.2 (H) 03/21/2022     Lab Results   Component Value Date    FERRITIN 551.3 (H) 03/21/2022         PROBLEM LIST:        1. Follicular lymphoma with BM involvement  2. DLBC NHL      TREATMENT:            1. FL: 1/1995 CHOP             11/2003 to 8/2004 Rituxan             7/2007 R-CHOP  2. DLBCL: R-[place]/Ox C1 1/31/2022, C2 02/14/2022                     Chip-BR C1D1: 3/22/22                                    C2D1: 4/20/22        Lymphodepleting Chemotherapy:  Fludarabine and cyclophosphamide   Disease Status at time of Infusion:  Progressive Disease  CAR-T Infusion Date: 5/23/22  CAR-T Product:  Zack oMre  Batch ID Number:  [id number]        ASSESSMENT AND PLAN:            1. Refractory large cell non-Hodgkin's lymphoma: progressive disease   - PET (3/1/22): extensive hypermetabolic lymphadenopathy in the retroperitoneum, left hemipelvis and LLE   - BMBX (3/14/22): negative   - S/p Chip/Bendamustine/Rituxan x 2 cycles  - PLAN: LDC to begin 5/18/22 followed by CAR-T infusion      CAR-T Work up:   - Echo (3/21/22): Centric mild left ventricular hypertrophy.   Left ventricular ejection fraction 55 to 60% with no regional wall motion abnormalities noted. Intermittent diastolic dysfunction.  - PFTs (3/21/22): Diffusion capacity 79%, FEV1 78%. Consistent with mild restrictive ventilatory defect. - CMI -  2 (age and pulmonary)  - PET (5/13/22): Multiple indeterminate pulmonary nodules in the right lung which demonstrates increased activity in the right upper lobe. These may be infectious. These were not described on the previous PET/CT. Pulmonary lymphoma would be difficult to exclude. If the pulmonary nodule represents lymphoma, this would represent a Deauville score 5 exam. If this is infectious, overall the exam would represent a Deauville score 1. Previously described lymphadenopathy in FDG activity in the left retroperitoneum, left iliac region and in left upper thigh has completely resolved. No residual lymphadenopathy or FDG avid lymph node identified      Lymphodepleting Chemotherapy:  Fludarabine and cyclophosphamide   Disease Status at time of Infusion:  Progressive Disease  CAR-T Infusion Date: 5/23/22  CAR-T Product:  Breyanzi  Batch ID Number:  [id number]     Day - 4     2. CRS / Neuro:  No evidence  - Monitor CRP and Ferrtin closely         Lab Results   Component Value Date     CRP 22.2 (H) 03/21/2022            Lab Results   Component Value Date     FERRITIN 551.3 (H) 03/21/2022   - Start Keppra 500 mg BID on Monday, 5/23/22  - Neuro checks w/ CARTOX 10-point assessment Q4hrs     Toxicity Grading        CRS Grade: N/A     ICANS Grade:  N/A     ICE Score:  N/A     3. ID:  Afebrile, no evidence of infection.    - Start Diflucan, Valtrex and Levaquin when ANC < 1.5 (Scripts called in 5/18/22)      4.  Heme: Anemia from chemotherapy   - Transfuse for Hgb < 7 and Platelets < 55A  - No transfusion today     5. Metabolic:  Electrolytes are WNL and renal fxn stable.     TLS:  No evidence, cont allopurinol and daily TLS labs   - Start IVF hydration: 2 L NS bolus daily with [facility] & CAR-T Infusion, then 1 L NS daily in OP Infusion  - Replace potassium and magnesium per policy.     6. Nutrition:  Appetite and oral intake is good. - Taking Astragalus supplement at home: Stop 5/18/22 d/t interaction with Cytoxan  - Start low microbial diet   - Follow closely with dietary     7. DVT ppx:   - S/p Eliquis 2.5 mg BID (stopped 5/17/22); if the leg edema resolves with CAR-T, plan to stop Eliquis.     - Disposition: Patient will receive [facility] for 3 days (5/18/22 - 5/20/22) and return on 5/23/22 for [facility] CAR-T Infusion. Patient will then follow up daily in OP Infusion for labs, CAR-T assessment for signs / symptoms of toxicity including CRS or EDUARD and MD visit.        The patient was seen and examined by Dr. Jelena Javier, APRN - NP   Nguyễn Arellano MD  [facility]  Please contact me through [address]

## 2022-05-19 NOTE — PROGRESS NOTES
Administration: Chemotherapy drug Fludarabine & Cytoxan independently verified with Moises Senate RN prior to administration. Acknowledgement of informed consent for chemotherapy administration verified. Original order, appropriateness of regimen, drug supplied, height, weight, BSA, dose calculations, expiration dates/times, drug appearance, and two patient identifiers were verified by both RNs. Drug checked for vesicant/irritant status and for risk of hypersensitivity. Most recent laboratory values and allergies, were reviewed. Positive, brisk blood return via CVC was confirmed prior to administration. Chest x-ray for correct line placement reviewed. Trinidad Torres RN and Weakley Senate RN verified correct rate of chemotherapy and maintenance IV fluids. Patient was educated on chemotherapy regimen prior to administration including indication for treatment related to disease & side effects of chemotherapy drug. Patient verbalizes understanding of all instructions. Completion of Chemotherapy: Monitoring during infusion done per policy, see Flowsheets. Blood return verified before, during, and after infusion per policy; no signs of extravasation. Pt tolerated chemotherapy well and without incident. Chemotherapy infusion end time on the [facility] Will continue to monitor. CVC flushed, heparin locked.  Electronically signed by Trinidad Torres RN on 5/19/2022 at 2:53 PM

## 2022-05-20 ENCOUNTER — HOSPITAL ENCOUNTER (OUTPATIENT)
Dept: ONCOLOGY | Age: 82
Setting detail: INFUSION SERIES
Discharge: HOME | End: 2022-05-20
Payer: MEDICARE

## 2022-05-20 VITALS
BODY MASS INDEX: 24.85 KG/M2 | RESPIRATION RATE: 18 BRPM | WEIGHT: 183.2 LBS | HEART RATE: 73 BPM | DIASTOLIC BLOOD PRESSURE: 84 MMHG | SYSTOLIC BLOOD PRESSURE: 151 MMHG | TEMPERATURE: 97.6 F | OXYGEN SATURATION: 100 %

## 2022-05-20 DIAGNOSIS — C83.30 DIFFUSE LARGE B-CELL LYMPHOMA, UNSPECIFIED BODY REGION (HCC): Primary | ICD-10-CM

## 2022-05-20 LAB
ALBUMIN SERPL-MCNC: 3.8 G/DL (ref 3.4–5)
ALP BLD-CCNC: 69 U/L (ref 40–129)
ALT SERPL-CCNC: 12 U/L (ref 10–40)
ANION GAP SERPL CALCULATED.3IONS-SCNC: 8 MMOL/L (ref 3–16)
AST SERPL-CCNC: 17 U/L (ref 15–37)
BASOPHILS ABSOLUTE: 0.1 K/UL (ref 0–0.2)
BASOPHILS RELATIVE PERCENT: 0.9 %
BILIRUB SERPL-MCNC: <0.2 MG/DL (ref 0–1)
BILIRUBIN DIRECT: <0.2 MG/DL (ref 0–0.3)
BILIRUBIN, INDIRECT: ABNORMAL MG/DL (ref 0–1)
BUN BLDV-MCNC: 21 MG/DL (ref 7–20)
CALCIUM SERPL-MCNC: 9 MG/DL (ref 8.3–10.6)
CHLORIDE BLD-SCNC: 103 MMOL/L (ref 99–110)
CO2: 24 MMOL/L (ref 21–32)
CREAT SERPL-MCNC: 1.1 MG/DL (ref 0.8–1.3)
EOSINOPHILS ABSOLUTE: 0.5 K/UL (ref 0–0.6)
EOSINOPHILS RELATIVE PERCENT: 7.1 %
GFR AFRICAN AMERICAN: >60
GFR NON-AFRICAN AMERICAN: >60
GLUCOSE BLD-MCNC: 143 MG/DL (ref 70–99)
HCT VFR BLD CALC: 31.4 % (ref 40.5–52.5)
HEMOGLOBIN: 10.7 G/DL (ref 13.5–17.5)
LACTATE DEHYDROGENASE: 193 U/L (ref 100–190)
LYMPHOCYTES ABSOLUTE: 0.1 K/UL (ref 1–5.1)
LYMPHOCYTES RELATIVE PERCENT: 1.1 %
MAGNESIUM: 1.8 MG/DL (ref 1.8–2.4)
MCH RBC QN AUTO: 32.9 PG (ref 26–34)
MCHC RBC AUTO-ENTMCNC: 34.1 G/DL (ref 31–36)
MCV RBC AUTO: 96.6 FL (ref 80–100)
MONOCYTES ABSOLUTE: 0.2 K/UL (ref 0–1.3)
MONOCYTES RELATIVE PERCENT: 3 %
NEUTROPHILS ABSOLUTE: 5.7 K/UL (ref 1.7–7.7)
NEUTROPHILS RELATIVE PERCENT: 87.9 %
PDW BLD-RTO: 15.3 % (ref 12.4–15.4)
PHOSPHORUS: 3 MG/DL (ref 2.5–4.9)
PLATELET # BLD: 138 K/UL (ref 135–450)
PMV BLD AUTO: 8.8 FL (ref 5–10.5)
POTASSIUM SERPL-SCNC: 4.4 MMOL/L (ref 3.5–5.1)
RBC # BLD: 3.25 M/UL (ref 4.2–5.9)
SODIUM BLD-SCNC: 135 MMOL/L (ref 136–145)
TOTAL PROTEIN: 6.3 G/DL (ref 6.4–8.2)
URIC ACID, SERUM: 5.2 MG/DL (ref 3.5–7.2)
WBC # BLD: 6.4 K/UL (ref 4–11)

## 2022-05-20 PROCEDURE — 83735 ASSAY OF MAGNESIUM: CPT

## 2022-05-20 PROCEDURE — 96360 HYDRATION IV INFUSION INIT: CPT

## 2022-05-20 PROCEDURE — 96417 CHEMO IV INFUS EACH ADDL SEQ: CPT

## 2022-05-20 PROCEDURE — 85025 COMPLETE CBC W/AUTO DIFF WBC: CPT

## 2022-05-20 PROCEDURE — 80076 HEPATIC FUNCTION PANEL: CPT

## 2022-05-20 PROCEDURE — 6370000000 HC RX 637 (ALT 250 FOR IP): Performed by: INTERNAL MEDICINE

## 2022-05-20 PROCEDURE — 84550 ASSAY OF BLOOD/URIC ACID: CPT

## 2022-05-20 PROCEDURE — 2580000003 HC RX 258: Performed by: INTERNAL MEDICINE

## 2022-05-20 PROCEDURE — 36592 COLLECT BLOOD FROM PICC: CPT

## 2022-05-20 PROCEDURE — 83615 LACTATE (LD) (LDH) ENZYME: CPT

## 2022-05-20 PROCEDURE — 80069 RENAL FUNCTION PANEL: CPT

## 2022-05-20 PROCEDURE — 96413 CHEMO IV INFUSION 1 HR: CPT

## 2022-05-20 PROCEDURE — 6360000002 HC RX W HCPCS: Performed by: INTERNAL MEDICINE

## 2022-05-20 PROCEDURE — 96361 HYDRATE IV INFUSION ADD-ON: CPT

## 2022-05-20 RX ORDER — HEPARIN SODIUM (PORCINE) LOCK FLUSH IV SOLN 100 UNIT/ML 100 UNIT/ML
500 SOLUTION INTRAVENOUS PRN
Status: CANCELLED
Start: 2022-05-20

## 2022-05-20 RX ORDER — 0.9 % SODIUM CHLORIDE 0.9 %
1000 INTRAVENOUS SOLUTION INTRAVENOUS ONCE
Status: CANCELLED | OUTPATIENT
Start: 2022-05-20

## 2022-05-20 RX ORDER — ALLOPURINOL 300 MG/1
300 TABLET ORAL ONCE
Status: DISCONTINUED | OUTPATIENT
Start: 2022-05-20 | End: 2022-05-21 | Stop reason: HOSPADM

## 2022-05-20 RX ORDER — 0.9 % SODIUM CHLORIDE 0.9 %
1000 INTRAVENOUS SOLUTION INTRAVENOUS ONCE
Status: COMPLETED | OUTPATIENT
Start: 2022-05-20 | End: 2022-05-20

## 2022-05-20 RX ORDER — PROCHLORPERAZINE MALEATE 10 MG
5 TABLET ORAL EVERY 6 HOURS PRN
Status: DISCONTINUED | OUTPATIENT
Start: 2022-05-20 | End: 2022-05-21 | Stop reason: HOSPADM

## 2022-05-20 RX ORDER — ONDANSETRON 4 MG/1
16 TABLET, FILM COATED ORAL ONCE
Status: COMPLETED | OUTPATIENT
Start: 2022-05-20 | End: 2022-05-20

## 2022-05-20 RX ORDER — ONDANSETRON 4 MG/1
16 TABLET, FILM COATED ORAL ONCE
Status: CANCELLED | OUTPATIENT
Start: 2022-05-20

## 2022-05-20 RX ORDER — PROCHLORPERAZINE MALEATE 10 MG
5 TABLET ORAL EVERY 6 HOURS PRN
Status: CANCELLED | OUTPATIENT
Start: 2022-05-20

## 2022-05-20 RX ORDER — HEPARIN SODIUM (PORCINE) LOCK FLUSH IV SOLN 100 UNIT/ML 100 UNIT/ML
500 SOLUTION INTRAVENOUS PRN
Status: DISCONTINUED | OUTPATIENT
Start: 2022-05-20 | End: 2022-05-21 | Stop reason: HOSPADM

## 2022-05-20 RX ORDER — ALLOPURINOL 300 MG/1
300 TABLET ORAL ONCE
Status: CANCELLED | OUTPATIENT
Start: 2022-05-20 | End: 2022-05-20

## 2022-05-20 RX ADMIN — ONDANSETRON HYDROCHLORIDE 16 MG: 4 TABLET, FILM COATED ORAL at 10:34

## 2022-05-20 RX ADMIN — CYCLOPHOSPHAMIDE 600 MG: 1 INJECTION, POWDER, FOR SOLUTION INTRAVENOUS; ORAL at 11:43

## 2022-05-20 RX ADMIN — SODIUM CHLORIDE 1000 ML: 9 INJECTION, SOLUTION INTRAVENOUS at 12:30

## 2022-05-20 RX ADMIN — SODIUM CHLORIDE 1000 ML: 9 INJECTION, SOLUTION INTRAVENOUS at 08:30

## 2022-05-20 RX ADMIN — SODIUM CHLORIDE, PRESERVATIVE FREE 500 UNITS: 5 INJECTION INTRAVENOUS at 14:18

## 2022-05-20 RX ADMIN — FLUDARABINE PHOSPHATE 60 MG: 25 INJECTION, SOLUTION INTRAVENOUS at 11:05

## 2022-05-20 NOTE — PROGRESS NOTES
[facility] Progress Note      2022    Halima     :  1940    MRN:  [id number]    Referring MD: Vishal Smith MD  [address], [address],  [address]      Subjective: Patient feels well today, no fevers, no GI changes, tolerating chemotherapy well, eating and drinking well. ECOG PS:  (1) Restricted in physically strenuous activity, ambulatory and able to do work of light nature    KPS: 90% Able to carry on normal activity; minor signs or symptoms of disease    Isolation:  None     Medications    Scheduled Meds:   sodium chloride  1,000 mL IntraVENous Once    ondansetron  16 mg Oral Once    fludarabine (FLUDARA) chemo IVPB  60 mg IntraVENous Once    cyclophosphamide (CYTOXAN) chemo infusion  600 mg IntraVENous Once    sodium chloride  1,000 mL IntraVENous Once    allopurinol  300 mg Oral Once     Continuous Infusions:  PRN Meds:.prochlorperazine, heparin flush      ROS:  As noted above, otherwise remainder of 10-point ROS negative      Physical Exam:     Vital Signs:  BP (!) 161/79   Pulse 62   Temp 97.9 °F (36.6 °C) (Oral)   Resp 18   Wt 183 lb 3.2 oz (83.1 kg)   SpO2 99%   BMI 24.85 kg/m²     Weight:    Wt Readings from Last 3 Encounters:   22 183 lb 3.2 oz (83.1 kg)   22 182 lb 15.7 oz (83 kg)   22 180 lb 1.9 oz (81.7 kg)       General: Awake, alert and oriented.   HEENT: normocephalic, alopecia, PERRL, no scleral erythema or icterus, Oral mucosa moist and intact, throat clear  NECK: supple without palpable adenopathy  BACK: Straight negative CVAT  SKIN: warm dry and intact without lesions rashes or masses  CHEST: CTA bilaterally without use of accessory muscles  CV: Normal S1 S2, RRR, no MRG  ABD: NT ND normoactive BS, no palpable masses or hepatosplenomegaly  EXTREMITIES: LLE Edema from the inguinal region distally.  It is improved.  He can extend to 180 degrees and flex to 90.  The right lower extremity is normal, denies calf Intermittent diastolic dysfunction.  - PFTs (3/21/22): Diffusion capacity 79%, FEV1 78%. Consistent with mild restrictive ventilatory defect. - CMI -  2 (age and pulmonary)  - PET (5/13/22): Multiple indeterminate pulmonary nodules in the right lung which demonstrates increased activity in the right upper lobe. These may be infectious. These were not described on the previous PET/CT. Pulmonary lymphoma would be difficult to exclude. If the pulmonary nodule represents lymphoma, this would represent a Deauville score 5 exam. If this is infectious, overall the exam would represent a Deauville score 1. Previously described lymphadenopathy in FDG activity in the left retroperitoneum, left iliac region and in left upper thigh has completely resolved. No residual lymphadenopathy or FDG avid lymph node identified      Lymphodepleting Chemotherapy:  Fludarabine and cyclophosphamide   Disease Status at time of Infusion:  Progressive Disease  CAR-T Infusion Date: 5/23/22  CAR-T Product:  Breyanzi  Batch ID Number:  [id number]     Day - 3     2. CRS / Neuro:  No evidence  - Monitor CRP and Ferrtin closely         Lab Results   Component Value Date     CRP 22.2 (H) 03/21/2022            Lab Results   Component Value Date     FERRITIN 551.3 (H) 03/21/2022   - Start Keppra 500 mg BID on Monday, 5/23/22  - Neuro checks w/ CARTOX 10-point assessment Q4hrs     Toxicity Grading        CRS Grade: N/A     ICANS Grade:  N/A     ICE Score:  N/A     3. ID:  Afebrile, no evidence of infection.    - Start Diflucan, Valtrex and Levaquin when ANC < 1.5 (Scripts called in 5/18/22)      4.  Heme: Anemia from chemotherapy   - Transfuse for Hgb < 7 and Platelets < 48S  - No transfusion today     5. Metabolic:  Electrolytes are WNL and renal fxn stable.     TLS:  No evidence, cont allopurinol and daily TLS labs   - Start IVF hydration: 2 L NS bolus daily with On [facility] of [facility] & CAR-T Infusion, then 1 L NS daily in OP Infusion  - Replace potassium and magnesium per policy.     6. Nutrition:  Appetite and oral intake is good. - Taking Astragalus supplement at home: Stop 5/18/22 d/t interaction with Cytoxan  - Start low microbial diet   - Follow closely with dietary     7. DVT ppx:   - S/p Eliquis 2.5 mg BID (stopped 5/17/22); if the leg edema resolves with CAR-T, plan to stop Eliquis.     - Disposition: Patient will receive [facility] for 3 days (5/18/22 - 5/20/22) and return on 5/23/22 for [facility] CAR-T Infusion. Patient will then follow up daily in OP Infusion for labs, CAR-T assessment for signs / symptoms of toxicity including CRS or EDUARD and MD visit.        The patient was seen and examined by JOAQUIM Billy - NP   Doreen Bryant MD  [facility]  Please contact me through [facility] [place] [place]

## 2022-05-23 ENCOUNTER — HOSPITAL ENCOUNTER (INPATIENT)
Age: 82
LOS: 1 days | Discharge: HOME | DRG: 018 | End: 2022-05-23
Attending: INTERNAL MEDICINE | Admitting: INTERNAL MEDICINE
Payer: MEDICARE

## 2022-05-23 ENCOUNTER — HOSPITAL ENCOUNTER (OUTPATIENT)
Dept: GENERAL RADIOLOGY | Age: 82
Discharge: HOME | DRG: 018 | End: 2022-05-23
Payer: MEDICARE

## 2022-05-23 ENCOUNTER — HOSPITAL ENCOUNTER (OUTPATIENT)
Dept: ONCOLOGY | Age: 82
Setting detail: INFUSION SERIES
Discharge: HOME | End: 2022-05-23
Payer: MEDICARE

## 2022-05-23 VITALS
DIASTOLIC BLOOD PRESSURE: 87 MMHG | BODY MASS INDEX: 24.49 KG/M2 | SYSTOLIC BLOOD PRESSURE: 152 MMHG | HEART RATE: 67 BPM | OXYGEN SATURATION: 99 % | RESPIRATION RATE: 16 BRPM | WEIGHT: 180.78 LBS | TEMPERATURE: 97.5 F | HEIGHT: 72 IN

## 2022-05-23 VITALS
SYSTOLIC BLOOD PRESSURE: 137 MMHG | HEART RATE: 70 BPM | DIASTOLIC BLOOD PRESSURE: 84 MMHG | OXYGEN SATURATION: 98 % | BODY MASS INDEX: 24.49 KG/M2 | RESPIRATION RATE: 16 BRPM | WEIGHT: 180.78 LBS | TEMPERATURE: 97.7 F | HEIGHT: 72 IN

## 2022-05-23 DIAGNOSIS — C83.30 DIFFUSE LARGE B-CELL LYMPHOMA, UNSPECIFIED BODY REGION (HCC): Primary | ICD-10-CM

## 2022-05-23 LAB
ALBUMIN SERPL-MCNC: 3.7 G/DL (ref 3.4–5)
ALP BLD-CCNC: 70 U/L (ref 40–129)
ALT SERPL-CCNC: 12 U/L (ref 10–40)
ANION GAP SERPL CALCULATED.3IONS-SCNC: 10 MMOL/L (ref 3–16)
AST SERPL-CCNC: 17 U/L (ref 15–37)
BASOPHILS ABSOLUTE: 0 K/UL (ref 0–0.2)
BASOPHILS RELATIVE PERCENT: 0.3 %
BILIRUB SERPL-MCNC: 0.3 MG/DL (ref 0–1)
BILIRUBIN DIRECT: <0.2 MG/DL (ref 0–0.3)
BILIRUBIN URINE: NEGATIVE
BILIRUBIN, INDIRECT: ABNORMAL MG/DL (ref 0–1)
BLOOD, URINE: NEGATIVE
BUN BLDV-MCNC: 20 MG/DL (ref 7–20)
C-REACTIVE PROTEIN: 7.7 MG/L (ref 0–5.1)
CALCIUM SERPL-MCNC: 9.1 MG/DL (ref 8.3–10.6)
CHLORIDE BLD-SCNC: 103 MMOL/L (ref 99–110)
CLARITY: CLEAR
CO2: 24 MMOL/L (ref 21–32)
COLOR: YELLOW
CREAT SERPL-MCNC: 1 MG/DL (ref 0.8–1.3)
EOSINOPHILS ABSOLUTE: 0.3 K/UL (ref 0–0.6)
EOSINOPHILS RELATIVE PERCENT: 7.8 %
FERRITIN: 462 NG/ML (ref 30–400)
GFR AFRICAN AMERICAN: >60
GFR NON-AFRICAN AMERICAN: >60
GLUCOSE BLD-MCNC: 155 MG/DL (ref 70–99)
GLUCOSE URINE: NEGATIVE MG/DL
HCT VFR BLD CALC: 29.7 % (ref 40.5–52.5)
HEMOGLOBIN: 10.2 G/DL (ref 13.5–17.5)
INR BLD: 1.13 (ref 0.88–1.12)
KETONES, URINE: NEGATIVE MG/DL
LACTATE DEHYDROGENASE: 179 U/L (ref 100–190)
LEUKOCYTE ESTERASE, URINE: NEGATIVE
LYMPHOCYTES ABSOLUTE: 0 K/UL (ref 1–5.1)
LYMPHOCYTES RELATIVE PERCENT: 0.3 %
MAGNESIUM: 1.8 MG/DL (ref 1.8–2.4)
MCH RBC QN AUTO: 32.8 PG (ref 26–34)
MCHC RBC AUTO-ENTMCNC: 34.2 G/DL (ref 31–36)
MCV RBC AUTO: 96 FL (ref 80–100)
MICROSCOPIC EXAMINATION: NORMAL
MONOCYTES ABSOLUTE: 0 K/UL (ref 0–1.3)
MONOCYTES RELATIVE PERCENT: 0.9 %
NEUTROPHILS ABSOLUTE: 3.6 K/UL (ref 1.7–7.7)
NEUTROPHILS RELATIVE PERCENT: 90.7 %
NITRITE, URINE: NEGATIVE
PDW BLD-RTO: 14.9 % (ref 12.4–15.4)
PH UA: 6 (ref 5–8)
PHOSPHORUS: 3.1 MG/DL (ref 2.5–4.9)
PLATELET # BLD: 111 K/UL (ref 135–450)
PMV BLD AUTO: 8.7 FL (ref 5–10.5)
POTASSIUM SERPL-SCNC: 4 MMOL/L (ref 3.5–5.1)
PROTEIN UA: NEGATIVE MG/DL
PROTHROMBIN TIME: 12.8 SEC (ref 9.9–12.7)
RBC # BLD: 3.1 M/UL (ref 4.2–5.9)
SODIUM BLD-SCNC: 137 MMOL/L (ref 136–145)
SPECIFIC GRAVITY UA: 1.02 (ref 1–1.03)
TOTAL PROTEIN: 6.3 G/DL (ref 6.4–8.2)
URIC ACID, SERUM: 5.8 MG/DL (ref 3.5–7.2)
URINE TYPE: NORMAL
UROBILINOGEN, URINE: 0.2 E.U./DL
WBC # BLD: 4 K/UL (ref 4–11)

## 2022-05-23 PROCEDURE — 81003 URINALYSIS AUTO W/O SCOPE: CPT

## 2022-05-23 PROCEDURE — 85610 PROTHROMBIN TIME: CPT

## 2022-05-23 PROCEDURE — 6370000000 HC RX 637 (ALT 250 FOR IP): Performed by: NURSE PRACTITIONER

## 2022-05-23 PROCEDURE — 2580000003 HC RX 258: Performed by: NURSE PRACTITIONER

## 2022-05-23 PROCEDURE — 83735 ASSAY OF MAGNESIUM: CPT

## 2022-05-23 PROCEDURE — 86140 C-REACTIVE PROTEIN: CPT

## 2022-05-23 PROCEDURE — 85025 COMPLETE CBC W/AUTO DIFF WBC: CPT

## 2022-05-23 PROCEDURE — 36592 COLLECT BLOOD FROM PICC: CPT

## 2022-05-23 PROCEDURE — C9076 HC RX FDA APPROVED CELL THERAPY: HCPCS | Performed by: INTERNAL MEDICINE

## 2022-05-23 PROCEDURE — 80048 BASIC METABOLIC PNL TOTAL CA: CPT

## 2022-05-23 PROCEDURE — 0539T HC CART-T THERAPY RECEIPT & PREP CAR-T CELLS F/ADMIN: CPT

## 2022-05-23 PROCEDURE — 8910000000 HC RX FDA APPROVED CELL THERAPY: Performed by: INTERNAL MEDICINE

## 2022-05-23 PROCEDURE — 71045 X-RAY EXAM CHEST 1 VIEW: CPT

## 2022-05-23 PROCEDURE — 84550 ASSAY OF BLOOD/URIC ACID: CPT

## 2022-05-23 PROCEDURE — 0540T HC CAR-T THERAPY AUTOLOGOUS CELL ADMIN: CPT

## 2022-05-23 PROCEDURE — 6360000002 HC RX W HCPCS: Performed by: NURSE PRACTITIONER

## 2022-05-23 PROCEDURE — 84100 ASSAY OF PHOSPHORUS: CPT

## 2022-05-23 PROCEDURE — 96361 HYDRATE IV INFUSION ADD-ON: CPT

## 2022-05-23 PROCEDURE — 83615 LACTATE (LD) (LDH) ENZYME: CPT

## 2022-05-23 PROCEDURE — 2060000000 HC ICU INTERMEDIATE R&B

## 2022-05-23 PROCEDURE — XW033N7 INTRODUCTION OF LISOCABTAGENE MARALEUCEL IMMUNOTHERAPY INTO PERIPHERAL VEIN, PERCUTANEOUS APPROACH, NEW TECHNOLOGY GROUP 7: ICD-10-PCS | Performed by: INTERNAL MEDICINE

## 2022-05-23 PROCEDURE — 82728 ASSAY OF FERRITIN: CPT

## 2022-05-23 PROCEDURE — 96360 HYDRATION IV INFUSION INIT: CPT

## 2022-05-23 PROCEDURE — 80076 HEPATIC FUNCTION PANEL: CPT

## 2022-05-23 RX ORDER — ACETAMINOPHEN 325 MG/1
650 TABLET ORAL
Status: COMPLETED | OUTPATIENT
Start: 2022-05-23 | End: 2022-05-23

## 2022-05-23 RX ORDER — OXYCODONE HYDROCHLORIDE 5 MG/1
10 TABLET ORAL EVERY 4 HOURS PRN
Status: CANCELLED | OUTPATIENT
Start: 2022-05-24

## 2022-05-23 RX ORDER — PROCHLORPERAZINE MALEATE 10 MG
10 TABLET ORAL EVERY 6 HOURS PRN
Status: CANCELLED | OUTPATIENT
Start: 2022-05-24

## 2022-05-23 RX ORDER — HEPARIN SODIUM (PORCINE) LOCK FLUSH IV SOLN 100 UNIT/ML 100 UNIT/ML
500 SOLUTION INTRAVENOUS PRN
Status: CANCELLED | OUTPATIENT
Start: 2022-05-24

## 2022-05-23 RX ORDER — 0.9 % SODIUM CHLORIDE 0.9 %
500 INTRAVENOUS SOLUTION INTRAVENOUS ONCE
Status: COMPLETED | OUTPATIENT
Start: 2022-05-23 | End: 2022-05-23

## 2022-05-23 RX ORDER — 0.9 % SODIUM CHLORIDE 0.9 %
1000 INTRAVENOUS SOLUTION INTRAVENOUS ONCE
Status: CANCELLED | OUTPATIENT
Start: 2022-05-24 | End: 2022-05-24

## 2022-05-23 RX ORDER — OXYCODONE HYDROCHLORIDE 5 MG/1
10 TABLET ORAL EVERY 4 HOURS PRN
Status: DISCONTINUED | OUTPATIENT
Start: 2022-05-23 | End: 2022-05-24 | Stop reason: HOSPADM

## 2022-05-23 RX ORDER — POTASSIUM CHLORIDE 20 MEQ/1
40 TABLET, EXTENDED RELEASE ORAL PRN
Status: CANCELLED | OUTPATIENT
Start: 2022-05-24

## 2022-05-23 RX ORDER — DIPHENHYDRAMINE HCL 25 MG
25 TABLET ORAL EVERY 6 HOURS PRN
Status: DISCONTINUED | OUTPATIENT
Start: 2022-05-23 | End: 2022-05-23 | Stop reason: HOSPADM

## 2022-05-23 RX ORDER — SODIUM CHLORIDE 9 MG/ML
INJECTION, SOLUTION INTRAVENOUS ONCE
Status: CANCELLED | OUTPATIENT
Start: 2022-05-24 | End: 2022-05-24

## 2022-05-23 RX ORDER — DIPHENHYDRAMINE HYDROCHLORIDE 50 MG/ML
25 INJECTION INTRAMUSCULAR; INTRAVENOUS PRN
Status: DISCONTINUED | OUTPATIENT
Start: 2022-05-23 | End: 2022-05-23 | Stop reason: HOSPADM

## 2022-05-23 RX ORDER — MORPHINE SULFATE 2 MG/ML
2 INJECTION, SOLUTION INTRAMUSCULAR; INTRAVENOUS PRN
Status: DISCONTINUED | OUTPATIENT
Start: 2022-05-23 | End: 2022-05-23 | Stop reason: HOSPADM

## 2022-05-23 RX ORDER — PROCHLORPERAZINE EDISYLATE 5 MG/ML
10 INJECTION INTRAMUSCULAR; INTRAVENOUS EVERY 6 HOURS PRN
Status: CANCELLED | OUTPATIENT
Start: 2022-05-24

## 2022-05-23 RX ORDER — HEPARIN SODIUM (PORCINE) LOCK FLUSH IV SOLN 100 UNIT/ML 100 UNIT/ML
500 SOLUTION INTRAVENOUS PRN
Status: DISCONTINUED | OUTPATIENT
Start: 2022-05-23 | End: 2022-05-24 | Stop reason: HOSPADM

## 2022-05-23 RX ORDER — 0.9 % SODIUM CHLORIDE 0.9 %
500 INTRAVENOUS SOLUTION INTRAVENOUS ONCE
Status: DISCONTINUED | OUTPATIENT
Start: 2022-05-23 | End: 2022-05-23 | Stop reason: HOSPADM

## 2022-05-23 RX ORDER — ONDANSETRON 4 MG/1
8 TABLET, FILM COATED ORAL ONCE
Status: DISCONTINUED | OUTPATIENT
Start: 2022-05-23 | End: 2022-05-24 | Stop reason: HOSPADM

## 2022-05-23 RX ORDER — PROCHLORPERAZINE MALEATE 10 MG
10 TABLET ORAL EVERY 6 HOURS PRN
Status: DISCONTINUED | OUTPATIENT
Start: 2022-05-23 | End: 2022-05-24 | Stop reason: HOSPADM

## 2022-05-23 RX ORDER — DIPHENHYDRAMINE HCL 25 MG
25 TABLET ORAL ONCE
Status: DISCONTINUED | OUTPATIENT
Start: 2022-05-23 | End: 2022-05-24 | Stop reason: HOSPADM

## 2022-05-23 RX ORDER — PROCHLORPERAZINE EDISYLATE 5 MG/ML
10 INJECTION INTRAMUSCULAR; INTRAVENOUS EVERY 6 HOURS PRN
Status: DISCONTINUED | OUTPATIENT
Start: 2022-05-23 | End: 2022-05-24 | Stop reason: HOSPADM

## 2022-05-23 RX ORDER — PETROLATUM, MENTHOL, UNSPECIFIED FORM, CAMPHOR (SYNTHETIC), AND PHENOL 59.14; 1; 1; .6 G/100G; G/100G; G/100G; G/100G
PASTE TOPICAL PRN
Status: CANCELLED | OUTPATIENT
Start: 2022-05-24

## 2022-05-23 RX ORDER — ONDANSETRON 2 MG/ML
8 INJECTION INTRAMUSCULAR; INTRAVENOUS ONCE
Status: CANCELLED | OUTPATIENT
Start: 2022-05-24 | End: 2022-05-24

## 2022-05-23 RX ORDER — OXYCODONE HYDROCHLORIDE 5 MG/1
5 TABLET ORAL EVERY 4 HOURS PRN
Status: CANCELLED | OUTPATIENT
Start: 2022-05-24

## 2022-05-23 RX ORDER — HEPARIN SODIUM (PORCINE) LOCK FLUSH IV SOLN 100 UNIT/ML 100 UNIT/ML
300 SOLUTION INTRAVENOUS PRN
Status: DISCONTINUED | OUTPATIENT
Start: 2022-05-23 | End: 2022-05-23 | Stop reason: HOSPADM

## 2022-05-23 RX ORDER — SODIUM CHLORIDE 9 MG/ML
INJECTION, SOLUTION INTRAVENOUS CONTINUOUS PRN
Status: DISCONTINUED | OUTPATIENT
Start: 2022-05-23 | End: 2022-05-23 | Stop reason: HOSPADM

## 2022-05-23 RX ORDER — ACETAMINOPHEN 325 MG/1
650 TABLET ORAL ONCE
Status: DISCONTINUED | OUTPATIENT
Start: 2022-05-23 | End: 2022-05-24 | Stop reason: HOSPADM

## 2022-05-23 RX ORDER — ONDANSETRON 2 MG/ML
8 INJECTION INTRAMUSCULAR; INTRAVENOUS ONCE
Status: DISCONTINUED | OUTPATIENT
Start: 2022-05-23 | End: 2022-05-24 | Stop reason: HOSPADM

## 2022-05-23 RX ORDER — OXYCODONE HYDROCHLORIDE 5 MG/1
5 TABLET ORAL EVERY 4 HOURS PRN
Status: DISCONTINUED | OUTPATIENT
Start: 2022-05-23 | End: 2022-05-24 | Stop reason: HOSPADM

## 2022-05-23 RX ORDER — POTASSIUM CHLORIDE 29.8 MG/ML
80 INJECTION INTRAVENOUS PRN
Status: CANCELLED | OUTPATIENT
Start: 2022-05-24

## 2022-05-23 RX ORDER — ONDANSETRON 4 MG/1
8 TABLET, FILM COATED ORAL ONCE
Status: CANCELLED | OUTPATIENT
Start: 2022-05-24 | End: 2022-05-24

## 2022-05-23 RX ORDER — MAGNESIUM SULFATE IN WATER 40 MG/ML
4000 INJECTION, SOLUTION INTRAVENOUS PRN
Status: CANCELLED | OUTPATIENT
Start: 2022-05-24

## 2022-05-23 RX ADMIN — SODIUM CHLORIDE 500 ML: 9 INJECTION, SOLUTION INTRAVENOUS at 12:19

## 2022-05-23 RX ADMIN — ACETAMINOPHEN 650 MG: 325 TABLET ORAL at 11:12

## 2022-05-23 RX ADMIN — HEPARIN SODIUM (PORCINE) LOCK FLUSH IV SOLN 100 UNIT/ML 500 UNITS: 100 SOLUTION at 14:39

## 2022-05-23 RX ADMIN — LISOCABTAGENE MARALEUCEL 1 SYRINGE: KIT at 11:55

## 2022-05-23 RX ADMIN — SODIUM CHLORIDE 500 ML: 9 INJECTION, SOLUTION INTRAVENOUS at 08:24

## 2022-05-23 RX ADMIN — DIPHENHYDRAMINE HYDROCHLORIDE 25 MG: 25 TABLET ORAL at 11:12

## 2022-05-23 NOTE — PROGRESS NOTES
4 Eyes Admission Assessment     I agree as the admission nurse that 2 RN's have performed a thorough Head to Toe Skin Assessment on the patient. ALL assessment sites listed below have been assessed on admission. Areas assessed by both nurses: Rafia and Lillie  [x]   Head, Face, and Ears   [x]   Shoulders, Back, and Chest  [x]   Arms, Elbows, and Hands   [x]   Coccyx, Sacrum, and Ischium  [x]   Legs, Feet, and Heels        Does the Patient have Skin Breakdown?   No         Mal Prevention initiated:  NA   Wound Care Orders initiated:  NA      [facility] nurse consulted for Pressure Injury (Stage 3,4, Unstageable, DTI, NWPT, and Complex wounds) or Mal score 18 or lower:  NA      Nurse 1 eSignature: Electronically signed by Heaven Gregg RN on 5/23/22 at 10:38 AM EDT    **SHARE this note so that the co-signing nurse is able to place an eSignature**    Nurse 2 eSignature: {Esignature:[id number]}

## 2022-05-23 NOTE — DISCHARGE SUMMARY
[facility] Discharge Summary             Attending Physician: Yudy Monroy MD    Referring MD: Yudy Monroy MD  [address] [place] [address], [facility] [address],  [address]    Name: Marybeth Carlson :  1940  MRN:  [id number]    Admission: 2022   Discharge:  22    Date: 2022    Diagnosis on admit: DLBC NHL    Medications:      Medication List      ASK your doctor about these medications    acyclovir 800 MG tablet  Commonly known as: ZOVIRAX  Take 1 tablet by mouth 2 times daily Do Not Start Until Instructed to by Provider. allopurinol 300 MG tablet  Commonly known as: ZYLOPRIM  Take 1 tablet by mouth daily     fluconazole 200 MG tablet  Commonly known as: Diflucan  Take 1 tablet by mouth daily Do Not Start Until Instructed to by Provider. GLUCOSAMINE HCL PO     levETIRAcetam 500 MG tablet  Commonly known as: Keppra  Take 1 tablet by mouth 2 times daily Start on 22     levoFLOXacin 500 MG tablet  Commonly known as: LEVAQUIN  Take 1 tablet by mouth daily Do Not Start Until Instructed to by Provider.      MULTIPLE VITAMINS PO     ondansetron 4 MG disintegrating tablet  Commonly known as: Zofran ODT  Take 1 tablet by mouth every 8 hours as needed for Nausea or Vomiting     prochlorperazine 10 MG tablet  Commonly known as: COMPAZINE            Reason for Admission: Breyanzi CAR-T Infusion for diagnosis of DLBC NHL    Oncological History:   The patient is an 74-year-old man with a history of diffuse large cell B-cell non-Hodgkin's lymphoma.  He presented 2021 with knee pain and swelling.  Biopsy showed B-cell non-Hodgkin's lymphoma with a Ki-67 of about 60%.  Baseline PET/CT from 2022 shows extensive hypermetabolic metabolism in the retroperitoneum, left pelvis and left lower extremity consistent with lymphoma.       He has a history of follicular lymphoma diagnosed  (bone marrow involvement).  He was treated with CHOP chemotherapy from 1995 to June 6, 1996. Anisha Bermudez then had a recurrence and received rituximab times 4 November 2003 followed by maintenance rituximab April 21, 2004 and August 23, 2004. Anisha Bermudez then received R-CHOP beginning July 5, 2007 followed by maintenance rituximab from November 2008 to September 8, 2009. Most recently he received R-[place]/Ox X 2 with F/U PET 3/1/22 showing progression in the retroperitoneum, left hemipelvis and left LE. He was then collected  for CAR-T cell collection in preporation for Breyanzi CAR-T Infusion on 4/19/22. He is status post Bridging chemotherapy of Chip BR on 3/22/22 and 4/40/22. His most recent PET CT on 5/13/22 showed new pulmonary nudules in the right upper lung measuring 1.2 cm max SUV 3.9. Previously described lymphadenopathy and FDG in left retroperitoneum, left iliac region and left upper thigh has completely resolved. He then received Lymphodepleting Chemo in OP Infusion on 5/18/22 - 5/20/22.      Hospital Course:   Mr. Kianna Brandon then presented to [facility] for his Brey[facility]zi CAR-T Infusion. He was admitted for the infusion, tolerated well, and now discharged home. He will then return tomorrow to OP Infusion then daily to be followed for any signs / symptoms of toxicity including CRS or EDUARD. He feels well today but has a chronically swollen left leg. He remains active on a daily basis. He denies fever, chills, bleeding, or GI changes. Eating and drinking well.        Physical Exam:     Vital Signs:  BP (!) 152/87   Pulse 67   Temp 97.5 °F (36.4 °C) (Oral)   Resp 16   Ht 6' (1.829 m)   Wt 180 lb 12.4 oz (82 kg)   SpO2 99%   BMI 24.52 kg/m²     Weight:    Wt Readings from Last 3 Encounters:   05/23/22 180 lb 12.4 oz (82 kg)   05/23/22 180 lb 12.4 oz (82 kg)   05/20/22 183 lb 3.2 oz (83.1 kg)       KPS: 90% Able to carry on normal activity; minor signs or symptoms of disease    General: Awake, alert and oriented.   HEENT: normocephalic, alopecia, PERRL, no scleral erythema or icterus, Oral mucosa moist and intact, throat clear  NECK: supple without palpable adenopathy  BACK: Straight negative CVAT  SKIN: warm dry and intact without lesions rashes or masses  CHEST: CTA bilaterally without use of accessory muscles  CV: Normal S1 S2, RRR, no MRG  ABD: NT ND normoactive BS, no palpable masses or hepatosplenomegaly  EXTREMITIES: LLE Edema from the inguinal region distally.  It is improved.  He can extend to 180 degrees and flex to 90.  The right lower extremity is normal, denies calf tenderness  NEURO: CN II - XII grossly intact  CATHETER: RIJ Trifusion (IR: Jermaine, 4/15/22): CDI      Discharge Laboratory Data:  CBC:   Recent Labs     05/23/22 0819   WBC 4.0   HGB 10.2*   HCT 29.7*   MCV 96.0   *     BMP/Mag:  Recent Labs     05/23/22 0819      K 4.0      CO2 24   PHOS 3.1   BUN 20   CREATININE 1.0   MG 1.80     LIVP:   Recent Labs     05/23/22 0819   AST 17   ALT 12   BILIDIR <0.2   BILITOT 0.3   ALKPHOS 70     Coags:   Recent Labs     05/23/22 0819   PROTIME 12.8*   INR 1.13*     Uric Acid   Recent Labs     05/23/22  0819   LABURIC 5.8     Tacro:  No results for input(s): TACROLEV in the last 72 hours. CMV Quant DNA by PCR: No results found for: CMVDNAQNT  IgG: No results for input(s): IGG in the last 72 hours. PROBLEM LIST:            1. Follicular lymphoma with BM involvement  2. DLBC NHL      TREATMENT:            1. FL: 1/1995 CHOP             11/2003 to 8/2004 Rituxan             7/2007 R-CHOP  2.  DLBCL: R-[place]/Ox C1 1/31/2022, C2 02/14/2022                     Chip-BR C1D1: 3/22/22                                    C2D1: 4/20/22        Lymphodepleting Chemotherapy:  Fludarabine and cyclophosphamide   Disease Status at time of Infusion:  Progressive Disease  CAR-T Infusion Date: 5/23/22  CAR-T Product:  Breyanzi  Batch ID Number:  [id number]        ASSESSMENT AND PLAN:            1.  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  Left ventricular ejection fraction 55 to 60% with no regional wall motion abnormalities noted.  Intermittent diastolic dysfunction.  - PFTs (3/21/22): Diffusion capacity 79%, FEV1 78%.  Consistent with mild restrictive ventilatory defect. - CMI -  3 (age and pulmonary)  - PET (5/13/22):  Multiple indeterminate pulmonary nodules in the right lung which demonstrates increased activity in the right upper lobe. These may be infectious. These were not described on the previous PET/CT. Pulmonary lymphoma would be difficult to exclude. If the pulmonary nodule represents lymphoma, this would represent a Deauville score 5 exam. If this is infectious, overall the exam would represent a Deauville score 1. Previously described lymphadenopathy in FDG activity in the left retroperitoneum, left iliac region and in left upper thigh has completely resolved.  No residual lymphadenopathy or FDG avid lymph node identified      Lymphodepleting Chemotherapy:  Fludarabine and cyclophosphamide   Disease Status at time of Infusion:  Progressive Disease  CAR-T Infusion Date: 5/23/22  CAR-T Product:  Breyanzi  Batch ID Number:  [id number]     Day 0     2.  CRS / Neuro:  No evidence  - Monitor CRP and Ferrtin closely             Lab Results   Component Value Date     CRP 22.2 (H) 03/21/2022                Lab Results   Component Value Date     FERRITIN 551.3 (H) 03/21/2022   - Start Keppra 500 mg BID on Monday, 5/23/22  - Neuro checks w/ CARTOX 10-point assessment Q4hrs     Toxicity Grading        CRS Grade: N/A     ICANS Grade:  N/A     ICE Score:  N/A     3.  ID:  Afebrile, no evidence of infection.    - Start Diflucan, Valtrex and Levaquin when [address] < 1.5 (Scripts called in 5/18/22)      4.  Heme: Anemia & thrombocytopenia from chemotherapy   - Transfuse for Hgb < 7 and Platelets < 30R  - No transfusion today     5.  Metabolic: Mild hyperglycemia otherwise Electrolytes are WNL and renal fxn stable.    TLS:  No evidence, cont allopurinol and daily TLS labs   - Cont IVF hydration: 2 L NS bolus daily with LDC & CAR-T Infusion, then 1 L NS daily in OP Infusion  - Replace potassium and magnesium per policy.     6. Nutrition:  Appetite and oral intake is good. - S/p taking Astragalus supplement at home: Stop 5/18/22 d/t interaction with Cytoxan  - Cont low microbial diet   - Follow closely with dietary     7. DVT ppx:   - S/p Eliquis 2.5 mg BID (stopped 5/17/22); if the leg edema resolves with CAR-T, plan to stop Eliquis.       Condition on discharge: stable     Discharge Instructions:  Patient will follow up daily in OP Infusion for labs, CAR-T assessment for signs / symptoms of toxicity including CRS or EDUARD and MD visit.     The patient was advised on activity and dietary restrictions. The patient was advised to follow up in the emergency department or contact the physician with any unresolved nausea/vomiting/diarrhea/pain or temperature greater than 100.5 F or any other unusual symptoms. This discharge summary and plan was discussed and agreed upon with Dr. Ren Chacko.     Je Huitron, APRN - NP, CNP

## 2022-05-23 NOTE — PROGRESS NOTES
Availability of two 700 mg doses of Tocilizumab was verified prior to patient CAR-T infusion by this pharmacist.      Tiana Zafar 5/23/2022 8:39 AM

## 2022-05-23 NOTE — H&P
[address] History and Physical        Attending Physician: Ira Cuevas MD    Primary Care: Dylon Delgado       Referring MD: Ira Cuevas MD  [address] [facility] [address]  [place],  [address]    Name: Clive Razo :  1940  MRN:  [id number]    Admission: 2022      Date: 2022    Reason for Admission: [facility] CAR-T Infusion for diagnosis of DLBC NHL    History of Present Illness: The patient is an 79-year-old man with a history of diffuse large cell B-cell non-Hodgkin's lymphoma.  He presented 2021 with knee pain and swelling.  Biopsy showed B-cell non-Hodgkin's lymphoma with a Ki-67 of about 60%.  Baseline PET/CT from 2022 shows extensive hypermetabolic metabolism in the retroperitoneum, left pelvis and left lower extremity consistent with lymphoma.       He has a history of follicular lymphoma diagnosed  (bone marrow involvement).  He was treated with CHOP chemotherapy from 1995 to 1996. Hector Ramesh then had a recurrence and received rituximab times 2003 followed by maintenance rituximab 2004 and 2004. Hector Ramesh then received R-CHOP beginning 2007 followed by maintenance rituximab from 2008 to 2009. Most recently he received R-Gilchrist/Ox X 2 with F/U PET 3/1/22 showing progression in the retroperitoneum, left hemipelvis and left LE. He was then collected for CAR-T cell collection in preporation for [facility] CAR-T Infusion on 22. He is status post Bridging chemotherapy of Chip BR on 3/22/22 and . His most recent PET CT on 22 showed new pulmonary nudules in the right upper lung measuring 1.2 cm max SUV 3.9. Previously described lymphadenopathy and FDG in left retroperitoneum, left iliac region and left upper thigh has completely resolved. He then received Lymphodepleting Chemo in OP Infusion on 22 - 22.      Mr. Dave Garcia now presents to [address] for his [facility] CAR-T Infusion. He will be admitted for the infusion, then discharged home. He will then be followed daily for any signs / symptoms of toxicity including CRS or EDUARD. He feels well today but has a chronically swollen left leg. He remains active on a daily basis. He denies fever, chills, bleeding, or GI changes. Eating and drinking well.                     Pre Transplant Workup:         Pre Transplant labs:       3/21/2022 09:40   Total IgG 776.0   Herpes Type 1/2 IgM Combined 0.52   Hep B E Ab Negative   Hep B S Ab <3.50   Hep B Core Ab, IgM Non-reactive           3/21/2022 09:40   EBV VCA IgG 25.4 (H)   EBV VCA IgM 18.9   CMV IgM <8.0   EBV Early Ag Ab <5.0   VARICELLA ZOSTER AB IGM 0.04   Herpes Type 1/2 IgM Combined 0.52   Ari Barr virus nuclear Ab IgG 185.0 (H)               Past Surgical History:   Procedure Laterality Date    CATHETER INSERTION N/A 4/18/2022    RIGHT INTERNAL JUGULAR TRIFUSION CENTRAL LINE PLACEMENT performed by Chico Banks MD at [facility] [address]       No past medical history on file. Prior to Admission medications    Medication Sig Start Date End Date Taking? Authorizing Provider   allopurinol (ZYLOPRIM) 300 MG tablet Take 1 tablet by mouth daily 5/19/22   JOAQUIM Daily NP   acyclovir (ZOVIRAX) 800 MG tablet Take 1 tablet by mouth 2 times daily Do Not Start Until Instructed to by Provider. 5/18/22   JOAQUIM Daily NP   levETIRAcetam (KEPPRA) 500 MG tablet Take 1 tablet by mouth 2 times daily Start on Monday 5/23/22 5/23/22   JOAQUIM Daily NP   ondansetron (ZOFRAN ODT) 4 MG disintegrating tablet Take 1 tablet by mouth every 8 hours as needed for Nausea or Vomiting 5/18/22   JOAQUIM Daily NP   levoFLOXacin (LEVAQUIN) 500 MG tablet Take 1 tablet by mouth daily Do Not Start Until Instructed to by Provider.  5/18/22 6/17/22  JOAQUIM Daily NP   fluconazole (DIFLUCAN) 200 MG tablet Take 1 tablet by mouth daily Do Not Start Until Instructed to by Provider. 5/18/22 6/17/22  JOAQUIM Randhawa NP   prochlorperazine (COMPAZINE) 10 MG tablet Take 10 mg by mouth every 4-6 hours as needed 1/30/22   Historical Provider, MD   MULTIPLE VITAMINS PO Take 1 tablet by mouth 2 times daily  Patient not taking: Reported on 5/23/2022    Historical Provider, MD   GLUCOSAMINE HCL PO Take 2 capsules by mouth 2 times daily  Patient not taking: Reported on 5/23/2022    Historical Provider, MD       Allergies   Allergen Reactions    Decadron [Dexamethasone]      Patient is receiving CAR-T. No steroids unless approved by [address] physician. No family history on file. Social History     Socioeconomic History    Marital status:      Spouse name: Not on file    Number of children: Not on file    Years of education: Not on file    Highest education level: Not on file   Occupational History    Not on file   Tobacco Use    Smoking status: Not on file    Smokeless tobacco: Not on file   Substance and Sexual Activity    Alcohol use: Not on file    Drug use: Not on file    Sexual activity: Not on file   Other Topics Concern    Not on file   Social History Narrative    Not on file     Social Determinants of Health     Financial Resource Strain:     Difficulty of Paying Living Expenses: Not on file   Food Insecurity:     Worried About Running Out of Food in the Last Year: Not on file    Xavi of Food in the Last Year: Not on file   Transportation Needs:     Lack of Transportation (Medical): Not on file    Lack of Transportation (Non-Medical):  Not on file   Physical Activity:     Days of Exercise per Week: Not on file    Minutes of Exercise per Session: Not on file   Stress:     Feeling of Stress : Not on file   Social Connections:     Frequency of Communication with Friends and Family: Not on file    Frequency of Social Gatherings with Friends and Family: Not on file    Attends [employer] Services: Not on file   [address] [place] [address] Member of Clubs or Organizations: Not on file    Attends Club or Organization Meetings: Not on file    Marital Status: Not on file   Intimate Partner Violence:     Fear of Current or Ex-Partner: Not on file    Emotionally Abused: Not on file    Physically Abused: Not on file    Sexually Abused: Not on file   Housing Stability:     Unable to Pay for Housing in the Last Year: Not on file    Number of [place] in the Last Year: Not on file    Unstable Housing in the Last Year: Not on file        ROS:  As noted above, otherwise remainder of 10-point ROS negative      Physical Exam:     Vital Signs:  Ht 6' (1.829 m)   Wt 180 lb 12.4 oz (82 kg)   BMI 24.52 kg/m²     Weight:    Wt Readings from Last 3 Encounters:   05/23/22 180 lb 12.4 oz (82 kg)   05/23/22 180 lb 12.4 oz (82 kg)   05/20/22 183 lb 3.2 oz (83.1 kg)       KPS: 90% Able to carry on normal activity; minor signs or symptoms of disease    ECOG PS:  (1) Restricted in physically strenuous activity, ambulatory and able to do work of light nature    General: Awake, alert and oriented.   HEENT: normocephalic, alopecia, PERRL, no scleral erythema or icterus, Oral mucosa moist and intact, throat clear  NECK: supple without palpable adenopathy  BACK: Straight negative CVAT  SKIN: warm dry and intact without lesions rashes or masses  CHEST: CTA bilaterally without use of accessory muscles  CV: Normal S1 S2, RRR, no MRG  ABD: NT ND normoactive BS, no palpable masses or hepatosplenomegaly  EXTREMITIES: LLE Edema from the inguinal region distally.  It is improved.  He can extend to 180 degrees and flex to 90.  The right lower extremity is normal, denies calf tenderness  NEURO: CN II - XII grossly intact  CATHETER: RIJ Trifusion (IR: Jermaine, 4/15/22): CDI      Laboratory Data:  CBC:   Recent Labs     05/23/22  0819   WBC 4.0   HGB 10.2*   HCT 29.7*   MCV 96.0   *     BMP/Mag:  Recent Labs     05/23/22  0819      K 4.0      CO2 24   PHOS 3. 1   BUN 20   CREATININE 1.0   MG 1.80     LIVP:   Recent Labs     05/23/22  0819   AST 17   ALT 12   BILIDIR <0.2   BILITOT 0.3   ALKPHOS 70     Coags:   Recent Labs     05/23/22  0819   PROTIME 12.8*   INR 1.13*     Uric Acid   Recent Labs     05/23/22  0819   LABURIC 5.8     Lab Results   Component Value Date    CRP 7.7 (H) 05/23/2022    CRP 22.2 (H) 03/21/2022     Lab Results   Component Value Date    FERRITIN 462.0 (H) 05/23/2022    FERRITIN 551.3 (H) 03/21/2022       PROBLEM LIST:                1. Follicular lymphoma with BM involvement  2. DLBC NHL      TREATMENT:            1. FL: 1/1995 CHOP             11/2003 to 8/2004 Rituxan             7/2007 R-CHOP  2. DLBCL: R-Benson/Ox C1 1/31/2022, C2 02/14/2022                     Chip-BR C1D1: 3/22/22                                    C2D1: 4/20/22        Lymphodepleting Chemotherapy:  Fludarabine and cyclophosphamide   Disease Status at time of Infusion:  Progressive Disease  CAR-T Infusion Date: 5/23/22  CAR-T Product:  [facility]  Batch ID Number:  [id number]        ASSESSMENT AND PLAN:            1.  Refractory large cell non-Hodgkin's lymphoma: Primary Induction Failure - Resistant   - PET (3/1/22): extensive hypermetabolic lymphadenopathy in the retroperitoneum, left hemipelvis and LLE   - BMBX (3/14/22): negative   - S/p Chip/Bendamustine/Rituxan x 2 cycles  - PLAN: LDC to begin 5/18/22 followed by CAR-T infusion      CAR-T Work up:   - Echo (3/21/22): Centric mild left ventricular hypertrophy.  Left ventricular ejection fraction 55 to 60% with no regional wall motion abnormalities noted.  Intermittent diastolic dysfunction.  - PFTs (3/21/22): Diffusion capacity 79%, FEV1 78%.  Consistent with mild restrictive ventilatory defect. - CMI -  3 (age and pulmonary)  - PET (5/13/22):  Multiple indeterminate pulmonary nodules in the right lung which demonstrates increased activity in the right upper lobe. These may be infectious.  These were not described on the previous PET/CT. Pulmonary lymphoma would be difficult to exclude. If the pulmonary nodule represents lymphoma, this would represent a Deauville score 5 exam. If this is infectious, overall the exam would represent a Deauville score 1. Previously described lymphadenopathy in FDG activity in the left retroperitoneum, left iliac region and in left upper thigh has completely resolved. No residual lymphadenopathy or FDG avid lymph node identified      Lymphodepleting Chemotherapy:  Fludarabine and cyclophosphamide   Disease Status at time of Infusion:  Progressive Disease  CAR-T Infusion Date: 5/23/22  CAR-T Product:  Breyanzi  Batch ID Number:  [id number]     Day 0     2.  CRS / Neuro:  No evidence  - Monitor CRP and Ferrtin closely             Lab Results   Component Value Date     CRP 22.2 (H) 03/21/2022                Lab Results   Component Value Date     FERRITIN 551.3 (H) 03/21/2022   - Start Keppra 500 mg BID on Monday, 5/23/22  - Neuro checks w/ CARTOX 10-point assessment Q4hrs     Toxicity Grading        CRS Grade: N/A     ICANS Grade:  N/A     ICE Score:  N/A     3.  ID:  Afebrile, no evidence of infection.    - Start Diflucan, Valtrex and Levaquin when ANC < 1.5 (Scripts called in 5/18/22)      4.  Heme: Anemia & thrombocytopenia from chemotherapy   - Transfuse for Hgb < 7 and Platelets < 08P  - No transfusion today     5.  Metabolic: Mild hyperglycemia otherwise Electrolytes are WNL and renal fxn stable.    TLS:  No evidence, cont allopurinol and daily TLS labs   - Cont IVF hydration: 2 L NS bolus daily with LDC & CAR-T Infusion, then 1 L NS daily in OP Infusion  - Replace potassium and magnesium per policy.     6. Nutrition:  Appetite and oral intake is good. - S/p taking Astragalus supplement at home: Stop 5/18/22 d/t interaction with Cytoxan  - Cont low microbial diet   - Follow closely with dietary     7.  DVT ppx:   - S/p Eliquis 2.5 mg BID (stopped 5/17/22); if the leg edema resolves with CAR-T, plan to stop Eliquis. - Disposition: Patient will receive [facility] CAR-T Infusion today then be discharged home to follow up daily in OP Infusion for labs, CAR-T assessment for signs / symptoms of toxicity including CRS or EDUARD and MD visit.       The patient was seen and examined by Dr. Jessica Saini. This admission history and physical has been discussed and agreed upon by Dr. Jessica Saini.     JOAQUIM Whitfield - NP

## 2022-05-24 ENCOUNTER — HOSPITAL ENCOUNTER (OUTPATIENT)
Dept: ONCOLOGY | Age: 82
Setting detail: INFUSION SERIES
Discharge: HOME | End: 2022-05-24
Payer: MEDICARE

## 2022-05-24 VITALS
OXYGEN SATURATION: 100 % | TEMPERATURE: 97.4 F | DIASTOLIC BLOOD PRESSURE: 74 MMHG | RESPIRATION RATE: 18 BRPM | BODY MASS INDEX: 24.61 KG/M2 | HEIGHT: 72 IN | SYSTOLIC BLOOD PRESSURE: 151 MMHG | WEIGHT: 181.66 LBS | HEART RATE: 74 BPM

## 2022-05-24 DIAGNOSIS — C83.30 DIFFUSE LARGE B-CELL LYMPHOMA, UNSPECIFIED BODY REGION (HCC): Primary | ICD-10-CM

## 2022-05-24 LAB
ANION GAP SERPL CALCULATED.3IONS-SCNC: 11 MMOL/L (ref 3–16)
BASOPHILS ABSOLUTE: 0 K/UL (ref 0–0.2)
BASOPHILS RELATIVE PERCENT: 0.6 %
BUN BLDV-MCNC: 18 MG/DL (ref 7–20)
C-REACTIVE PROTEIN: 5.7 MG/L (ref 0–5.1)
CALCIUM SERPL-MCNC: 8.7 MG/DL (ref 8.3–10.6)
CHLORIDE BLD-SCNC: 105 MMOL/L (ref 99–110)
CO2: 25 MMOL/L (ref 21–32)
CREAT SERPL-MCNC: 0.9 MG/DL (ref 0.8–1.3)
EOSINOPHILS ABSOLUTE: 0.4 K/UL (ref 0–0.6)
EOSINOPHILS RELATIVE PERCENT: 13.7 %
FERRITIN: 447.7 NG/ML (ref 30–400)
GFR AFRICAN AMERICAN: >60
GFR NON-AFRICAN AMERICAN: >60
GLUCOSE BLD-MCNC: 149 MG/DL (ref 70–99)
HCT VFR BLD CALC: 30.1 % (ref 40.5–52.5)
HEMOGLOBIN: 10.2 G/DL (ref 13.5–17.5)
LYMPHOCYTES ABSOLUTE: 0 K/UL (ref 1–5.1)
LYMPHOCYTES RELATIVE PERCENT: 0.7 %
MCH RBC QN AUTO: 32.8 PG (ref 26–34)
MCHC RBC AUTO-ENTMCNC: 33.9 G/DL (ref 31–36)
MCV RBC AUTO: 96.7 FL (ref 80–100)
MONOCYTES ABSOLUTE: 0 K/UL (ref 0–1.3)
MONOCYTES RELATIVE PERCENT: 1.3 %
NEUTROPHILS ABSOLUTE: 2.3 K/UL (ref 1.7–7.7)
NEUTROPHILS RELATIVE PERCENT: 83.7 %
PDW BLD-RTO: 14.7 % (ref 12.4–15.4)
PHOSPHORUS: 2.9 MG/DL (ref 2.5–4.9)
PLATELET # BLD: 107 K/UL (ref 135–450)
PMV BLD AUTO: 8.5 FL (ref 5–10.5)
POTASSIUM SERPL-SCNC: 4.2 MMOL/L (ref 3.5–5.1)
RBC # BLD: 3.12 M/UL (ref 4.2–5.9)
SODIUM BLD-SCNC: 141 MMOL/L (ref 136–145)
WBC # BLD: 2.7 K/UL (ref 4–11)

## 2022-05-24 PROCEDURE — 84100 ASSAY OF PHOSPHORUS: CPT

## 2022-05-24 PROCEDURE — 36592 COLLECT BLOOD FROM PICC: CPT

## 2022-05-24 PROCEDURE — 96361 HYDRATE IV INFUSION ADD-ON: CPT

## 2022-05-24 PROCEDURE — 96360 HYDRATION IV INFUSION INIT: CPT

## 2022-05-24 PROCEDURE — 2580000003 HC RX 258: Performed by: NURSE PRACTITIONER

## 2022-05-24 PROCEDURE — 80048 BASIC METABOLIC PNL TOTAL CA: CPT

## 2022-05-24 PROCEDURE — 82728 ASSAY OF FERRITIN: CPT

## 2022-05-24 PROCEDURE — 85025 COMPLETE CBC W/AUTO DIFF WBC: CPT

## 2022-05-24 PROCEDURE — 86140 C-REACTIVE PROTEIN: CPT

## 2022-05-24 PROCEDURE — 6360000002 HC RX W HCPCS: Performed by: NURSE PRACTITIONER

## 2022-05-24 RX ORDER — ONDANSETRON 4 MG/1
8 TABLET, FILM COATED ORAL ONCE
Status: DISCONTINUED | OUTPATIENT
Start: 2022-05-24 | End: 2022-05-25 | Stop reason: HOSPADM

## 2022-05-24 RX ORDER — PROCHLORPERAZINE MALEATE 10 MG
10 TABLET ORAL EVERY 6 HOURS PRN
Status: DISCONTINUED | OUTPATIENT
Start: 2022-05-24 | End: 2022-05-25 | Stop reason: HOSPADM

## 2022-05-24 RX ORDER — DIMETHICONE, CAMPHOR (SYNTHETIC), MENTHOL, AND PHENOL 1.1; .5; .625; .5 G/100G; G/100G; G/100G; G/100G
OINTMENT TOPICAL PRN
Status: DISCONTINUED | OUTPATIENT
Start: 2022-05-24 | End: 2022-05-25 | Stop reason: HOSPADM

## 2022-05-24 RX ORDER — OXYCODONE HYDROCHLORIDE 5 MG/1
10 TABLET ORAL EVERY 4 HOURS PRN
Status: DISCONTINUED | OUTPATIENT
Start: 2022-05-24 | End: 2022-05-25 | Stop reason: HOSPADM

## 2022-05-24 RX ORDER — HEPARIN SODIUM (PORCINE) LOCK FLUSH IV SOLN 100 UNIT/ML 100 UNIT/ML
500 SOLUTION INTRAVENOUS PRN
Status: DISCONTINUED | OUTPATIENT
Start: 2022-05-24 | End: 2022-05-25 | Stop reason: HOSPADM

## 2022-05-24 RX ORDER — PROCHLORPERAZINE EDISYLATE 5 MG/ML
10 INJECTION INTRAMUSCULAR; INTRAVENOUS EVERY 6 HOURS PRN
Status: CANCELLED | OUTPATIENT
Start: 2022-05-25

## 2022-05-24 RX ORDER — POTASSIUM CHLORIDE 29.8 MG/ML
20 INJECTION INTRAVENOUS PRN
Status: DISCONTINUED | OUTPATIENT
Start: 2022-05-24 | End: 2022-05-25 | Stop reason: HOSPADM

## 2022-05-24 RX ORDER — POTASSIUM CHLORIDE 20 MEQ/1
40 TABLET, EXTENDED RELEASE ORAL PRN
Status: DISCONTINUED | OUTPATIENT
Start: 2022-05-24 | End: 2022-05-25 | Stop reason: HOSPADM

## 2022-05-24 RX ORDER — SODIUM CHLORIDE 9 MG/ML
INJECTION, SOLUTION INTRAVENOUS ONCE
Status: DISCONTINUED | OUTPATIENT
Start: 2022-05-24 | End: 2022-05-25 | Stop reason: HOSPADM

## 2022-05-24 RX ORDER — 0.9 % SODIUM CHLORIDE 0.9 %
1000 INTRAVENOUS SOLUTION INTRAVENOUS ONCE
Status: COMPLETED | OUTPATIENT
Start: 2022-05-24 | End: 2022-05-24

## 2022-05-24 RX ORDER — POTASSIUM CHLORIDE 20 MEQ/1
40 TABLET, EXTENDED RELEASE ORAL PRN
Status: CANCELLED | OUTPATIENT
Start: 2022-05-25

## 2022-05-24 RX ORDER — ONDANSETRON 4 MG/1
8 TABLET, FILM COATED ORAL ONCE
Status: CANCELLED | OUTPATIENT
Start: 2022-05-25 | End: 2022-05-25

## 2022-05-24 RX ORDER — POTASSIUM CHLORIDE 29.8 MG/ML
80 INJECTION INTRAVENOUS PRN
Status: CANCELLED | OUTPATIENT
Start: 2022-05-25

## 2022-05-24 RX ORDER — PETROLATUM, MENTHOL, UNSPECIFIED FORM, CAMPHOR (SYNTHETIC), AND PHENOL 59.14; 1; 1; .6 G/100G; G/100G; G/100G; G/100G
PASTE TOPICAL PRN
Status: CANCELLED | OUTPATIENT
Start: 2022-05-25

## 2022-05-24 RX ORDER — HEPARIN SODIUM (PORCINE) LOCK FLUSH IV SOLN 100 UNIT/ML 100 UNIT/ML
500 SOLUTION INTRAVENOUS PRN
Status: CANCELLED | OUTPATIENT
Start: 2022-05-25

## 2022-05-24 RX ORDER — MAGNESIUM SULFATE IN WATER 40 MG/ML
4000 INJECTION, SOLUTION INTRAVENOUS PRN
Status: CANCELLED | OUTPATIENT
Start: 2022-05-25

## 2022-05-24 RX ORDER — PROCHLORPERAZINE MALEATE 10 MG
10 TABLET ORAL EVERY 6 HOURS PRN
Status: CANCELLED | OUTPATIENT
Start: 2022-05-25

## 2022-05-24 RX ORDER — OXYCODONE HYDROCHLORIDE 5 MG/1
5 TABLET ORAL EVERY 4 HOURS PRN
Status: CANCELLED | OUTPATIENT
Start: 2022-05-25

## 2022-05-24 RX ORDER — SODIUM CHLORIDE 9 MG/ML
INJECTION, SOLUTION INTRAVENOUS ONCE
Status: CANCELLED | OUTPATIENT
Start: 2022-05-25 | End: 2022-05-25

## 2022-05-24 RX ORDER — ONDANSETRON 2 MG/ML
8 INJECTION INTRAMUSCULAR; INTRAVENOUS ONCE
Status: CANCELLED | OUTPATIENT
Start: 2022-05-25 | End: 2022-05-25

## 2022-05-24 RX ORDER — OXYCODONE HYDROCHLORIDE 5 MG/1
10 TABLET ORAL EVERY 4 HOURS PRN
Status: CANCELLED | OUTPATIENT
Start: 2022-05-25

## 2022-05-24 RX ORDER — 0.9 % SODIUM CHLORIDE 0.9 %
1000 INTRAVENOUS SOLUTION INTRAVENOUS ONCE
Status: CANCELLED | OUTPATIENT
Start: 2022-05-25 | End: 2022-05-25

## 2022-05-24 RX ORDER — ONDANSETRON 2 MG/ML
8 INJECTION INTRAMUSCULAR; INTRAVENOUS ONCE
Status: DISCONTINUED | OUTPATIENT
Start: 2022-05-24 | End: 2022-05-25 | Stop reason: HOSPADM

## 2022-05-24 RX ORDER — PROCHLORPERAZINE EDISYLATE 5 MG/ML
10 INJECTION INTRAMUSCULAR; INTRAVENOUS EVERY 6 HOURS PRN
Status: DISCONTINUED | OUTPATIENT
Start: 2022-05-24 | End: 2022-05-25 | Stop reason: HOSPADM

## 2022-05-24 RX ORDER — MAGNESIUM SULFATE IN WATER 40 MG/ML
4000 INJECTION, SOLUTION INTRAVENOUS PRN
Status: DISCONTINUED | OUTPATIENT
Start: 2022-05-24 | End: 2022-05-25 | Stop reason: HOSPADM

## 2022-05-24 RX ORDER — OXYCODONE HYDROCHLORIDE 5 MG/1
5 TABLET ORAL EVERY 4 HOURS PRN
Status: DISCONTINUED | OUTPATIENT
Start: 2022-05-24 | End: 2022-05-25 | Stop reason: HOSPADM

## 2022-05-24 RX ADMIN — SODIUM CHLORIDE 1000 ML: 9 INJECTION, SOLUTION INTRAVENOUS at 08:38

## 2022-05-24 RX ADMIN — SODIUM CHLORIDE, PRESERVATIVE FREE 500 UNITS: 5 INJECTION INTRAVENOUS at 11:07

## 2022-05-24 NOTE — PROGRESS NOTES
Pt presents Day +1 Breyanzi infusion. Pt denies n/v/d, denies pain. Pt presents with pitting +1 BLE edema. LS CTA, bowel sounds active/present. Medication reviewed with pt and spouse. No noted mouth sores, mucous membranes pink/moist/intact. Pt up in chair, call light in reach, nonskid footwear on pt, steady gait, spouse chairside, hourly rounding in place. CAR-T 10, tox screening WDL.  Electronically signed by Enedina Lawson RN on 5/24/2022 at 8:58 AM

## 2022-05-24 NOTE — DISCHARGE SUMMARY
[facility] Discharge Summary             Attending Physician: No att. providers found    Referring MD: Michelle Palomo, JOAQUIM - NP  [address] [facility]  [facility],  [address]    Name: Matthew Friedman :  1940  MRN:  [id number]    Admission: 2022   Discharge:  22    Date: 2022    Diagnosis on admit: DLBC NHL    Medications:      Medication List      ASK your doctor about these medications    acyclovir 800 MG tablet  Commonly known as: ZOVIRAX  Take 1 tablet by mouth 2 times daily Do Not Start Until Instructed to by Provider. allopurinol 300 MG tablet  Commonly known as: ZYLOPRIM  Take 1 tablet by mouth daily     fluconazole 200 MG tablet  Commonly known as: Diflucan  Take 1 tablet by mouth daily Do Not Start Until Instructed to by Provider. levETIRAcetam 500 MG tablet  Commonly known as: Keppra  Take 1 tablet by mouth 2 times daily Start on 22     levoFLOXacin 500 MG tablet  Commonly known as: LEVAQUIN  Take 1 tablet by mouth daily Do Not Start Until Instructed to by Provider.      ondansetron 4 MG disintegrating tablet  Commonly known as: Zofran ODT  Take 1 tablet by mouth every 8 hours as needed for Nausea or Vomiting     prochlorperazine 10 MG tablet  Commonly known as: COMPAZINE            Reason for Admission: [facility] CAR-T Infusion for diagnosis of DLBC NHL    Oncological History:   The patient is an 74-year-old man with a history of diffuse large cell B-cell non-Hodgkin's lymphoma.  He presented 2021 with knee pain and swelling.  Biopsy showed B-cell non-Hodgkin's lymphoma with a Ki-67 of about 60%.  Baseline PET/CT from 2022 shows extensive hypermetabolic metabolism in the retroperitoneum, left pelvis and left lower extremity consistent with lymphoma.       He has a history of follicular lymphoma diagnosed  (bone marrow involvement).  He was treated with CHOP chemotherapy from 1995 to 1996. [facility] then had a recurrence and received rituximab times 4 November 2003 followed by maintenance rituximab April 21, 2004 and August 23, 2004. [facility] then received R-CHOP beginning July 5, 2007 followed by maintenance rituximab from November 2008 to September 8, 2009. Most recently he received R-Russell/Ox X 2 with F/U PET 3/1/22 showing progression in the retroperitoneum, left hemipelvis and left LE. He was then collected  for CAR-T cell collection in preporation for [facility] CAR-T Infusion on 4/19/22. He is status post Bridging chemotherapy of Chip BR on 3/22/22 and 4/40/22. His most recent PET CT on 5/13/22 showed new pulmonary nudules in the right upper lung measuring 1.2 cm max SUV 3.9. Previously described lymphadenopathy and FDG in left retroperitoneum, left iliac region and left upper thigh has completely resolved. He then received Lymphodepleting Chemo in OP Infusion on 5/18/22 - 5/20/22.      Hospital Course:   Mr. Kaykay Barrientos then presented to [facility] for his [facility] CAR-T Infusion. He was admitted for the infusion, tolerated well, and now discharged home. He will then return tomorrow to OP Infusion then daily to be followed for any signs / symptoms of toxicity including CRS or EDUARD. He feels well today but has a chronically swollen left leg. He remains active on a daily basis. He denies fever, chills, bleeding, or GI changes. Eating and drinking well.        Physical Exam:     Vital Signs:  BP (!) 151/74   Pulse 74   Temp 97.4 °F (36.3 °C) (Oral)   Resp 18   Ht 6' (1.829 m)   Wt 181 lb 10.5 oz (82.4 kg)   SpO2 100%   BMI 24.64 kg/m²     Weight:    Wt Readings from Last 3 Encounters:   05/24/22 181 lb 10.5 oz (82.4 kg)   05/23/22 180 lb 12.4 oz (82 kg)   05/23/22 180 lb 12.4 oz (82 kg)       KPS: 90% Able to carry on normal activity; minor signs or symptoms of disease    General: Awake, alert and oriented.   HEENT: normocephalic, alopecia, PERRL, no scleral erythema or icterus, Oral mucosa moist and intact, throat clear  NECK: supple without palpable adenopathy  BACK: Straight negative CVAT  SKIN: warm dry and intact without lesions rashes or masses  CHEST: CTA bilaterally without use of accessory muscles  CV: Normal S1 S2, RRR, no MRG  ABD: NT ND normoactive BS, no palpable masses or hepatosplenomegaly  EXTREMITIES: LLE Edema from the inguinal region distally.  It is improved.  He can extend to 180 degrees and flex to 90.  The right lower extremity is normal, denies calf tenderness  NEURO: CN II - XII grossly intact  CATHETER: RIJ Trifusion (IR: Jermaine, 4/15/22): CDI      Discharge Laboratory Data:  CBC:   Recent Labs     05/23/22  0819 05/24/22  0845   WBC 4.0 2.7*   HGB 10.2* 10.2*   HCT 29.7* 30.1*   MCV 96.0 96.7   * 107*     BMP/Mag:  Recent Labs     05/23/22  0819 05/24/22  0845    141   K 4.0 4.2    105   CO2 24 25   PHOS 3.1 2.9   BUN 20 18   CREATININE 1.0 0.9   MG 1.80  --      LIVP:   Recent Labs     05/23/22  0819   AST 17   ALT 12   BILIDIR <0.2   BILITOT 0.3   ALKPHOS 70     Coags:   Recent Labs     05/23/22  0819   PROTIME 12.8*   INR 1.13*     Uric Acid   Recent Labs     05/23/22  0819   LABURIC 5.8     Tacro:  No results for input(s): TACROLEV in the last 72 hours. CMV Quant DNA by PCR: No results found for: CMVDNAQNT  IgG: No results for input(s): IGG in the last 72 hours. PROBLEM LIST:            1. Follicular lymphoma with BM involvement  2. DLBC NHL      TREATMENT:            1. FL: 1/1995 CHOP             11/2003 to 8/2004 Rituxan             7/2007 R-CHOP  2.  DLBCL: R-[place]/Ox C1 1/31/2022, C2 02/14/2022                     Chip-BR C1D1: 3/22/22                                    C2D1: 4/20/22        Lymphodepleting Chemotherapy:  Fludarabine and cyclophosphamide   Disease Status at time of Infusion:  Progressive Disease  CAR-T Infusion Date: 5/23/22  CAR-T Product:  Breyanzi  Batch ID Number:  [id number] [id number]        ASSESSMENT AND PLAN:            1.  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  Left ventricular ejection fraction 55 to 60% with no regional wall motion abnormalities noted.  Intermittent diastolic dysfunction.  - PFTs (3/21/22): Diffusion capacity 79%, FEV1 78%.  Consistent with mild restrictive ventilatory defect. - CMI -  3 (age and pulmonary)  - PET (5/13/22):  Multiple indeterminate pulmonary nodules in the right lung which demonstrates increased activity in the right upper lobe. These may be infectious. These were not described on the previous PET/CT. Pulmonary lymphoma would be difficult to exclude. If the pulmonary nodule represents lymphoma, this would represent a Deauville score 5 exam. If this is infectious, overall the exam would represent a Deauville score 1. Previously described lymphadenopathy in FDG activity in the left retroperitoneum, left iliac region and in left upper thigh has completely resolved.  No residual lymphadenopathy or FDG avid lymph node identified      Lymphodepleting Chemotherapy:  Fludarabine and cyclophosphamide   Disease Status at time of Infusion:  Progressive Disease  CAR-T Infusion Date: 5/23/22  CAR-T Product:  Breyanzi  Batch ID Number:  [id number]     Day 0     2.  CRS / Neuro:  No evidence  - Monitor CRP and Ferrtin closely             Lab Results   Component Value Date     CRP 22.2 (H) 03/21/2022                Lab Results   Component Value Date     FERRITIN 551.3 (H) 03/21/2022   - Start Keppra 500 mg BID on Monday, 5/23/22  - Neuro checks w/ CARTOX 10-point assessment Q4hrs     Toxicity Grading        CRS Grade: N/A     ICANS Grade:  N/A     ICE Score:  N/A     3.  ID:  Afebrile, no evidence of infection.    - Start Diflucan, Valtrex and Levaquin when [address] < 1.5 (Scripts called in 5/18/22)      4.  Heme: Anemia & thrombocytopenia from chemotherapy   - Transfuse for Hgb < 7 and Platelets < 94A  - No transfusion today     5.  Metabolic: Mild hyperglycemia otherwise Electrolytes are WNL and renal fxn stable.    TLS:  No evidence, cont allopurinol and daily TLS labs   - Cont IVF hydration: 2 L NS bolus daily with LDC & CAR-T Infusion, then 1 L NS daily in OP Infusion  - Replace potassium and magnesium per policy.     6. Nutrition:  Appetite and oral intake is good. - S/p taking Astragalus supplement at home: Stop 5/18/22 d/t interaction with Cytoxan  - Cont low microbial diet   - Follow closely with dietary     7. DVT ppx:   - S/p Eliquis 2.5 mg BID (stopped 5/17/22); if the leg edema resolves with CAR-T, plan to stop Eliquis.       Condition on discharge: stable     Discharge Instructions:  Patient will follow up daily in OP Infusion for labs, CAR-T assessment for signs / symptoms of toxicity including CRS or EDUARD and MD visit.     The patient was advised on activity and dietary restrictions. The patient was advised to follow up in the emergency department or contact the physician with any unresolved nausea/vomiting/diarrhea/pain or temperature greater than 100.5 F or any other unusual symptoms. This discharge summary and plan was discussed and agreed upon with Dr. Nuzhat Rubio.     Ml Holguin MD, CNP

## 2022-05-24 NOTE — PROGRESS NOTES
[facility] [employer] [facility] [employer] Progress Note      2022    Riana Rodarte    :  1940    MRN:  [id number]    Referring MD: JOAQUIM Yadav - NP  [place], [place] [address]      Subjective: Patient complains of \"allergy symptoms\" including dry cough that typically resolves with daily Zyrtec, denies fever, shortness of breath, denies confusion, eating and drinking well. ECOG PS:  (1) Restricted in physically strenuous activity, ambulatory and able to do work of light nature    KPS: 90% Able to carry on normal activity; minor signs or symptoms of disease    Isolation:  None     Medications    Scheduled Meds:  Continuous Infusions:  PRN Meds:. ROS:  As noted above, otherwise remainder of 10-point ROS negative      Physical Exam:     Vital Signs:  BP (!) 151/74   Pulse 74   Temp 97.4 °F (36.3 °C) (Oral)   Resp 18   Wt 181 lb 10.5 oz (82.4 kg)   SpO2 100%   BMI 24.64 kg/m²     Weight:    Wt Readings from Last 3 Encounters:   22 181 lb 10.5 oz (82.4 kg)   22 180 lb 12.4 oz (82 kg)   22 180 lb 12.4 oz (82 kg)       General: Awake, alert and oriented.   HEENT: normocephalic, alopecia, PERRL, no scleral erythema or icterus, Oral mucosa moist and intact, throat clear  NECK: supple without palpable adenopathy  BACK: Straight negative CVAT  SKIN: warm dry and intact without lesions rashes or masses  CHEST: CTA bilaterally without use of accessory muscles  CV: Normal S1 S2, RRR, no MRG  ABD: NT ND normoactive BS, no palpable masses or hepatosplenomegaly  EXTREMITIES: LLE Edema from the inguinal region distally.  It is improved.  He can extend to 180 degrees and flex to 90.  The right lower extremity is normal, denies calf tenderness  NEURO: CN II - XII grossly intact  CATHETER: RIJ Trifusion (IR: Jermaine, 4/15/22): CDI      Laboratory Data:  CBC:   Recent Labs     22  0819 22  0845   WBC 4.0 2.7*   HGB 10.2* 10.2*   HCT 29.7* 30.1*   MCV 96.0 96.7   PLT 111* 107*     BMP/Mag:  Recent Labs     05/23/22  0819 05/24/22  0845    141   K 4.0 4.2    105   CO2 24 25   PHOS 3.1 2.9   BUN 20 18   CREATININE 1.0 0.9   MG 1.80  --      LIVP:   Recent Labs     05/23/22  0819   AST 17   ALT 12   BILIDIR <0.2   BILITOT 0.3   ALKPHOS 70     Coags:   Recent Labs     05/23/22  0819   PROTIME 12.8*   INR 1.13*     Uric Acid   Recent Labs     05/23/22  0819   LABURIC 5.8     Lab Results   Component Value Date    CRP 5.7 (H) 05/24/2022    CRP 7.7 (H) 05/23/2022    CRP 22.2 (H) 03/21/2022     Lab Results   Component Value Date    FERRITIN 447.7 (H) 05/24/2022    FERRITIN 462.0 (H) 05/23/2022    FERRITIN 551.3 (H) 03/21/2022         PROBLEM LIST:            1. Follicular lymphoma with BM involvement  2. DLBC NHL      TREATMENT:            1. FL: 1/1995 CHOP             11/2003 to 8/2004 Rituxan             7/2007 R-CHOP  2. DLBCL: R-[place]/Ox C1 1/31/2022, C2 02/14/2022                     Chip-BR C1D1: 3/22/22                                    C2D1: 4/20/22        Lymphodepleting Chemotherapy:  Fludarabine and cyclophosphamide   Disease Status at time of Infusion:  Progressive Disease  CAR-T Infusion Date: 5/23/22  CAR-T Product:  Breyanzi  Batch ID Number:  [id number]        ASSESSMENT AND PLAN:            1.  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  Left ventricular ejection fraction 55 to 60% with no regional wall motion abnormalities noted.  Intermittent diastolic dysfunction.  - PFTs (3/21/22): Diffusion capacity 79%, FEV1 78%.  Consistent with mild restrictive ventilatory defect.   - CMI -  3 (age and pulmonary)  - PET (5/13/22):  Multiple indeterminate pulmonary nodules in the right lung which demonstrates increased activity in the right upper lobe. These may be infectious. These were not described on the previous PET/CT. Pulmonary lymphoma would be difficult to exclude. If the pulmonary nodule represents lymphoma, this would represent a Deauville score 5 exam. If this is infectious, overall the exam would represent a Deauville score 1. Previously described lymphadenopathy in FDG activity in the left retroperitoneum, left iliac region and in left upper thigh has completely resolved. No residual lymphadenopathy or FDG avid lymph node identified      Lymphodepleting Chemotherapy:  Fludarabine and cyclophosphamide   Disease Status at time of Infusion:  Progressive Disease  CAR-T Infusion Date: 5/23/22  CAR-T Product:  Breyanzi  Batch ID Number:  [id number]     Day + 1     2.  CRS / Neuro:  No evidence  - Monitor CRP and Ferrtin closely             Lab Results   Component Value Date     CRP 22.2 (H) 03/21/2022                Lab Results   Component Value Date     FERRITIN 551.3 (H) 03/21/2022   - Start Keppra 500 mg BID on Monday, 5/23/22  - Neuro checks w/ CARTOX 10-point assessment Q4hrs     Toxicity Grading        CRS Grade: N/A     ICANS Grade:  N/A     ICE Score:  N/A     3.  ID:  Dry Cough d/t allergies, Afebrile  - Start Diflucan, Valtrex and Levaquin when ANC < 1.5 (Scripts called in 5/18/22)   - Cont daily Zyrtec PRN     4.  Heme: Anemia & thrombocytopenia from chemotherapy   - Transfuse for Hgb < 7 and Platelets < 85L  - No transfusion today     5.  Metabolic: Mild hyperglycemia otherwise Electrolytes are WNL and renal fxn stable.    TLS:  No evidence, cont allopurinol and daily TLS labs   - Cont IVF hydration: 1 L NS daily in OP Infusion  - Replace potassium and magnesium per policy.     6. Nutrition:  Appetite and oral intake is good.    - S/p taking Astragalus supplement at home: Stop 5/18/22 d/t interaction with Cytoxan  - Cont low microbial diet   - Follow closely with dietary     7. DVT ppx:   - S/p Eliquis 2.5 mg BID (stopped 5/17/22); if the leg edema resolves with CAR-T, plan to stop Eliquis.       - Disposition:  Patient will follow up daily in OP Infusion for labs, CAR-T assessment for signs / symptoms of toxicity including CRS or EDUARD and MD visit.     The patient was seen and examined by Dr. Jacques Cabrera.        JOAQUIM Yadav - ADELE Toth MD  [facility]  Please contact me through [address]

## 2022-05-24 NOTE — H&P
[facility] [employer] History and Physical        Attending Physician: No att. providers found    Primary Care: Anamaria Sutton       Referring MD: Margo Sanchez, APRN - NP  [address] [facility] [facility],  [address]    Name: Yan Orosco :  1940  MRN:  [id number]    Admission: 2022      Date: 2022    Reason for Admission: Breyanzi CAR-T Infusion for diagnosis of DLBC NHL    History of Present Illness: The patient is an 46-year-old man with a history of diffuse large cell B-cell non-Hodgkin's lymphoma.  He presented 2021 with knee pain and swelling.  Biopsy showed B-cell non-Hodgkin's lymphoma with a Ki-67 of about 60%.  Baseline PET/CT from 2022 shows extensive hypermetabolic metabolism in the retroperitoneum, left pelvis and left lower extremity consistent with lymphoma.       He has a history of follicular lymphoma diagnosed  (bone marrow involvement).  He was treated with CHOP chemotherapy from 1995 to 1996. [facility] then had a recurrence and received rituximab times 2003 followed by maintenance rituximab 2004 and 2004. [facility] then received R-CHOP beginning 2007 followed by maintenance rituximab from 2008 to 2009. Most recently he received R-[place]/Ox X 2 with F/U PET 3/1/22 showing progression in the retroperitoneum, left hemipelvis and left LE. He was then collected for CAR-T cell collection in preporation for Breyanzi CAR-T Infusion on 22. He is status post Bridging chemotherapy of Chip BR on 3/22/22 and . His most recent PET CT on 22 showed new pulmonary nudules in the right upper lung measuring 1.2 cm max SUV 3.9. Previously described lymphadenopathy and FDG in left retroperitoneum, left iliac region and left upper thigh has completely resolved. He then received Lymphodepleting Chemo in OP Infusion on 22 - 22.      Mr. Reece Aponte now presents to [facility] [employer] for his Breyanzi CAR-T Infusion. He will be admitted for the infusion, then discharged home. He will then be followed daily for any signs / symptoms of toxicity including CRS or EDUARD. He feels well today but has a chronically swollen left leg. He remains active on a daily basis. He denies fever, chills, bleeding, or GI changes. Eating and drinking well.                     Pre Transplant Workup:         Pre Transplant labs:       3/21/2022 09:40   Total IgG 776.0   Herpes Type 1/2 IgM Combined 0.52   Hep B E Ab Negative   Hep B S Ab <3.50   Hep B Core Ab, IgM Non-reactive           3/21/2022 09:40   EBV VCA IgG 25.4 (H)   EBV VCA IgM 18.9   CMV IgM <8.0   EBV Early Ag Ab <5.0   VARICELLA ZOSTER AB IGM 0.04   Herpes Type 1/2 IgM Combined 0.52   Ari Barr virus nuclear Ab IgG 185.0 (H)               Past Surgical History:   Procedure Laterality Date    CATHETER INSERTION N/A 4/18/2022    RIGHT INTERNAL JUGULAR TRIFUSION CENTRAL LINE PLACEMENT performed by Chico Banks MD at [facility] [address]       No past medical history on file. Prior to Admission medications    Medication Sig Start Date End Date Taking? Authorizing Provider   allopurinol (ZYLOPRIM) 300 MG tablet Take 1 tablet by mouth daily 5/19/22   JOAQUIM Daily NP   acyclovir (ZOVIRAX) 800 MG tablet Take 1 tablet by mouth 2 times daily Do Not Start Until Instructed to by Provider. 5/18/22   JOAQUIM Daily NP   levETIRAcetam (KEPPRA) 500 MG tablet Take 1 tablet by mouth 2 times daily Start on Monday 5/23/22 5/23/22   JOAQUIM Daily NP   ondansetron (ZOFRAN ODT) 4 MG disintegrating tablet Take 1 tablet by mouth every 8 hours as needed for Nausea or Vomiting 5/18/22   JOAQUIM Daily NP   levoFLOXacin (LEVAQUIN) 500 MG tablet Take 1 tablet by mouth daily Do Not Start Until Instructed to by Provider.  5/18/22 6/17/22  JOAQUIM Daily NP   fluconazole (DIFLUCAN) 200 MG tablet Take 1 tablet by mouth daily Do Not Start Until Instructed to by Provider. 5/18/22 6/17/22  Michelle Palomo APRN - NP   prochlorperazine (COMPAZINE) 10 MG tablet Take 10 mg by mouth every 4-6 hours as needed 1/30/22   Historical Provider, MD       Allergies   Allergen Reactions    Decadron [Dexamethasone]      Patient is receiving CAR-T. No steroids unless approved by [facility] [employer] [employer] physician. No family history on file. Social History     Socioeconomic History    Marital status:      Spouse name: Not on file    Number of children: Not on file    Years of education: Not on file    Highest education level: Not on file   Occupational History    Not on file   Tobacco Use    Smoking status: Never Smoker    Smokeless tobacco: Never Used   Substance and Sexual Activity    Alcohol use: Not on file    Drug use: Not on file    Sexual activity: Not on file   Other Topics Concern    Not on file   Social History Narrative    Not on file     Social Determinants of Health     Financial Resource Strain:     Difficulty of Paying Living Expenses: Not on file   Food Insecurity:     Worried About Running Out of Food in the Last Year: Not on file    Xavi of Food in the Last Year: Not on file   Transportation Needs:     Lack of Transportation (Medical): Not on file    Lack of Transportation (Non-Medical):  Not on file   Physical Activity:     Days of Exercise per Week: Not on file    Minutes of Exercise per Session: Not on file   Stress:     Feeling of Stress : Not on file   Social Connections:     Frequency of Communication with Friends and Family: Not on file    Frequency of Social Gatherings with Friends and Family: Not on file    Attends [employer] Services: Not on file    Active Member of Clubs or Organizations: Not on file    Attends Club or Organization Meetings: Not on file    Marital Status: Not on file   Intimate Partner Violence:     Fear of Current or Ex-Partner: Not on file    Emotionally Abused: Not on file   [facility] Physically Abused: Not on file    Sexually Abused: Not on file   Housing Stability:     Unable to Pay for Housing in the Last Year: Not on file    Number of Places Lived in the Last Year: Not on file    Unstable Housing in the Last Year: Not on file        ROS:  As noted above, otherwise remainder of 10-point ROS negative      Physical Exam:     Vital Signs:  BP (!) 151/74   Pulse 74   Temp 97.4 °F (36.3 °C) (Oral)   Resp 18   Ht 6' (1.829 m)   Wt 181 lb 10.5 oz (82.4 kg)   SpO2 100%   BMI 24.64 kg/m²     Weight:    Wt Readings from Last 3 Encounters:   05/24/22 181 lb 10.5 oz (82.4 kg)   05/23/22 180 lb 12.4 oz (82 kg)   05/23/22 180 lb 12.4 oz (82 kg)       KPS: 90% Able to carry on normal activity; minor signs or symptoms of disease    ECOG PS:  (1) Restricted in physically strenuous activity, ambulatory and able to do work of light nature    General: Awake, alert and oriented.   HEENT: normocephalic, alopecia, PERRL, no scleral erythema or icterus, Oral mucosa moist and intact, throat clear  NECK: supple without palpable adenopathy  BACK: Straight negative CVAT  SKIN: warm dry and intact without lesions rashes or masses  CHEST: CTA bilaterally without use of accessory muscles  CV: Normal S1 S2, RRR, no MRG  ABD: NT ND normoactive BS, no palpable masses or hepatosplenomegaly  EXTREMITIES: LLE Edema from the inguinal region distally.  It is improved.  He can extend to 180 degrees and flex to 90.  The right lower extremity is normal, denies calf tenderness  NEURO: CN II - XII grossly intact  CATHETER: RIJ Trifusion (IR: Jermaine, 4/15/22): CDI      Laboratory Data:  CBC:   Recent Labs     05/23/22  0819 05/24/22  0845   WBC 4.0 2.7*   HGB 10.2* 10.2*   HCT 29.7* 30.1*   MCV 96.0 96.7   * 107*     BMP/Mag:  Recent Labs     05/23/22  0819 05/24/22  0845    141   K 4.0 4.2    105   CO2 24 25   PHOS 3.1 2.9   BUN 20 18   CREATININE 1.0 0.9   MG 1.80  --      LIVP:   Recent Labs 05/23/22  0819   AST 17   ALT 12   BILIDIR <0.2   BILITOT 0.3   ALKPHOS 70     Coags:   Recent Labs     05/23/22  0819   PROTIME 12.8*   INR 1.13*     Uric Acid   Recent Labs     05/23/22 0819   LABURIC 5.8     Lab Results   Component Value Date    CRP 5.7 (H) 05/24/2022    CRP 7.7 (H) 05/23/2022    CRP 22.2 (H) 03/21/2022     Lab Results   Component Value Date    FERRITIN 447.7 (H) 05/24/2022    FERRITIN 462.0 (H) 05/23/2022    FERRITIN 551.3 (H) 03/21/2022       PROBLEM LIST:                1. Follicular lymphoma with BM involvement  2. DLBC NHL      TREATMENT:            1. FL: 1/1995 CHOP             11/2003 to 8/2004 Rituxan             7/2007 R-CHOP  2. DLBCL: R-[place]/Ox C1 1/31/2022, C2 02/14/2022                     Chip-BR C1D1: 3/22/22                                    C2D1: 4/20/22        Lymphodepleting Chemotherapy:  Fludarabine and cyclophosphamide   Disease Status at time of Infusion:  Progressive Disease  CAR-T Infusion Date: 5/23/22  CAR-T Product:  Breyanzi  Batch ID Number:  [id number]        ASSESSMENT AND PLAN:            1.  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  Left ventricular ejection fraction 55 to 60% with no regional wall motion abnormalities noted.  Intermittent diastolic dysfunction.  - PFTs (3/21/22): Diffusion capacity 79%, FEV1 78%.  Consistent with mild restrictive ventilatory defect. - CMI -  3 (age and pulmonary)  - PET (5/13/22):  Multiple indeterminate pulmonary nodules in the right lung which demonstrates increased activity in the right upper lobe. These may be infectious. These were not described on the previous PET/CT. Pulmonary lymphoma would be difficult to exclude. If the pulmonary nodule represents lymphoma, this would represent a Deauville score 5 exam. If this is infectious, overall the exam would represent a Deauville score 1. Previously described lymphadenopathy in FDG activity in the left retroperitoneum, left iliac region and in left upper thigh has completely resolved. No residual lymphadenopathy or FDG avid lymph node identified      Lymphodepleting Chemotherapy:  Fludarabine and cyclophosphamide   Disease Status at time of Infusion:  Progressive Disease  CAR-T Infusion Date: 5/23/22  CAR-T Product:  Breyanzi  Batch ID Number:  [id number]     Day 0     2.  CRS / Neuro:  No evidence  - Monitor CRP and Ferrtin closely             Lab Results   Component Value Date     CRP 22.2 (H) 03/21/2022                Lab Results   Component Value Date     FERRITIN 551.3 (H) 03/21/2022   - Start Keppra 500 mg BID on Monday, 5/23/22  - Neuro checks w/ CARTOX 10-point assessment Q4hrs     Toxicity Grading        CRS Grade: N/A     ICANS Grade:  N/A     ICE Score:  N/A     3.  ID:  Afebrile, no evidence of infection.    - Start Diflucan, Valtrex and Levaquin when ANC < 1.5 (Scripts called in 5/18/22)      4.  Heme: Anemia & thrombocytopenia from chemotherapy   - Transfuse for Hgb < 7 and Platelets < 76L  - No transfusion today     5.  Metabolic: Mild hyperglycemia otherwise Electrolytes are WNL and renal fxn stable.    TLS:  No evidence, cont allopurinol and daily TLS labs   - Cont IVF hydration: 2 L NS bolus daily with LDC & CAR-T Infusion, then 1 L NS daily in OP Infusion  - Replace potassium and magnesium per policy.     6. Nutrition:  Appetite and oral intake is good. - S/p taking Astragalus supplement at home: Stop 5/18/22 d/t interaction with Cytoxan  - Cont low microbial diet   - Follow closely with dietary     7.  DVT ppx:   - S/p Eliquis 2.5 mg BID (stopped 5/17/22); if the leg edema resolves with CAR-T, plan to stop Eliquis. - Disposition: Patient will receive Breyanzi CAR-T Infusion today then be discharged home to follow up daily in OP Infusion for labs, CAR-T assessment for signs / symptoms of toxicity including CRS or EDUARD and MD visit.       The patient was seen and examined by Dr. Carmita Tello. This admission history and physical has been discussed and agreed upon by Dr. Carmita Tello.     Travis Adan MD

## 2022-05-24 NOTE — PROGRESS NOTES
Oncology  Nutrition Assessment    RECOMMENDATIONS:  1. PO Diet: Continue current diet w/food safety precautions for immunocompromised pt if ANC < 1000  2. ONS: encourage 1-daily or nutritional snack qd to meet increased nutrient needs s/p car-t.   3. Nutrition Education: provided food safety pamphet w/review. NUTRITION ASSESSMENT:   Nutritional summary & status: Nutrition eval for CAR-T. RD provided info on food safety if ANC < 1000 through tx course. Discussed w/pt increased nutrition following chemo/CAR-t infusion. Pt denies any barriers to PO nutrition; reports good appetite and having usual 3 meals/day. PGSGA provided and scored. RD encouraged adding in 1 nutritional snack or ONS daily s/p car-t. RD will monitor for conitnued adequate nutrition. · PG-SGA: Score 0-1; No intervention required at this time. Re-assess on routine/regular basis during treatment    Admission/PMH: Visit for Fludarabine and cyclophosphamide 5/18/22 - 5/20/22 followed by Laila Perez on 5/23/22 for refractory DLBC NHL dx Dec 2021; PET/CT from January 21, 2022 shows extensive hypermetabolic metabolism in the retroperitoneum, left pelvis and left lower extremity consistent with lymphoma.  PMH of follicular lymphoma diagnosed 1995 (bone marrow involvement); received various chemotx starting 11/1996 through as recent as 3/22/22. MALNUTRITION ASSESSMENT  Context of Malnutrition: Acute Illness (on chronic )   Malnutrition Status: No malnutrition    NUTRITION DIAGNOSIS   Increased nutrient needs related to increase demand for energy/nutrients as evidenced by  (s/p chemo then car-t)    NUTRITION INTERVENTION  Food and/or Nutrient Delivery:  Continue Current Diet  Nutrition Education/Counseling:  Education initiated   Goals:  pt will maintain adequate po intake by consuming 75% or greater of meals/snacks s/p car-t to promote meeting increased nutrient needs.           Nutrition Monitoring and Evaluation:   Food/Nutrient Intake Outcomes:  Food and Nutrient Intake  Physical Signs/Symptoms Outcomes:  Biochemical Data,Weight     OBJECTIVE DATA: Significant to nutrition assessment  · Nutrition-Focused Physical Findings: extremely [place];   · Labs: Reviewed; ; WBC 2.7; ANC 2.3   · Meds: Reviewed;   · Wounds: None       CURRENT NUTRITION THERAPIES  PO Diet: General diet   ONS: n/a   PO Intake: %   PO Supplement Intake:None Ordered  Additional Sources of Calories/IVF:1L NS daily s/p car-t     ANTHROPOMETRICS  Current Height: 6' (182.9 cm)  Current Weight: 181 lb 10.5 oz (82.4 kg)    Admission weight: 181 lb 10.5 oz (82.4 kg)  Ideal Body Weight (IBW): 178 lbs  (81 kg)    Usual Bodyweight 180 lb (81.6 kg)   Weight Changes no significant; elevated weight march/april r/t BLE swelling       BMI: 24.7    Wt Readings from Last 50 Encounters:   05/24/22 181 lb 10.5 oz (82.4 kg)   05/23/22 180 lb 12.4 oz (82 kg)   05/23/22 180 lb 12.4 oz (82 kg)   05/20/22 183 lb 3.2 oz (83.1 kg)   05/19/22 182 lb 15.7 oz (83 kg)   05/18/22 180 lb 1.9 oz (81.7 kg)   04/18/22 186 lb (84.4 kg)   03/21/22 188 lb 7.9 oz (85.5 kg)       COMPARATIVE STANDARDS  Energy (kcal):  0936-3267 (25-30)     Protein (g):  107-124  (1.3-1.5)       Fluid (ml/day):  3954-6261    Consult dietitian if nutrition interventions essential to patient care is needed.      William Lawton RD, LD  Office:  [phone]

## 2022-05-25 ENCOUNTER — HOSPITAL ENCOUNTER (OUTPATIENT)
Dept: ONCOLOGY | Age: 82
Setting detail: INFUSION SERIES
Discharge: HOME | End: 2022-05-25
Payer: MEDICARE

## 2022-05-25 VITALS
BODY MASS INDEX: 24.73 KG/M2 | OXYGEN SATURATION: 97 % | RESPIRATION RATE: 18 BRPM | WEIGHT: 182.32 LBS | SYSTOLIC BLOOD PRESSURE: 153 MMHG | TEMPERATURE: 98 F | DIASTOLIC BLOOD PRESSURE: 70 MMHG | HEART RATE: 85 BPM

## 2022-05-25 DIAGNOSIS — C83.30 DIFFUSE LARGE B-CELL LYMPHOMA, UNSPECIFIED BODY REGION (HCC): Primary | ICD-10-CM

## 2022-05-25 LAB
ALBUMIN SERPL-MCNC: 4 G/DL (ref 3.4–5)
ALP BLD-CCNC: 75 U/L (ref 40–129)
ALT SERPL-CCNC: 11 U/L (ref 10–40)
ANION GAP SERPL CALCULATED.3IONS-SCNC: 9 MMOL/L (ref 3–16)
AST SERPL-CCNC: 17 U/L (ref 15–37)
BASOPHILS ABSOLUTE: 0 K/UL (ref 0–0.2)
BASOPHILS RELATIVE PERCENT: 0.3 %
BILIRUB SERPL-MCNC: 0.3 MG/DL (ref 0–1)
BILIRUBIN DIRECT: <0.2 MG/DL (ref 0–0.3)
BILIRUBIN, INDIRECT: ABNORMAL MG/DL (ref 0–1)
BUN BLDV-MCNC: 17 MG/DL (ref 7–20)
C-REACTIVE PROTEIN: 6.9 MG/L (ref 0–5.1)
CALCIUM SERPL-MCNC: 8.7 MG/DL (ref 8.3–10.6)
CHLORIDE BLD-SCNC: 105 MMOL/L (ref 99–110)
CO2: 24 MMOL/L (ref 21–32)
CREAT SERPL-MCNC: 1 MG/DL (ref 0.8–1.3)
EOSINOPHILS ABSOLUTE: 0.5 K/UL (ref 0–0.6)
EOSINOPHILS RELATIVE PERCENT: 12 %
FERRITIN: 436.6 NG/ML (ref 30–400)
GFR AFRICAN AMERICAN: >60
GFR NON-AFRICAN AMERICAN: >60
GLUCOSE BLD-MCNC: 189 MG/DL (ref 70–99)
HCT VFR BLD CALC: 29.1 % (ref 40.5–52.5)
HEMOGLOBIN: 9.9 G/DL (ref 13.5–17.5)
LACTATE DEHYDROGENASE: 154 U/L (ref 100–190)
LYMPHOCYTES ABSOLUTE: 0 K/UL (ref 1–5.1)
LYMPHOCYTES RELATIVE PERCENT: 0.4 %
MAGNESIUM: 1.7 MG/DL (ref 1.8–2.4)
MCH RBC QN AUTO: 32.9 PG (ref 26–34)
MCHC RBC AUTO-ENTMCNC: 34.1 G/DL (ref 31–36)
MCV RBC AUTO: 96.5 FL (ref 80–100)
MONOCYTES ABSOLUTE: 0.1 K/UL (ref 0–1.3)
MONOCYTES RELATIVE PERCENT: 1.5 %
NEUTROPHILS ABSOLUTE: 3.5 K/UL (ref 1.7–7.7)
NEUTROPHILS RELATIVE PERCENT: 85.8 %
PDW BLD-RTO: 14.6 % (ref 12.4–15.4)
PHOSPHORUS: 2.5 MG/DL (ref 2.5–4.9)
PLATELET # BLD: 99 K/UL (ref 135–450)
PMV BLD AUTO: 8.5 FL (ref 5–10.5)
POTASSIUM SERPL-SCNC: 4 MMOL/L (ref 3.5–5.1)
RBC # BLD: 3.01 M/UL (ref 4.2–5.9)
SODIUM BLD-SCNC: 138 MMOL/L (ref 136–145)
TOTAL PROTEIN: 6.1 G/DL (ref 6.4–8.2)
URIC ACID, SERUM: 4.3 MG/DL (ref 3.5–7.2)
WBC # BLD: 4.1 K/UL (ref 4–11)

## 2022-05-25 PROCEDURE — 84100 ASSAY OF PHOSPHORUS: CPT

## 2022-05-25 PROCEDURE — 80076 HEPATIC FUNCTION PANEL: CPT

## 2022-05-25 PROCEDURE — 83615 LACTATE (LD) (LDH) ENZYME: CPT

## 2022-05-25 PROCEDURE — 6360000002 HC RX W HCPCS: Performed by: NURSE PRACTITIONER

## 2022-05-25 PROCEDURE — 82728 ASSAY OF FERRITIN: CPT

## 2022-05-25 PROCEDURE — 96361 HYDRATE IV INFUSION ADD-ON: CPT

## 2022-05-25 PROCEDURE — 85025 COMPLETE CBC W/AUTO DIFF WBC: CPT

## 2022-05-25 PROCEDURE — 86140 C-REACTIVE PROTEIN: CPT

## 2022-05-25 PROCEDURE — 36592 COLLECT BLOOD FROM PICC: CPT

## 2022-05-25 PROCEDURE — 2580000003 HC RX 258: Performed by: NURSE PRACTITIONER

## 2022-05-25 PROCEDURE — 84550 ASSAY OF BLOOD/URIC ACID: CPT

## 2022-05-25 PROCEDURE — 83735 ASSAY OF MAGNESIUM: CPT

## 2022-05-25 PROCEDURE — 80048 BASIC METABOLIC PNL TOTAL CA: CPT

## 2022-05-25 PROCEDURE — 96360 HYDRATION IV INFUSION INIT: CPT

## 2022-05-25 RX ORDER — OXYCODONE HYDROCHLORIDE 5 MG/1
10 TABLET ORAL EVERY 4 HOURS PRN
Status: CANCELLED | OUTPATIENT
Start: 2022-05-26

## 2022-05-25 RX ORDER — HEPARIN SODIUM (PORCINE) LOCK FLUSH IV SOLN 100 UNIT/ML 100 UNIT/ML
500 SOLUTION INTRAVENOUS PRN
OUTPATIENT
Start: 2022-05-25

## 2022-05-25 RX ORDER — SODIUM CHLORIDE 0.9 % (FLUSH) 0.9 %
5-40 SYRINGE (ML) INJECTION PRN
Status: CANCELLED | OUTPATIENT
Start: 2022-05-25

## 2022-05-25 RX ORDER — SODIUM CHLORIDE 9 MG/ML
INJECTION, SOLUTION INTRAVENOUS ONCE
Status: CANCELLED | OUTPATIENT
Start: 2022-05-26 | End: 2022-05-26

## 2022-05-25 RX ORDER — HEPARIN SODIUM (PORCINE) LOCK FLUSH IV SOLN 100 UNIT/ML 100 UNIT/ML
500 SOLUTION INTRAVENOUS PRN
Status: CANCELLED | OUTPATIENT
Start: 2022-05-26

## 2022-05-25 RX ORDER — ONDANSETRON 4 MG/1
8 TABLET, FILM COATED ORAL ONCE
Status: CANCELLED | OUTPATIENT
Start: 2022-05-26 | End: 2022-05-26

## 2022-05-25 RX ORDER — OXYCODONE HYDROCHLORIDE 5 MG/1
10 TABLET ORAL EVERY 4 HOURS PRN
Status: DISCONTINUED | OUTPATIENT
Start: 2022-05-25 | End: 2022-05-26 | Stop reason: HOSPADM

## 2022-05-25 RX ORDER — POTASSIUM CHLORIDE 29.8 MG/ML
80 INJECTION INTRAVENOUS PRN
Status: CANCELLED | OUTPATIENT
Start: 2022-05-26

## 2022-05-25 RX ORDER — SODIUM CHLORIDE 9 MG/ML
INJECTION, SOLUTION INTRAVENOUS ONCE
Status: DISCONTINUED | OUTPATIENT
Start: 2022-05-25 | End: 2022-05-26 | Stop reason: HOSPADM

## 2022-05-25 RX ORDER — MAGNESIUM SULFATE IN WATER 40 MG/ML
4000 INJECTION, SOLUTION INTRAVENOUS PRN
Status: DISCONTINUED | OUTPATIENT
Start: 2022-05-25 | End: 2022-05-26 | Stop reason: HOSPADM

## 2022-05-25 RX ORDER — MAGNESIUM SULFATE IN WATER 40 MG/ML
4000 INJECTION, SOLUTION INTRAVENOUS PRN
Status: CANCELLED | OUTPATIENT
Start: 2022-05-26

## 2022-05-25 RX ORDER — DIMETHICONE, CAMPHOR (SYNTHETIC), MENTHOL, AND PHENOL 1.1; .5; .625; .5 G/100G; G/100G; G/100G; G/100G
OINTMENT TOPICAL PRN
Status: DISCONTINUED | OUTPATIENT
Start: 2022-05-25 | End: 2022-05-26 | Stop reason: HOSPADM

## 2022-05-25 RX ORDER — SODIUM CHLORIDE 9 MG/ML
25 INJECTION, SOLUTION INTRAVENOUS PRN
OUTPATIENT
Start: 2022-05-25

## 2022-05-25 RX ORDER — PROCHLORPERAZINE EDISYLATE 5 MG/ML
10 INJECTION INTRAMUSCULAR; INTRAVENOUS EVERY 6 HOURS PRN
Status: DISCONTINUED | OUTPATIENT
Start: 2022-05-25 | End: 2022-05-26 | Stop reason: HOSPADM

## 2022-05-25 RX ORDER — ONDANSETRON 2 MG/ML
8 INJECTION INTRAMUSCULAR; INTRAVENOUS ONCE
Status: CANCELLED | OUTPATIENT
Start: 2022-05-26 | End: 2022-05-26

## 2022-05-25 RX ORDER — HEPARIN SODIUM (PORCINE) LOCK FLUSH IV SOLN 100 UNIT/ML 100 UNIT/ML
500 SOLUTION INTRAVENOUS PRN
Status: DISCONTINUED | OUTPATIENT
Start: 2022-05-25 | End: 2022-05-26 | Stop reason: HOSPADM

## 2022-05-25 RX ORDER — POTASSIUM CHLORIDE 20 MEQ/1
40 TABLET, EXTENDED RELEASE ORAL PRN
Status: DISCONTINUED | OUTPATIENT
Start: 2022-05-25 | End: 2022-05-26 | Stop reason: HOSPADM

## 2022-05-25 RX ORDER — OXYCODONE HYDROCHLORIDE 5 MG/1
5 TABLET ORAL EVERY 4 HOURS PRN
Status: DISCONTINUED | OUTPATIENT
Start: 2022-05-25 | End: 2022-05-26 | Stop reason: HOSPADM

## 2022-05-25 RX ORDER — PETROLATUM, MENTHOL, UNSPECIFIED FORM, CAMPHOR (SYNTHETIC), AND PHENOL 59.14; 1; 1; .6 G/100G; G/100G; G/100G; G/100G
PASTE TOPICAL PRN
Status: CANCELLED | OUTPATIENT
Start: 2022-05-26

## 2022-05-25 RX ORDER — PROCHLORPERAZINE EDISYLATE 5 MG/ML
10 INJECTION INTRAMUSCULAR; INTRAVENOUS EVERY 6 HOURS PRN
Status: CANCELLED | OUTPATIENT
Start: 2022-05-26

## 2022-05-25 RX ORDER — 0.9 % SODIUM CHLORIDE 0.9 %
1000 INTRAVENOUS SOLUTION INTRAVENOUS ONCE
Status: CANCELLED | OUTPATIENT
Start: 2022-05-26 | End: 2022-05-26

## 2022-05-25 RX ORDER — OXYCODONE HYDROCHLORIDE 5 MG/1
5 TABLET ORAL EVERY 4 HOURS PRN
Status: CANCELLED | OUTPATIENT
Start: 2022-05-26

## 2022-05-25 RX ORDER — POTASSIUM CHLORIDE 29.8 MG/ML
20 INJECTION INTRAVENOUS PRN
Status: DISCONTINUED | OUTPATIENT
Start: 2022-05-25 | End: 2022-05-26 | Stop reason: HOSPADM

## 2022-05-25 RX ORDER — ONDANSETRON 2 MG/ML
8 INJECTION INTRAMUSCULAR; INTRAVENOUS ONCE
Status: DISCONTINUED | OUTPATIENT
Start: 2022-05-25 | End: 2022-05-26 | Stop reason: HOSPADM

## 2022-05-25 RX ORDER — PROCHLORPERAZINE MALEATE 10 MG
10 TABLET ORAL EVERY 6 HOURS PRN
Status: DISCONTINUED | OUTPATIENT
Start: 2022-05-25 | End: 2022-05-26 | Stop reason: HOSPADM

## 2022-05-25 RX ORDER — SODIUM CHLORIDE 0.9 % (FLUSH) 0.9 %
5-40 SYRINGE (ML) INJECTION PRN
OUTPATIENT
Start: 2022-05-25

## 2022-05-25 RX ORDER — ONDANSETRON 4 MG/1
8 TABLET, FILM COATED ORAL ONCE
Status: DISCONTINUED | OUTPATIENT
Start: 2022-05-25 | End: 2022-05-26 | Stop reason: HOSPADM

## 2022-05-25 RX ORDER — POTASSIUM CHLORIDE 20 MEQ/1
40 TABLET, EXTENDED RELEASE ORAL PRN
Status: CANCELLED | OUTPATIENT
Start: 2022-05-26

## 2022-05-25 RX ORDER — PROCHLORPERAZINE MALEATE 10 MG
10 TABLET ORAL EVERY 6 HOURS PRN
Status: CANCELLED | OUTPATIENT
Start: 2022-05-26

## 2022-05-25 RX ORDER — 0.9 % SODIUM CHLORIDE 0.9 %
1000 INTRAVENOUS SOLUTION INTRAVENOUS ONCE
Status: COMPLETED | OUTPATIENT
Start: 2022-05-25 | End: 2022-05-25

## 2022-05-25 RX ORDER — SODIUM CHLORIDE 9 MG/ML
25 INJECTION, SOLUTION INTRAVENOUS PRN
Status: CANCELLED | OUTPATIENT
Start: 2022-05-25

## 2022-05-25 RX ADMIN — SODIUM CHLORIDE, PRESERVATIVE FREE 500 UNITS: 5 INJECTION INTRAVENOUS at 11:05

## 2022-05-25 RX ADMIN — SODIUM CHLORIDE 1000 ML: 9 INJECTION, SOLUTION INTRAVENOUS at 08:50

## 2022-05-25 NOTE — PROGRESS NOTES
[address] [email or web address] Progress Note      2022    Stephen Canada    :  1940    MRN:  [id number]    Referring MD: MD [place] [address],  [facility]      Subjective: Patient complains that cough is worse, no fever, no confusion, denies shortness of breath, eating and drinking well. ECOG PS:  (1) Restricted in physically strenuous activity, ambulatory and able to do work of light nature    KPS: 90% Able to carry on normal activity; minor signs or symptoms of disease    Isolation:  None     Medications    Scheduled Meds:  Continuous Infusions:  PRN Meds:. ROS:  As noted above, otherwise remainder of 10-point ROS negative      Physical Exam:     Vital Signs:  BP (!) 153/70   Pulse 85   Temp 98 °F (36.7 °C) (Oral)   Resp 18   Wt 182 lb 5.1 oz (82.7 kg)   SpO2 97%   BMI 24.73 kg/m²     Weight:    Wt Readings from Last 3 Encounters:   22 182 lb 5.1 oz (82.7 kg)   22 181 lb 10.5 oz (82.4 kg)   22 180 lb 12.4 oz (82 kg)       General: Awake, alert and oriented.   HEENT: normocephalic, alopecia, PERRL, no scleral erythema or icterus, Oral mucosa moist and intact, throat clear  NECK: supple without palpable adenopathy  BACK: Straight negative CVAT  SKIN: warm dry and intact without lesions rashes or masses  CHEST: CTA bilaterally without use of accessory muscles  CV: Normal S1 S2, RRR, no MRG  ABD: NT ND normoactive BS, no palpable masses or hepatosplenomegaly  EXTREMITIES: LLE Edema from the inguinal region distally.  It is improved.  He can extend to 180 degrees and flex to 90.  The right lower extremity is normal, denies calf tenderness  NEURO: CN II - XII grossly intact  CATHETER: RIJ Trifusion (IR: Jermaine, 4/15/22): CDI      Laboratory Data:  CBC:   Recent Labs     22  0819 22  0845 22  0913   WBC 4.0 2.7* 4.1   HGB 10.2* 10.2* 9.9*   HCT 29.7* 30.1* 29.1*   MCV 96.0 96.7 96.5   * 107* 99*     BMP/Mag:  Recent Labs 05/23/22  0819 05/24/22  0845 05/25/22  0913    141 138   K 4.0 4.2 4.0    105 105   CO2 24 25 24   PHOS 3.1 2.9 2.5   BUN 20 18 17   CREATININE 1.0 0.9 1.0   MG 1.80  --  1.70*     LIVP:   Recent Labs     05/23/22  0819 05/25/22  0913   AST 17 17   ALT 12 11   BILIDIR <0.2 <0.2   BILITOT 0.3 0.3   ALKPHOS 70 75     Coags:   Recent Labs     05/23/22 0819   PROTIME 12.8*   INR 1.13*     Uric Acid   Recent Labs     05/23/22  0819 05/25/22  0913   LABURIC 5.8 4.3     Lab Results   Component Value Date    CRP 6.9 (H) 05/25/2022    CRP 5.7 (H) 05/24/2022    CRP 7.7 (H) 05/23/2022     Lab Results   Component Value Date    FERRITIN 436.6 (H) 05/25/2022    FERRITIN 447.7 (H) 05/24/2022    FERRITIN 462.0 (H) 05/23/2022         PROBLEM LIST:            1. Follicular lymphoma with BM involvement  2. DLBC NHL      TREATMENT:            1. FL: 1/1995 CHOP             11/2003 to 8/2004 Rituxan             7/2007 R-CHOP  2. DLBCL: R-[place]/Ox C1 1/31/2022, C2 02/14/2022                     Chip-BR C1D1: 3/22/22                                    C2D1: 4/20/22        Lymphodepleting Chemotherapy:  Fludarabine and cyclophosphamide   Disease Status at time of Infusion:  Progressive Disease  CAR-T Infusion Date: 5/23/22  CAR-T Product:  Breyanzi  Batch ID Number:  [id number]        ASSESSMENT AND PLAN:            1.  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  Left ventricular ejection fraction 55 to 60% with no regional wall motion abnormalities noted.  Intermittent diastolic dysfunction.  - PFTs (3/21/22):  Diffusion capacity 79%, FEV1 78%.  Consistent with mild restrictive ventilatory defect. - CMI -  3 (age and pulmonary)  - PET (5/13/22):  Multiple indeterminate pulmonary nodules in the right lung which demonstrates increased activity in the right upper lobe. These may be infectious. These were not described on the previous PET/CT. Pulmonary lymphoma would be difficult to exclude. If the pulmonary nodule represents lymphoma, this would represent a Deauville score 5 exam. If this is infectious, overall the exam would represent a Deauville score 1. Previously described lymphadenopathy in FDG activity in the left retroperitoneum, left iliac region and in left upper thigh has completely resolved.  No residual lymphadenopathy or FDG avid lymph node identified      Lymphodepleting Chemotherapy:  Fludarabine and cyclophosphamide   Disease Status at time of Infusion:  Progressive Disease  CAR-T Infusion Date: 5/23/22  CAR-T Product:  Breyanzi  Batch ID Number:  [id number]     Day + 2     2.  CRS / Neuro:  No evidence  - Monitor CRP and Ferrtin closely             Lab Results   Component Value Date     CRP 22.2 (H) 03/21/2022                Lab Results   Component Value Date     FERRITIN 551.3 (H) 03/21/2022   - Start Keppra 500 mg BID on Monday, 5/23/22  - Neuro checks w/ CARTOX 10-point assessment Q4hrs     Toxicity Grading        CRS Grade: N/A     ICANS Grade:  N/A     ICE Score:  N/A     3.  ID:  Dry Cough d/t allergies: Exacerbated 5/25/22, Afebrile  - CXR & UA 5/23/22: Negative   - Start Diflucan, Valtrex and Levaquin when ANC < 1.5 (Scripts called in 5/18/22)   - Cont daily Zyrtec   - Start daily Flonase 5/25/22     4.  Heme: Anemia & thrombocytopenia from chemotherapy   - Transfuse for Hgb < 7 and Platelets < 96Q  - No transfusion today     5.  Metabolic: Mild hyperglycemia otherwise Electrolytes are WNL and renal fxn stable.    TLS:  No evidence, cont allopurinol and daily TLS labs   - Cont IVF hydration: 1 L NS daily in OP Infusion  - Replace potassium and magnesium per policy.     6. Nutrition:  Appetite and oral intake is good. - S/p taking Astragalus supplement at home: Stop 5/18/22 d/t interaction with Cytoxan  - Cont low microbial diet   - Follow closely with dietary     7. DVT ppx:   - S/p Eliquis 2.5 mg BID (stopped 5/17/22); if the leg edema resolves with CAR-T, plan to stop Eliquis.       - Disposition:  Patient will follow up daily in OP Infusion for labs, CAR-T assessment for signs / symptoms of toxicity including CRS or EDUARD and MD visit.     The patient was seen and examined by Dr. Luann Perry.        JOAQUIM Randhawa - ADELE Brandt MD  [facility]  Please contact me through [address]

## 2022-05-25 NOTE — PROGRESS NOTES
Short Stay Communication Note  Stephen Canada  Diagnosis:  Primary MD:  Treatment: Melphalan Fludarabine/Cytoxan  Day +*2 of Car-T Karol  Pt seen in outpatient infusion today. Labs drawn and reviewed. CBC: Recent Labs     05/23/22  0819 05/24/22  0845   WBC 4.0 2.7*   HGB 10.2* 10.2*   HCT 29.7* 30.1*   MCV 96.0 96.7   * 107*     BMP/Mag:  Recent Labs     05/23/22  0819 05/24/22  0845 05/25/22  0913    141 138   K 4.0 4.2 4.0    105 105   CO2 24 25 24   PHOS 3.1 2.9 2.5   BUN 20 18 17   CREATININE 1.0 0.9 1.0   MG 1.80  --  1.70*     Standing parameters for replacement for this patient:   1 unit of pack red blood cells for a hemoglobin < or equal to 7.0  1 pack of platelets for a platelet count less than or equal to 10  40 MeQ of Potassium administered for a potassium level less than or equal to 3.4  4g of Magnesium Sulfate for a magnesum level less than or equal to 1.4  No transfusions required for the above lab values. Urinalysis last done: 5/23/22 Urinalysis next due: 5/30/22    Chest X-Ray last done: 5/23/22 Chest X-Ray next due: 5/30/22    Symptoms addressed and reported to care team this date: seasonal allergies, cough  Treatments this date: IV Fluids    Reviewed medication schedule with pt and caregiver. Both able to verbalize all medications and schedule. Pt to be seen again tomorrow. Discharged ambulatory to home.

## 2022-05-26 ENCOUNTER — HOSPITAL ENCOUNTER (OUTPATIENT)
Dept: ONCOLOGY | Age: 82
Setting detail: INFUSION SERIES
Discharge: HOME | End: 2022-05-26
Payer: MEDICARE

## 2022-05-26 VITALS
BODY MASS INDEX: 24.85 KG/M2 | WEIGHT: 183.2 LBS | OXYGEN SATURATION: 100 % | SYSTOLIC BLOOD PRESSURE: 148 MMHG | TEMPERATURE: 98.3 F | DIASTOLIC BLOOD PRESSURE: 77 MMHG | RESPIRATION RATE: 18 BRPM

## 2022-05-26 DIAGNOSIS — C83.30 DIFFUSE LARGE B-CELL LYMPHOMA, UNSPECIFIED BODY REGION (HCC): Primary | ICD-10-CM

## 2022-05-26 LAB
ANION GAP SERPL CALCULATED.3IONS-SCNC: 10 MMOL/L (ref 3–16)
BASOPHILS ABSOLUTE: 0 K/UL (ref 0–0.2)
BASOPHILS RELATIVE PERCENT: 0.4 %
BUN BLDV-MCNC: 17 MG/DL (ref 7–20)
C-REACTIVE PROTEIN: 26.2 MG/L (ref 0–5.1)
CALCIUM SERPL-MCNC: 8.7 MG/DL (ref 8.3–10.6)
CHLORIDE BLD-SCNC: 105 MMOL/L (ref 99–110)
CO2: 25 MMOL/L (ref 21–32)
CREAT SERPL-MCNC: 1 MG/DL (ref 0.8–1.3)
EOSINOPHILS ABSOLUTE: 0.5 K/UL (ref 0–0.6)
EOSINOPHILS RELATIVE PERCENT: 11.5 %
FERRITIN: 445 NG/ML (ref 30–400)
GFR AFRICAN AMERICAN: >60
GFR NON-AFRICAN AMERICAN: >60
GLUCOSE BLD-MCNC: 149 MG/DL (ref 70–99)
HCT VFR BLD CALC: 28.6 % (ref 40.5–52.5)
HEMOGLOBIN: 9.5 G/DL (ref 13.5–17.5)
INR BLD: 1.11 (ref 0.87–1.14)
LYMPHOCYTES ABSOLUTE: 0 K/UL (ref 1–5.1)
LYMPHOCYTES RELATIVE PERCENT: 0.5 %
MCH RBC QN AUTO: 32.2 PG (ref 26–34)
MCHC RBC AUTO-ENTMCNC: 33.3 G/DL (ref 31–36)
MCV RBC AUTO: 96.8 FL (ref 80–100)
MONOCYTES ABSOLUTE: 0.1 K/UL (ref 0–1.3)
MONOCYTES RELATIVE PERCENT: 1.6 %
NEUTROPHILS ABSOLUTE: 3.6 K/UL (ref 1.7–7.7)
NEUTROPHILS RELATIVE PERCENT: 86 %
PDW BLD-RTO: 14.8 % (ref 12.4–15.4)
PHOSPHORUS: 2.5 MG/DL (ref 2.5–4.9)
PLATELET # BLD: 89 K/UL (ref 135–450)
PMV BLD AUTO: 8.7 FL (ref 5–10.5)
POTASSIUM SERPL-SCNC: 3.9 MMOL/L (ref 3.5–5.1)
PROTHROMBIN TIME: 14.2 SEC (ref 11.7–14.5)
RBC # BLD: 2.95 M/UL (ref 4.2–5.9)
SODIUM BLD-SCNC: 140 MMOL/L (ref 136–145)
WBC # BLD: 4.2 K/UL (ref 4–11)

## 2022-05-26 PROCEDURE — 96360 HYDRATION IV INFUSION INIT: CPT

## 2022-05-26 PROCEDURE — 87070 CULTURE OTHR SPECIMN AEROBIC: CPT

## 2022-05-26 PROCEDURE — 2580000003 HC RX 258: Performed by: NURSE PRACTITIONER

## 2022-05-26 PROCEDURE — 96361 HYDRATE IV INFUSION ADD-ON: CPT

## 2022-05-26 PROCEDURE — 36592 COLLECT BLOOD FROM PICC: CPT

## 2022-05-26 PROCEDURE — 80048 BASIC METABOLIC PNL TOTAL CA: CPT

## 2022-05-26 PROCEDURE — 85025 COMPLETE CBC W/AUTO DIFF WBC: CPT

## 2022-05-26 PROCEDURE — 6360000002 HC RX W HCPCS: Performed by: NURSE PRACTITIONER

## 2022-05-26 PROCEDURE — 82728 ASSAY OF FERRITIN: CPT

## 2022-05-26 PROCEDURE — 87205 SMEAR GRAM STAIN: CPT

## 2022-05-26 PROCEDURE — 84100 ASSAY OF PHOSPHORUS: CPT

## 2022-05-26 PROCEDURE — 86140 C-REACTIVE PROTEIN: CPT

## 2022-05-26 PROCEDURE — 85610 PROTHROMBIN TIME: CPT

## 2022-05-26 PROCEDURE — 6370000000 HC RX 637 (ALT 250 FOR IP): Performed by: NURSE PRACTITIONER

## 2022-05-26 RX ORDER — HEPARIN SODIUM (PORCINE) LOCK FLUSH IV SOLN 100 UNIT/ML 100 UNIT/ML
500 SOLUTION INTRAVENOUS PRN
Status: CANCELLED | OUTPATIENT
Start: 2022-05-27

## 2022-05-26 RX ORDER — OXYCODONE HYDROCHLORIDE 5 MG/1
5 TABLET ORAL EVERY 4 HOURS PRN
Status: CANCELLED | OUTPATIENT
Start: 2022-05-27

## 2022-05-26 RX ORDER — SODIUM CHLORIDE 9 MG/ML
INJECTION, SOLUTION INTRAVENOUS ONCE
Status: CANCELLED | OUTPATIENT
Start: 2022-05-27 | End: 2022-05-27

## 2022-05-26 RX ORDER — POTASSIUM CHLORIDE 29.8 MG/ML
80 INJECTION INTRAVENOUS PRN
Status: CANCELLED | OUTPATIENT
Start: 2022-05-27

## 2022-05-26 RX ORDER — HEPARIN SODIUM (PORCINE) LOCK FLUSH IV SOLN 100 UNIT/ML 100 UNIT/ML
500 SOLUTION INTRAVENOUS PRN
Status: DISCONTINUED | OUTPATIENT
Start: 2022-05-26 | End: 2022-05-27 | Stop reason: HOSPADM

## 2022-05-26 RX ORDER — ONDANSETRON 2 MG/ML
8 INJECTION INTRAMUSCULAR; INTRAVENOUS ONCE
Status: CANCELLED | OUTPATIENT
Start: 2022-05-27 | End: 2022-05-27

## 2022-05-26 RX ORDER — PETROLATUM, MENTHOL, UNSPECIFIED FORM, CAMPHOR (SYNTHETIC), AND PHENOL 59.14; 1; 1; .6 G/100G; G/100G; G/100G; G/100G
PASTE TOPICAL PRN
Status: CANCELLED | OUTPATIENT
Start: 2022-05-27

## 2022-05-26 RX ORDER — 0.9 % SODIUM CHLORIDE 0.9 %
1000 INTRAVENOUS SOLUTION INTRAVENOUS ONCE
Status: CANCELLED | OUTPATIENT
Start: 2022-05-27 | End: 2022-05-27

## 2022-05-26 RX ORDER — OXYCODONE HYDROCHLORIDE 5 MG/1
10 TABLET ORAL EVERY 4 HOURS PRN
Status: CANCELLED | OUTPATIENT
Start: 2022-05-27

## 2022-05-26 RX ORDER — 0.9 % SODIUM CHLORIDE 0.9 %
1000 INTRAVENOUS SOLUTION INTRAVENOUS ONCE
Status: COMPLETED | OUTPATIENT
Start: 2022-05-26 | End: 2022-05-26

## 2022-05-26 RX ORDER — POTASSIUM CHLORIDE 20 MEQ/1
40 TABLET, EXTENDED RELEASE ORAL PRN
Status: CANCELLED | OUTPATIENT
Start: 2022-05-27

## 2022-05-26 RX ORDER — MAGNESIUM SULFATE IN WATER 40 MG/ML
4000 INJECTION, SOLUTION INTRAVENOUS PRN
Status: CANCELLED | OUTPATIENT
Start: 2022-05-27

## 2022-05-26 RX ORDER — ACETAMINOPHEN 325 MG/1
650 TABLET ORAL ONCE
Status: COMPLETED | OUTPATIENT
Start: 2022-05-26 | End: 2022-05-26

## 2022-05-26 RX ORDER — PROCHLORPERAZINE MALEATE 10 MG
10 TABLET ORAL EVERY 6 HOURS PRN
Status: CANCELLED | OUTPATIENT
Start: 2022-05-27

## 2022-05-26 RX ORDER — ONDANSETRON 4 MG/1
8 TABLET, FILM COATED ORAL ONCE
Status: CANCELLED | OUTPATIENT
Start: 2022-05-27 | End: 2022-05-27

## 2022-05-26 RX ORDER — PROCHLORPERAZINE EDISYLATE 5 MG/ML
10 INJECTION INTRAMUSCULAR; INTRAVENOUS EVERY 6 HOURS PRN
Status: CANCELLED | OUTPATIENT
Start: 2022-05-27

## 2022-05-26 RX ADMIN — SODIUM CHLORIDE, PRESERVATIVE FREE 500 UNITS: 5 INJECTION INTRAVENOUS at 10:57

## 2022-05-26 RX ADMIN — SODIUM CHLORIDE 1000 ML: 9 INJECTION, SOLUTION INTRAVENOUS at 08:56

## 2022-05-26 RX ADMIN — ACETAMINOPHEN 650 MG: 325 TABLET ORAL at 09:36

## 2022-05-26 NOTE — PROGRESS NOTES
Short Stay Communication Note  Aniya Dean  Diagnosis: Large cell non-Hodgkin's lymphoma  Primary MD: Dr. Floyd Del Cid  Treatment: Melphalan Fludarabine/Cytoxan  Day + 3  of Car-T Karol   Pt seen in outpatient infusion today. Labs drawn and reviewed. CBC: Recent Labs     05/24/22  0845 05/25/22  0913 05/26/22  0855   WBC 2.7* 4.1 4.2   HGB 10.2* 9.9* 9.5*   HCT 30.1* 29.1* 28.6*   MCV 96.7 96.5 96.8   * 99* 89*     BMP/Mag:  Recent Labs     05/24/22  0845 05/25/22  0913 05/26/22  0855    138 140   K 4.2 4.0 3.9    105 105   CO2 25 24 25   PHOS 2.9 2.5 2.5   BUN 18 17 17   CREATININE 0.9 1.0 1.0   MG  --  1.70*  --      Standing parameters for replacement for this patient:   1 unit of pack red blood cells for a hemoglobin < or equal to 7.0.  1 pack of platelets for a platelet count less than or equal to 10.0.  40 MeQ of Potassium administered for a potassium level less than or equal to 3.4.  4g of Magnesium Sulfate for a magnesum level less than or equal to 1.4. No transfusions required for the above lab values. Urinalysis last done: 5/23/2022 Urinalysis next due: 5/30/2022    Chest X-Ray last done: 5/23/22 Chest X-Ray next due: 5/30/2022    Symptoms addressed and reported to care team this date:   Treatments this date: IV Fluids    Reviewed medication schedule with pt and caregiver. Both able to verbalize all medications and schedule. Pt to be seen again tomorrow. Discharged ambulatory to home.   Natalie Sarmiento RN

## 2022-05-26 NOTE — PROGRESS NOTES
[address] [employer] Progress Note      2022    Jonas Buchanan    :  1940    MRN:  [id number]    Referring MD: Gil Diop MD  [facility] [address], [place] [address],  [address]      Subjective: Patient complains of mild headache that Tylenol resolves, allergy cough still bothersome, Flonase helped, no fevers, no confusion, still eating and drinking well. ECOG PS:  (1) Restricted in physically strenuous activity, ambulatory and able to do work of light nature    KPS: 90% Able to carry on normal activity; minor signs or symptoms of disease    Isolation:  None     Medications    Scheduled Meds:  Continuous Infusions:  PRN Meds:. ROS:  As noted above, otherwise remainder of 10-point ROS negative      Physical Exam:     Vital Signs:  BP (!) 148/77   Temp 98.3 °F (36.8 °C) (Oral)   Resp 18   Wt 183 lb 3.2 oz (83.1 kg)   SpO2 100%   BMI 24.85 kg/m²     Weight:    Wt Readings from Last 3 Encounters:   22 183 lb 3.2 oz (83.1 kg)   22 182 lb 5.1 oz (82.7 kg)   22 181 lb 10.5 oz (82.4 kg)       General: Awake, alert and oriented.   HEENT: normocephalic, alopecia, PERRL, no scleral erythema or icterus, Oral mucosa moist and intact, throat clear  NECK: supple without palpable adenopathy  BACK: Straight negative CVAT  SKIN: warm dry and intact without lesions rashes or masses  CHEST: CTA bilaterally without use of accessory muscles  CV: Normal S1 S2, RRR, no MRG  ABD: NT ND normoactive BS, no palpable masses or hepatosplenomegaly  EXTREMITIES: LLE Edema from the inguinal region distally.  It is improved.  He can extend to 180 degrees and flex to 90.  The right lower extremity is normal, denies calf tenderness  NEURO: CN II - XII grossly intact  CATHETER: RIJ Trifusion (IR: Jermaine, 4/15/22): CDI      Laboratory Data:  CBC:   Recent Labs     22  0845 22  0913 22  0855   WBC 2.7* 4.1 4.2   HGB 10.2* 9.9* 9.5*   HCT 30.1* 29.1* 28.6*   MCV 96.7 96.5 96.8   PLT 107* 99* 89*     BMP/Mag:  Recent Labs     05/24/22  0845 05/25/22  0913 05/26/22  0855    138 140   K 4.2 4.0 3.9    105 105   CO2 25 24 25   PHOS 2.9 2.5 2.5   BUN 18 17 17   CREATININE 0.9 1.0 1.0   MG  --  1.70*  --      LIVP:   Recent Labs     05/25/22  0913   AST 17   ALT 11   BILIDIR <0.2   BILITOT 0.3   ALKPHOS 75     Coags:   Recent Labs     05/26/22  0855   PROTIME 14.2   INR 1.11     Uric Acid   Recent Labs     05/25/22  0913   LABURIC 4.3     Lab Results   Component Value Date    CRP 26.2 (H) 05/26/2022    CRP 6.9 (H) 05/25/2022    CRP 5.7 (H) 05/24/2022     Lab Results   Component Value Date    FERRITIN 445.0 (H) 05/26/2022    FERRITIN 436.6 (H) 05/25/2022    FERRITIN 447.7 (H) 05/24/2022         PROBLEM LIST:            1. Follicular lymphoma with BM involvement  2. DLBC NHL      TREATMENT:            1. FL: 1/1995 CHOP             11/2003 to 8/2004 Rituxan             7/2007 R-CHOP  2. DLBCL: R-[place]/Ox C1 1/31/2022, C2 02/14/2022                     Chip-BR C1D1: 3/22/22                                    C2D1: 4/20/22        Lymphodepleting Chemotherapy:  Fludarabine and cyclophosphamide   Disease Status at time of Infusion:  Progressive Disease  CAR-T Infusion Date: 5/23/22  CAR-T Product:  Breyanzi  Batch ID Number:  [id number]        ASSESSMENT AND PLAN:            1.  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  Left ventricular ejection fraction 55 to 60% with no regional wall motion abnormalities noted.  Intermittent diastolic dysfunction.  - PFTs (3/21/22):  Diffusion capacity 79%, FEV1 78%.  Consistent with mild restrictive ventilatory defect. - CMI -  3 (age and pulmonary)  - PET (5/13/22):  Multiple indeterminate pulmonary nodules in the right lung which demonstrates increased activity in the right upper lobe. These may be infectious. These were not described on the previous PET/CT. Pulmonary lymphoma would be difficult to exclude. If the pulmonary nodule represents lymphoma, this would represent a Deauville score 5 exam. If this is infectious, overall the exam would represent a Deauville score 1. Previously described lymphadenopathy in FDG activity in the left retroperitoneum, left iliac region and in left upper thigh has completely resolved.  No residual lymphadenopathy or FDG avid lymph node identified      Lymphodepleting Chemotherapy:  Fludarabine and cyclophosphamide   Disease Status at time of Infusion:  Progressive Disease  CAR-T Infusion Date: 5/23/22  CAR-T Product:  Breyanzi  Batch ID Number:  [id number]     Day + 3     2.  CRS / Neuro:  No evidence, CRP rising anticipate a call this afternoon.  - Monitor CRP and Ferrtin closely             Lab Results   Component Value Date     CRP 22.2 (H) 03/21/2022                Lab Results   Component Value Date     FERRITIN 551.3 (H) 03/21/2022   - Start Keppra 500 mg BID on Monday, 5/23/22  - Neuro checks w/ CARTOX 10-point assessment Q4hrs     Toxicity Grading        CRS Grade: N/A     ICANS Grade:  N/A     ICE Score:  N/A     3.  ID:  Dry Cough d/t allergies: Exacerbated 5/25/22, Afebrile  - CXR & UA 5/23/22: Negative   - Start Diflucan, Valtrex and Levaquin when ANC < 1.5 (Scripts called in 5/18/22)   - Cont daily Zyrtec   - Start daily Flonase 5/25/22  - Sputum Culture 5/26/22: Pending      4.  Heme: Anemia & thrombocytopenia from chemotherapy   - Transfuse for Hgb < 7 and Platelets < 15E  - No transfusion today     5.  Metabolic: Mild hyperglycemia otherwise Electrolytes are WNL and renal fxn stable.    TLS:  No evidence, cont allopurinol and daily TLS labs   - Cont IVF hydration: 1 L NS daily in OP Infusion  - Replace potassium and magnesium per policy.     6. Nutrition:  Appetite and oral intake is good. - S/p taking Astragalus supplement at home: Stop 5/18/22 d/t interaction with Cytoxan  - Cont low microbial diet   - Follow closely with dietary     7. DVT ppx:   - S/p Eliquis 2.5 mg BID (stopped 5/17/22); leg edema resolving, stop Eliquis        - Disposition:  Patient will follow up daily in OP Infusion for labs, CAR-T assessment for signs / symptoms of toxicity including CRS or EDUARD and MD visit.     The patient was seen and examined by Dr. Reno Gaston.        JOAQUIM Daily - NP   Kathy Hammonds MD  [facility]  Please contact me through [address]

## 2022-05-27 ENCOUNTER — APPOINTMENT (OUTPATIENT)
Dept: GENERAL RADIOLOGY | Age: 82
DRG: 152 | End: 2022-05-27
Payer: MEDICARE

## 2022-05-27 ENCOUNTER — HOSPITAL ENCOUNTER (INPATIENT)
Age: 82
LOS: 4 days | Discharge: HOME | DRG: 152 | End: 2022-06-01
Attending: EMERGENCY MEDICINE | Admitting: STUDENT IN AN ORGANIZED HEALTH CARE EDUCATION/TRAINING PROGRAM
Payer: MEDICARE

## 2022-05-27 ENCOUNTER — HOSPITAL ENCOUNTER (OUTPATIENT)
Dept: GENERAL RADIOLOGY | Age: 82
Discharge: HOME | DRG: 152 | End: 2022-05-27
Payer: MEDICARE

## 2022-05-27 ENCOUNTER — HOSPITAL ENCOUNTER (OUTPATIENT)
Dept: ONCOLOGY | Age: 82
Setting detail: INFUSION SERIES
Discharge: HOME | End: 2022-05-27
Payer: MEDICARE

## 2022-05-27 VITALS
DIASTOLIC BLOOD PRESSURE: 76 MMHG | RESPIRATION RATE: 18 BRPM | HEART RATE: 82 BPM | SYSTOLIC BLOOD PRESSURE: 157 MMHG | WEIGHT: 184.53 LBS | OXYGEN SATURATION: 97 % | BODY MASS INDEX: 25.03 KG/M2 | TEMPERATURE: 98.5 F

## 2022-05-27 DIAGNOSIS — R50.81 NEUTROPENIC FEVER (HCC): Primary | ICD-10-CM

## 2022-05-27 DIAGNOSIS — C83.30 DIFFUSE LARGE B-CELL LYMPHOMA, UNSPECIFIED BODY REGION (HCC): Primary | ICD-10-CM

## 2022-05-27 DIAGNOSIS — D70.9 NEUTROPENIC FEVER (HCC): Primary | ICD-10-CM

## 2022-05-27 LAB
A/G RATIO: 1.5 (ref 1.1–2.2)
ALBUMIN SERPL-MCNC: 3.8 G/DL (ref 3.4–5)
ALBUMIN SERPL-MCNC: 4 G/DL (ref 3.4–5)
ALP BLD-CCNC: 73 U/L (ref 40–129)
ALP BLD-CCNC: 75 U/L (ref 40–129)
ALT SERPL-CCNC: 15 U/L (ref 10–40)
ALT SERPL-CCNC: 17 U/L (ref 10–40)
ANION GAP SERPL CALCULATED.3IONS-SCNC: 10 MMOL/L (ref 3–16)
ANION GAP SERPL CALCULATED.3IONS-SCNC: 13 MMOL/L (ref 3–16)
ANISOCYTOSIS: ABNORMAL
AST SERPL-CCNC: 19 U/L (ref 15–37)
AST SERPL-CCNC: 21 U/L (ref 15–37)
BASE EXCESS VENOUS: -0.9 MMOL/L (ref -2–3)
BASOPHILS ABSOLUTE: 0 K/UL (ref 0–0.2)
BASOPHILS ABSOLUTE: 0 K/UL (ref 0–0.2)
BASOPHILS RELATIVE PERCENT: 0 %
BASOPHILS RELATIVE PERCENT: 0.2 %
BILIRUB SERPL-MCNC: 0.3 MG/DL (ref 0–1)
BILIRUB SERPL-MCNC: 0.3 MG/DL (ref 0–1)
BILIRUBIN DIRECT: <0.2 MG/DL (ref 0–0.3)
BILIRUBIN URINE: NEGATIVE
BILIRUBIN, INDIRECT: ABNORMAL MG/DL (ref 0–1)
BLOOD, URINE: NEGATIVE
BUN BLDV-MCNC: 14 MG/DL (ref 7–20)
BUN BLDV-MCNC: 15 MG/DL (ref 7–20)
C-REACTIVE PROTEIN: 55.6 MG/L (ref 0–5.1)
CALCIUM SERPL-MCNC: 8.6 MG/DL (ref 8.3–10.6)
CALCIUM SERPL-MCNC: 8.8 MG/DL (ref 8.3–10.6)
CARBOXYHEMOGLOBIN: 1.5 % (ref 0–1.5)
CHLORIDE BLD-SCNC: 100 MMOL/L (ref 99–110)
CHLORIDE BLD-SCNC: 105 MMOL/L (ref 99–110)
CLARITY: CLEAR
CO2: 21 MMOL/L (ref 21–32)
CO2: 25 MMOL/L (ref 21–32)
COLOR: YELLOW
CREAT SERPL-MCNC: 1 MG/DL (ref 0.8–1.3)
CREAT SERPL-MCNC: 1 MG/DL (ref 0.8–1.3)
EOSINOPHILS ABSOLUTE: 0.2 K/UL (ref 0–0.6)
EOSINOPHILS ABSOLUTE: 0.5 K/UL (ref 0–0.6)
EOSINOPHILS RELATIVE PERCENT: 15.1 %
EOSINOPHILS RELATIVE PERCENT: 19 %
EPITHELIAL CELLS, UA: ABNORMAL /HPF (ref 0–5)
FERRITIN: 471.7 NG/ML (ref 30–400)
GFR AFRICAN AMERICAN: >60
GFR AFRICAN AMERICAN: >60
GFR NON-AFRICAN AMERICAN: >60
GFR NON-AFRICAN AMERICAN: >60
GLUCOSE BLD-MCNC: 121 MG/DL (ref 70–99)
GLUCOSE BLD-MCNC: 152 MG/DL (ref 70–99)
GLUCOSE URINE: NEGATIVE MG/DL
HCO3 VENOUS: 25.1 MMOL/L (ref 24–28)
HCT VFR BLD CALC: 27 % (ref 40.5–52.5)
HCT VFR BLD CALC: 27.3 % (ref 40.5–52.5)
HEMOGLOBIN, VEN, REDUCED: 56.3 %
HEMOGLOBIN: 9.3 G/DL (ref 13.5–17.5)
HEMOGLOBIN: 9.4 G/DL (ref 13.5–17.5)
HYPOCHROMIA: ABNORMAL
KETONES, URINE: NEGATIVE MG/DL
LACTATE DEHYDROGENASE: 155 U/L (ref 100–190)
LACTIC ACID, SEPSIS: 0.7 MMOL/L (ref 0.4–1.9)
LEUKOCYTE ESTERASE, URINE: NEGATIVE
LYMPHOCYTES ABSOLUTE: 0 K/UL (ref 1–5.1)
LYMPHOCYTES ABSOLUTE: 0.1 K/UL (ref 1–5.1)
LYMPHOCYTES RELATIVE PERCENT: 2 %
LYMPHOCYTES RELATIVE PERCENT: 2.1 %
MACROCYTES: ABNORMAL
MACROCYTES: ABNORMAL
MAGNESIUM: 1.7 MG/DL (ref 1.8–2.4)
MCH RBC QN AUTO: 33 PG (ref 26–34)
MCH RBC QN AUTO: 33.1 PG (ref 26–34)
MCHC RBC AUTO-ENTMCNC: 34.3 G/DL (ref 31–36)
MCHC RBC AUTO-ENTMCNC: 34.4 G/DL (ref 31–36)
MCV RBC AUTO: 95.8 FL (ref 80–100)
MCV RBC AUTO: 96.4 FL (ref 80–100)
METAMYELOCYTES RELATIVE PERCENT: 2 %
METHEMOGLOBIN VENOUS: 0 % (ref 0–1.5)
MICROSCOPIC EXAMINATION: YES
MONOCYTES ABSOLUTE: 0 K/UL (ref 0–1.3)
MONOCYTES ABSOLUTE: 0.1 K/UL (ref 0–1.3)
MONOCYTES RELATIVE PERCENT: 0 %
MONOCYTES RELATIVE PERCENT: 7.1 %
MUCUS: ABNORMAL /LPF
NEUTROPHILS ABSOLUTE: 1.2 K/UL (ref 1.7–7.7)
NEUTROPHILS ABSOLUTE: 2.2 K/UL (ref 1.7–7.7)
NEUTROPHILS RELATIVE PERCENT: 75.5 %
NEUTROPHILS RELATIVE PERCENT: 77 %
NITRITE, URINE: NEGATIVE
O2 SAT, VEN: 43 %
OVALOCYTES: ABNORMAL
PCO2, VEN: 45.1 MMHG (ref 41–51)
PDW BLD-RTO: 14.5 % (ref 12.4–15.4)
PDW BLD-RTO: 14.7 % (ref 12.4–15.4)
PH UA: 6 (ref 5–8)
PH VENOUS: 7.35 (ref 7.35–7.45)
PHOSPHORUS: 2.6 MG/DL (ref 2.5–4.9)
PLATELET # BLD: 95 K/UL (ref 135–450)
PLATELET # BLD: 99 K/UL (ref 135–450)
PLATELET SLIDE REVIEW: ABNORMAL
PLATELET SLIDE REVIEW: ABNORMAL
PMV BLD AUTO: 8.8 FL (ref 5–10.5)
PMV BLD AUTO: 9.3 FL (ref 5–10.5)
PO2, VEN: <30 MMHG (ref 25–40)
POIKILOCYTES: ABNORMAL
POTASSIUM REFLEX MAGNESIUM: 4.1 MMOL/L (ref 3.5–5.1)
POTASSIUM SERPL-SCNC: 3.8 MMOL/L (ref 3.5–5.1)
PROTEIN UA: ABNORMAL MG/DL
RBC # BLD: 2.82 M/UL (ref 4.2–5.9)
RBC # BLD: 2.83 M/UL (ref 4.2–5.9)
RBC UA: ABNORMAL /HPF (ref 0–4)
SCHISTOCYTES: ABNORMAL
SLIDE REVIEW: ABNORMAL
SLIDE REVIEW: ABNORMAL
SODIUM BLD-SCNC: 135 MMOL/L (ref 136–145)
SODIUM BLD-SCNC: 139 MMOL/L (ref 136–145)
SPECIFIC GRAVITY UA: 1.01 (ref 1–1.03)
TCO2 CALC VENOUS: 27 MMOL/L
TOTAL PROTEIN: 6.3 G/DL (ref 6.4–8.2)
TOTAL PROTEIN: 6.6 G/DL (ref 6.4–8.2)
URIC ACID, SERUM: 3.6 MG/DL (ref 3.5–7.2)
URINE REFLEX TO CULTURE: ABNORMAL
URINE TYPE: ABNORMAL
UROBILINOGEN, URINE: 0.2 E.U./DL
WBC # BLD: 1.6 K/UL (ref 4–11)
WBC # BLD: 2.8 K/UL (ref 4–11)
WBC UA: ABNORMAL /HPF (ref 0–5)

## 2022-05-27 PROCEDURE — 71045 X-RAY EXAM CHEST 1 VIEW: CPT

## 2022-05-27 PROCEDURE — 83615 LACTATE (LD) (LDH) ENZYME: CPT

## 2022-05-27 PROCEDURE — 2580000003 HC RX 258: Performed by: NURSE PRACTITIONER

## 2022-05-27 PROCEDURE — 96361 HYDRATE IV INFUSION ADD-ON: CPT

## 2022-05-27 PROCEDURE — 6360000002 HC RX W HCPCS: Performed by: NURSE PRACTITIONER

## 2022-05-27 PROCEDURE — 96365 THER/PROPH/DIAG IV INF INIT: CPT

## 2022-05-27 PROCEDURE — 83735 ASSAY OF MAGNESIUM: CPT

## 2022-05-27 PROCEDURE — 99285 EMERGENCY DEPT VISIT HI MDM: CPT

## 2022-05-27 PROCEDURE — 84550 ASSAY OF BLOOD/URIC ACID: CPT

## 2022-05-27 PROCEDURE — 85025 COMPLETE CBC W/AUTO DIFF WBC: CPT

## 2022-05-27 PROCEDURE — 87040 BLOOD CULTURE FOR BACTERIA: CPT

## 2022-05-27 PROCEDURE — 84100 ASSAY OF PHOSPHORUS: CPT

## 2022-05-27 PROCEDURE — 81001 URINALYSIS AUTO W/SCOPE: CPT

## 2022-05-27 PROCEDURE — 80076 HEPATIC FUNCTION PANEL: CPT

## 2022-05-27 PROCEDURE — 80048 BASIC METABOLIC PNL TOTAL CA: CPT

## 2022-05-27 PROCEDURE — 96360 HYDRATION IV INFUSION INIT: CPT

## 2022-05-27 PROCEDURE — 6360000002 HC RX W HCPCS: Performed by: EMERGENCY MEDICINE

## 2022-05-27 PROCEDURE — 80053 COMPREHEN METABOLIC PANEL: CPT

## 2022-05-27 PROCEDURE — 36592 COLLECT BLOOD FROM PICC: CPT

## 2022-05-27 PROCEDURE — 87636 SARSCOV2 & INF A&B AMP PRB: CPT

## 2022-05-27 PROCEDURE — 2580000003 HC RX 258: Performed by: EMERGENCY MEDICINE

## 2022-05-27 PROCEDURE — 83605 ASSAY OF LACTIC ACID: CPT

## 2022-05-27 PROCEDURE — 82728 ASSAY OF FERRITIN: CPT

## 2022-05-27 PROCEDURE — 86140 C-REACTIVE PROTEIN: CPT

## 2022-05-27 PROCEDURE — 82803 BLOOD GASES ANY COMBINATION: CPT

## 2022-05-27 RX ORDER — PROCHLORPERAZINE EDISYLATE 5 MG/ML
10 INJECTION INTRAMUSCULAR; INTRAVENOUS EVERY 6 HOURS PRN
Status: CANCELLED | OUTPATIENT
Start: 2022-05-28

## 2022-05-27 RX ORDER — PETROLATUM, MENTHOL, UNSPECIFIED FORM, CAMPHOR (SYNTHETIC), AND PHENOL 59.14; 1; 1; .6 G/100G; G/100G; G/100G; G/100G
PASTE TOPICAL PRN
Status: CANCELLED | OUTPATIENT
Start: 2022-05-28

## 2022-05-27 RX ORDER — GUAIFENESIN 600 MG/1
600 TABLET, EXTENDED RELEASE ORAL 2 TIMES DAILY PRN
Qty: 20 TABLET | Refills: 2 | Status: ON HOLD | OUTPATIENT
Start: 2022-05-27 | End: 2022-06-01 | Stop reason: SDUPTHER

## 2022-05-27 RX ORDER — ONDANSETRON 2 MG/ML
8 INJECTION INTRAMUSCULAR; INTRAVENOUS ONCE
Status: CANCELLED | OUTPATIENT
Start: 2022-05-28 | End: 2022-05-28

## 2022-05-27 RX ORDER — 0.9 % SODIUM CHLORIDE 0.9 %
1000 INTRAVENOUS SOLUTION INTRAVENOUS ONCE
Status: COMPLETED | OUTPATIENT
Start: 2022-05-27 | End: 2022-05-27

## 2022-05-27 RX ORDER — ACYCLOVIR 800 MG/1
800 TABLET ORAL 2 TIMES DAILY
Qty: 60 TABLET | Refills: 2
Start: 2022-05-27

## 2022-05-27 RX ORDER — POTASSIUM CHLORIDE 29.8 MG/ML
80 INJECTION INTRAVENOUS PRN
Status: CANCELLED | OUTPATIENT
Start: 2022-05-28

## 2022-05-27 RX ORDER — SODIUM CHLORIDE 9 MG/ML
INJECTION, SOLUTION INTRAVENOUS ONCE
Status: CANCELLED | OUTPATIENT
Start: 2022-05-28 | End: 2022-05-28

## 2022-05-27 RX ORDER — POTASSIUM CHLORIDE 20 MEQ/1
40 TABLET, EXTENDED RELEASE ORAL PRN
Status: CANCELLED | OUTPATIENT
Start: 2022-05-28

## 2022-05-27 RX ORDER — OXYCODONE HYDROCHLORIDE 5 MG/1
5 TABLET ORAL EVERY 4 HOURS PRN
Status: CANCELLED | OUTPATIENT
Start: 2022-05-28

## 2022-05-27 RX ORDER — OXYCODONE HYDROCHLORIDE 5 MG/1
10 TABLET ORAL EVERY 4 HOURS PRN
Status: CANCELLED | OUTPATIENT
Start: 2022-05-28

## 2022-05-27 RX ORDER — ONDANSETRON 4 MG/1
8 TABLET, FILM COATED ORAL ONCE
Status: CANCELLED | OUTPATIENT
Start: 2022-05-28 | End: 2022-05-28

## 2022-05-27 RX ORDER — HEPARIN SODIUM (PORCINE) LOCK FLUSH IV SOLN 100 UNIT/ML 100 UNIT/ML
500 SOLUTION INTRAVENOUS PRN
Status: DISCONTINUED | OUTPATIENT
Start: 2022-05-27 | End: 2022-05-28 | Stop reason: HOSPADM

## 2022-05-27 RX ORDER — FLUCONAZOLE 200 MG/1
200 TABLET ORAL DAILY
Qty: 30 TABLET | Refills: 2
Start: 2022-05-27 | End: 2022-06-02 | Stop reason: HOSPADM

## 2022-05-27 RX ORDER — PROCHLORPERAZINE MALEATE 10 MG
10 TABLET ORAL EVERY 6 HOURS PRN
Status: CANCELLED | OUTPATIENT
Start: 2022-05-28

## 2022-05-27 RX ORDER — MAGNESIUM SULFATE IN WATER 40 MG/ML
4000 INJECTION, SOLUTION INTRAVENOUS PRN
Status: CANCELLED | OUTPATIENT
Start: 2022-05-28

## 2022-05-27 RX ORDER — HEPARIN SODIUM (PORCINE) LOCK FLUSH IV SOLN 100 UNIT/ML 100 UNIT/ML
500 SOLUTION INTRAVENOUS PRN
Status: CANCELLED | OUTPATIENT
Start: 2022-05-28

## 2022-05-27 RX ORDER — LEVOFLOXACIN 500 MG/1
500 TABLET, FILM COATED ORAL DAILY
Qty: 30 TABLET | Refills: 2
Start: 2022-05-27 | End: 2022-06-02 | Stop reason: HOSPADM

## 2022-05-27 RX ORDER — 0.9 % SODIUM CHLORIDE 0.9 %
1000 INTRAVENOUS SOLUTION INTRAVENOUS ONCE
Status: CANCELLED | OUTPATIENT
Start: 2022-05-28 | End: 2022-05-28

## 2022-05-27 RX ADMIN — CEFEPIME HYDROCHLORIDE 2000 MG: 2 INJECTION, POWDER, FOR SOLUTION INTRAVENOUS at 22:33

## 2022-05-27 RX ADMIN — SODIUM CHLORIDE, PRESERVATIVE FREE 500 UNITS: 5 INJECTION INTRAVENOUS at 11:23

## 2022-05-27 RX ADMIN — SODIUM CHLORIDE 1000 ML: 9 INJECTION, SOLUTION INTRAVENOUS at 09:15

## 2022-05-27 ASSESSMENT — ENCOUNTER SYMPTOMS
WHEEZING: 0
DIARRHEA: 0
NAUSEA: 0
ABDOMINAL PAIN: 0
COUGH: 1
SHORTNESS OF BREATH: 0
VOMITING: 0

## 2022-05-27 NOTE — PROGRESS NOTES
[facility] Progress Note      2022    Sanna Rapp    :  1940    MRN:  [id number]    Referring MD: MD Shayna Moncada 1941,  [address]    Subjective: he feels well      ECOG PS:  (1) Restricted in physically strenuous activity, ambulatory and able to do work of light nature    KPS: 90% Able to carry on normal activity; minor signs or symptoms of disease    Isolation:  None     Medications    Scheduled Meds:  Continuous Infusions:  PRN Meds:. ROS:  As noted above, otherwise remainder of 10-point ROS negative      Physical Exam:     Vital Signs:  BP (!) 157/76   Pulse 82   Temp 98.5 °F (36.9 °C) (Oral)   Resp 18   Wt 184 lb 8.4 oz (83.7 kg)   SpO2 97%   BMI 25.03 kg/m²     Weight:    Wt Readings from Last 3 Encounters:   22 184 lb 8.4 oz (83.7 kg)   22 183 lb 3.2 oz (83.1 kg)   22 182 lb 5.1 oz (82.7 kg)       General: Awake, alert and oriented.   HEENT: normocephalic, alopecia, PERRL, no scleral erythema or icterus, Oral mucosa moist and intact, throat clear  NECK: supple without palpable adenopathy  BACK: Straight negative CVAT  SKIN: warm dry and intact without lesions rashes or masses  CHEST: Wheezes bilaterally without use of accessory muscles  CV: Normal S1 S2, RRR, no MRG  ABD: NT ND normoactive BS, no palpable masses or hepatosplenomegaly  EXTREMITIES: LLE Edema from the inguinal region distally.  It is improved.  He can extend to 180 degrees and flex to 90.  The right lower extremity is normal, denies calf tenderness  NEURO: CN II - XII grossly intact  CATHETER: RIJ Trifusion (IR: Jermaine, 4/15/22): CDI      Laboratory Data:  CBC:   Recent Labs     22  0913 22  0855 22  0913   WBC 4.1 4.2 2.8*   HGB 9.9* 9.5* 9.4*   HCT 29.1* 28.6* 27.3*   MCV 96.5 96.8 96.4   PLT 99* 89* 95*     BMP/Mag:  Recent Labs     22  0913 22  0855 22  0913    140 135*   K 4.0 3.9 3.8    105 100 CO2 24 25 25   PHOS 2.5 2.5 2.6   BUN 17 17 14   CREATININE 1.0 1.0 1.0   MG 1.70*  --  1.70*     LIVP:   Recent Labs     05/25/22  0913 05/27/22  0913   AST 17 19   ALT 11 15   BILIDIR <0.2 <0.2   BILITOT 0.3 0.3   ALKPHOS 75 73     Coags:   Recent Labs     05/26/22  0855   PROTIME 14.2   INR 1.11     Uric Acid   Recent Labs     05/25/22  0913 05/27/22  0913   LABURIC 4.3 3.6     Lab Results   Component Value Date    CRP 55.6 (H) 05/27/2022    CRP 26.2 (H) 05/26/2022    CRP 6.9 (H) 05/25/2022     Lab Results   Component Value Date    FERRITIN 471.7 (H) 05/27/2022    FERRITIN 445.0 (H) 05/26/2022    FERRITIN 436.6 (H) 05/25/2022         PROBLEM LIST:            1. Follicular lymphoma with BM involvement  2. DLBC NHL      TREATMENT:            1. FL: 1/1995 CHOP             11/2003 to 8/2004 Rituxan             7/2007 R-CHOP  2. DLBCL: R-[place]/Ox C1 1/31/2022, C2 02/14/2022                     Chip-BR C1D1: 3/22/22                                    C2D1: 4/20/22        Lymphodepleting Chemotherapy:  Fludarabine and cyclophosphamide   Disease Status at time of Infusion:  Progressive Disease  CAR-T Infusion Date: 5/23/22  CAR-T Product:  Breyanzi  Batch ID Number:  [id number]        ASSESSMENT AND PLAN:            1.  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  Left ventricular ejection fraction 55 to 60% with no regional wall motion abnormalities noted.  Intermittent diastolic dysfunction.  - PFTs (3/21/22): Diffusion capacity 79%, FEV1 78%.  Consistent with mild restrictive ventilatory defect.   - CMI -  3 (age and pulmonary)  - PET (5/13/22):  Multiple indeterminate pulmonary nodules in the right lung which demonstrates increased activity in the right upper lobe. These may be infectious. These were not described on the previous PET/CT. Pulmonary lymphoma would be difficult to exclude. If the pulmonary nodule represents lymphoma, this would represent a Deauville score 5 exam. If this is infectious, overall the exam would represent a Deauville score 1. Previously described lymphadenopathy in FDG activity in the left retroperitoneum, left iliac region and in left upper thigh has completely resolved. No residual lymphadenopathy or FDG avid lymph node identified      Lymphodepleting Chemotherapy:  Fludarabine and cyclophosphamide   Disease Status at time of Infusion:  Progressive Disease  CAR-T Infusion Date: 5/23/22  CAR-T Product:  Breyanzi  Batch ID Number:  [id number]     Day + 4     2.  CRS / Neuro:  No evidence, CRP rising anticipate a call this afternoon.  - Monitor CRP and Ferrtin closely             Lab Results   Component Value Date     CRP 22.2 (H) 03/21/2022                Lab Results   Component Value Date     FERRITIN 551.3 (H) 03/21/2022   - Start Keppra 500 mg BID on Monday, 5/23/22  - Neuro checks w/ CARTOX 10-point assessment Q4hrs     Toxicity Grading        CRS Grade: N/A     ICANS Grade:  N/A     ICE Score:  N/A     3.  ID: Dry Cough d/t allergies: Exacerbated 5/25/22, new Wheezing throughout 5/27/22, Afebrile  - CXR & UA 5/23/22: Negative   - Start Diflucan, Valtrex and Levaquin when ANC < 1.5 (Scripts called in 5/18/22)   - Cont daily Zyrtec   - Cont daily Flonase 5/25/22  - Sputum Culture 5/26/22: Pending   - CXR 5/27/22: Pending        4.  Heme: Anemia & thrombocytopenia from chemotherapy   - Transfuse for Hgb < 7 and Platelets < 81S  - No transfusion today     5.  Metabolic: hypoNa .    TLS:  No evidence, cont allopurinol and daily TLS labs   - Cont IVF hydration: 1 L NS daily in OP Infusion  - Replace potassium and magnesium per policy.     6. Nutrition:  Appetite and oral intake is good. - S/p taking Astragalus supplement at home: Stop 5/18/22 d/t interaction with Cytoxan  - Cont low microbial diet   - Follow closely with dietary     7.  DVT ppx:   - S/p Eliquis 2.5 mg BID (stopped 5/17/22); leg edema resolving, stop Eliquis        - Disposition:  Patient will follow up daily in OP Infusion for labs, CAR-T assessment for signs / symptoms of toxicity including CRS or EDUARD and MD visit.       JOAQUIM Victor - NP     Wilver Jiménez MD

## 2022-05-27 NOTE — PROGRESS NOTES
Short Stay Communication Note  Magdy Sanders  Diagnosis: Large cell non-Hodgin's lymphoma  Primary MD: Dr. Alexandre Knapp  Treatment: Fludarabine and Cytoxan  Day + 4 of Car-T Karol   Pt seen in outpatient infusion today. Labs drawn and reviewed. CBC: Recent Labs     05/25/22  0913 05/26/22  0855 05/27/22  0913   WBC 4.1 4.2 2.8*   HGB 9.9* 9.5* 9.4*   HCT 29.1* 28.6* 27.3*   MCV 96.5 96.8 96.4   PLT 99* 89* 95*     BMP/Mag:  Recent Labs     05/25/22  0913 05/26/22  0855 05/27/22  0913    140 135*   K 4.0 3.9 3.8    105 100   CO2 24 25 25   PHOS 2.5 2.5 2.6   BUN 17 17 14   CREATININE 1.0 1.0 1.0   MG 1.70*  --  1.70*     Standing parameters for replacement for this patient:   1 unit of pack red blood cells for a hemoglobin < or equal to 7.0.  1 pack of platelets for a platelet count less than or equal to 10.0.  40 MeQ of Potassium administered for a potassium level less than or equal to 3.4.  4g of Magnesium Sulfate for a magnesum level less than or equal to 1.4. No transfusions required for the above lab values. Urinalysis last done: 5/23/2022 Urinalysis next due: 5/30/2022. Chest X-Ray last done: 5/27/2022 Chest X-Ray next due: 5/30/22    Symptoms addressed and reported to care team this date: cough  Treatments this date: IV Fluids    Reviewed medication schedule with pt and caregiver. Both able to verbalize all medications and schedule. Pt to be seen again tomorrow. Patient and caregiver verbalized understanding of discharge instructions including when and how to call the doctor and when to report  to the ER. Discharged ambulatory to home.   Ramón Zamudio RN

## 2022-05-28 PROBLEM — D70.9 NEUTROPENIC FEVER (HCC): Status: ACTIVE | Noted: 2022-05-28

## 2022-05-28 PROBLEM — R50.81 NEUTROPENIC FEVER (HCC): Status: ACTIVE | Noted: 2022-05-28

## 2022-05-28 LAB
ABO/RH: NORMAL
ALBUMIN SERPL-MCNC: 3.7 G/DL (ref 3.4–5)
ALP BLD-CCNC: 69 U/L (ref 40–129)
ALT SERPL-CCNC: 15 U/L (ref 10–40)
ANION GAP SERPL CALCULATED.3IONS-SCNC: 12 MMOL/L (ref 3–16)
ANTIBODY SCREEN: NORMAL
APTT: 45.3 SEC (ref 23–34.3)
AST SERPL-CCNC: 21 U/L (ref 15–37)
BASOPHILS ABSOLUTE: 0 K/UL (ref 0–0.2)
BASOPHILS RELATIVE PERCENT: 0 %
BILIRUB SERPL-MCNC: 0.4 MG/DL (ref 0–1)
BILIRUBIN DIRECT: <0.2 MG/DL (ref 0–0.3)
BILIRUBIN URINE: NEGATIVE
BILIRUBIN, INDIRECT: ABNORMAL MG/DL (ref 0–1)
BLOOD, URINE: ABNORMAL
BUN BLDV-MCNC: 14 MG/DL (ref 7–20)
CALCIUM SERPL-MCNC: 8.7 MG/DL (ref 8.3–10.6)
CHLORIDE BLD-SCNC: 103 MMOL/L (ref 99–110)
CLARITY: CLEAR
CO2: 22 MMOL/L (ref 21–32)
COLOR: YELLOW
CREAT SERPL-MCNC: 1 MG/DL (ref 0.8–1.3)
CULTURE, RESPIRATORY: NORMAL
D DIMER: 1.39 UG/ML FEU (ref 0–0.6)
EKG ATRIAL RATE: 105 BPM
EKG DIAGNOSIS: NORMAL
EKG Q-T INTERVAL: 350 MS
EKG QRS DURATION: 108 MS
EKG QTC CALCULATION (BAZETT): 464 MS
EKG R AXIS: -41 DEGREES
EKG T AXIS: 31 DEGREES
EKG VENTRICULAR RATE: 106 BPM
EOSINOPHILS ABSOLUTE: 0.1 K/UL (ref 0–0.6)
EOSINOPHILS RELATIVE PERCENT: 7 %
FERRITIN: 533.3 NG/ML (ref 30–400)
FIBRINOGEN: 636 MG/DL (ref 207–509)
GFR AFRICAN AMERICAN: >60
GFR NON-AFRICAN AMERICAN: >60
GLUCOSE BLD-MCNC: 111 MG/DL (ref 70–99)
GLUCOSE URINE: NEGATIVE MG/DL
GRAM STAIN RESULT: NORMAL
HCT VFR BLD CALC: 26.9 % (ref 40.5–52.5)
HEMOGLOBIN: 9 G/DL (ref 13.5–17.5)
INFLUENZA A: NOT DETECTED
INFLUENZA B: NOT DETECTED
INR BLD: 1.29 (ref 0.87–1.14)
KETONES, URINE: ABNORMAL MG/DL
LACTATE DEHYDROGENASE: 168 U/L (ref 100–190)
LEUKOCYTE ESTERASE, URINE: NEGATIVE
LYMPHOCYTES ABSOLUTE: 0.1 K/UL (ref 1–5.1)
LYMPHOCYTES RELATIVE PERCENT: 6 %
MAGNESIUM: 1.7 MG/DL (ref 1.8–2.4)
MCH RBC QN AUTO: 32.2 PG (ref 26–34)
MCHC RBC AUTO-ENTMCNC: 33.3 G/DL (ref 31–36)
MCV RBC AUTO: 96.6 FL (ref 80–100)
MICROSCOPIC EXAMINATION: YES
MONOCYTES ABSOLUTE: 0.2 K/UL (ref 0–1.3)
MONOCYTES RELATIVE PERCENT: 12 %
NEUTROPHILS ABSOLUTE: 1 K/UL (ref 1.7–7.7)
NEUTROPHILS RELATIVE PERCENT: 75 %
NITRITE, URINE: NEGATIVE
PDW BLD-RTO: 14.1 % (ref 12.4–15.4)
PH UA: 6 (ref 5–8)
PHOSPHORUS: 2.9 MG/DL (ref 2.5–4.9)
PLATELET # BLD: 97 K/UL (ref 135–450)
PMV BLD AUTO: 8.8 FL (ref 5–10.5)
POTASSIUM SERPL-SCNC: 4 MMOL/L (ref 3.5–5.1)
PROCALCITONIN: 0.28 NG/ML (ref 0–0.15)
PROTEIN UA: NEGATIVE MG/DL
PROTHROMBIN TIME: 16 SEC (ref 11.7–14.5)
RBC # BLD: 2.79 M/UL (ref 4.2–5.9)
RBC UA: ABNORMAL /HPF (ref 0–4)
RENAL EPITHELIAL, UA: ABNORMAL /HPF (ref 0–1)
SARS-COV-2 RNA, RT PCR: NOT DETECTED
SODIUM BLD-SCNC: 137 MMOL/L (ref 136–145)
SPECIFIC GRAVITY UA: 1.01 (ref 1–1.03)
TOTAL PROTEIN: 6.3 G/DL (ref 6.4–8.2)
URIC ACID, SERUM: 3.4 MG/DL (ref 3.5–7.2)
URINE TYPE: ABNORMAL
UROBILINOGEN, URINE: 0.2 E.U./DL
WBC # BLD: 1.3 K/UL (ref 4–11)
WBC UA: ABNORMAL /HPF (ref 0–5)

## 2022-05-28 PROCEDURE — 83615 LACTATE (LD) (LDH) ENZYME: CPT

## 2022-05-28 PROCEDURE — 0202U NFCT DS 22 TRGT SARS-COV-2: CPT

## 2022-05-28 PROCEDURE — 6360000002 HC RX W HCPCS: Performed by: STUDENT IN AN ORGANIZED HEALTH CARE EDUCATION/TRAINING PROGRAM

## 2022-05-28 PROCEDURE — 80076 HEPATIC FUNCTION PANEL: CPT

## 2022-05-28 PROCEDURE — 82728 ASSAY OF FERRITIN: CPT

## 2022-05-28 PROCEDURE — A4217 STERILE WATER/SALINE, 500 ML: HCPCS | Performed by: STUDENT IN AN ORGANIZED HEALTH CARE EDUCATION/TRAINING PROGRAM

## 2022-05-28 PROCEDURE — 84100 ASSAY OF PHOSPHORUS: CPT

## 2022-05-28 PROCEDURE — 86900 BLOOD TYPING SEROLOGIC ABO: CPT

## 2022-05-28 PROCEDURE — 99221 1ST HOSP IP/OBS SF/LOW 40: CPT | Performed by: INTERNAL MEDICINE

## 2022-05-28 PROCEDURE — 2060000000 HC ICU INTERMEDIATE R&B

## 2022-05-28 PROCEDURE — 86901 BLOOD TYPING SEROLOGIC RH(D): CPT

## 2022-05-28 PROCEDURE — 6370000000 HC RX 637 (ALT 250 FOR IP): Performed by: STUDENT IN AN ORGANIZED HEALTH CARE EDUCATION/TRAINING PROGRAM

## 2022-05-28 PROCEDURE — 84550 ASSAY OF BLOOD/URIC ACID: CPT

## 2022-05-28 PROCEDURE — 85610 PROTHROMBIN TIME: CPT

## 2022-05-28 PROCEDURE — 93005 ELECTROCARDIOGRAM TRACING: CPT | Performed by: STUDENT IN AN ORGANIZED HEALTH CARE EDUCATION/TRAINING PROGRAM

## 2022-05-28 PROCEDURE — 85379 FIBRIN DEGRADATION QUANT: CPT

## 2022-05-28 PROCEDURE — 86850 RBC ANTIBODY SCREEN: CPT

## 2022-05-28 PROCEDURE — 81001 URINALYSIS AUTO W/SCOPE: CPT

## 2022-05-28 PROCEDURE — 85730 THROMBOPLASTIN TIME PARTIAL: CPT

## 2022-05-28 PROCEDURE — 2580000003 HC RX 258: Performed by: STUDENT IN AN ORGANIZED HEALTH CARE EDUCATION/TRAINING PROGRAM

## 2022-05-28 PROCEDURE — 85025 COMPLETE CBC W/AUTO DIFF WBC: CPT

## 2022-05-28 PROCEDURE — 84145 PROCALCITONIN (PCT): CPT

## 2022-05-28 PROCEDURE — 80048 BASIC METABOLIC PNL TOTAL CA: CPT

## 2022-05-28 PROCEDURE — 83735 ASSAY OF MAGNESIUM: CPT

## 2022-05-28 PROCEDURE — 6370000000 HC RX 637 (ALT 250 FOR IP): Performed by: NURSE PRACTITIONER

## 2022-05-28 PROCEDURE — 93010 ELECTROCARDIOGRAM REPORT: CPT | Performed by: INTERNAL MEDICINE

## 2022-05-28 PROCEDURE — 85384 FIBRINOGEN ACTIVITY: CPT

## 2022-05-28 PROCEDURE — 36592 COLLECT BLOOD FROM PICC: CPT

## 2022-05-28 RX ORDER — GUAIFENESIN 600 MG/1
600 TABLET, EXTENDED RELEASE ORAL 2 TIMES DAILY PRN
Status: DISCONTINUED | OUTPATIENT
Start: 2022-05-28 | End: 2022-05-29

## 2022-05-28 RX ORDER — FLUTICASONE PROPIONATE 50 MCG
1 SPRAY, SUSPENSION (ML) NASAL DAILY
Status: DISCONTINUED | OUTPATIENT
Start: 2022-05-28 | End: 2022-06-01 | Stop reason: HOSPADM

## 2022-05-28 RX ORDER — ACYCLOVIR 800 MG/1
800 TABLET ORAL 2 TIMES DAILY
Status: DISCONTINUED | OUTPATIENT
Start: 2022-05-28 | End: 2022-05-28

## 2022-05-28 RX ORDER — SODIUM CHLORIDE 0.9 % (FLUSH) 0.9 %
5-40 SYRINGE (ML) INJECTION PRN
Status: DISCONTINUED | OUTPATIENT
Start: 2022-05-28 | End: 2022-06-01 | Stop reason: HOSPADM

## 2022-05-28 RX ORDER — LEVETIRACETAM 500 MG/1
500 TABLET ORAL 2 TIMES DAILY
Status: DISCONTINUED | OUTPATIENT
Start: 2022-05-28 | End: 2022-06-01 | Stop reason: HOSPADM

## 2022-05-28 RX ORDER — ACETAMINOPHEN 325 MG/1
650 TABLET ORAL EVERY 4 HOURS PRN
Status: DISCONTINUED | OUTPATIENT
Start: 2022-05-28 | End: 2022-06-01 | Stop reason: HOSPADM

## 2022-05-28 RX ORDER — SODIUM CHLORIDE 0.9 % (FLUSH) 0.9 %
5-40 SYRINGE (ML) INJECTION EVERY 12 HOURS SCHEDULED
Status: DISCONTINUED | OUTPATIENT
Start: 2022-05-28 | End: 2022-06-01 | Stop reason: HOSPADM

## 2022-05-28 RX ORDER — ALLOPURINOL 300 MG/1
300 TABLET ORAL DAILY
Status: DISCONTINUED | OUTPATIENT
Start: 2022-05-28 | End: 2022-06-01 | Stop reason: HOSPADM

## 2022-05-28 RX ORDER — SODIUM CHLORIDE 9 MG/ML
INJECTION, SOLUTION INTRAVENOUS CONTINUOUS
Status: DISCONTINUED | OUTPATIENT
Start: 2022-05-28 | End: 2022-06-01 | Stop reason: HOSPADM

## 2022-05-28 RX ORDER — ACYCLOVIR 800 MG/1
800 TABLET ORAL 2 TIMES DAILY WITH MEALS
Status: DISCONTINUED | OUTPATIENT
Start: 2022-05-28 | End: 2022-06-01 | Stop reason: HOSPADM

## 2022-05-28 RX ORDER — FLUCONAZOLE 200 MG/1
200 TABLET ORAL DAILY
Status: DISCONTINUED | OUTPATIENT
Start: 2022-05-28 | End: 2022-05-31

## 2022-05-28 RX ORDER — BENZONATATE 100 MG/1
100 CAPSULE ORAL 3 TIMES DAILY PRN
Status: DISCONTINUED | OUTPATIENT
Start: 2022-05-28 | End: 2022-06-01 | Stop reason: HOSPADM

## 2022-05-28 RX ORDER — SODIUM CHLORIDE 9 MG/ML
INJECTION, SOLUTION INTRAVENOUS PRN
Status: DISCONTINUED | OUTPATIENT
Start: 2022-05-28 | End: 2022-06-01 | Stop reason: HOSPADM

## 2022-05-28 RX ORDER — MAGNESIUM SULFATE IN WATER 40 MG/ML
4000 INJECTION, SOLUTION INTRAVENOUS PRN
Status: DISCONTINUED | OUTPATIENT
Start: 2022-05-28 | End: 2022-06-01 | Stop reason: HOSPADM

## 2022-05-28 RX ORDER — POTASSIUM CHLORIDE 29.8 MG/ML
20 INJECTION INTRAVENOUS PRN
Status: DISCONTINUED | OUTPATIENT
Start: 2022-05-28 | End: 2022-06-01 | Stop reason: HOSPADM

## 2022-05-28 RX ORDER — ENOXAPARIN SODIUM 100 MG/ML
40 INJECTION SUBCUTANEOUS DAILY
Status: DISCONTINUED | OUTPATIENT
Start: 2022-05-28 | End: 2022-06-01 | Stop reason: HOSPADM

## 2022-05-28 RX ADMIN — CEFEPIME HYDROCHLORIDE 2000 MG: 2 INJECTION, POWDER, FOR SOLUTION INTRAVENOUS at 14:45

## 2022-05-28 RX ADMIN — ACYCLOVIR 800 MG: 800 TABLET ORAL at 08:34

## 2022-05-28 RX ADMIN — SODIUM CHLORIDE 15 ML: 900 IRRIGANT IRRIGATION at 08:48

## 2022-05-28 RX ADMIN — SODIUM CHLORIDE, PRESERVATIVE FREE 10 ML: 5 INJECTION INTRAVENOUS at 21:14

## 2022-05-28 RX ADMIN — SODIUM CHLORIDE: 9 INJECTION, SOLUTION INTRAVENOUS at 08:46

## 2022-05-28 RX ADMIN — ALLOPURINOL 300 MG: 300 TABLET ORAL at 08:34

## 2022-05-28 RX ADMIN — LEVETIRACETAM 500 MG: 500 TABLET, FILM COATED ORAL at 21:14

## 2022-05-28 RX ADMIN — FLUCONAZOLE 200 MG: 200 TABLET ORAL at 08:34

## 2022-05-28 RX ADMIN — ENOXAPARIN SODIUM 40 MG: 100 INJECTION SUBCUTANEOUS at 18:22

## 2022-05-28 RX ADMIN — BENZONATATE 100 MG: 100 CAPSULE ORAL at 16:02

## 2022-05-28 RX ADMIN — SODIUM CHLORIDE: 9 INJECTION, SOLUTION INTRAVENOUS at 21:16

## 2022-05-28 RX ADMIN — BENZONATATE 100 MG: 100 CAPSULE ORAL at 10:19

## 2022-05-28 RX ADMIN — CEFEPIME HYDROCHLORIDE 2000 MG: 2 INJECTION, POWDER, FOR SOLUTION INTRAVENOUS at 06:11

## 2022-05-28 RX ADMIN — ACYCLOVIR 800 MG: 800 TABLET ORAL at 16:02

## 2022-05-28 RX ADMIN — SODIUM CHLORIDE: 9 INJECTION, SOLUTION INTRAVENOUS at 01:41

## 2022-05-28 RX ADMIN — SODIUM CHLORIDE 15 ML: 900 IRRIGANT IRRIGATION at 16:09

## 2022-05-28 RX ADMIN — LEVETIRACETAM 500 MG: 500 TABLET, FILM COATED ORAL at 08:34

## 2022-05-28 RX ADMIN — BENZOCAINE AND MENTHOL 1 LOZENGE: 15; 3.6 LOZENGE ORAL at 21:58

## 2022-05-28 RX ADMIN — GUAIFENESIN 600 MG: 600 TABLET, EXTENDED RELEASE ORAL at 08:48

## 2022-05-28 RX ADMIN — FLUTICASONE PROPIONATE 1 SPRAY: 50 SPRAY, METERED NASAL at 10:58

## 2022-05-28 RX ADMIN — CEFEPIME HYDROCHLORIDE 2000 MG: 2 INJECTION, POWDER, FOR SOLUTION INTRAVENOUS at 21:57

## 2022-05-28 RX ADMIN — GUAIFENESIN 600 MG: 600 TABLET, EXTENDED RELEASE ORAL at 21:13

## 2022-05-28 RX ADMIN — SODIUM CHLORIDE 15 ML: 900 IRRIGANT IRRIGATION at 10:25

## 2022-05-28 RX ADMIN — METOPROLOL TARTRATE 12.5 MG: 25 TABLET, FILM COATED ORAL at 10:23

## 2022-05-28 RX ADMIN — METOPROLOL TARTRATE 12.5 MG: 25 TABLET, FILM COATED ORAL at 16:08

## 2022-05-28 RX ADMIN — SODIUM CHLORIDE, PRESERVATIVE FREE 10 ML: 5 INJECTION INTRAVENOUS at 08:34

## 2022-05-28 ASSESSMENT — PAIN SCALES - GENERAL
PAINLEVEL_OUTOF10: 0

## 2022-05-28 NOTE — PROGRESS NOTES
4 Eyes Admission Assessment     I agree as the admission nurse that 2 RN's have performed a thorough Head to Toe Skin Assessment on the patient. ALL assessment sites listed below have been assessed on admission. Areas assessed by both nurses: Brandi Bhardwaj and Jose Elias Carranza  [x]   Head, Face, and Ears   [x]   Shoulders, Back, and Chest  [x]   Arms, Elbows, and Hands   [x]   Coccyx, Sacrum, and Ischium  [x]   Legs, Feet, and Heels        Does the Patient have Skin Breakdown?   No         Mal Prevention initiated:  Yes   Wound Care Orders initiated:  NA      [facility] nurse consulted for Pressure Injury (Stage 3,4, Unstageable, DTI, NWPT, and Complex wounds) or Mal score 18 or lower:  NA      Nurse 1 eSignature: Electronically signed by Sloan Aguirre RN on 5/28/22 at 2:23 AM EDT    **SHARE this note so that the co-signing nurse is able to place an eSignature**    Nurse 2 eSignature: Electronically signed by Heather Easton RN on 5/28/22 at 2:29 AM EDT

## 2022-05-28 NOTE — PLAN OF CARE
Problem: Pain  Goal: Verbalizes/displays adequate comfort level or baseline comfort level  Outcome: Progressing   Pt had no complaints of pain this shift. Will continue to monitor and assess. Problem: Safety - Adult  Goal: Free from fall injury  Outcome: Progressing   Orthostatic vital signs obtained at start of shift - see flowsheet for details. Pt does not meet criteria for orthostasis. Pt is a Med fall risk. See Dahlia Lafleur Fall Score and ABCDS Injury Risk assessments. - Screening for Orthostasis AND not a [place] Risk per ARNETT/ABCDS: Pt bed is in low position, side rails up, call light and belongings are in reach. Fall risk light is on outside pts room. Pt encouraged to call for assistance as needed. Will continue with hourly rounds for PO intake, pain needs, toileting and repositioning as needed. Problem: Skin/Tissue Integrity - Adult  Goal: Skin integrity remains intact  Outcome: Progressing   Pt has no skin breakdown, 4 eyes admission assessment performed as per [address] protocol. Pt able to turn self. Will continue to monitor and assess. Problem: Infection - Adult  Goal: Absence of infection at discharge  Outcome: Progressing   Pt remained afebrile this shift, on IV cefepeme. Will continue to monitor and assess. Problem: Metabolic/Fluid and Electrolytes - Adult  Goal: Electrolytes maintained within normal limits  Outcome: Progressing   See results for AM labs. Problem: Hematologic - Adult  Goal: Maintains hematologic stability  Outcome: Progressing     Patient's hemoglobin this AM: Recent Labs     05/28/22  0440   HGB 9.0*     Patient's platelet count this AM:   Recent Labs     05/28/22  0440   PLT 97*    Thrombocytopenia Precautions in place. Patient showing no signs or symptoms of active bleeding. Transfusion not indicated at this time. Patient verbalizes understanding of all instructions. Will continue to assess and implement POC. Call light within reach and hourly rounding in place.

## 2022-05-28 NOTE — H&P
[facility] History and Physical        Attending Physician: Gaston Sanches DO    Primary Care: Tammy Morris       Referring MD: No referring provider defined for this encounter. Name: Dave Bazan :  1940  MRN:  [id number]    Admission: 2022      Date: 2022    Reason for Admission: Neutropenic Fever     History of Present Illness:   Mr. Armin Gomez is an 62-year-old man with a history of diffuse large cell B-cell [facility] with [facility] pain and swelling.  Biopsy showed B-cell non-Hodgkin's lymphoma with a Ki-67 of about 60%.  Baseline PET/CT from 2022 shows extensive hypermetabolic metabolism in the retroperitoneum, left pelvis and left lower extremity consistent with lymphoma.       He has a history of follicular lymphoma diagnosed  (bone marrow involvement).  He was treated with CHOP chemotherapy from 1995 to 1996. Rachel Shipley then had a recurrence and received rituximab times 2003 followed by maintenance rituximab 2004 and 2004. Rachel Shipley then received R-CHOP beginning 2007 followed by maintenance rituximab from 2008 to 2009. Most recently he received R-[place]/Ox X 2 with F/U PET 3/1/22 showing progression in the retroperitoneum, left hemipelvis and left LE. He was then collected for CAR-T cell collection in preporation for Breyanzi CAR-T Infusion on 22. He is status post Bridging chemotherapy of Chip BR on 3/22/22 and . His most recent PET CT on 22 showed new pulmonary nudules in the right upper lung measuring 1.2 cm max SUV 3.9. Previously described lymphadenopathy and FDG in left retroperitoneum, left iliac region and left upper thigh has completely resolved. He then received Lymphodepleting Chemo in OP Infusion on 22 - 22 followed by his Breyanzi CAR-T Infusion on 22.  He then was followed daily in OP Infusion for any signs / symptoms of toxicity including CRS or EDUARD. Has had a mild nonproductive cough recently, no other localizing infectious symptoms. He then presented to the ED last night with neutropenic fever. Patient was started on cefepime, and COVID/influenza testing was negative, blood cultures pending. Chest x-ray without evidence of infection. He was then admitted to [address] [place] [address] for ongoing evaluation and management of neutropenic fever. He feels well today but has a chronically swollen left leg. He remains active on a daily basis. He also complains of persistent cough. He denies bleeding, or GI changes. Eating and drinking well.                     Past Surgical History:   Procedure Laterality Date    CATHETER INSERTION N/A 4/18/2022    RIGHT INTERNAL JUGULAR TRIFUSION CENTRAL LINE PLACEMENT performed by Lianet Kumar MD at [facility]. No pertinent past medical history. Prior to Admission medications    Medication Sig Start Date End Date Taking?  Authorizing Provider   acyclovir (ZOVIRAX) 800 MG tablet Take 1 tablet by mouth 2 times daily 5/27/22   Traci Media, APRN - NP   fluconazole (DIFLUCAN) 200 MG tablet Take 1 tablet by mouth daily 5/27/22 6/26/22  Traci Media, APRN - NP   levoFLOXacin (LEVAQUIN) 500 MG tablet Take 1 tablet by mouth daily 5/27/22 6/26/22  Traci Media, APRN - NP   guaiFENesin [facility]) 600 MG extended release tablet Take 1 tablet by mouth 2 times daily as needed for Congestion 5/27/22   Traci Media, APRN - NP   allopurinol (ZYLOPRIM) 300 MG tablet Take 1 tablet by mouth daily 5/19/22   Traci Media, APRN - NP   levETIRAcetam (KEPPRA) 500 MG tablet Take 1 tablet by mouth 2 times daily Start on Monday 5/23/22 5/23/22   Traci Media, APRN - NP   ondansetron (ZOFRAN ODT) 4 MG disintegrating tablet Take 1 tablet by mouth every 8 hours as needed for Nausea or Vomiting  Patient not taking: Reported on 5/27/2022 5/18/22   Teddy Hernandez Tonie, APRN - NP   prochlorperazine (COMPAZINE) 10 MG tablet Take 10 mg by mouth every 4-6 hours as needed  Patient not taking: Reported on 5/27/2022 1/30/22   Historical Provider, MD       Allergies   Allergen Reactions    Decadron [Dexamethasone]      Patient is receiving CAR-T. No steroids unless approved by [facility] [employer] physician. History reviewed. No pertinent family history. Social History     Socioeconomic History    Marital status:      Spouse name: Not on file    Number of children: Not on file    Years of education: Not on file    Highest education level: Not on file   Occupational History    Not on file   Tobacco Use    Smoking status: Never Smoker    Smokeless tobacco: Never Used   Substance and Sexual Activity    Alcohol use: Never    Drug use: Never    Sexual activity: Not on file   Other Topics Concern    Not on file   Social History Narrative    Not on file     Social Determinants of Health     Financial Resource Strain:     Difficulty of Paying Living Expenses: Not on file   Food Insecurity:     Worried About Running Out of Food in the Last Year: Not on file    Xavi of Food in the Last Year: Not on file   Transportation Needs:     Lack of Transportation (Medical): Not on file    Lack of Transportation (Non-Medical):  Not on file   Physical Activity:     Days of Exercise per Week: Not on file    Minutes of Exercise per Session: Not on file   Stress:     Feeling of Stress : Not on file   Social Connections:     Frequency of Communication with Friends and Family: Not on file    Frequency of Social Gatherings with Friends and Family: Not on file    Attends Hoahaoism Services: Not on file    Active Member of Clubs or Organizations: Not on file    Attends Club or Organization Meetings: Not on file    Marital Status: Not on file   Intimate Partner Violence:     Fear of Current or Ex-Partner: Not on file    Emotionally Abused: Not on file    Physically Abused: Not on file    Sexually Abused: Not on file   Housing Stability:     Unable to Pay for Housing in the Last Year: Not on file    Number of Places Lived in the Last Year: Not on file    Unstable Housing in the Last Year: Not on file        ROS:  As noted above, otherwise remainder of 10-point ROS negative      Physical Exam:     Vital Signs:  BP (!) 151/86   Pulse (!) 104   Temp 98.9 °F (37.2 °C) (Oral)   Resp 25   Wt 184 lb (83.5 kg)   SpO2 96%   BMI 24.95 kg/m²     Weight:    Wt Readings from Last 3 Encounters:   05/28/22 184 lb (83.5 kg)   05/27/22 184 lb 8.4 oz (83.7 kg)   05/26/22 183 lb 3.2 oz (83.1 kg)       KPS: 80% Normal activity with effort; some signs or symptoms of disease    ECOG PS:  (1) Restricted in physically strenuous activity, ambulatory and able to do work of light nature    General: Awake, alert and oriented.   HEENT: normocephalic, alopecia, PERRL, no scleral erythema or icterus, Oral mucosa moist and intact, throat clear  NECK: supple without palpable adenopathy  BACK: Straight negative CVAT  SKIN: warm dry and intact without lesions rashes or masses  CHEST: Wheezes bilaterally without use of accessory muscles  CV: Normal S1 S2, RRR, no MRG  ABD: NT ND normoactive BS, no palpable masses or hepatosplenomegaly  EXTREMITIES: LLE Edema from the inguinal region distally.  It is improved.  He can extend to 180 degrees and flex to 90.  The right lower extremity is normal, denies calf tenderness  NEURO: CN II - XII grossly intact  CATHETER: RIJ Trifusion (IR: Jermaine, 4/15/22): CDI      Laboratory Data:  CBC:   Recent Labs     05/27/22  0913 05/27/22 2225 05/28/22  0440   WBC 2.8* 1.6* 1.3*   HGB 9.4* 9.3* 9.0*   HCT 27.3* 27.0* 26.9*   MCV 96.4 95.8 96.6   PLT 95* 99* 97*     BMP/Mag:  Recent Labs     05/26/22  0855 05/26/22  0855 05/27/22  0913 05/27/22 2225 05/28/22  0440      < > 135* 139 137   K 3.9   < > 3.8 4.1 4.0      < > 100 105 103   CO2 25   < > 25 21 22   PHOS 2.5  -- 2.6  --  2.9   BUN 17   < > 14 15 14   CREATININE 1.0   < > 1.0 1.0 1.0   MG  --   --  1.70*  --  1.70*    < > = values in this interval not displayed. LIVP:   Recent Labs     05/27/22  0913 05/27/22  2225 05/28/22  0440   AST 19 21 21   ALT 15 17 15   BILIDIR <0.2  --  <0.2   BILITOT 0.3 0.3 0.4   ALKPHOS 73 75 69     Coags:   Recent Labs     05/26/22  0855 05/28/22  0440   PROTIME 14.2 16.0*   INR 1.11 1.29*   APTT  --  45.3*     Uric Acid   Recent Labs     05/27/22  0913 05/28/22  0440   LABURIC 3.6 3.4*     Lab Results   Component Value Date    CRP 55.6 (H) 05/27/2022    CRP 26.2 (H) 05/26/2022    CRP 6.9 (H) 05/25/2022     Lab Results   Component Value Date    FERRITIN 533.3 (H) 05/28/2022    FERRITIN 471.7 (H) 05/27/2022    FERRITIN 445.0 (H) 05/26/2022       PROBLEM LIST:        1. Follicular lymphoma with BM involvement  2. DLBC NHL      TREATMENT:            1. FL: 1/1995 CHOP             11/2003 to 8/2004 Rituxan             7/2007 R-CHOP  2. DLBCL: R-[place]/Ox C1 1/31/2022, C2 02/14/2022                     Chip-BR C1D1: 3/22/22                                    C2D1: 4/20/22        Lymphodepleting Chemotherapy:  Fludarabine and cyclophosphamide   Disease Status at time of Infusion:  Progressive Disease  CAR-T Infusion Date: 5/23/22  CAR-T Product:  Breyanzi  Batch ID Number:  [id number]        ASSESSMENT AND PLAN:            1.  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  Left ventricular ejection fraction 55 to 60% with no regional wall motion abnormalities noted.  Intermittent diastolic dysfunction.  - PFTs (3/21/22): Diffusion capacity 79%, FEV1 78%.  Consistent with mild restrictive ventilatory defect. - CMI -  3 (age and pulmonary)  - PET (5/13/22):  Multiple indeterminate pulmonary nodules in the right lung which demonstrates increased activity in the right upper lobe. These may be infectious. These were not described on the previous PET/CT. Pulmonary lymphoma would be difficult to exclude. If the pulmonary nodule represents lymphoma, this would represent a Deauville score 5 exam. If this is infectious, overall the exam would represent a Deauville score 1. Previously described lymphadenopathy in FDG activity in the left retroperitoneum, left iliac region and in left upper thigh has completely resolved.  No residual lymphadenopathy or FDG avid lymph node identified      Lymphodepleting Chemotherapy:  Fludarabine and cyclophosphamide   Disease Status at time of Infusion:  Progressive Disease  CAR-T Infusion Date: 5/23/22  CAR-T Product:  Breyanzi  Batch ID Number:  [id number]     Day + 5     2.  CRS / Neuro:  Admitted with Grade 1 CRS (fever only)   - Monitor CRP and Ferrtin closely             Lab Results   Component Value Date     CRP 22.2 (H) 03/21/2022                Lab Results   Component Value Date     FERRITIN 551.3 (H) 03/21/2022   - Start Keppra 500 mg BID on Monday, 5/23/22  - Neuro checks w/ CARTOX 10-point assessment Q4hrs     Toxicity Grading        CRS Grade: Grade 1 CRS (fever only)      ICANS Grade:  N/A     CAR-T Score: 10/10     3.  ID: Admitted with NF likely d/t CRS, Dry Cough d/t allergies: Exacerbated 5/25/22, new Wheezing throughout 5/27/22: resolved   - CXR & UA 5/23/22: Negative   - Contine Diflucan & Valtrex ppx  - Cont daily Zyrtec   - Cont daily Flonase 5/25/22  - Sputum Culture 5/26/22: Negative   - CXR 5/27/22: Negative    - Pan Cx 5/27/22: Pending   - Covid-19 & Flu 5/27/22: Negative    - RR Viral Panel 5/28/22: Pending  - Start Cefepime Day + 1 (5/28/22)      4.  Heme: Anemia & thrombocytopenia from chemotherapy   - Transfuse for Hgb < 7 and Platelets < 04Z  - No transfusion today     5.  Metabolic: hypoNa .    TLS:  No evidence, cont allopurinol and daily TLS labs   - Cont IVF hydration: 1 L NS daily in OP Infusion  - Replace potassium and magnesium per policy.     6. Cardiac: Overnight developed atrial fibrillation with rapid ventricular response  - Consult Cardiology  - Start Metoprolol 12.5 mg BID: Increase metoprolol 12.5 mg to every 6 hours. If he is able to tolerate this today, increase to 25 mg by twice daily per Cardiology   - Low threshold to start anticoagulation with Eliquis although has thrombocytopenia    7. Nutrition:  Appetite and oral intake is good. - S/p taking Astragalus supplement at home: Stop 5/18/22 d/t interaction with Cytoxan  - Cont low microbial diet   - Follow closely with dietary    - DVT Prophylaxis: Platelets <33,023 cells/dL - prophylactic lovenox on hold and mechanical prophylaxis with bilateral SCDs while in bed in place. Contraindications to pharmacologic prophylaxis: Thrombocytopenia  Contraindications to mechanical prophylaxis: None   - S/p Eliquis 2.5 mg BID (stopped 5/17/22); leg edema resolving, stop Eliquis     - Disposition:  Uncertain at this time. The patient was seen and examined by Dr. Moody Pino. This admission history and physical has been discussed and agreed upon by Dr. Moody Pino.     JOAQUIM Mccrary - ADELE

## 2022-05-28 NOTE — PROGRESS NOTES
Patient admitted to [facility] via ambulatory from ED for diagnosis of Neutropenic fever. Patient oriented to patient room including call light and bed controls. Admission assessment completed - see admission flowsheet documentation. Patient is a Medium fall risk. Safety measures instituted per policy. Patient oriented to unit policies and procedures including: pain management practices, unit safety precautions, family rapid response, q4h vital signs and assessments, daily 4am lab draws, weekly chest x-rays, weekly VRE rectal swabs for surveillance, daily chlorhexidine bathing, standing transfusion orders, and routine central line care. Also discussed use of call light and how to get in touch with nursing staff. Stressed the importance of calling out immediately for any changes in condition including but not limited to: pain, chills, fever, nausea, vomiting, diarrhea, chest pain, sob/oneill, assistance with toileting, bleeding, or any other symptoms that are out of the ordinary for the patient. Patient verbalizes understanding of all instructions and will call for assistance as needed.

## 2022-05-28 NOTE — CONSULTS
[facility]  Cardiology Inpatient Consult Service                                                                                          Pt Name: Darinel Banks  Age: [de-identified] y.o. Sex: male  : 1940  Location: [address] [place] [address]    Referring Physician: Kelby Capps DO      Reason for Consult:       Reason for Consultation/Chief Complaint:   Atrial fibrillation      HPI:      Darinel Banks is a [de-identified] y.o. male with a past medical history of lymphoma with recent cart T therapy who presented overnight with that tachycardia, hypertension and a fever. He is currently being treated for neutropenic fever. Overnight developed atrial fibrillation with rapid ventricular response. Were consulted for this reason. He feels completely fine and his main complaint is his current cough. Histories     Past Medical History:   has no past medical history on file. Surgical History:   has a past surgical history that includes Catheter Insertion (N/A, 2022). Social History:   reports that he has never smoked. He has never used smokeless tobacco. He reports that he does not drink alcohol and does not use drugs. Family History:  No evidence for sudden cardiac death or premature CAD      Medications:       Home Medications  Were reviewed and are listed in nursing record. and/or listed below  Prior to Admission medications    Medication Sig Start Date End Date Taking?  Authorizing Provider   acyclovir (ZOVIRAX) 800 MG tablet Take 1 tablet by mouth 2 times daily 22   Felipe Laser, APRN - NP   fluconazole (DIFLUCAN) 200 MG tablet Take 1 tablet by mouth daily 22  Felipe Laser, APRN - NP   levoFLOXacin (LEVAQUIN) 500 MG tablet Take 1 tablet by mouth daily 22  Felipe Laser, APRN - NP   guaiFENesin [facility]) 600 MG extended release tablet Take 1 tablet by mouth 2 times daily as needed for Congestion 22   Felipe Laser, APRN - NP   allopurinol (ZYLOPRIM) 300 MG tablet Take 1 tablet by mouth daily 5/19/22   Colleen Medina APRN - NP   levETIRAcetam (KEPPRA) 500 MG tablet Take 1 tablet by mouth 2 times daily Start on Monday 5/23/22 5/23/22   Dorindajaocbo Portilloonel APRN - NP   ondansetron (ZOFRAN ODT) 4 MG disintegrating tablet Take 1 tablet by mouth every 8 hours as needed for Nausea or Vomiting  Patient not taking: Reported on 5/27/2022 5/18/22   Dorindajacobo Adam APRN - NP   prochlorperazine (COMPAZINE) 10 MG tablet Take 10 mg by mouth every 4-6 hours as needed  Patient not taking: Reported on 5/27/2022 1/30/22   Historical Provider, MD          Inpatient Medications:   allopurinol  300 mg Oral Daily    fluconazole  200 mg Oral Daily    levETIRAcetam  500 mg Oral BID    sodium chloride flush  5-40 mL IntraVENous 2 times per day    Saline Mouthwash  15 mL Swish & Spit 4x Daily AC & HS    enoxaparin  40 mg SubCUTAneous Daily    cefepime  2,000 mg IntraVENous Q8H    acyclovir  800 mg Oral BID WC    metoprolol tartrate  12.5 mg Oral BID    fluticasone  1 spray Each Nostril Daily       IV drips:   sodium chloride 75 mL/hr at 05/28/22 0846    sodium chloride         PRN:  guaiFENesin, acetaminophen, sodium chloride flush, sodium chloride, potassium chloride, magnesium sulfate, acetaminophen, magnesium hydroxide, alteplase (CATHFLO) with sterile water injection, benzonatate    Allergy:     Decadron [dexamethasone]       Review of Systems:     All 12 point review of symptoms completed. Pertinent positives identified in the HPI, all other review of symptoms negative as below. CONSTITUTIONAL: Nounanticipated weight loss. No change in energy level, sleep pattern, or activity level. SKIN: No rash or pruritis. EYES: No visual changes or diplopia. No scleral icterus. ENT: No Headaches, hearing loss or vertigo. No mouth sores or sore throat. CARDIOVASCULAR: No chest pain/chest pressure/chest discomfort.  No palpitations. RESPIRATORY: No cough or wheezing, no sputum production. No hematemesis. GASTROINTESTINAL: No N/V/D. No abdominal pain, appetite loss, blood in stools. GENITOURINARY: No dysuria, trouble voiding, or hematuria. MUSCULOSKELETAL:  No gait disturbance, weakness or joint complaints. NEUROLOGICAL: No headache, diplopia, change in muscle strength, numbness or tingling. No change in gait, balance, coordination, mood, affect, memory, mentation, behavior. PSHYCH: No anxiety, loss of interest, change in sexual behavior, feelings of self-harm, or confusion. ENDOCRINE: No malaise, fatigue or temperature intolerance. No excessive thirst, fluid intake, or urination. No tremor. HEMATOLOGIC: No abnormal bruising or bleeding. ALLERGY: No nasal congestion or hives.       Physical Examination:     Vitals:    05/28/22 0821 05/28/22 0829 05/28/22 0942 05/28/22 0943   BP: (!) 147/65 136/68     Pulse: (!) 106 (!) 115 (!) 104 80   Resp: 25      Temp: 98.9 °F (37.2 °C)      TempSrc: Oral      SpO2: 96%      Weight:  184 lb (83.5 kg)         Wt Readings from Last 3 Encounters:   05/28/22 184 lb (83.5 kg)   05/27/22 184 lb 8.4 oz (83.7 kg)   05/26/22 183 lb 3.2 oz (83.1 kg)       Objective      General Appearance:  Alert, cooperative, no distress, appears stated age Appropriate weight   Head:  Normocephalic, without obvious abnormality, atraumatic   Eyes:  PERRL, conjunctiva/corneas clear EOM intact  Ears normal   Throat no lesions       Nose: Nares normal, no drainage or sinus tenderness   Throat: Lips, mucosa, and tongue normal   Neck: Supple, symmetrical, trachea midline, no adenopathy, thyroid: not enlarged, symmetric, no tenderness/mass/nodules, no carotid bruit or JVD       Lungs:   Clear to auscultation bilaterally, respirations unlabored   Chest Wall:  No tenderness or deformity   Heart:  Regular rate and rhythm, S1, S2 normal, no murmur, rub or gallop PMI intact   Abdomen:   Soft, non-tender, bowel sounds active all four quadrants,  no masses, no organomegaly       Extremities: Extremities normal, atraumatic, no cyanosis or edema   Pulses: 2+ and symmetric   Skin: Skin color, texture, turgor normal, no rashes or lesions   Pysch: Normal mood and affect   Neurologic: Normal gross motor and sensory exam.  Cranial nerves intact        Labs:     Recent Labs     05/26/22  0855 05/27/22  0913 05/27/22 2225 05/28/22  0440    135* 139 137   K 3.9 3.8 4.1 4.0   BUN 17 14 15 14   CREATININE 1.0 1.0 1.0 1.0    100 105 103   CO2 25 25 21 22   GLUCOSE 149* 152* 121* 111*   CALCIUM 8.7 8.8 8.6 8.7   MG  --  1.70*  --  1.70*     Recent Labs     05/26/22  0855 05/26/22  0855 05/27/22  0913 05/27/22 0913 05/27/22 2225 05/27/22 2225 05/28/22  0440   WBC 4.2  --  2.8*  --  1.6*  --  1.3*   HGB 9.5*  --  9.4*  --  9.3*  --  9.0*   HCT 28.6*  --  27.3*  --  27.0*  --  26.9*   PLT 89*  --  95*  --  99*  --  97*   MCV 96.8   < > 96.4   < > 95.8   < > 96.6    < > = values in this interval not displayed. No results for input(s): CHOLTOT, TRIG, HDL, CHOLHDL, LDL in the last 72 hours. Invalid input(s): [address] [facility],[address], [id number] [place] [address] Road     05/26/22  0855 05/28/22  0440   INR 1.11 1.29*     No results for input(s): CKTOTAL, CKMB, CKMBINDEX, TROPONINI in the last 72 hours. No results for input(s): BNP in the last 72 hours. No results for input(s): TSH in the last 72 hours. No results for input(s): CHOL, HDL, LDLCALC, TRIG in the last 72 hours.]    No results found for: CKTOTAL, CKMB, CKMBINDEX, TROPONINI      Imaging:     I personally reviewed imaging studies including CXR, Stress test, TTE/ANGELO. Last ECG (if available) -personally interpreted EKG:  I have reviewed EKG with the following interpretation  A. fib    Telemetry: Personally interpreted  Sinus/A. fib    Last Stress (if available):    Last TTE/ANGELO(if available):  Left ventricular cavity size is normal. There is mild concentric left   ventricular hypertrophy. Overall left ventricular systolic function appears   normal with an ejection fraction of 55-60%. No regional wall motion   abnormalities are noted. Indeterminate diastolic function. Global strain is   -15.2%. Tricuspid valve leaflets are structurally normal. Mild to moderate tricuspid   regurgitation. No evidence of tricuspid stenosis. The pulmonic valve is not well visualized. Mild pulmonic regurgitation present. No evidence of pulmonic valve stenosis. Last Cath (if available):    Last CMR  (if available):      Assessment / Plan:     Atrial fibrillation  -Likely triggered by underlying infection  -Increase metoprolol 12.5 mg to every 6 hours. If he is able to tolerate this today, increase to 25 mg by twice daily  -Low threshold to start anticoagulation with Eliquis although has thrombocytopenia    Tobacco use was discussed with the patient and educated on the negative effects. I have asked the patient to not utilize these agents. Thank you for allowing to us to participate in the care or Carlo Pica. Further evaluation will be based upon the patient's clinical course and testing results. I have spent 40 minutes of face to face time with the patient with more than 50% spent counseling and coordinating care. All questions and concerns were addressed to the patient/family. Alternatives to my treatment were discussed. The note was completed using EMR. Every effort was made to ensure accuracy; however, inadvertent computerized transcription errors may be present. I have personally reviewed the reports and images of labs, radiological studies, cardiac studies including ECG's and telemetry, current and old medical records. Malcolm Bunn MD, [facility], [facility] [address]

## 2022-05-28 NOTE — ED NOTES
MD CASTAÑEDA [facility] informed WBC 1.6     Sj Espinoza RN  05/27/22 0560

## 2022-05-28 NOTE — ED PROVIDER NOTES
ED Attending Attestation Note     Date of evaluation: 5/27/2022    This patient was seen by the KALEIGH. I have seen and examined the patient, agree with the workup, evaluation, management and diagnosis. The care plan has been discussed. I have reviewed the ECG and concur with the KALEIGH's interpretation. I was present for any procedures performed in the KALEIGH's note and have made edits to the note where appropriate. My assessment reveals [de-identified] y.o. male with history of non-Hodgkin's lymphoma currently receiving CAR-T therapy presenting to the emergency department today for fever. On arrival he is well-appearing, mildly tachycardic, hypertensive into the 654I systolic. Has had a mild nonproductive cough recently, no other localizing infectious symptoms. Heart regular rate and rhythm, lungs clear to auscultation. Triple-lumen catheter in the right upper chest without tenderness, induration, erythema, and dressing is clean/dry/intact. Abdomen soft and benign. No significant lower extremity edema. No symptoms that would be consistent with cytokine release or neurotoxicity. Cultures obtained and empiric cefepime given. Will discuss with hematology.          Rick Esquivel MD  05/27/22 1108

## 2022-05-28 NOTE — ED PROVIDER NOTES
1 tablet by mouth daily    FLUCONAZOLE (DIFLUCAN) 200 MG TABLET    Take 1 tablet by mouth daily    GUAIFENESIN (MUCINEX) 600 MG EXTENDED RELEASE TABLET    Take 1 tablet by mouth 2 times daily as needed for Congestion    LEVETIRACETAM (KEPPRA) 500 MG TABLET    Take 1 tablet by mouth 2 times daily Start on Monday 5/23/22    LEVOFLOXACIN (LEVAQUIN) 500 MG TABLET    Take 1 tablet by mouth daily    ONDANSETRON (ZOFRAN ODT) 4 MG DISINTEGRATING TABLET    Take 1 tablet by mouth every 8 hours as needed for Nausea or Vomiting    PROCHLORPERAZINE (COMPAZINE) 10 MG TABLET    Take 10 mg by mouth every 4-6 hours as needed       Allergies     He is allergic to decadron [dexamethasone]. Physical Exam     INITIAL VITALS: BP: (!) 174/82, Temp: 99.1 °F (37.3 °C), Heart Rate: (!) 101, Resp: 24, SpO2: 99 %   Physical Exam  Vitals and nursing note reviewed. Constitutional:       Appearance: Normal appearance. HENT:      Head: Normocephalic and atraumatic. Cardiovascular:      Rate and Rhythm: Regular rhythm. Tachycardia present. Pulses: Normal pulses. Heart sounds: Normal heart sounds. Pulmonary:      Effort: Pulmonary effort is normal. No respiratory distress. Breath sounds: Normal breath sounds. Abdominal:      General: Bowel sounds are normal. There is no distension. Palpations: Abdomen is soft. Tenderness: There is no abdominal tenderness. Musculoskeletal:         General: Normal range of motion. Cervical back: Normal range of motion and neck supple. Skin:     General: Skin is warm and dry. Neurological:      Mental Status: He is alert and oriented to person, place, and time. Psychiatric:         Mood and Affect: Mood normal.         Behavior: Behavior normal.           Diagnostic Results     RADIOLOGY:  XR CHEST PORTABLE   Final Result      No acute pulmonary disease.              LABS:   Results for orders placed or performed during the hospital encounter of 05/27/22   CBC with Auto Differential   Result Value Ref Range    WBC 1.6 (LL) 4.0 - 11.0 K/uL    RBC 2.82 (L) 4.20 - 5.90 M/uL    Hemoglobin 9.3 (L) 13.5 - 17.5 g/dL    Hematocrit 27.0 (L) 40.5 - 52.5 %    MCV 95.8 80.0 - 100.0 fL    MCH 33.0 26.0 - 34.0 pg    MCHC 34.4 31.0 - 36.0 g/dL    RDW 14.5 12.4 - 15.4 %    Platelets 99 (L) 084 - 450 K/uL    MPV 9.3 5.0 - 10.5 fL    PLATELET SLIDE REVIEW Decreased     SLIDE REVIEW see below     Neutrophils % 75.5 %    Lymphocytes % 2.1 %    Monocytes % 7.1 %    Eosinophils % 15.1 %    Basophils % 0.2 %    Neutrophils Absolute 1.2 (L) 1.7 - 7.7 K/uL    Lymphocytes Absolute 0.0 (L) 1.0 - 5.1 K/uL    Monocytes Absolute 0.1 0.0 - 1.3 K/uL    Eosinophils Absolute 0.2 0.0 - 0.6 K/uL    Basophils Absolute 0.0 0.0 - 0.2 K/uL    Macrocytes Occasional (A)    Comprehensive Metabolic Panel w/ Reflex to MG   Result Value Ref Range    Sodium 139 136 - 145 mmol/L    Potassium reflex Magnesium 4.1 3.5 - 5.1 mmol/L    Chloride 105 99 - 110 mmol/L    CO2 21 21 - 32 mmol/L    Anion Gap 13 3 - 16    Glucose 121 (H) 70 - 99 mg/dL    BUN 15 7 - 20 mg/dL    CREATININE 1.0 0.8 - 1.3 mg/dL    GFR Non-African American >60 >60    GFR African American >60 >60    Calcium 8.6 8.3 - 10.6 mg/dL    Total Protein 6.6 6.4 - 8.2 g/dL    Albumin 4.0 3.4 - 5.0 g/dL    Albumin/Globulin Ratio 1.5 1.1 - 2.2    Total Bilirubin 0.3 0.0 - 1.0 mg/dL    Alkaline Phosphatase 75 40 - 129 U/L    ALT 17 10 - 40 U/L    AST 21 15 - 37 U/L   Urinalysis with Reflex to Culture    Specimen: Urine   Result Value Ref Range    Color, UA Yellow Straw/Yellow    Clarity, UA Clear Clear    Glucose, Ur Negative Negative mg/dL    Bilirubin Urine Negative Negative    Ketones, Urine Negative Negative mg/dL    Specific Gravity, UA 1.015 1.005 - 1.030    Blood, Urine Negative Negative    pH, UA 6.0 5.0 - 8.0    Protein, UA TRACE (A) Negative mg/dL    Urobilinogen, Urine 0.2 <2.0 E.U./dL    Nitrite, Urine Negative Negative    Leukocyte Esterase, Urine Negative Negative    Microscopic Examination YES     Urine Type Voided     Urine Reflex to Culture Not Indicated    Lactate, Sepsis   Result Value Ref Range    Lactic Acid, Sepsis 0.7 0.4 - 1.9 mmol/L   Blood Gas, Venous   Result Value Ref Range    pH, Jonathan 7.354 7.350 - 7.450    pCO2, Jonathan 45.1 41.0 - 51.0 mmHg    pO2, Jonathan <30.0 25.0 - 40.0 mmHg    HCO3, Venous 25.1 24.0 - 28.0 mmol/L    Base Excess, Jonathan -0.9 -2.0 - 3.0 mmol/L    O2 Sat, Jonathan 43 Not established %    Carboxyhemoglobin 1.5 0.0 - 1.5 %    MetHgb, Jonathan 0.0 0.0 - 1.5 %    TC02 (Calc), Jonathan 27 mmol/L    Hemoglobin, Jonathan, Reduced 56.30 %   Microscopic Urinalysis   Result Value Ref Range    Mucus, UA Rare (A) None Seen /LPF    WBC, UA None seen 0 - 5 /HPF    RBC, UA None seen 0 - 4 /HPF    Epithelial Cells, UA 0-1 0 - 5 /HPF       RECENT VITALS:  BP: (!) 157/81, Temp: 99.1 °F (37.3 °C), Heart Rate: 95, Resp: 23, SpO2: 94 %         ED Course     Nursing Notes, Past Medical Hx, Past Surgical Hx, Social Hx, Allergies, and Family Hx were reviewed. The patient was given the following medications:  Orders Placed This Encounter   Medications    cefepime (MAXIPIME) 2000 mg IVPB minibag in NS     Order Specific Question:   Antimicrobial Indications     Answer:   Febrile Neutropenia            CONSULTS:  IP CONSULT TO [address] [place] [address] / IRVIN / Renetta Violetta is a [de-identified] y.o. male who presents with complaints as noted in HPI. 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 On arrival patient with a low-grade temperature of 99.1, mildly tachycardic, otherwise hemodynamically stable and overall well-appearing. Does not appear ill. Initial concern for sepsis. Given how well patient appears, minimal concern for cytokine release syndrome or neurotoxicity at this time.   Patient was started on cefepime, and COVID/influenza testing, blood cultures, lactate, urinalysis, CBC, VBG, CMP were ordered. Mild neutropenia with a white blood cell count of 1.6, platelet count of 99, neutrophil count of 1.2 with a calculated ANC of 1208. CMP with a glucose of 121 otherwise unremarkable, lactate normal at 0.7, urinalysis without evidence of infection, VBG unremarkable, COVID/influenza pending. Chest x-ray without evidence of infection. Case discussed with hematology/oncology on-call, Dr. Man Olivares who will admit the patient for ongoing evaluation and management of neutropenic fever. This patient was also evaluated by the attending physician. All care plans were discussed and agreed upon. Clinical Impression     1.  Neutropenic fever ([facility] [address]        Disposition     DISPOSITION  Admitted in stable condition        JOAQUIM Burrows CNP  05/28/22 0034

## 2022-05-28 NOTE — PLAN OF CARE
Problem: Pain  Goal: Verbalizes/displays adequate comfort level or baseline comfort level  Outcome: Progressing     Problem: Safety - Adult  Goal: Free from fall injury  Outcome: Progressing     Problem: Skin/Tissue Integrity - Adult  Goal: Skin integrity remains intact  Outcome: Progressing     Problem: Infection - Adult  Goal: Absence of infection at discharge  Outcome: Progressing   CVC site remains free of signs/symptoms of infection. No drainage, edema, erythema, pain, or warmth noted at site. Dressing changes continue per protocol and on an as needed basis - see flowsheet. Compliant with [facility] Bath Protocol:  Performed CHG bath today per [facility] protocol utilizing Bed bath with CHG wipes. CVC site cleansed with CHG wipe over dressing, skin surrounding dressing, and first 6\" of IV tubing. Pt tolerated well. Continued to encourage daily CHG bathing per [facility] protocol.

## 2022-05-29 LAB
ALBUMIN SERPL-MCNC: 3.4 G/DL (ref 3.4–5)
ALP BLD-CCNC: 60 U/L (ref 40–129)
ALT SERPL-CCNC: 17 U/L (ref 10–40)
ANION GAP SERPL CALCULATED.3IONS-SCNC: 11 MMOL/L (ref 3–16)
APTT: 63.8 SEC (ref 23–34.3)
AST SERPL-CCNC: 31 U/L (ref 15–37)
BASOPHILS ABSOLUTE: 0 K/UL (ref 0–0.2)
BASOPHILS RELATIVE PERCENT: 0.2 %
BILIRUB SERPL-MCNC: 0.4 MG/DL (ref 0–1)
BILIRUBIN DIRECT: <0.2 MG/DL (ref 0–0.3)
BILIRUBIN, INDIRECT: ABNORMAL MG/DL (ref 0–1)
BUN BLDV-MCNC: 19 MG/DL (ref 7–20)
CALCIUM SERPL-MCNC: 8.1 MG/DL (ref 8.3–10.6)
CHLORIDE BLD-SCNC: 100 MMOL/L (ref 99–110)
CO2: 21 MMOL/L (ref 21–32)
CREAT SERPL-MCNC: 1.1 MG/DL (ref 0.8–1.3)
D DIMER: 1 UG/ML FEU (ref 0–0.6)
EOSINOPHILS ABSOLUTE: 0.1 K/UL (ref 0–0.6)
EOSINOPHILS RELATIVE PERCENT: 3.3 %
FERRITIN: 652 NG/ML (ref 30–400)
FIBRINOGEN: 591 MG/DL (ref 207–509)
GFR AFRICAN AMERICAN: >60
GFR NON-AFRICAN AMERICAN: >60
GLUCOSE BLD-MCNC: 103 MG/DL (ref 70–99)
HCT VFR BLD CALC: 24.2 % (ref 40.5–52.5)
HEMOGLOBIN: 8.1 G/DL (ref 13.5–17.5)
INR BLD: 1.32 (ref 0.87–1.14)
LACTATE DEHYDROGENASE: 179 U/L (ref 100–190)
LYMPHOCYTES ABSOLUTE: 0 K/UL (ref 1–5.1)
LYMPHOCYTES RELATIVE PERCENT: 2.4 %
MAGNESIUM: 1.7 MG/DL (ref 1.8–2.4)
MCH RBC QN AUTO: 32 PG (ref 26–34)
MCHC RBC AUTO-ENTMCNC: 33.3 G/DL (ref 31–36)
MCV RBC AUTO: 96.1 FL (ref 80–100)
MONOCYTES ABSOLUTE: 0.3 K/UL (ref 0–1.3)
MONOCYTES RELATIVE PERCENT: 15.8 %
NEUTROPHILS ABSOLUTE: 1.3 K/UL (ref 1.7–7.7)
NEUTROPHILS RELATIVE PERCENT: 78.3 %
ORGANISM: ABNORMAL
PDW BLD-RTO: 14.8 % (ref 12.4–15.4)
PHOSPHORUS: 3.5 MG/DL (ref 2.5–4.9)
PLATELET # BLD: 98 K/UL (ref 135–450)
PMV BLD AUTO: 8.8 FL (ref 5–10.5)
POTASSIUM SERPL-SCNC: 4.1 MMOL/L (ref 3.5–5.1)
PROCALCITONIN: 0.33 NG/ML (ref 0–0.15)
PROTHROMBIN TIME: 16.4 SEC (ref 11.7–14.5)
RBC # BLD: 2.52 M/UL (ref 4.2–5.9)
REPORT: NORMAL
RESPIRATORY PANEL PCR: ABNORMAL
SODIUM BLD-SCNC: 132 MMOL/L (ref 136–145)
TOTAL PROTEIN: 5.8 G/DL (ref 6.4–8.2)
URIC ACID, SERUM: 3.6 MG/DL (ref 3.5–7.2)
WBC # BLD: 1.7 K/UL (ref 4–11)

## 2022-05-29 PROCEDURE — 6370000000 HC RX 637 (ALT 250 FOR IP): Performed by: STUDENT IN AN ORGANIZED HEALTH CARE EDUCATION/TRAINING PROGRAM

## 2022-05-29 PROCEDURE — 84550 ASSAY OF BLOOD/URIC ACID: CPT

## 2022-05-29 PROCEDURE — 85025 COMPLETE CBC W/AUTO DIFF WBC: CPT

## 2022-05-29 PROCEDURE — 83735 ASSAY OF MAGNESIUM: CPT

## 2022-05-29 PROCEDURE — 2580000003 HC RX 258: Performed by: STUDENT IN AN ORGANIZED HEALTH CARE EDUCATION/TRAINING PROGRAM

## 2022-05-29 PROCEDURE — 6360000002 HC RX W HCPCS: Performed by: FAMILY MEDICINE

## 2022-05-29 PROCEDURE — 6360000002 HC RX W HCPCS: Performed by: STUDENT IN AN ORGANIZED HEALTH CARE EDUCATION/TRAINING PROGRAM

## 2022-05-29 PROCEDURE — 80076 HEPATIC FUNCTION PANEL: CPT

## 2022-05-29 PROCEDURE — 85379 FIBRIN DEGRADATION QUANT: CPT

## 2022-05-29 PROCEDURE — 83615 LACTATE (LD) (LDH) ENZYME: CPT

## 2022-05-29 PROCEDURE — 84100 ASSAY OF PHOSPHORUS: CPT

## 2022-05-29 PROCEDURE — 94640 AIRWAY INHALATION TREATMENT: CPT

## 2022-05-29 PROCEDURE — 2060000000 HC ICU INTERMEDIATE R&B

## 2022-05-29 PROCEDURE — 80048 BASIC METABOLIC PNL TOTAL CA: CPT

## 2022-05-29 PROCEDURE — 94664 DEMO&/EVAL PT USE INHALER: CPT

## 2022-05-29 PROCEDURE — 85730 THROMBOPLASTIN TIME PARTIAL: CPT

## 2022-05-29 PROCEDURE — 36592 COLLECT BLOOD FROM PICC: CPT

## 2022-05-29 PROCEDURE — 99233 SBSQ HOSP IP/OBS HIGH 50: CPT | Performed by: INTERNAL MEDICINE

## 2022-05-29 PROCEDURE — 84145 PROCALCITONIN (PCT): CPT

## 2022-05-29 PROCEDURE — 6370000000 HC RX 637 (ALT 250 FOR IP): Performed by: INTERNAL MEDICINE

## 2022-05-29 PROCEDURE — 85384 FIBRINOGEN ACTIVITY: CPT

## 2022-05-29 PROCEDURE — 6370000000 HC RX 637 (ALT 250 FOR IP): Performed by: NURSE PRACTITIONER

## 2022-05-29 PROCEDURE — 6360000002 HC RX W HCPCS: Performed by: NURSE PRACTITIONER

## 2022-05-29 PROCEDURE — 85610 PROTHROMBIN TIME: CPT

## 2022-05-29 PROCEDURE — 82728 ASSAY OF FERRITIN: CPT

## 2022-05-29 RX ORDER — ALBUTEROL SULFATE 2.5 MG/3ML
2.5 SOLUTION RESPIRATORY (INHALATION) 2 TIMES DAILY
Status: DISCONTINUED | OUTPATIENT
Start: 2022-05-29 | End: 2022-06-01 | Stop reason: HOSPADM

## 2022-05-29 RX ORDER — ALBUTEROL SULFATE 2.5 MG/3ML
2.5 SOLUTION RESPIRATORY (INHALATION) EVERY 6 HOURS PRN
Status: DISCONTINUED | OUTPATIENT
Start: 2022-05-29 | End: 2022-06-01 | Stop reason: HOSPADM

## 2022-05-29 RX ORDER — LEVOFLOXACIN 250 MG/1
500 TABLET ORAL DAILY
Status: DISCONTINUED | OUTPATIENT
Start: 2022-05-29 | End: 2022-05-31

## 2022-05-29 RX ORDER — CODEINE PHOSPHATE AND GUAIFENESIN 10; 100 MG/5ML; MG/5ML
5 SOLUTION ORAL EVERY 4 HOURS PRN
Status: DISCONTINUED | OUTPATIENT
Start: 2022-05-29 | End: 2022-06-01 | Stop reason: HOSPADM

## 2022-05-29 RX ORDER — ALBUTEROL SULFATE 90 UG/1
2 AEROSOL, METERED RESPIRATORY (INHALATION) EVERY 6 HOURS PRN
Status: DISCONTINUED | OUTPATIENT
Start: 2022-05-29 | End: 2022-05-29 | Stop reason: ALTCHOICE

## 2022-05-29 RX ADMIN — ACYCLOVIR 800 MG: 800 TABLET ORAL at 09:38

## 2022-05-29 RX ADMIN — LEVETIRACETAM 500 MG: 500 TABLET, FILM COATED ORAL at 09:38

## 2022-05-29 RX ADMIN — GUAIFENESIN AND CODEINE PHOSPHATE 5 ML: 100; 10 SOLUTION ORAL at 15:24

## 2022-05-29 RX ADMIN — FLUTICASONE PROPIONATE 1 SPRAY: 50 SPRAY, METERED NASAL at 09:40

## 2022-05-29 RX ADMIN — GUAIFENESIN AND CODEINE PHOSPHATE 5 ML: 100; 10 SOLUTION ORAL at 09:52

## 2022-05-29 RX ADMIN — SODIUM CHLORIDE: 9 INJECTION, SOLUTION INTRAVENOUS at 10:50

## 2022-05-29 RX ADMIN — GUAIFENESIN AND CODEINE PHOSPHATE 5 ML: 100; 10 SOLUTION ORAL at 20:40

## 2022-05-29 RX ADMIN — METOPROLOL TARTRATE 12.5 MG: 25 TABLET, FILM COATED ORAL at 00:37

## 2022-05-29 RX ADMIN — METOPROLOL TARTRATE 12.5 MG: 25 TABLET, FILM COATED ORAL at 06:43

## 2022-05-29 RX ADMIN — BENZONATATE 100 MG: 100 CAPSULE ORAL at 09:38

## 2022-05-29 RX ADMIN — ENOXAPARIN SODIUM 40 MG: 100 INJECTION SUBCUTANEOUS at 18:46

## 2022-05-29 RX ADMIN — SODIUM CHLORIDE 15 ML: 900 IRRIGANT IRRIGATION at 18:46

## 2022-05-29 RX ADMIN — SODIUM CHLORIDE 15 ML: 900 IRRIGANT IRRIGATION at 11:21

## 2022-05-29 RX ADMIN — ALLOPURINOL 300 MG: 300 TABLET ORAL at 09:38

## 2022-05-29 RX ADMIN — LEVETIRACETAM 500 MG: 500 TABLET, FILM COATED ORAL at 20:40

## 2022-05-29 RX ADMIN — ALBUTEROL SULFATE 2.5 MG: 2.5 SOLUTION RESPIRATORY (INHALATION) at 20:23

## 2022-05-29 RX ADMIN — BENZONATATE 100 MG: 100 CAPSULE ORAL at 00:35

## 2022-05-29 RX ADMIN — CEFEPIME HYDROCHLORIDE 2000 MG: 2 INJECTION, POWDER, FOR SOLUTION INTRAVENOUS at 06:42

## 2022-05-29 RX ADMIN — METOPROLOL TARTRATE 25 MG: 25 TABLET, FILM COATED ORAL at 20:40

## 2022-05-29 RX ADMIN — SODIUM CHLORIDE, PRESERVATIVE FREE 10 ML: 5 INJECTION INTRAVENOUS at 09:40

## 2022-05-29 RX ADMIN — METOPROLOL TARTRATE 25 MG: 25 TABLET, FILM COATED ORAL at 09:52

## 2022-05-29 RX ADMIN — FLUCONAZOLE 200 MG: 200 TABLET ORAL at 09:38

## 2022-05-29 RX ADMIN — ALTEPLASE 2 MG: 2.2 INJECTION, POWDER, LYOPHILIZED, FOR SOLUTION INTRAVENOUS at 15:24

## 2022-05-29 RX ADMIN — LEVOFLOXACIN 500 MG: 250 TABLET, FILM COATED ORAL at 11:42

## 2022-05-29 RX ADMIN — ACYCLOVIR 800 MG: 800 TABLET ORAL at 16:34

## 2022-05-29 RX ADMIN — BENZOCAINE AND MENTHOL 1 LOZENGE: 15; 3.6 LOZENGE ORAL at 00:35

## 2022-05-29 RX ADMIN — BENZONATATE 100 MG: 100 CAPSULE ORAL at 15:24

## 2022-05-29 RX ADMIN — ALBUTEROL SULFATE 2.5 MG: 2.5 SOLUTION RESPIRATORY (INHALATION) at 10:17

## 2022-05-29 RX ADMIN — SODIUM CHLORIDE 15 ML: 900 IRRIGANT IRRIGATION at 09:39

## 2022-05-29 NOTE — PLAN OF CARE
Problem: Pain  Goal: Verbalizes/displays adequate comfort level or baseline comfort level  5/29/2022 1744 by Sabrina Powell RN  Outcome: Progressing  Note: Pt has not complained of pain during this shift. Will continue to monitor. Problem: Safety - Adult  Goal: Free from fall injury  5/29/2022 1744 by Sabrina Powell RN  Outcome: Progressing   Orthostatic vital signs obtained at start of shift - see flowsheet for details. Pt does not meet criteria for orthostasis. Pt is a Med    - Screening for Orthostasis AND not a [place] Risk per ARNETT/ABCDS: Pt bed is in low position, side rails up, call light and belongings are in reach. Fall risk light is on outside pts room. Pt encouraged to call for assistance as needed. Will continue with hourly rounds for PO intake, pain needs, toileting and repositioning as needed. Problem: Skin/Tissue Integrity - Adult  Goal: Skin integrity remains intact  5/29/2022 1744 by Sabrina Powell RN  Outcome: Progressing   CVC site remains free of signs/symptoms of infection. No drainage, edema, erythema, pain, or warmth noted at site. Dressing changes continue per protocol and on an as needed basis - see flowsheet. Compliant with [facility] Bath Protocol:  Performed CHG bath today per [facility] protocol utilizing CHG solution in the shower. CVC site cleansed with CHG wipe over dressing, skin surrounding dressing, and first 6\" of IV tubing. Pt tolerated well. Continued to encourage daily CHG bathing per [facility] protocol.

## 2022-05-29 NOTE — PROGRESS NOTES
[facility]  Cardiology Inpatient Consult Service  Daily Progress Note        Admit Date:  5/27/2022    Referring Physician: Jeyson Toney DO    Reason for Consultation/Chief Complaint:   Atrial fibrillation    Subjective: Interval history:  PAVEL    Medications:   allopurinol  300 mg Oral Daily    fluconazole  200 mg Oral Daily    levETIRAcetam  500 mg Oral BID    sodium chloride flush  5-40 mL IntraVENous 2 times per day    Saline Mouthwash  15 mL Swish & Spit 4x Daily AC & HS    enoxaparin  40 mg SubCUTAneous Daily    cefepime  2,000 mg IntraVENous Q8H    acyclovir  800 mg Oral BID WC    fluticasone  1 spray Each Nostril Daily    metoprolol tartrate  12.5 mg Oral Q6H       IV drips:   sodium chloride 75 mL/hr at 05/28/22 2116    sodium chloride         PRN:  guaiFENesin, acetaminophen, sodium chloride flush, sodium chloride, potassium chloride, magnesium sulfate, acetaminophen, magnesium hydroxide, alteplase (CATHFLO) with sterile water injection, benzonatate, benzocaine-menthol      Objective:     Vitals:    05/28/22 2000 05/28/22 2047 05/29/22 0024 05/29/22 0445   BP:   132/70 (!) 120/54   Pulse: 89  97 90   Resp: 24  24 24   Temp:  97.9 °F (36.6 °C) 99 °F (37.2 °C) 98 °F (36.7 °C)   TempSrc:  Oral Oral Oral   SpO2: 98% 94% 92% 90%   Weight:           Intake/Output Summary (Last 24 hours) at 5/29/2022 0911  Last data filed at 5/29/2022 6074  Gross per 24 hour   Intake 2917 ml   Output 1125 ml   Net 1792 ml     I/O last 3 completed shifts: In: 4181 [P.O.:1200;  I.V.:2102; IV Piggyback:95]  Out: 1971 [Urine:1375]  Wt Readings from Last 3 Encounters:   05/28/22 184 lb (83.5 kg)   05/27/22 184 lb 8.4 oz (83.7 kg)   05/26/22 183 lb 3.2 oz (83.1 kg)       Admit Wt: Weight: 184 lb (83.5 kg)   Todays Wt: Weight: 184 lb (83.5 kg)    TELEMETRY: Personally interpreted Sinus     Physical Exam:     General:  Awake, alert, NAD  Skin:  Warm and dry  Neck:  -JVP  Chest:  Clear to auscultation, respiration normal  Cardiovascular:  RRR S1S2  Abdomen:  Soft -  Extremities:  - edema      Objective    Labs:   Recent Labs     05/27/22  0913 05/27/22  0913 05/27/22 2225 05/28/22  0440 05/29/22  0508   *   < > 139 137 132*   K 3.8  --  4.1 4.0 4.1   BUN 14   < > 15 14 19   CREATININE 1.0   < > 1.0 1.0 1.1      < > 105 103 100   CO2 25   < > 21 22 21   GLUCOSE 152*   < > 121* 111* 103*   CALCIUM 8.8   < > 8.6 8.7 8.1*   MG 1.70*  --   --  1.70* 1.70*    < > = values in this interval not displayed. Recent Labs     05/27/22 2225 05/27/22 2225 05/28/22 0440 05/28/22  0440 05/29/22  0508   WBC 1.6*  --  1.3*  --  1.7*   HGB 9.3*  --  9.0*  --  8.1*   HCT 27.0*  --  26.9*  --  24.2*   PLT 99*  --  97*  --  98*   MCV 95.8   < > 96.6   < > 96.1    < > = values in this interval not displayed. No results for input(s): CHOLTOT, TRIG, HDL, CHOLHDL, LDL in the last 72 hours. Invalid input(s): [address] [facility],[address], [address]  Recent Labs     05/28/22 0440 05/29/22  0508   INR 1.29* 1.32*     No results for input(s): CKTOTAL, CKMB, CKMBINDEX, TROPONINI in the last 72 hours. No results for input(s): BNP in the last 72 hours. No results for input(s): NTPROBNP in the last 72 hours. No results for input(s): TSH in the last 72 hours. Imaging:   I personally reviewed imaging studies including CXR, Stress test, TTE/ANGELO. Assessment & Plan:     Atrial fibrillation  - Converted to normal sinus rhythm. -Frequent PACs. -Transition to metoprolol 25 mg twice daily  -No need for anticoagulation at this point    Thank you for allowing to us to participate in the care of [facility]. Please call our service with questions. All questions and concerns were addressed to the patient/family. Alternatives to my treatment were discussed. The note was completed using EMR. Every effort was made to ensure accuracy; however, inadvertent computerized transcription errors may be present.  I have personally reviewed the reports and images of labs, radiological studies, cardiac studies including ECG's and telemetry, current and old medical records. Malcolm Esposito MD, [address] [facility] [place] [address]    5/29/2022 9:11 AM

## 2022-05-29 NOTE — RT PROTOCOL NOTE
RT Nebulizer Bronchodilator Protocol Note    There is a bronchodilator order in the chart from a provider indicating to follow the RT Bronchodilator Protocol and there is an Initiate RT Bronchodilator Protocol order as well (see protocol at bottom of note). CXR Findings:  XR CHEST PORTABLE    Result Date: 5/27/2022  No acute pulmonary disease. XR CHEST PORTABLE    Result Date: 5/27/2022  Impression: No acute cardiopulmonary abnormality or significant change in the interval.      The findings from the last RT Protocol Assessment were as follows:  Smoking: None or smoker <15 pack years  Respiratory Pattern: Regular pattern and RR 12-20 bpm  Breath Sounds: Inspiratory and expiratory or bilateral wheezing and/or rhonchi  Cough: Strong, spontaneous, non-productive  Indication for Bronchodilator Therapy:    Bronchodilator Assessment Score: 6    Aerosolized bronchodilator medication orders have been revised according to the RT Nebulizer Bronchodilator Protocol below. Respiratory Therapist to perform RT Therapy Protocol Assessment initially then follow the protocol. Repeat RT Therapy Protocol Assessment PRN for score 0-3 or on second treatment, BID, and PRN for scores above 3. No Indications - adjust the frequency to every 6 hours PRN wheezing or bronchospasm, if no treatments needed after 48 hours then discontinue using Per Protocol order mode. If indication present, adjust the RT bronchodilator orders based on the Bronchodilator Assessment Score as indicated below. If a patient is on this medication at home then do not decrease Frequency below that used at home. 0-3 - enter or revise RT bronchodilator order(s) to equivalent RT Bronchodilator order with Frequency of every 4 hours PRN for wheezing or increased work of breathing using Per Protocol order mode.        4-6 - enter or revise RT Bronchodilator order(s) to two equivalent RT bronchodilator orders with one order with BID Frequency and one order with Frequency of every 4 hours PRN wheezing or increased work of breathing using Per Protocol order mode. 7-10 - enter or revise RT Bronchodilator order(s) to two equivalent RT bronchodilator orders with one order with TID Frequency and one order with Frequency of every 4 hours PRN wheezing or increased work of breathing using Per Protocol order mode. 11-13 - enter or revise RT Bronchodilator order(s) to one equivalent RT bronchodilator order with QID Frequency and an Albuterol order with Frequency of every 4 hours PRN wheezing or increased work of breathing using Per Protocol order mode. Greater than 13 - enter or revise RT Bronchodilator order(s) to one equivalent RT bronchodilator order with every 4 hours Frequency and an Albuterol order with Frequency of every 2 hours PRN wheezing or increased work of breathing using Per Protocol order mode. RT to enter RT Home Evaluation for COPD & MDI Assessment order using Per Protocol order mode.     Electronically signed by Saundra King RCP on 5/29/2022 at 10:26 AM

## 2022-05-29 NOTE — PROGRESS NOTES
[address] Progress Note      2022    Shima Stewart    :  1940    MRN:  [id number]    Referring MD: No referring provider defined for this encounter. Subjective:  Still coughing. Afebrile. He feels well. He is eating well. ECOG PS:  (2) Ambulatory and capable of self care, unable to carry out work activity, up and about > 50% or waking hours    KPS: 80% Normal activity with effort; some signs or symptoms of disease    Isolation:  None     Medications    Scheduled Meds:   metoprolol tartrate  25 mg Oral BID    allopurinol  300 mg Oral Daily    fluconazole  200 mg Oral Daily    levETIRAcetam  500 mg Oral BID    sodium chloride flush  5-40 mL IntraVENous 2 times per day    Saline Mouthwash  15 mL Swish & Spit 4x Daily AC & HS    enoxaparin  40 mg SubCUTAneous Daily    cefepime  2,000 mg IntraVENous Q8H    acyclovir  800 mg Oral BID WC    fluticasone  1 spray Each Nostril Daily     Continuous Infusions:   sodium chloride 75 mL/hr at 22    sodium chloride       PRN Meds:.guaiFENesin-codeine, albuterol, acetaminophen, sodium chloride flush, sodium chloride, potassium chloride, magnesium sulfate, acetaminophen, magnesium hydroxide, alteplase (CATHFLO) with sterile water injection, benzonatate, benzocaine-menthol      ROS:  As noted above, otherwise remainder of 10-point ROS negative      Physical Exam:     Vital Signs:  BP (!) 117/94   Pulse 77   Temp 96.9 °F (36.1 °C) (Oral)   Resp 23   Wt 191 lb 6.4 oz (86.8 kg)   SpO2 93%   BMI 25.96 kg/m²     Weight:    Wt Readings from Last 3 Encounters:   22 191 lb 6.4 oz (86.8 kg)   22 184 lb 8.4 oz (83.7 kg)   22 183 lb 3.2 oz (83.1 kg)     PE performed by Dr. Jonas Tran:   General: Awake, alert and oriented.   HEENT: normocephalic, alopecia, PERRL, no scleral erythema or icterus, Oral mucosa moist and intact, throat clear  NECK: supple without palpable adenopathy  BACK: Straight negative CVAT  SKIN: warm dry and intact without lesions rashes or masses  CHEST: Wheezes throughout bilaterally without use of accessory muscles  CV: Normal S1 S2, RRR, no MRG  ABD: NT ND normoactive BS, no palpable masses or hepatosplenomegaly  EXTREMITIES: LLE Edema from the inguinal region distally.  It is improved.  He can extend to 180 degrees and flex to 90.  The right lower extremity is normal, denies calf tenderness  NEURO: CN II - XII grossly intact  CATHETER: RIJ Trifusion (IR: Dean, 4/15/22): CDI    Laboratory Data:  CBC:   Recent Labs     05/27/22 2225 05/28/22 0440 05/29/22  0508   WBC 1.6* 1.3* 1.7*   HGB 9.3* 9.0* 8.1*   HCT 27.0* 26.9* 24.2*   MCV 95.8 96.6 96.1   PLT 99* 97* 98*     BMP/Mag:  Recent Labs     05/27/22 0913 05/27/22 0913 05/27/22 2225 05/28/22 0440 05/29/22  0508   *   < > 139 137 132*   K 3.8  --  4.1 4.0 4.1      < > 105 103 100   CO2 25   < > 21 22 21   PHOS 2.6  --   --  2.9 3.5   BUN 14   < > 15 14 19   CREATININE 1.0   < > 1.0 1.0 1.1   MG 1.70*  --   --  1.70* 1.70*    < > = values in this interval not displayed. LIVP:   Recent Labs     05/27/22 0913 05/27/22 0913 05/27/22 2225 05/28/22 0440 05/29/22  0508   AST 19   < > 21 21 31   ALT 15   < > 17 15 17   BILIDIR <0.2  --   --  <0.2 <0.2   BILITOT 0.3   < > 0.3 0.4 0.4   ALKPHOS 73   < > 75 69 60    < > = values in this interval not displayed. Coags:   Recent Labs     05/28/22 0440 05/29/22  0508   PROTIME 16.0* 16.4*   INR 1.29* 1.32*   APTT 45.3* 63.8*     Uric Acid   Recent Labs     05/27/22 0913 05/28/22  0440 05/29/22  0508   LABURIC 3.6 3.4* 3.6     Lab Results   Component Value Date    CRP 55.6 (H) 05/27/2022    CRP 26.2 (H) 05/26/2022    CRP 6.9 (H) 05/25/2022     Lab Results   Component Value Date    FERRITIN 652.0 (H) 05/29/2022    FERRITIN 533.3 (H) 05/28/2022    FERRITIN 471.7 (H) 05/27/2022         PROBLEM LIST:            1. Follicular lymphoma with BM involvement  2.  DLBC NHL      TREATMENT:          1. FL: 1/1995 CHOP             11/2003 to 8/2004 Rituxan             7/2007 R-CHOP  2. DLBCL: R-Sunshine/Ox C1 1/31/2022, C2 02/14/2022                     Chip-BR C1D1: 3/22/22                                    C2D1: 4/20/22        Lymphodepleting Chemotherapy:  Fludarabine and cyclophosphamide   Disease Status at time of Infusion:  Progressive Disease  CAR-T Infusion Date: 5/23/22  CAR-T Product:  Breyanzi  Batch ID Number:  [id number]        ASSESSMENT AND PLAN:            1.  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  Left ventricular ejection fraction 55 to 60% with no regional wall motion abnormalities noted.  Intermittent diastolic dysfunction.  - PFTs (3/21/22): Diffusion capacity 79%, FEV1 78%.  Consistent with mild restrictive ventilatory defect. - CMI -  3 (age and pulmonary)  - PET (5/13/22):  Multiple indeterminate pulmonary nodules in the right lung which demonstrates increased activity in the right upper lobe. These may be infectious. These were not described on the previous PET/CT. Pulmonary lymphoma would be difficult to exclude. If the pulmonary nodule represents lymphoma, this would represent a Deauville score 5 exam. If this is infectious, overall the exam would represent a Deauville score 1. Previously described lymphadenopathy in FDG activity in the left retroperitoneum, left iliac region and in left upper thigh has completely resolved.  No residual lymphadenopathy or FDG avid lymph node identified      Lymphodepleting Chemotherapy:  Fludarabine and cyclophosphamide   Disease Status at time of Infusion:  Progressive Disease  CAR-T Infusion Date: 5/23/22  CAR-T Product:  Breyanzi  Batch ID Number:  [id number]     Day + 6     2.  CRS / Neuro:  Admitted with Grade 1 CRS (fever only)   - Monitor CRP and Ferrtin closely             Lab Results   Component Value Date     CRP 22.2 (H) 03/21/2022                Lab Results   Component Value Date     FERRITIN 551.3 (H) 03/21/2022   - Start Keppra 500 mg BID on Monday, 5/23/22  - Neuro checks w/ CARTOX 10-point assessment Q4hrs     Toxicity Grading        CRS Grade: Grade 1 CRS (fever only)      ICANS Grade:  N/A     CAR-T Score: 10/10     3.  ID: Admitted with NF likely d/t CRS, Dry Cough d/t allergies: Exacerbated 5/25/22, new Wheezing throughout 5/27/22: resolved   - CXR & UA 5/23/22: Negative   - Contine Diflucan & Valtrex ppx  - Cont daily Zyrtec   - Cont daily Flonase 5/25/22  - Sputum Culture 5/26/22: Negative   - CXR 5/27/22: Negative    - Pan Cx 5/27/22: NGTD   - Covid-19 & Flu 5/27/22: Negative    - RR Viral Panel 5/28/22: Parainfluenza 3 Virus  - stop Cefepime Day + 2 (5/28/22) -5/29/22, switch to Levaquin ppx   - Start Robitussin with Codeine and Albuterol PRN (5/29/22)       4.  Heme: Anemia & thrombocytopenia from chemotherapy   - Transfuse for Hgb < 7 and Platelets < 50A  - No transfusion today     5.  Metabolic: hypoNa .    TLS:  No evidence, cont allopurinol and daily TLS labs   - Cont IVF hydration: 1 L NS daily in OP Infusion  - Replace potassium and magnesium per policy.     6. Cardiac: Overnight developed atrial fibrillation with rapid ventricular response  - Consult Cardiology  - Start Metoprolol 12.5 mg BID: Increase metoprolol 12.5 mg to every 6 hours (5/28/22). Change to Metoprolol 25 mg BID 5/29/22 per Cardiology   - Low threshold to start anticoagulation with Eliquis although has thrombocytopenia     7. Nutrition:  Appetite and oral intake is good.    - S/p taking Astragalus supplement at home: Stop 5/18/22 d/t interaction with Cytoxan  - Cont low microbial diet   - Follow closely with dietary     - DVT Prophylaxis: Platelets <02,303 cells/dL - prophylactic lovenox on hold and mechanical prophylaxis with bilateral SCDs while in bed in place. Contraindications to pharmacologic prophylaxis: Thrombocytopenia  Contraindications to mechanical prophylaxis: None   - S/p Eliquis 2.5 mg BID (stopped 5/17/22); leg edema resolving, stop Eliquis      - Disposition:  Uncertain at this time.      The patient was seen and examined by Dr. Tk Campos.      JOAQUIM Pete - NP

## 2022-05-29 NOTE — PLAN OF CARE
Problem: Pain  Goal: Verbalizes/displays adequate comfort level or baseline comfort level  2022 by Ana Marmolejo RN  Outcome: Progressing  Note: Patient has no complaints of pain during shift. Will continue to assess comfort and pain on 0-10 number scale and medicate per order while encouraging non pharmacological techniques as well. Call light within reach and hourly rounding in place. Problem: Safety - Adult  Goal: Free from fall injury  2022 by Ana Marmolejo RN  Note: Patient remained free of falls during shift. Patient is a Reyna Fall Risk: Medium (25-44); uses call light appropriately, ambulates with a steady gait and no assistive devices. Orthostatic blood pressures assessed and patient remains negative. Will continue to encourage ambulation and implement POC. Call light within reach and hourly rounding in place. Problem: Skin/Tissue Integrity - Adult  Goal: Skin integrity remains intact  2022 by Ana Marmolejo RN  Outcome: Progressing  Note: Patient is free of new skin breakdown. Will continue to encourage position changes every 2 hours, ambulation, and implement POC. Call light within reach and hourly rounding in place. Problem: Infection - Adult  Goal: Absence of infection at discharge  2022 by Ana Marmolejo RN  Outcome: Progressing  Note: Patient is showing no signs or symptoms of nosocomial infections. Patient's temp during shift are as follows: Temp (8hrs), Av.5 °F (36.9 °C), Min:98 °F (36.7 °C), Max:99 °F (37.2 °C)  . Patient placed in private room and instructed on importance of handwashing and when to wear a mask when ambulating in hallway. Will continue to assess for s/s of infection and implement POC. Call light within reach and hourly rounding in place. Problem: ABCDS Injury Assessment  Goal: Absence of physical injury  2022 by Ana Marmolejo RN  Note: No injuries this shift.

## 2022-05-30 ENCOUNTER — APPOINTMENT (OUTPATIENT)
Dept: GENERAL RADIOLOGY | Age: 82
DRG: 152 | End: 2022-05-30
Payer: MEDICARE

## 2022-05-30 LAB
ALBUMIN SERPL-MCNC: 3.3 G/DL (ref 3.4–5)
ALP BLD-CCNC: 58 U/L (ref 40–129)
ALT SERPL-CCNC: 24 U/L (ref 10–40)
ANION GAP SERPL CALCULATED.3IONS-SCNC: 14 MMOL/L (ref 3–16)
APTT: 31.2 SEC (ref 23–34.3)
AST SERPL-CCNC: 41 U/L (ref 15–37)
BASOPHILS ABSOLUTE: 0 K/UL (ref 0–0.2)
BASOPHILS RELATIVE PERCENT: 0.2 %
BILIRUB SERPL-MCNC: <0.2 MG/DL (ref 0–1)
BILIRUBIN DIRECT: <0.2 MG/DL (ref 0–0.3)
BILIRUBIN, INDIRECT: ABNORMAL MG/DL (ref 0–1)
BUN BLDV-MCNC: 18 MG/DL (ref 7–20)
C-REACTIVE PROTEIN: 107.1 MG/L (ref 0–5.1)
CALCIUM SERPL-MCNC: 8.3 MG/DL (ref 8.3–10.6)
CHLORIDE BLD-SCNC: 102 MMOL/L (ref 99–110)
CO2: 19 MMOL/L (ref 21–32)
CREAT SERPL-MCNC: 1 MG/DL (ref 0.8–1.3)
D DIMER: 0.89 UG/ML FEU (ref 0–0.6)
EOSINOPHILS ABSOLUTE: 0.2 K/UL (ref 0–0.6)
EOSINOPHILS RELATIVE PERCENT: 11.3 %
FERRITIN: 779.5 NG/ML (ref 30–400)
FIBRINOGEN: 476 MG/DL (ref 207–509)
GFR AFRICAN AMERICAN: >60
GFR NON-AFRICAN AMERICAN: >60
GLUCOSE BLD-MCNC: 106 MG/DL (ref 70–99)
HCT VFR BLD CALC: 23.1 % (ref 40.5–52.5)
HEMOGLOBIN: 8.1 G/DL (ref 13.5–17.5)
INR BLD: 1.33 (ref 0.87–1.14)
LACTATE DEHYDROGENASE: 210 U/L (ref 100–190)
LYMPHOCYTES ABSOLUTE: 0.1 K/UL (ref 1–5.1)
LYMPHOCYTES RELATIVE PERCENT: 3.2 %
MAGNESIUM: 1.9 MG/DL (ref 1.8–2.4)
MCH RBC QN AUTO: 33.3 PG (ref 26–34)
MCHC RBC AUTO-ENTMCNC: 35.1 G/DL (ref 31–36)
MCV RBC AUTO: 95 FL (ref 80–100)
MONOCYTES ABSOLUTE: 0.3 K/UL (ref 0–1.3)
MONOCYTES RELATIVE PERCENT: 14.5 %
NEUTROPHILS ABSOLUTE: 1.4 K/UL (ref 1.7–7.7)
NEUTROPHILS RELATIVE PERCENT: 70.8 %
PDW BLD-RTO: 14.3 % (ref 12.4–15.4)
PHOSPHORUS: 3.2 MG/DL (ref 2.5–4.9)
PLATELET # BLD: 92 K/UL (ref 135–450)
PMV BLD AUTO: 9.1 FL (ref 5–10.5)
POTASSIUM SERPL-SCNC: 4.1 MMOL/L (ref 3.5–5.1)
PROTHROMBIN TIME: 16.4 SEC (ref 11.7–14.5)
RBC # BLD: 2.43 M/UL (ref 4.2–5.9)
SODIUM BLD-SCNC: 135 MMOL/L (ref 136–145)
TOTAL PROTEIN: 5.7 G/DL (ref 6.4–8.2)
URIC ACID, SERUM: 3.8 MG/DL (ref 3.5–7.2)
WBC # BLD: 2 K/UL (ref 4–11)

## 2022-05-30 PROCEDURE — 83735 ASSAY OF MAGNESIUM: CPT

## 2022-05-30 PROCEDURE — 6370000000 HC RX 637 (ALT 250 FOR IP): Performed by: INTERNAL MEDICINE

## 2022-05-30 PROCEDURE — 6360000002 HC RX W HCPCS: Performed by: FAMILY MEDICINE

## 2022-05-30 PROCEDURE — 85025 COMPLETE CBC W/AUTO DIFF WBC: CPT

## 2022-05-30 PROCEDURE — 6370000000 HC RX 637 (ALT 250 FOR IP): Performed by: NURSE PRACTITIONER

## 2022-05-30 PROCEDURE — 36592 COLLECT BLOOD FROM PICC: CPT

## 2022-05-30 PROCEDURE — 85384 FIBRINOGEN ACTIVITY: CPT

## 2022-05-30 PROCEDURE — 85730 THROMBOPLASTIN TIME PARTIAL: CPT

## 2022-05-30 PROCEDURE — 80048 BASIC METABOLIC PNL TOTAL CA: CPT

## 2022-05-30 PROCEDURE — 85379 FIBRIN DEGRADATION QUANT: CPT

## 2022-05-30 PROCEDURE — 6370000000 HC RX 637 (ALT 250 FOR IP): Performed by: STUDENT IN AN ORGANIZED HEALTH CARE EDUCATION/TRAINING PROGRAM

## 2022-05-30 PROCEDURE — 83615 LACTATE (LD) (LDH) ENZYME: CPT

## 2022-05-30 PROCEDURE — 84100 ASSAY OF PHOSPHORUS: CPT

## 2022-05-30 PROCEDURE — 2060000000 HC ICU INTERMEDIATE R&B

## 2022-05-30 PROCEDURE — 6360000002 HC RX W HCPCS: Performed by: STUDENT IN AN ORGANIZED HEALTH CARE EDUCATION/TRAINING PROGRAM

## 2022-05-30 PROCEDURE — 94640 AIRWAY INHALATION TREATMENT: CPT

## 2022-05-30 PROCEDURE — 85610 PROTHROMBIN TIME: CPT

## 2022-05-30 PROCEDURE — 71045 X-RAY EXAM CHEST 1 VIEW: CPT

## 2022-05-30 PROCEDURE — 82728 ASSAY OF FERRITIN: CPT

## 2022-05-30 PROCEDURE — 2580000003 HC RX 258: Performed by: STUDENT IN AN ORGANIZED HEALTH CARE EDUCATION/TRAINING PROGRAM

## 2022-05-30 PROCEDURE — 94761 N-INVAS EAR/PLS OXIMETRY MLT: CPT

## 2022-05-30 PROCEDURE — 84550 ASSAY OF BLOOD/URIC ACID: CPT

## 2022-05-30 PROCEDURE — 80076 HEPATIC FUNCTION PANEL: CPT

## 2022-05-30 PROCEDURE — 86140 C-REACTIVE PROTEIN: CPT

## 2022-05-30 RX ADMIN — SODIUM CHLORIDE 15 ML: 900 IRRIGANT IRRIGATION at 11:19

## 2022-05-30 RX ADMIN — ALLOPURINOL 300 MG: 300 TABLET ORAL at 09:15

## 2022-05-30 RX ADMIN — ENOXAPARIN SODIUM 40 MG: 100 INJECTION SUBCUTANEOUS at 17:45

## 2022-05-30 RX ADMIN — GUAIFENESIN AND CODEINE PHOSPHATE 5 ML: 100; 10 SOLUTION ORAL at 09:24

## 2022-05-30 RX ADMIN — LEVETIRACETAM 500 MG: 500 TABLET, FILM COATED ORAL at 20:20

## 2022-05-30 RX ADMIN — FLUTICASONE PROPIONATE 1 SPRAY: 50 SPRAY, METERED NASAL at 11:18

## 2022-05-30 RX ADMIN — SODIUM CHLORIDE, PRESERVATIVE FREE 10 ML: 5 INJECTION INTRAVENOUS at 09:15

## 2022-05-30 RX ADMIN — METOPROLOL TARTRATE 25 MG: 25 TABLET, FILM COATED ORAL at 20:20

## 2022-05-30 RX ADMIN — METOPROLOL TARTRATE 25 MG: 25 TABLET, FILM COATED ORAL at 09:15

## 2022-05-30 RX ADMIN — SODIUM CHLORIDE, PRESERVATIVE FREE 10 ML: 5 INJECTION INTRAVENOUS at 20:21

## 2022-05-30 RX ADMIN — ALBUTEROL SULFATE 2.5 MG: 2.5 SOLUTION RESPIRATORY (INHALATION) at 20:31

## 2022-05-30 RX ADMIN — GUAIFENESIN AND CODEINE PHOSPHATE 5 ML: 100; 10 SOLUTION ORAL at 16:05

## 2022-05-30 RX ADMIN — FLUCONAZOLE 200 MG: 200 TABLET ORAL at 09:15

## 2022-05-30 RX ADMIN — ALBUTEROL SULFATE 2.5 MG: 2.5 SOLUTION RESPIRATORY (INHALATION) at 09:25

## 2022-05-30 RX ADMIN — LEVETIRACETAM 500 MG: 500 TABLET, FILM COATED ORAL at 09:15

## 2022-05-30 RX ADMIN — ACYCLOVIR 800 MG: 800 TABLET ORAL at 09:24

## 2022-05-30 RX ADMIN — ACYCLOVIR 800 MG: 800 TABLET ORAL at 16:30

## 2022-05-30 RX ADMIN — SODIUM CHLORIDE 15 ML: 900 IRRIGANT IRRIGATION at 20:22

## 2022-05-30 RX ADMIN — LEVOFLOXACIN 500 MG: 250 TABLET, FILM COATED ORAL at 09:15

## 2022-05-30 RX ADMIN — SODIUM CHLORIDE 15 ML: 900 IRRIGANT IRRIGATION at 16:05

## 2022-05-30 NOTE — PLAN OF CARE
Problem: Pain  Goal: Verbalizes/displays adequate comfort level or baseline comfort level  5/30/2022 1841 by Teagan Champagne RN  Outcome: Progressing   Pt having some coughing. Pt medicated with robitussin. Pt verbalize some relief with medication. Encourage pt to call out with any needs or status changes. Will monitor. Problem: Safety - Adult  Goal: Free from fall injury  5/30/2022 1841 by Teagan Champagne RN  Outcome: Progressing         Pt with activity orders for up ad raz. Encouraged pt to be up OOB as much as possible throughout the day and for all meals. Encouraged frequent short naps as necessary to preserve energy but instructed that while awake, pt should be OOB. Pt in isolation and is unable to ambulate in halls d/t restrictions. Continued to encourage activity in room as much as possible. Pt is visualized to be OOB 51-75% of the time this shift. Will continue to encourage frequent activity. Problem: Hematologic - Adult  Goal: Maintains hematologic stability  5/30/2022 1841 by Teagan Champagne RN  Outcome: Progressing   Patient's hemoglobin this AM:   Recent Labs     05/30/22  0335   HGB 8.1*     Patient's platelet count this AM:   Recent Labs     05/30/22  0335   PLT 92*    Thrombocytopenia Precautions in place. Patient showing no signs or symptoms of active bleeding. Transfusion not indicated at this time. Patient verbalizes understanding of all instructions. Will continue to assess and implement POC. Call light within reach and hourly rounding in place.

## 2022-05-30 NOTE — PLAN OF CARE
Problem: Pain  Goal: Verbalizes/displays adequate comfort level or baseline comfort level  5/30/2022 0452 by Elizabeth Maciel RN  Outcome: Progressing   Pt had no complaints of pain this shift. Will continue to monitor and assess. Problem: Safety - Adult  Goal: Free from fall injury  5/30/2022 0452 by Elizabeth Maciel RN  Outcome: Progressing   Orthostatic vital signs obtained at start of shift - see flowsheet for details. Pt does not meet criteria for orthostasis. Pt is a Med fall risk. See Janice Harjinder Fall Score and ABCDS Injury Risk assessments. - Screening for Orthostasis AND not a Hernando Risk per ARNETT/ABCDS: Pt bed is in low position, side rails up, call light and belongings are in reach. Fall risk light is on outside pts room. Pt encouraged to call for assistance as needed. Will continue with hourly rounds for PO intake, pain needs, toileting and repositioning as needed. Problem: Skin/Tissue Integrity - Adult  Goal: Skin integrity remains intact  5/30/2022 0452 by Elizabeth Maciel RN  Outcome: Progressing   Pt had no skin breakdown, can turn self appropriately. Will continue to monitor and assess. Problem: Infection - Adult  Goal: Absence of infection at discharge  5/30/2022 0452 by Elizabeth Maciel RN  Outcome: Progressing   Pt remains afebrile, currently in droplet precautions for parainfluenza. Will continue to monitor and assess. Problem: Metabolic/Fluid and Electrolytes - Adult  Goal: Electrolytes maintained within normal limits  5/30/2022 0452 by Elizabeth Maciel RN  Outcome: Progressing   See results for AM labs. Problem: Hematologic - Adult  Goal: Maintains hematologic stability  5/30/2022 0452 by Elizabeth Maciel RN  Outcome: Progressing   Patient's hemoglobin this AM:   Recent Labs     05/30/22  0335   HGB 8.1*     Patient's platelet count this AM:   Recent Labs     05/30/22  0335   PLT 92*    Thrombocytopenia Precautions in place. Patient showing no signs or symptoms of active bleeding. Transfusion not indicated at this time. Patient verbalizes understanding of all instructions. Will continue to assess and implement POC. Call light within reach and hourly rounding in place.

## 2022-05-30 NOTE — PROGRESS NOTES
3/22/22                                    C2D1: 4/20/22        Lymphodepleting Chemotherapy:  Fludarabine and cyclophosphamide   Disease Status at time of Infusion:  Progressive Disease  CAR-T Infusion Date: 5/23/22  CAR-T Product:  Breyanzi  Batch ID Number:  [id number]        ASSESSMENT AND PLAN:            1.  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  Left ventricular ejection fraction 55 to 60% with no regional wall motion abnormalities noted.  Intermittent diastolic dysfunction.  - PFTs (3/21/22): Diffusion capacity 79%, FEV1 78%.  Consistent with mild restrictive ventilatory defect. - CMI -  3 (age and pulmonary)  - PET (5/13/22):  Multiple indeterminate pulmonary nodules in the right lung which demonstrates increased activity in the right upper lobe. These may be infectious. These were not described on the previous PET/CT. Pulmonary lymphoma would be difficult to exclude. If the pulmonary nodule represents lymphoma, this would represent a Deauville score 5 exam. If this is infectious, overall the exam would represent a Deauville score 1. Previously described lymphadenopathy in FDG activity in the left retroperitoneum, left iliac region and in left upper thigh has completely resolved.  No residual lymphadenopathy or FDG avid lymph node identified      Lymphodepleting Chemotherapy:  Fludarabine and cyclophosphamide   Disease Status at time of Infusion:  Progressive Disease  CAR-T Infusion Date: 5/23/22  CAR-T Product:  Breyanzi  Batch ID Number:  [id number]     Day + 7     2.  CRS / Neuro:  Admitted with Grade 1 CRS (fever only) , no resolved   - Monitor CRP and Ferrtin closely             Lab Results   Component Value Date     CRP 22.2 (H) 03/21/2022                Lab Results   Component Value Date     FERRITIN 551.3 (H) 03/21/2022   - Start Keppra 500 mg BID on Monday, 5/23/22  - Neuro checks w/ CARTOX 10-point assessment Q4hrs     Toxicity Grading        CRS Grade: Grade 1 CRS (fever only)      ICANS Grade:  N/A     CAR-T Score: 10/10     3.  ID: Admitted with NF likely d/t CRS, Dry Cough d/t allergies: Exacerbated 5/25/22, new Wheezing throughout 5/27/22: resolved   - CXR & UA 5/23/22: Negative   - Contine Diflucan & Valtrex ppx  - Cont daily Zyrtec   - Cont daily Flonase 5/25/22  - Sputum Culture 5/26/22: Negative   - CXR 5/27/22: Negative    - Pan Cx 5/27/22: NGTD   - Covid-19 & Flu 5/27/22: Negative    - RR Viral Panel 5/28/22: Parainfluenza 3 Virus   switch to Levaquin ppx 5/29/22  - Robitussin with Codeine and Albuterol PRN (5/29/22)      abx history    Cefepime Day (5/28/22) -5/29/22,     4.  Heme: Anemia & thrombocytopenia from chemotherapy   - Transfuse for Hgb < 7 and Platelets < 27Z  - No transfusion today     5.  Metabolic: hypoNa .    TLS:  No evidence, cont allopurinol and daily TLS labs   - Cont IVF hydration: 1 L NS daily in OP Infusion  - Replace potassium and magnesium per policy.     6. Cardiac: Overnight developed atrial fibrillation with rapid ventricular response 5/28/22  - Consult Cardiology  - Start Metoprolol 12.5 mg BID: Increase metoprolol 12.5 mg to every 6 hours (5/28/22). Change to Metoprolol 25 mg BID 5/29/22 per Cardiology   - per cardiology no indication for [facility] at this time      7. Nutrition:  Appetite and oral intake is good.    - S/p taking Astragalus supplement at home: Stop 5/18/22 d/t interaction with Cytoxan  - Cont low microbial diet   - Follow closely with dietary     - DVT Prophylaxis: Platelets <52,442 cells/dL - prophylactic lovenox on hold and mechanical prophylaxis with bilateral SCDs while in bed in place.   Contraindications to pharmacologic prophylaxis: Thrombocytopenia  Contraindications to mechanical prophylaxis: None        - Disposition: likely home tomorrow once cough and wheezing improved     Timothy Altman DO

## 2022-05-31 LAB
ALBUMIN SERPL-MCNC: 3.5 G/DL (ref 3.4–5)
ALP BLD-CCNC: 63 U/L (ref 40–129)
ALT SERPL-CCNC: 31 U/L (ref 10–40)
ANION GAP SERPL CALCULATED.3IONS-SCNC: 10 MMOL/L (ref 3–16)
APTT: 45.4 SEC (ref 23–34.3)
AST SERPL-CCNC: 40 U/L (ref 15–37)
BASOPHILS ABSOLUTE: 0 K/UL (ref 0–0.2)
BASOPHILS RELATIVE PERCENT: 0 %
BILIRUB SERPL-MCNC: <0.2 MG/DL (ref 0–1)
BILIRUBIN DIRECT: <0.2 MG/DL (ref 0–0.3)
BILIRUBIN, INDIRECT: ABNORMAL MG/DL (ref 0–1)
BLOOD CULTURE, ROUTINE: NORMAL
BUN BLDV-MCNC: 14 MG/DL (ref 7–20)
C-REACTIVE PROTEIN: 57 MG/L (ref 0–5.1)
CALCIUM SERPL-MCNC: 8.6 MG/DL (ref 8.3–10.6)
CHLORIDE BLD-SCNC: 102 MMOL/L (ref 99–110)
CO2: 22 MMOL/L (ref 21–32)
CREAT SERPL-MCNC: 1 MG/DL (ref 0.8–1.3)
CULTURE, BLOOD 2: NORMAL
D DIMER: 1.16 UG/ML FEU (ref 0–0.6)
EOSINOPHILS ABSOLUTE: 0.2 K/UL (ref 0–0.6)
EOSINOPHILS RELATIVE PERCENT: 8.3 %
FERRITIN: 891.5 NG/ML (ref 30–400)
FIBRINOGEN: 582 MG/DL (ref 207–509)
GFR AFRICAN AMERICAN: >60
GFR NON-AFRICAN AMERICAN: >60
GLUCOSE BLD-MCNC: 101 MG/DL (ref 70–99)
HCT VFR BLD CALC: 24.2 % (ref 40.5–52.5)
HEMOGLOBIN: 8.3 G/DL (ref 13.5–17.5)
INR BLD: 1.25 (ref 0.87–1.14)
LACTATE DEHYDROGENASE: 191 U/L (ref 100–190)
LYMPHOCYTES ABSOLUTE: 0.1 K/UL (ref 1–5.1)
LYMPHOCYTES RELATIVE PERCENT: 4.3 %
MAGNESIUM: 1.9 MG/DL (ref 1.8–2.4)
MCH RBC QN AUTO: 32.6 PG (ref 26–34)
MCHC RBC AUTO-ENTMCNC: 34.3 G/DL (ref 31–36)
MCV RBC AUTO: 95.1 FL (ref 80–100)
MONOCYTES ABSOLUTE: 0.3 K/UL (ref 0–1.3)
MONOCYTES RELATIVE PERCENT: 13.7 %
NEUTROPHILS ABSOLUTE: 1.7 K/UL (ref 1.7–7.7)
NEUTROPHILS RELATIVE PERCENT: 73.7 %
PDW BLD-RTO: 14.4 % (ref 12.4–15.4)
PHOSPHORUS: 3.2 MG/DL (ref 2.5–4.9)
PLATELET # BLD: 122 K/UL (ref 135–450)
PMV BLD AUTO: 9.1 FL (ref 5–10.5)
POTASSIUM SERPL-SCNC: 3.9 MMOL/L (ref 3.5–5.1)
PROCALCITONIN: 0.25 NG/ML (ref 0–0.15)
PROTHROMBIN TIME: 15.6 SEC (ref 11.7–14.5)
RBC # BLD: 2.55 M/UL (ref 4.2–5.9)
SODIUM BLD-SCNC: 134 MMOL/L (ref 136–145)
TOTAL PROTEIN: 5.9 G/DL (ref 6.4–8.2)
URIC ACID, SERUM: 3.6 MG/DL (ref 3.5–7.2)
WBC # BLD: 2.3 K/UL (ref 4–11)

## 2022-05-31 PROCEDURE — 85730 THROMBOPLASTIN TIME PARTIAL: CPT

## 2022-05-31 PROCEDURE — 86140 C-REACTIVE PROTEIN: CPT

## 2022-05-31 PROCEDURE — 6360000002 HC RX W HCPCS: Performed by: STUDENT IN AN ORGANIZED HEALTH CARE EDUCATION/TRAINING PROGRAM

## 2022-05-31 PROCEDURE — 6370000000 HC RX 637 (ALT 250 FOR IP): Performed by: STUDENT IN AN ORGANIZED HEALTH CARE EDUCATION/TRAINING PROGRAM

## 2022-05-31 PROCEDURE — 85610 PROTHROMBIN TIME: CPT

## 2022-05-31 PROCEDURE — 85379 FIBRIN DEGRADATION QUANT: CPT

## 2022-05-31 PROCEDURE — 6370000000 HC RX 637 (ALT 250 FOR IP): Performed by: NURSE PRACTITIONER

## 2022-05-31 PROCEDURE — 85384 FIBRINOGEN ACTIVITY: CPT

## 2022-05-31 PROCEDURE — 85025 COMPLETE CBC W/AUTO DIFF WBC: CPT

## 2022-05-31 PROCEDURE — 84550 ASSAY OF BLOOD/URIC ACID: CPT

## 2022-05-31 PROCEDURE — 94761 N-INVAS EAR/PLS OXIMETRY MLT: CPT

## 2022-05-31 PROCEDURE — 80048 BASIC METABOLIC PNL TOTAL CA: CPT

## 2022-05-31 PROCEDURE — 84145 PROCALCITONIN (PCT): CPT

## 2022-05-31 PROCEDURE — 84100 ASSAY OF PHOSPHORUS: CPT

## 2022-05-31 PROCEDURE — 80076 HEPATIC FUNCTION PANEL: CPT

## 2022-05-31 PROCEDURE — 82728 ASSAY OF FERRITIN: CPT

## 2022-05-31 PROCEDURE — 36592 COLLECT BLOOD FROM PICC: CPT

## 2022-05-31 PROCEDURE — 2060000000 HC ICU INTERMEDIATE R&B

## 2022-05-31 PROCEDURE — 83735 ASSAY OF MAGNESIUM: CPT

## 2022-05-31 PROCEDURE — 6360000002 HC RX W HCPCS: Performed by: FAMILY MEDICINE

## 2022-05-31 PROCEDURE — 6370000000 HC RX 637 (ALT 250 FOR IP): Performed by: INTERNAL MEDICINE

## 2022-05-31 PROCEDURE — 2580000003 HC RX 258: Performed by: STUDENT IN AN ORGANIZED HEALTH CARE EDUCATION/TRAINING PROGRAM

## 2022-05-31 PROCEDURE — 83615 LACTATE (LD) (LDH) ENZYME: CPT

## 2022-05-31 PROCEDURE — 94640 AIRWAY INHALATION TREATMENT: CPT

## 2022-05-31 RX ADMIN — FLUCONAZOLE 200 MG: 200 TABLET ORAL at 08:07

## 2022-05-31 RX ADMIN — ACYCLOVIR 800 MG: 800 TABLET ORAL at 18:11

## 2022-05-31 RX ADMIN — ENOXAPARIN SODIUM 40 MG: 100 INJECTION SUBCUTANEOUS at 18:10

## 2022-05-31 RX ADMIN — ACYCLOVIR 800 MG: 800 TABLET ORAL at 08:07

## 2022-05-31 RX ADMIN — ALBUTEROL SULFATE 2.5 MG: 2.5 SOLUTION RESPIRATORY (INHALATION) at 09:23

## 2022-05-31 RX ADMIN — LEVOFLOXACIN 500 MG: 250 TABLET, FILM COATED ORAL at 08:07

## 2022-05-31 RX ADMIN — SODIUM CHLORIDE, PRESERVATIVE FREE 10 ML: 5 INJECTION INTRAVENOUS at 19:52

## 2022-05-31 RX ADMIN — METOPROLOL TARTRATE 25 MG: 25 TABLET, FILM COATED ORAL at 19:52

## 2022-05-31 RX ADMIN — METOPROLOL TARTRATE 25 MG: 25 TABLET, FILM COATED ORAL at 08:07

## 2022-05-31 RX ADMIN — ALBUTEROL SULFATE 2.5 MG: 2.5 SOLUTION RESPIRATORY (INHALATION) at 19:54

## 2022-05-31 RX ADMIN — LEVETIRACETAM 500 MG: 500 TABLET, FILM COATED ORAL at 08:07

## 2022-05-31 RX ADMIN — LEVETIRACETAM 500 MG: 500 TABLET, FILM COATED ORAL at 19:52

## 2022-05-31 RX ADMIN — ALLOPURINOL 300 MG: 300 TABLET ORAL at 08:07

## 2022-05-31 RX ADMIN — SODIUM CHLORIDE, PRESERVATIVE FREE 10 ML: 5 INJECTION INTRAVENOUS at 08:10

## 2022-05-31 RX ADMIN — FLUTICASONE PROPIONATE 1 SPRAY: 50 SPRAY, METERED NASAL at 08:11

## 2022-05-31 RX ADMIN — GUAIFENESIN AND CODEINE PHOSPHATE 5 ML: 100; 10 SOLUTION ORAL at 22:59

## 2022-05-31 NOTE — PLAN OF CARE
Problem: Pain  Goal: Verbalizes/displays adequate comfort level or baseline comfort level  5/31/2022 1054 by Jess Alcantar RN  Note: Client denies pain this shift  5/31/2022 0154 by Itzel Sánchez RN  Outcome: Progressing  Note: Pt with no complaints of pain this shift. Problem: Safety - Adult  Goal: Free from fall injury  5/31/2022 1054 by Jess Alcantar RN  Flowsheets (Taken 5/31/2022 1051)  Free From Fall Injury: Instruct family/caregiver on patient safety  Note: Orthostatic vital signs obtained at start of shift - see flowsheet for details. Pt does not meet criteria for orthostasis. Pt is a Med fall risk. See Zina Nanny Fall Score and ABCDS Injury Risk assessments. - Screening for Orthostasis AND not a [place] Risk per ARNETT/ABCDS: Pt bed is in low position, side rails up, call light and belongings are in reach. Fall risk light is on outside pts room. Pt encouraged to call for assistance as needed. Will continue with hourly rounds for PO intake, pain needs, toileting and repositioning as needed. Orthostatic vital signs obtained at start of shift - see flowsheet for details.      Problem: Metabolic/Fluid and Electrolytes - Adult  Goal: Electrolytes maintained within normal limits  Flowsheets (Taken 5/31/2022 0757)  Electrolytes maintained within normal limits: Monitor labs and assess patient for signs and symptoms of electrolyte imbalances

## 2022-05-31 NOTE — PLAN OF CARE
Problem: Pain  Goal: Verbalizes/displays adequate comfort level or baseline comfort level  Outcome: Progressing  Note: Pt with no complaints of pain this shift. Problem: Safety - Adult  Goal: Free from fall injury  Outcome: Progressing  Note: Orthostatic vital signs obtained at start of shift - see flowsheet for details. Pt does not meet criteria for orthostasis. Pt is a Med fall risk. See Wing Deis Fall Score and ABCDS Injury Risk assessments. - Screening for Orthostasis AND not a [place] Risk per ARNETT/ABCDS: Pt bed is in low position, side rails up, call light and belongings are in reach. Fall risk light is on outside pts room. Pt encouraged to call for assistance as needed. Will continue with hourly rounds for PO intake, pain needs, toileting and repositioning as needed. Problem: Skin/Tissue Integrity - Adult  Goal: Skin integrity remains intact  Outcome: Progressing  Note: Pt remains free from any new skin breakdown. Problem: Infection - Adult  Goal: Absence of infection at discharge  Outcome: Progressing  Note: CVC site remains free of signs/symptoms of infection. No drainage, edema, erythema, pain, or warmth noted at site. Dressing changes continue per protocol and on an as needed basis - see flowsheet. Problem: Hematologic - Adult  Goal: Maintains hematologic stability  Outcome: Progressing  Patient's hemoglobin this AM: Recent Labs     05/31/22  0409   HGB 8.3*     Patient's platelet count this AM:   Recent Labs     05/31/22  0409   *    Thrombocytopenia Precautions in place. Patient showing no signs or symptoms of active bleeding. Transfusion not indicated at this time. Patient verbalizes understanding of all instructions. Will continue to assess and implement POC. Call light within reach and hourly rounding in place.

## 2022-05-31 NOTE — PROGRESS NOTES
[address] Progress Note      2022    Lyndon Elliott    :  1940    MRN:  [id number]    Referring MD: No referring provider defined for this encounter. Subjective:  Persistent cough. Feeling better. ECOG PS:  (2) Ambulatory and capable of self care, unable to carry out work activity, up and about > 50% or waking hours    KPS: 80% Normal activity with effort; some signs or symptoms of disease    Isolation:  None     Medications    Scheduled Meds:   metoprolol tartrate  25 mg Oral BID    albuterol  2.5 mg Nebulization BID    levoFLOXacin  500 mg Oral Daily    allopurinol  300 mg Oral Daily    fluconazole  200 mg Oral Daily    levETIRAcetam  500 mg Oral BID    sodium chloride flush  5-40 mL IntraVENous 2 times per day    Saline Mouthwash  15 mL Swish & Spit 4x Daily AC & HS    enoxaparin  40 mg SubCUTAneous Daily    acyclovir  800 mg Oral BID WC    fluticasone  1 spray Each Nostril Daily     Continuous Infusions:   sodium chloride 20 mL/hr at 22 1123    sodium chloride       PRN Meds:.guaiFENesin-codeine, albuterol, alteplase, acetaminophen, sodium chloride flush, sodium chloride, potassium chloride, magnesium sulfate, acetaminophen, magnesium hydroxide, benzonatate, benzocaine-menthol      ROS:  As noted above, otherwise remainder of 10-point ROS negative      Physical Exam:     Vital Signs:  /67   Pulse 69   Temp 97.9 °F (36.6 °C) (Oral)   Resp 16   Wt 182 lb 12.8 oz (82.9 kg)   SpO2 100%   BMI 24.79 kg/m²     Weight:    Wt Readings from Last 3 Encounters:   22 182 lb 12.8 oz (82.9 kg)   22 184 lb 8.4 oz (83.7 kg)   22 183 lb 3.2 oz (83.1 kg)     PE performed by Dr. Trevon Marinelli:   General: Awake, alert and oriented.   HEENT: normocephalic, alopecia, PERRL, no scleral erythema or icterus, Oral mucosa moist and intact, throat clear  NECK: supple without palpable adenopathy  BACK: Straight negative CVAT  SKIN: warm dry and intact without lesions rashes or masses  CHEST: Wheezes throughout bilaterally without use of accessory muscles  CV: Normal S1 S2, RRR, no MRG  ABD: NT ND normoactive BS, no palpable masses or hepatosplenomegaly  EXTREMITIES: LLE Edema from the inguinal region distally.  It is improved.  He can extend to 180 degrees and flex to 90.  The right lower extremity is normal, denies calf tenderness  NEURO: CN II - XII grossly intact  CATHETER: RIJ Trifusion (IR: Dean, 4/15/22): CDI    Laboratory Data:  CBC:   Recent Labs     05/29/22  0508 05/30/22 0335 05/31/22 0409   WBC 1.7* 2.0* 2.3*   HGB 8.1* 8.1* 8.3*   HCT 24.2* 23.1* 24.2*   MCV 96.1 95.0 95.1   PLT 98* 92* 122*     BMP/Mag:  Recent Labs     05/29/22  0508 05/30/22 0335 05/31/22  0409   * 135* 134*   K 4.1 4.1 3.9    102 102   CO2 21 19* 22   PHOS 3.5 3.2 3.2   BUN 19 18 14   CREATININE 1.1 1.0 1.0   MG 1.70* 1.90 1.90     LIVP:   Recent Labs     05/29/22  0508 05/30/22 0335 05/31/22  0409   AST 31 41* 40*   ALT 17 24 31   BILIDIR <0.2 <0.2 <0.2   BILITOT 0.4 <0.2 <0.2   ALKPHOS 60 58 63     Coags:   Recent Labs     05/29/22  0508 05/30/22 0335 05/31/22  0409   PROTIME 16.4* 16.4* 15.6*   INR 1.32* 1.33* 1.25*   APTT 63.8* 31.2 45.4*     Uric Acid   Recent Labs     05/29/22  0508 05/30/22 0335 05/31/22  0409   LABURIC 3.6 3.8 3.6     Lab Results   Component Value Date    CRP 57.0 (H) 05/31/2022    .1 (H) 05/30/2022    CRP 55.6 (H) 05/27/2022     Lab Results   Component Value Date    FERRITIN 891.5 (H) 05/31/2022    FERRITIN 779.5 (H) 05/30/2022    FERRITIN 652.0 (H) 05/29/2022         PROBLEM LIST:            1. Follicular lymphoma with BM involvement  2. DLBC NHL      TREATMENT:            1. FL: 1/1995 CHOP             11/2003 to 8/2004 Rituxan             7/2007 R-CHOP  2.  DLBCL: R-[place]/Ox C1 1/31/2022, C2 02/14/2022                     Chip-BR C1D1: 3/22/22                                    C2D1: 4/20/22        Lymphodepleting Chemotherapy:  Fludarabine and cyclophosphamide   Disease Status at time of Infusion:  Progressive Disease  CAR-T Infusion Date: 5/23/22  CAR-T Product:  Breyanzi  Batch ID Number:  [id number]        ASSESSMENT AND PLAN:            1.  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  Left ventricular ejection fraction 55 to 60% with no regional wall motion abnormalities noted.  Intermittent diastolic dysfunction.  - PFTs (3/21/22): Diffusion capacity 79%, FEV1 78%.  Consistent with mild restrictive ventilatory defect. - CMI -  3 (age and pulmonary)  - PET (5/13/22):  Multiple indeterminate pulmonary nodules in the right lung which demonstrates increased activity in the right upper lobe. These may be infectious. These were not described on the previous PET/CT. Pulmonary lymphoma would be difficult to exclude. If the pulmonary nodule represents lymphoma, this would represent a Deauville score 5 exam. If this is infectious, overall the exam would represent a Deauville score 1. Previously described lymphadenopathy in FDG activity in the left retroperitoneum, left iliac region and in left upper thigh has completely resolved.  No residual lymphadenopathy or FDG avid lymph node identified      Lymphodepleting Chemotherapy:  Fludarabine and cyclophosphamide   Disease Status at time of Infusion:  Progressive Disease  CAR-T Infusion Date: 5/23/22  CAR-T Product:  Breyanzi  Batch ID Number:  [id number]     Day + 8     2.  CRS / Neuro:  Admitted with Grade 1 CRS (fever only) , now resolved   - Monitor CRP and Ferrtin closely             Lab Results   Component Value Date     CRP 22.2 (H) 03/21/2022              Lab Results   Component Value Date     FERRITIN 551.3 (H) 03/21/2022   - Start Keppra 500 mg BID on Monday, 5/23/22  - Neuro checks w/ CARTOX 10-point assessment Q4hrs     Toxicity Grading        CRS Grade: Grade 1 CRS (fever only)      ICANS Grade:  N/A     CAR-T Score: 10/10     3.  ID: Admitted with NF likely d/t CRS, Dry Cough d/t allergies: Exacerbated 5/25/22, new Wheezing throughout 5/27/22: resolved   - CXR & UA 5/23/22: Negative   - Contine Valtrex ppx  - Cont daily Zyrtec   - Cont daily Flonase 5/25/22  - Sputum Culture 5/26/22: Negative   - CXR 5/27/22: Negative    - Pan Cx 5/27/22: NGTD   - Covid-19 & Flu 5/27/22: Negative    - RR Viral Panel 5/28/22: Parainfluenza 3 Virus   switch to Levaquin ppx 5/29/22, stop 5/31/22  - Robitussin with Codeine and Albuterol PRN (5/29/22)      abx history    Cefepime Day (5/28/22) -5/29/22,     4.  Heme: Anemia & thrombocytopenia from chemotherapy   - Transfuse for Hgb < 7 and Platelets < 84L  - No transfusion today     5.  Metabolic: hypoNa .    TLS:  No evidence, cont allopurinol and daily TLS labs   - Cont IVF hydration: 1 L NS daily in OP Infusion  - Replace potassium and magnesium per policy.     6. Cardiac: Overnight developed atrial fibrillation with rapid ventricular response 5/28/22  - Consult Cardiology  - Start Metoprolol 12.5 mg BID: Increase metoprolol 12.5 mg to every 6 hours (5/28/22). Change to Metoprolol 25 mg BID 5/29/22 per Cardiology   - per cardiology no indication for [facility] at this time      7. Nutrition:  Appetite and oral intake is good. - S/p taking Astragalus supplement at home: Stop 5/18/22 d/t interaction with Cytoxan  - Cont low microbial diet   - Follow closely with dietary     - DVT Prophylaxis: Platelets <46,536 cells/dL - prophylactic lovenox on hold and mechanical prophylaxis with bilateral SCDs while in bed in place.   Contraindications to pharmacologic prophylaxis: Thrombocytopenia  Contraindications to mechanical prophylaxis: None        - Disposition: likely home tomorrow once cough and wheezing improved     Reymundo Guzman MD

## 2022-05-31 NOTE — PROGRESS NOTES
Behavioral Health Intervention Note    Met with patient and wife briefly. Notably, patient had difficulty hearing writer. He denied behavioral health needs today.         ----------------------------------------------------------------------------------------------------------    At initial encounter with patient, discussed role of psychologist including addressing emotional and behavioral needs while receiving care at the [facility] and [place] [address]. Discussed short-term nature of services. Also discussed with patient that a component of provider's role is completing the pre-surgical psychological evaluations for bone marrow transplant and CAR-T. Informed patient of psychologist's various roles in order for the patient to be fully informed when consenting to behavioral health treatment. Patient was agreeable to engaging in care with psychologist. Discussed limits of confidentiality including that psychologist coordinates with patient's treatment team and other limits of confidentiality were discussed as needed.

## 2022-06-01 VITALS
TEMPERATURE: 97.9 F | BODY MASS INDEX: 24.46 KG/M2 | WEIGHT: 180.38 LBS | HEART RATE: 64 BPM | OXYGEN SATURATION: 92 % | SYSTOLIC BLOOD PRESSURE: 125 MMHG | RESPIRATION RATE: 16 BRPM | DIASTOLIC BLOOD PRESSURE: 72 MMHG

## 2022-06-01 LAB
ALBUMIN SERPL-MCNC: 3.6 G/DL (ref 3.4–5)
ALP BLD-CCNC: 69 U/L (ref 40–129)
ALT SERPL-CCNC: 30 U/L (ref 10–40)
ANION GAP SERPL CALCULATED.3IONS-SCNC: 12 MMOL/L (ref 3–16)
APTT: 43.9 SEC (ref 23–34.3)
AST SERPL-CCNC: 32 U/L (ref 15–37)
BASOPHILS ABSOLUTE: 0 K/UL (ref 0–0.2)
BASOPHILS RELATIVE PERCENT: 0.7 %
BILIRUB SERPL-MCNC: <0.2 MG/DL (ref 0–1)
BILIRUBIN DIRECT: <0.2 MG/DL (ref 0–0.3)
BILIRUBIN, INDIRECT: ABNORMAL MG/DL (ref 0–1)
BUN BLDV-MCNC: 15 MG/DL (ref 7–20)
C-REACTIVE PROTEIN: 33.4 MG/L (ref 0–5.1)
CALCIUM SERPL-MCNC: 8.8 MG/DL (ref 8.3–10.6)
CHLORIDE BLD-SCNC: 105 MMOL/L (ref 99–110)
CO2: 22 MMOL/L (ref 21–32)
CREAT SERPL-MCNC: 1 MG/DL (ref 0.8–1.3)
D DIMER: 1.03 UG/ML FEU (ref 0–0.6)
EOSINOPHILS ABSOLUTE: 0.2 K/UL (ref 0–0.6)
EOSINOPHILS RELATIVE PERCENT: 6.5 %
FERRITIN: 856.2 NG/ML (ref 30–400)
FIBRINOGEN: 593 MG/DL (ref 207–509)
GFR AFRICAN AMERICAN: >60
GFR NON-AFRICAN AMERICAN: >60
GLUCOSE BLD-MCNC: 102 MG/DL (ref 70–99)
HCT VFR BLD CALC: 24.8 % (ref 40.5–52.5)
HEMOGLOBIN: 8.7 G/DL (ref 13.5–17.5)
INR BLD: 1.2 (ref 0.87–1.14)
LACTATE DEHYDROGENASE: 197 U/L (ref 100–190)
LYMPHOCYTES ABSOLUTE: 0.2 K/UL (ref 1–5.1)
LYMPHOCYTES RELATIVE PERCENT: 7.1 %
MAGNESIUM: 1.9 MG/DL (ref 1.8–2.4)
MCH RBC QN AUTO: 32.8 PG (ref 26–34)
MCHC RBC AUTO-ENTMCNC: 35.1 G/DL (ref 31–36)
MCV RBC AUTO: 93.5 FL (ref 80–100)
MONOCYTES ABSOLUTE: 0.4 K/UL (ref 0–1.3)
MONOCYTES RELATIVE PERCENT: 18 %
NEUTROPHILS ABSOLUTE: 1.6 K/UL (ref 1.7–7.7)
NEUTROPHILS RELATIVE PERCENT: 67.7 %
PDW BLD-RTO: 14.6 % (ref 12.4–15.4)
PHOSPHORUS: 3.5 MG/DL (ref 2.5–4.9)
PLATELET # BLD: 145 K/UL (ref 135–450)
PMV BLD AUTO: 9.1 FL (ref 5–10.5)
POTASSIUM SERPL-SCNC: 4 MMOL/L (ref 3.5–5.1)
PROTHROMBIN TIME: 15.1 SEC (ref 11.7–14.5)
RBC # BLD: 2.65 M/UL (ref 4.2–5.9)
SODIUM BLD-SCNC: 139 MMOL/L (ref 136–145)
TOTAL PROTEIN: 6.1 G/DL (ref 6.4–8.2)
URIC ACID, SERUM: 3.7 MG/DL (ref 3.5–7.2)
WBC # BLD: 2.4 K/UL (ref 4–11)

## 2022-06-01 PROCEDURE — 85384 FIBRINOGEN ACTIVITY: CPT

## 2022-06-01 PROCEDURE — 36592 COLLECT BLOOD FROM PICC: CPT

## 2022-06-01 PROCEDURE — 6370000000 HC RX 637 (ALT 250 FOR IP): Performed by: STUDENT IN AN ORGANIZED HEALTH CARE EDUCATION/TRAINING PROGRAM

## 2022-06-01 PROCEDURE — 83735 ASSAY OF MAGNESIUM: CPT

## 2022-06-01 PROCEDURE — 85730 THROMBOPLASTIN TIME PARTIAL: CPT

## 2022-06-01 PROCEDURE — 85610 PROTHROMBIN TIME: CPT

## 2022-06-01 PROCEDURE — 84100 ASSAY OF PHOSPHORUS: CPT

## 2022-06-01 PROCEDURE — 85025 COMPLETE CBC W/AUTO DIFF WBC: CPT

## 2022-06-01 PROCEDURE — 84550 ASSAY OF BLOOD/URIC ACID: CPT

## 2022-06-01 PROCEDURE — 85379 FIBRIN DEGRADATION QUANT: CPT

## 2022-06-01 PROCEDURE — 80076 HEPATIC FUNCTION PANEL: CPT

## 2022-06-01 PROCEDURE — 83615 LACTATE (LD) (LDH) ENZYME: CPT

## 2022-06-01 PROCEDURE — 2580000003 HC RX 258: Performed by: STUDENT IN AN ORGANIZED HEALTH CARE EDUCATION/TRAINING PROGRAM

## 2022-06-01 PROCEDURE — 80048 BASIC METABOLIC PNL TOTAL CA: CPT

## 2022-06-01 PROCEDURE — 86140 C-REACTIVE PROTEIN: CPT

## 2022-06-01 PROCEDURE — 82728 ASSAY OF FERRITIN: CPT

## 2022-06-01 PROCEDURE — 94761 N-INVAS EAR/PLS OXIMETRY MLT: CPT

## 2022-06-01 PROCEDURE — 6360000002 HC RX W HCPCS: Performed by: NURSE PRACTITIONER

## 2022-06-01 PROCEDURE — 6370000000 HC RX 637 (ALT 250 FOR IP): Performed by: NURSE PRACTITIONER

## 2022-06-01 PROCEDURE — 94640 AIRWAY INHALATION TREATMENT: CPT

## 2022-06-01 PROCEDURE — 6370000000 HC RX 637 (ALT 250 FOR IP): Performed by: INTERNAL MEDICINE

## 2022-06-01 RX ORDER — HEPARIN SODIUM (PORCINE) LOCK FLUSH IV SOLN 100 UNIT/ML 100 UNIT/ML
1500 SOLUTION INTRAVENOUS PRN
Status: DISCONTINUED | OUTPATIENT
Start: 2022-06-01 | End: 2022-06-01 | Stop reason: HOSPADM

## 2022-06-01 RX ORDER — BENZONATATE 100 MG/1
100 CAPSULE ORAL 3 TIMES DAILY PRN
Qty: 60 CAPSULE | Refills: 1 | Status: SHIPPED | OUTPATIENT
Start: 2022-06-01 | End: 2022-06-15

## 2022-06-01 RX ORDER — GUAIFENESIN 600 MG/1
600 TABLET, EXTENDED RELEASE ORAL 2 TIMES DAILY PRN
Qty: 20 TABLET | Refills: 2 | Status: SHIPPED | OUTPATIENT
Start: 2022-06-01

## 2022-06-01 RX ADMIN — LEVETIRACETAM 500 MG: 500 TABLET, FILM COATED ORAL at 08:50

## 2022-06-01 RX ADMIN — SODIUM CHLORIDE, PRESERVATIVE FREE 10 ML: 5 INJECTION INTRAVENOUS at 08:50

## 2022-06-01 RX ADMIN — METOPROLOL TARTRATE 25 MG: 25 TABLET, FILM COATED ORAL at 08:50

## 2022-06-01 RX ADMIN — FLUTICASONE PROPIONATE 1 SPRAY: 50 SPRAY, METERED NASAL at 08:51

## 2022-06-01 RX ADMIN — ALLOPURINOL 300 MG: 300 TABLET ORAL at 08:50

## 2022-06-01 RX ADMIN — HEPARIN SODIUM (PORCINE) LOCK FLUSH IV SOLN 100 UNIT/ML 1500 UNITS: 100 SOLUTION at 12:16

## 2022-06-01 RX ADMIN — ACYCLOVIR 800 MG: 800 TABLET ORAL at 08:50

## 2022-06-01 RX ADMIN — GUAIFENESIN AND CODEINE PHOSPHATE 5 ML: 100; 10 SOLUTION ORAL at 09:00

## 2022-06-01 NOTE — PLAN OF CARE
Problem: Pain  Goal: Verbalizes/displays adequate comfort level or baseline comfort level  Outcome: Progressing  Note: No complaints of pain this shift. Problem: Safety - Adult  Goal: Free from fall injury  Outcome: Progressing  Note:   Orthostatic vital signs obtained at start of shift - see flowsheet for details. Pt does not meet criteria for orthostasis. Pt is a Med fall risk. See Marion Mort Fall Score and ABCDS Injury Risk assessments. - Screening for Orthostasis AND not a [place] Risk per ARNETT/ABCDS: Pt bed is in low position, side rails up, call light and belongings are in reach. Fall risk light is on outside pts room. Pt encouraged to call for assistance as needed. Will continue with hourly rounds for PO intake, pain needs, toileting and repositioning as needed. Orthostatic vital signs obtained at start of shift - see flowsheet for details. Problem: Infection - Adult  Goal: Absence of infection at discharge  Outcome: Progressing  Note: CVC site remains free of signs/symptoms of infection. No drainage, edema, erythema, pain, or warmth noted at site. Dressing changes continue per protocol and on an as needed basis - see flowsheet. Compliant with [facility] Bath Protocol:  Performed CHG bath today per [facility] protocol utilizing CHG solution in the shower. CVC site cleansed with CHG wipe over dressing, skin surrounding dressing, and first 6\" of IV tubing. Pt tolerated well. Continued to encourage daily CHG bathing per [address] protocol. Patient's hemoglobin this AM: Recent Labs     06/01/22  0409   HGB 8.7*     Patient's platelet count this AM:   Recent Labs     06/01/22  0409       Thrombocytopenia Precautions in place. Patient showing no signs or symptoms of active bleeding. Transfusion not indicated at this time. Patient verbalizes understanding of all instructions. Will continue to assess and implement POC. Call light within reach and hourly rounding in place.

## 2022-06-01 NOTE — CARE COORDINATION
Case Management Assessment            Discharge Note                    Date / Time of Note: 6/1/2022 11:07 AM                  Discharge Note Completed by: DIAMOND Casanova    Patient Name: Miko Hall   YOB: 1940  Diagnosis: Neutropenic fever ([address] [D70.9, R50.81]   Date / Time: 5/27/2022  9:42 PM    Current PCP: Mariam patient: No    Hospitalization in the last 30 days: Yes    Advance Directives:  Code Status: Full Code  [place] DNR form completed and on chart: No    Financial:  Payor: Lonnie Camarena / Plan: MEDICARE PART A AND B / Product Type: *No Product type* /      Pharmacy:    [address] [phone]  [address] [place] [address]  Phone: [phone] Fax: [phone]      Assistance purchasing medications?: Potential Assistance Purchasing Medications: No  Assistance provided by Case Management: None at this time    Does patient want to participate in local refill/ meds to beds program?: Yes    Meds To Beds General Rules:  1. Can ONLY be done Monday- Friday between 8:30am-5pm  2. Prescription(s) must be in pharmacy by 3pm to be filled same day  3. Copy of patient's insurance/ prescription drug card and patient face sheet must be sent along with the prescription(s)  4. Cost of Rx cannot be added to hospital bill. If financial assistance is needed, please contact unit  or ;  or  CANNOT provide pharmacy voucher for patients co-pays  5.  Patients can then  the prescription on their way out of the hospital at discharge, or pharmacy can deliver to the bedside if staff is available. (payment due at time of pick-up or delivery - cash, check, or card accepted)     Able to afford home medications/ co-pay costs: No    ADLS:  Current PT AM-PAC Score:   /24  Current OT AM-PAC Score:   /24      DISCHARGE Disposition: Home- No Services Needed    LOC at discharge: Not Applicable  MAGNUS Completed: No    Notification completed in HENS/PAS?:  No    IMM Completed:   Yes, Case management has presented and reviewed IMM letter #2 to the patient and/or family/ POA. Patient and/or family/POA verbalized understanding of their medicare rights and appeal process if needed. Patient and/or family/POA has signed, initialed and placed today's date (6/1/2022) and time ([phone]) on IMM letter #2 on the the appropriate lines. Patient and/or family/POA, copy of letter offered and they are aware that this original copy of IMM letter #2 is available prior to discharge from the paper chart on the unit. Electronic documentation has been entered into epic for IMM letter #2 and original paper copy has been added to the paper chart at the nurses station. Transportation:  Transportation PLAN for discharge: family   Mode of Transport: Private Car  Reason for medical transport: Not Applicable  Name of [address] [place] [address],[address]: Not Applicable  Time of Transport: none    Transport form completed: No    Home Care:  [address] ordered at discharge: No  [address]: Not Applicable  Orders faxed: No    Durable Medical Equipment:  DME Provider: none  Equipment obtained during hospitalization: none    Home Oxygen and Respiratory Equipment:  Oxygen needed at discharge?: No  [address]: Not Applicable  Portable tank available for discharge?: No    Dialysis:  Dialysis patient: No    Dialysis Center:  Not Applicable    Hospice Services:  Location: Not Applicable  Agency: Not Applicable    Consents signed: No    Referrals made at [facility] for outpatient continued care:  Not Applicable    Additional CM Notes: Patient to discharge home with no needs today.      The Plan for Transition of Care is related to the following treatment goals of Neutropenic fever ([address] [D70.9, R50.81]    The Patient and/or patient representative Yenny Diehl and his family were provided with a choice of provider and agrees with the discharge plan Yes    Freedom of choice list was provided with basic dialogue that supports the patient's individualized plan of care/goals and shares the quality data associated with the providers.  Yes    Care Transitions patient: No    Jennifer Reid  Case Management Department  Ph: [phone]  Fax: [phone]

## 2022-06-01 NOTE — PLAN OF CARE
Problem: Pain  Goal: Verbalizes/displays adequate comfort level or baseline comfort level  6/1/2022 1054 by Lily Roblero RN  Outcome: Adequate for Discharge     Problem: Safety - Adult  Goal: Free from fall injury  6/1/2022 1054 by Lily Roblero RN  Outcome: Adequate for Discharge     Problem: Skin/Tissue Integrity - Adult  Goal: Skin integrity remains intact  Outcome: Adequate for Discharge  Flowsheets  Taken 6/1/2022 1053  Skin Integrity Remains Intact: Monitor for areas of redness and/or skin breakdown  Taken 6/1/2022 0844  Skin Integrity Remains Intact: Monitor for areas of redness and/or skin breakdown     Problem: Skin/Tissue Integrity - Adult  Goal: Incisions, wounds, or drain sites healing without S/S of infection  Outcome: Adequate for Discharge     Problem: Skin/Tissue Integrity - Adult  Goal: Oral mucous membranes remain intact  Outcome: Adequate for Discharge     Problem: Infection - Adult  Goal: Absence of infection at discharge  6/1/2022 1054 by Lily Roblero RN  Outcome: Adequate for Discharge  Flowsheets (Taken 6/1/2022 0844)  Absence of infection at discharge:   Monitor lab/diagnostic results   Assess and monitor for signs and symptoms of infection   Monitor all insertion sites i.e., indwelling lines, tubes and drains   Administer medications as ordered   Instruct and encourage patient and family to use good hand hygiene technique   Identify and instruct in appropriate isolation precautions for identified infection/condition     Problem: Infection - Adult  Goal: Absence of infection during hospitalization  Outcome: Adequate for Discharge     Problem: Infection - Adult  Goal: Absence of fever/infection during anticipated neutropenic period  Outcome: Adequate for Discharge     Problem: Metabolic/Fluid and Electrolytes - Adult  Goal: Electrolytes maintained within normal limits  Outcome: Adequate for Discharge  Flowsheets (Taken 6/1/2022 0844)  Electrolytes maintained within normal limits: Monitor labs and assess patient for signs and symptoms of electrolyte imbalances   Administer electrolyte replacement as ordered     Problem: Metabolic/Fluid and Electrolytes - Adult  Goal: Hemodynamic stability and optimal renal function maintained  Outcome: Adequate for Discharge     Problem: Metabolic/Fluid and Electrolytes - Adult  Goal: Glucose maintained within prescribed range  Outcome: Adequate for Discharge     Problem: Hematologic - Adult  Goal: Maintains hematologic stability  6/1/2022 1054 by Monster Argueta RN  Outcome: Adequate for Discharge

## 2022-06-01 NOTE — DISCHARGE SUMMARY
[facility] Discharge Summary             Attending Physician: Kristal Watson DO    Referring MD: No referring provider defined for this encounter.     Name: Sabrina Bella :  1940  MRN:  [id number]    Admission: 2022   Discharge:   22    Date: 2022    Diagnosis on admit: Neutropenic fever     Procedures: Routine chest x-ray, laboratories, EKG, IV fluid hydration, Panculture for fevers, IV antimicrobial therapy, Respiratory therapy, Oxygen therapy, Blood Product Infusions    Consultations:     Medications:      Medication List      START taking these medications    benzonatate 100 MG capsule  Commonly known as: TESSALON  Take 1 capsule by mouth 3 times daily as needed for Cough     metoprolol tartrate 25 MG tablet  Commonly known as: LOPRESSOR  Take 1 tablet by mouth 2 times daily        CONTINUE taking these medications    acyclovir 800 MG tablet  Commonly known as: ZOVIRAX  Take 1 tablet by mouth 2 times daily     allopurinol 300 MG tablet  Commonly known as: ZYLOPRIM  Take 1 tablet by mouth daily     fluconazole 200 MG tablet  Commonly known as: Diflucan  Take 1 tablet by mouth daily     guaiFENesin 600 MG extended release tablet  Commonly known as: Mucinex  Take 1 tablet by mouth 2 times daily as needed for Congestion     levETIRAcetam 500 MG tablet  Commonly known as: Keppra  Take 1 tablet by mouth 2 times daily Start on 22     levoFLOXacin 500 MG tablet  Commonly known as: LEVAQUIN  Take 1 tablet by mouth daily     ondansetron 4 MG disintegrating tablet  Commonly known as: Zofran ODT  Take 1 tablet by mouth every 8 hours as needed for Nausea or Vomiting     prochlorperazine 10 mg tablet  Commonly known as: COMPAZINE           Where to Get Your Medications      These medications were sent to [facility] [id number] Tamica Garcia,  [address]    Phone: [phone]   · benzonatate 100 MG capsule  · guaiFENesin 600 MG extended release tablet  · metoprolol tartrate 25 MG tablet         Reason for Admission:  Neutropenic Fever    Hospital Course:   Mr. Annie Fisher is an 68-year-old man with a history of diffuse large cell B-cell non-Hodgkin's Yvonne Cheney presented December 2021 with knee pain and swelling.  Biopsy showed B-cell non-Hodgkin's lymphoma with a Ki-67 of about 60%.  Baseline PET/CT from January 21, 2022 shows extensive hypermetabolic metabolism in the retroperitoneum, left pelvis and left lower extremity consistent with lymphoma.       He has a history of follicular lymphoma diagnosed 1995 (bone marrow involvement).  He was treated with CHOP chemotherapy from November 6, 1995 to June 6, 1996. Ming Tillman then had a recurrence and received rituximab times 4 November 2003 followed by maintenance rituximab April 21, 2004 and August 23, 2004. Ming Tillman then received R-CHOP beginning July 5, 2007 followed by maintenance rituximab from November 2008 to September 8, 2009. Most recently he received R-[place]/Ox X 2 with F/U PET 3/1/22 showing progression in the retroperitoneum, left hemipelvis and left LE. He was then collected for CAR-T cell collection in preporation for Breyanzi CAR-T Infusion on 4/19/22. He is status post Bridging chemotherapy of Chip BR on 3/22/22 and 4/40/22. His most recent PET CT on 5/13/22 showed new pulmonary nudules in the right upper lung measuring 1.2 cm max SUV 3.9. Previously described lymphadenopathy and FDG in left retroperitoneum, left iliac region and left upper thigh has completely resolved. He then received Lymphodepleting Chemo in OP Infusion on 5/18/22 - 5/20/22 followed by his Breyanzi CAR-T Infusion on 5/24/22. He then was followed daily in OP Infusion for any signs / symptoms of toxicity including CRS or EDUARD. Has had a mild nonproductive cough recently, no other localizing infectious symptoms.      He then presented to the ED on 5/27/22 with neutropenic fever.  Patient was started on cefepime, and COVID/influenza testing was negative, blood cultures NGTD. Chest x-ray without evidence of infection. He was then admitted to [facility] for ongoing evaluation and management of neutropenic fever.   He was positive for parainfluenza. He was switched to levaquin for ppx. No further fevers and URI symptoms improving. He also developed Afib with RVr during admission. Cardiology was consulted and started him on metoprolol 25 mg BID for rate control. He is clinically doing well, will be discharged today and follow up in outpt unit     Physical Exam:     Vital Signs:  /72   Pulse 64   Temp 97.9 °F (36.6 °C) (Oral)   Resp 16   Wt 180 lb 6 oz (81.8 kg)   SpO2 92%   BMI 24.46 kg/m²     Weight:    Wt Readings from Last 3 Encounters:   06/01/22 180 lb 6 oz (81.8 kg)   05/27/22 184 lb 8.4 oz (83.7 kg)   05/26/22 183 lb 3.2 oz (83.1 kg)       KPS: 80% Normal activity with effort; some signs or symptoms of disease    General: Awake, alert and oriented    HEENT: normocephalic, alopecia, PERRL, no scleral erythema or icterus, Oral mucosa moist and intact, throat clear.    NECK: supple without palpable adenopathy  BACK: Straight, negative CVAT  SKIN: warm dry and intact without lesions rashes or masses  CHEST: CTA bilaterally without use of accessory muscles  CV: Normal S1 S2, RRR, no MRG  ABD: NT ND normoactive BS, no palpable masses or hepatosplenomegaly  EXTREMITIES: without edema, denies calf tenderness  NEURO: CN II - XII grossly intact  CATHETER: RIJ Trifusion (IR: Jermaine, 4/15/22): CDI  Discharge Laboratory Data:  CBC:   Recent Labs     05/30/22 0335 05/31/22 0409 06/01/22  0409   WBC 2.0* 2.3* 2.4*   HGB 8.1* 8.3* 8.7*   HCT 23.1* 24.2* 24.8*   MCV 95.0 95.1 93.5   PLT 92* 122* 145     BMP/Mag:  Recent Labs     05/30/22  0335 05/31/22  0409 06/01/22  0409   * 134* 139   K 4.1 3.9 4.0    102 105   CO2 19* 22 22   PHOS 3.2 3.2 3.5   BUN 18 14 15   CREATININE 1.0 1.0 1.0   MG 1.90 1. 90 1.90     LIVP:   Recent Labs     05/30/22  0335 05/31/22  0409 06/01/22  0409   AST 41* 40* 32   ALT 24 31 30   BILIDIR <0.2 <0.2 <0.2   BILITOT <0.2 <0.2 <0.2   ALKPHOS 58 63 69     Coags:   Recent Labs     05/30/22  0335 05/31/22  0409 06/01/22  0409   PROTIME 16.4* 15.6* 15.1*   INR 1.33* 1.25* 1.20*   APTT 31.2 45.4* 43.9*     Uric Acid   Recent Labs     05/30/22  0335 05/31/22  0409 06/01/22  0409   LABURIC 3.8 3.6 3.7     Tacro:  No results for input(s): TACROLEV in the last 72 hours. CMV Quant DNA by PCR: No results found for: CMVDNAQNT  IgG: No results for input(s): IGG in the last 72 hours. Microbiology:     Diagnostics:     Pathology:       PROBLEM LIST:              1. Follicular lymphoma with BM involvement  2. DLBC NHL            TREATMENT:                1. FL: 1/1995 CHOP             11/2003 to 8/2004 Rituxan             7/2007 R-CHOP  2. DLBCL: R-[place]/Ox C1 1/31/2022, C2 02/14/2022                     Chip-BR C1D1: 3/22/22                                    C2D1: 4/20/22        Lymphodepleting Chemotherapy:  Fludarabine and cyclophosphamide   Disease Status at time of Infusion:  Progressive Disease  CAR-T Infusion Date: 5/23/22  CAR-T Product:  Breyanzi  Batch ID Number:  [id number]      ASSESSMENT AND PLAN:           1.  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  Left ventricular ejection fraction 55 to 60% with no regional wall motion abnormalities noted.  Intermittent diastolic dysfunction.  - PFTs (3/21/22): Diffusion capacity 79%, FEV1 78%.  Consistent with mild restrictive ventilatory defect.   - CMI -  3 (age and pulmonary)  - PET (5/13/22):  Multiple indeterminate pulmonary nodules in the right lung which demonstrates increased activity in the right upper lobe. These may be infectious. These were not described on the previous PET/CT. Pulmonary lymphoma would be difficult to exclude. If the pulmonary nodule represents lymphoma, this would represent a Deauville score 5 exam. If this is infectious, overall the exam would represent a Deauville score 1. Previously described lymphadenopathy in FDG activity in the left retroperitoneum, left iliac region and in left upper thigh has completely resolved.  No residual lymphadenopathy or FDG avid lymph node identified      Lymphodepleting Chemotherapy:  Fludarabine and cyclophosphamide   Disease Status at time of Infusion:  Progressive Disease  CAR-T Infusion Date: 5/23/22  CAR-T Product:  Breyanzi  Batch ID Number:  [id number]     Day + 9     2.  CRS / Neuro:  Admitted with Grade 1 CRS (fever only) , now resolved   - Monitor CRP and Ferrtin closely             Lab Results   Component Value Date     CRP 22.2 (H) 03/21/2022                Lab Results   Component Value Date     FERRITIN 551.3 (H) 03/21/2022   - Start Keppra 500 mg BID on Monday, 5/23/22  - Neuro checks w/ CARTOX 10-point assessment Q4hrs     Toxicity Grading        CRS Grade: Grade 1 CRS (fever only)      ICANS Grade:  N/A     CAR-T Score: 10/10     3.  ID: Admitted with NF likely d/t CRS, Dry Cough d/t allergies: Exacerbated 5/25/22, new Wheezing throughout 5/27/22: resolved   - CXR & UA 5/23/22: Negative   - Contine Valtrex ppx  - Cont daily Zyrtec   - Cont daily Flonase 5/25/22  - Sputum Culture 5/26/22: Negative   - CXR 5/27/22: Negative    - Pan Cx 5/27/22: NGTD   - Covid-19 & Flu 5/27/22: Negative    - RR Viral Panel 5/28/22: Parainfluenza 3 Virus   switch to Levaquin ppx 5/29/22, stop 5/31/22  - Robitussin with Codeine and Albuterol PRN (5/29/22)       abx history    Cefepime Day (5/28/22) -5/29/22,     4.  Heme: Anemia & thrombocytopenia from chemotherapy   - Transfuse for Hgb < 7 and Platelets < 55H  - No transfusion today     5.  Metabolic: hypoNa .    TLS:  No evidence, cont allopurinol and daily TLS labs   - Cont IVF hydration: 1 L NS daily in OP Infusion  - Replace potassium and magnesium per policy.     6. Cardiac: Overnight developed atrial fibrillation with rapid ventricular response 5/28/22  - Consult Cardiology  - Start Metoprolol 12.5 mg BID: Increase metoprolol 12.5 mg to every 6 hours (5/28/22). Change to Metoprolol 25 mg BID 5/29/22 per Cardiology   - per cardiology no indication for [facility] at this time      7. Nutrition:  Appetite and oral intake is good. - S/p taking Astragalus supplement at home: Stop 5/18/22 d/t interaction with Cytoxan  - Cont low microbial diet   - Follow closely with dietary           Condition on discharge: stable     Discharge Instructions: The patient was advised on activity and dietary restrictions. The patient was advised to follow up in the emergency department or contact the physician with any unresolved nausea/vomiting/diarrhea/pain or temperature greater than 100.5 F or any other unusual symptoms.          Santiago Ross DO

## 2022-06-01 NOTE — PROGRESS NOTES
Reviewed discharge instructions with patient. Reviewed discharge medications including dosing, schedule, indication, and adverse reactions. Reviewed which medications were already taken today and next dosage due for each medication. Reviewed signs and symptoms that prompt a call to the physician and appropriate phone numbers. [facility] ER card given to the patient with explanations of its use. Reviewed follow up appointments that have been made in [facility]. Low microbial diet, activity restrictions, and increased risk of infection were reviewed. Patient is being discharged with IV access d/t need for ongoing therapy:      Type:  R trifusion                        Plan: CONTINUE, heparin locked  Next dressing change due on: 6/6/22  Cap changes due on: 6/6/22  CVC care and maintenance was reviewed with patient. Pt verbalizes understanding of line care and maintenance. Patient verbalized understanding of all instructions and questions were answered to his. satisfaction. Signed discharge instructions were given to the patient and a copy placed in the paper-lite chart. Patient discharged to home per wheelchair with spouse.       Anastacio Conte RN

## 2022-06-01 NOTE — PROGRESS NOTES
Physician Progress Note      PATIENT:               Gagan Conteh  CSN #:                  [id number]  :                       1940  ADMIT DATE:       2022 9:42 PM  [address] [facility] DATE:  RESPONDING  PROVIDER #:        Nasim Mckeon          QUERY TEXT:    Patient admitted with NF d/t CRS grade 1 s/p Lymphodepleting Chemo in OP   Infusion on 22 - 22 followed by his Breyanzi CAR-T Infusion on   22. Noted to have WBC: 1.6- 2.3, HGB: 9.3- 8.3, PLT: 99- 122. If   possible, please document in progress notes and discharge summary if you are   evaluating and/or treating any of the following: The medical record reflects the following:  Risk Factors: Chemo - , CAR-T   Clinical Indicators: WBC: 1.6- 2.3, HGB: 9.3- 8.3, PLT: 99- 122. Per pn's   \"Anemia & thrombocytopenia from chemotherapy\". Treatment: Daily lab monitoring, Transfuse for Hgb < 7 and Platelets < 82P  Options provided:  -- Chemotherapy induced pancytopenia  -- Pancytopenia due to CAR-T  -- Pancytopenia due to both chemotherapy and CAR-T  -- Other - I will add my own diagnosis  -- Disagree - Not applicable / Not valid  -- Disagree - Clinically unable to determine / Unknown  -- Refer to Clinical Documentation Reviewer    PROVIDER RESPONSE TEXT:    Patient has chemotherapy induced pancytopenia. Query created by: Debra Flynn on 2022 4:17 PM      QUERY TEXT:    Patient admitted  with NF d/t CRS grade 1 s/p Lymphodepleting Chemo in OP   Infusion on 22 - 22 followed by his Breyanzi CAR-T Infusion on   22. Noted cough on admission with Respiratory panel + Parainfluenza 3   Virus. If possible, please document in progress notes and discharge summary if   you are evaluating and/or treating any of the following: The medical record reflects the following:  Risk Factors: + Resp panel, CAR-T, Neutropenia  Clinical Indicators: Resp panel: Parainfluenza 3 Virus. Per H&P \"complains of   persistent cough. Admitted with NF likely d/t CRS, Dry Cough d/t allergies\". Per pn 5/31 \"Persistent cough. Feeling better; RR Viral Panel 5/28/22; Admitted with NF likely d/t CRS, Dry Cough d/t allergies; Parainfluenza 3   Virus\". Admit CXR's x 2 Negative, 5/30 CXR: minimal bibasilar airspace   disease. Treatment: Cefepime 5/27- 5/29, Scheduled Zovirax starting 5/28,  Levaquin   ppx, PRN Robitussin with Codeine and Albuterol, CXR x 3, RR Viral Panel,   Contact Isolation, Bld cx's (neg), Covid-19 & Flu (neg)  Options provided:  -- Neutropenic fever due to Parainfluenza 3 Virus upper respiratory tract   infection  -- Neutropenic fever due to Parainfluenza 3 Virus lower respiratory tract   infection  -- Neutropenic fever due to CRS related to CAR-T  -- Neutropenic fever due to Bacterial infection unknown source/etiology  -- Other - I will add my own diagnosis  -- Disagree - Not applicable / Not valid  -- Disagree - Clinically unable to determine / Unknown  -- Refer to Clinical Documentation Reviewer    PROVIDER RESPONSE TEXT:    This patient has Neutropenic fever due to Parainfluenza 3 Virus upper   respiratory tract infection. Query created by:  Boris Camara on 5/31/2022 4:30 PM      Electronically signed by:  Esha Dailey 6/1/2022 11:10 AM

## 2022-06-02 ENCOUNTER — HOSPITAL ENCOUNTER (OUTPATIENT)
Dept: ONCOLOGY | Age: 82
Setting detail: INFUSION SERIES
Discharge: HOME | End: 2022-06-02
Payer: MEDICARE

## 2022-06-02 VITALS
SYSTOLIC BLOOD PRESSURE: 117 MMHG | BODY MASS INDEX: 23.8 KG/M2 | WEIGHT: 175.49 LBS | DIASTOLIC BLOOD PRESSURE: 64 MMHG | RESPIRATION RATE: 16 BRPM | TEMPERATURE: 97.7 F | OXYGEN SATURATION: 98 % | HEART RATE: 70 BPM

## 2022-06-02 DIAGNOSIS — C83.30 DIFFUSE LARGE B-CELL LYMPHOMA, UNSPECIFIED BODY REGION (HCC): Primary | ICD-10-CM

## 2022-06-02 LAB
ANION GAP SERPL CALCULATED.3IONS-SCNC: 11 MMOL/L (ref 3–16)
BASOPHILS ABSOLUTE: 0 K/UL (ref 0–0.2)
BASOPHILS RELATIVE PERCENT: 0.4 %
BUN BLDV-MCNC: 14 MG/DL (ref 7–20)
C-REACTIVE PROTEIN: 46.6 MG/L (ref 0–5.1)
CALCIUM SERPL-MCNC: 9.1 MG/DL (ref 8.3–10.6)
CHLORIDE BLD-SCNC: 102 MMOL/L (ref 99–110)
CO2: 25 MMOL/L (ref 21–32)
CREAT SERPL-MCNC: 1 MG/DL (ref 0.8–1.3)
EOSINOPHILS ABSOLUTE: 0.1 K/UL (ref 0–0.6)
EOSINOPHILS RELATIVE PERCENT: 3.8 %
FERRITIN: 841.1 NG/ML (ref 30–400)
GFR AFRICAN AMERICAN: >60
GFR NON-AFRICAN AMERICAN: >60
GLUCOSE BLD-MCNC: 143 MG/DL (ref 70–99)
HCT VFR BLD CALC: 26.4 % (ref 40.5–52.5)
HEMOGLOBIN: 9.1 G/DL (ref 13.5–17.5)
INR BLD: 1.14 (ref 0.87–1.14)
LYMPHOCYTES ABSOLUTE: 0.1 K/UL (ref 1–5.1)
LYMPHOCYTES RELATIVE PERCENT: 5.6 %
MCH RBC QN AUTO: 33 PG (ref 26–34)
MCHC RBC AUTO-ENTMCNC: 34.6 G/DL (ref 31–36)
MCV RBC AUTO: 95.1 FL (ref 80–100)
MONOCYTES ABSOLUTE: 0.4 K/UL (ref 0–1.3)
MONOCYTES RELATIVE PERCENT: 14 %
NEUTROPHILS ABSOLUTE: 2 K/UL (ref 1.7–7.7)
NEUTROPHILS RELATIVE PERCENT: 76.2 %
PDW BLD-RTO: 14.4 % (ref 12.4–15.4)
PHOSPHORUS: 2.7 MG/DL (ref 2.5–4.9)
PLATELET # BLD: 155 K/UL (ref 135–450)
PMV BLD AUTO: 9 FL (ref 5–10.5)
POTASSIUM SERPL-SCNC: 3.7 MMOL/L (ref 3.5–5.1)
PROTHROMBIN TIME: 14.6 SEC (ref 11.7–14.5)
RBC # BLD: 2.77 M/UL (ref 4.2–5.9)
SODIUM BLD-SCNC: 138 MMOL/L (ref 136–145)
WBC # BLD: 2.6 K/UL (ref 4–11)

## 2022-06-02 PROCEDURE — 36592 COLLECT BLOOD FROM PICC: CPT

## 2022-06-02 PROCEDURE — 6360000002 HC RX W HCPCS: Performed by: NURSE PRACTITIONER

## 2022-06-02 PROCEDURE — 86140 C-REACTIVE PROTEIN: CPT

## 2022-06-02 PROCEDURE — 2580000003 HC RX 258: Performed by: NURSE PRACTITIONER

## 2022-06-02 PROCEDURE — 84100 ASSAY OF PHOSPHORUS: CPT

## 2022-06-02 PROCEDURE — 96360 HYDRATION IV INFUSION INIT: CPT

## 2022-06-02 PROCEDURE — 80048 BASIC METABOLIC PNL TOTAL CA: CPT

## 2022-06-02 PROCEDURE — 96361 HYDRATE IV INFUSION ADD-ON: CPT

## 2022-06-02 PROCEDURE — 85025 COMPLETE CBC W/AUTO DIFF WBC: CPT

## 2022-06-02 PROCEDURE — 82728 ASSAY OF FERRITIN: CPT

## 2022-06-02 PROCEDURE — 85610 PROTHROMBIN TIME: CPT

## 2022-06-02 RX ORDER — MAGNESIUM SULFATE IN WATER 40 MG/ML
4000 INJECTION, SOLUTION INTRAVENOUS PRN
Status: CANCELLED | OUTPATIENT
Start: 2022-06-03

## 2022-06-02 RX ORDER — OXYCODONE HYDROCHLORIDE 5 MG/1
10 TABLET ORAL EVERY 4 HOURS PRN
Status: CANCELLED | OUTPATIENT
Start: 2022-06-03

## 2022-06-02 RX ORDER — OXYCODONE HYDROCHLORIDE 5 MG/1
5 TABLET ORAL EVERY 4 HOURS PRN
Status: CANCELLED | OUTPATIENT
Start: 2022-06-03

## 2022-06-02 RX ORDER — PROCHLORPERAZINE MALEATE 10 MG
10 TABLET ORAL EVERY 6 HOURS PRN
Status: CANCELLED | OUTPATIENT
Start: 2022-06-03

## 2022-06-02 RX ORDER — DIMETHICONE, CAMPHOR (SYNTHETIC), MENTHOL, AND PHENOL 1.1; .5; .625; .5 G/100G; G/100G; G/100G; G/100G
OINTMENT TOPICAL PRN
Status: DISCONTINUED | OUTPATIENT
Start: 2022-06-02 | End: 2022-06-03 | Stop reason: HOSPADM

## 2022-06-02 RX ORDER — SODIUM CHLORIDE 9 MG/ML
INJECTION, SOLUTION INTRAVENOUS ONCE
Status: CANCELLED | OUTPATIENT
Start: 2022-06-03 | End: 2022-06-03

## 2022-06-02 RX ORDER — PETROLATUM, MENTHOL, UNSPECIFIED FORM, CAMPHOR (SYNTHETIC), AND PHENOL 59.14; 1; 1; .6 G/100G; G/100G; G/100G; G/100G
PASTE TOPICAL PRN
Status: CANCELLED | OUTPATIENT
Start: 2022-06-03

## 2022-06-02 RX ORDER — 0.9 % SODIUM CHLORIDE 0.9 %
1000 INTRAVENOUS SOLUTION INTRAVENOUS ONCE
Status: CANCELLED | OUTPATIENT
Start: 2022-06-03 | End: 2022-06-03

## 2022-06-02 RX ORDER — ONDANSETRON 4 MG/1
8 TABLET, FILM COATED ORAL ONCE
Status: DISCONTINUED | OUTPATIENT
Start: 2022-06-02 | End: 2022-06-03 | Stop reason: HOSPADM

## 2022-06-02 RX ORDER — SODIUM CHLORIDE 9 MG/ML
INJECTION, SOLUTION INTRAVENOUS ONCE
Status: DISCONTINUED | OUTPATIENT
Start: 2022-06-02 | End: 2022-06-03 | Stop reason: HOSPADM

## 2022-06-02 RX ORDER — 0.9 % SODIUM CHLORIDE 0.9 %
1000 INTRAVENOUS SOLUTION INTRAVENOUS ONCE
Status: COMPLETED | OUTPATIENT
Start: 2022-06-02 | End: 2022-06-02

## 2022-06-02 RX ORDER — ONDANSETRON 4 MG/1
8 TABLET, FILM COATED ORAL ONCE
Status: CANCELLED | OUTPATIENT
Start: 2022-06-03 | End: 2022-06-03

## 2022-06-02 RX ORDER — ONDANSETRON 2 MG/ML
8 INJECTION INTRAMUSCULAR; INTRAVENOUS ONCE
Status: CANCELLED | OUTPATIENT
Start: 2022-06-03 | End: 2022-06-03

## 2022-06-02 RX ORDER — POTASSIUM CHLORIDE 20 MEQ/1
40 TABLET, EXTENDED RELEASE ORAL PRN
Status: DISCONTINUED | OUTPATIENT
Start: 2022-06-02 | End: 2022-06-03 | Stop reason: HOSPADM

## 2022-06-02 RX ORDER — MAGNESIUM SULFATE IN WATER 40 MG/ML
4000 INJECTION, SOLUTION INTRAVENOUS PRN
Status: DISCONTINUED | OUTPATIENT
Start: 2022-06-02 | End: 2022-06-03 | Stop reason: HOSPADM

## 2022-06-02 RX ORDER — OXYCODONE HYDROCHLORIDE 5 MG/1
5 TABLET ORAL EVERY 4 HOURS PRN
Status: DISCONTINUED | OUTPATIENT
Start: 2022-06-02 | End: 2022-06-03 | Stop reason: HOSPADM

## 2022-06-02 RX ORDER — HEPARIN SODIUM (PORCINE) LOCK FLUSH IV SOLN 100 UNIT/ML 100 UNIT/ML
500 SOLUTION INTRAVENOUS PRN
Status: CANCELLED | OUTPATIENT
Start: 2022-06-03

## 2022-06-02 RX ORDER — PROCHLORPERAZINE MALEATE 10 MG
10 TABLET ORAL EVERY 6 HOURS PRN
Status: DISCONTINUED | OUTPATIENT
Start: 2022-06-02 | End: 2022-06-03 | Stop reason: HOSPADM

## 2022-06-02 RX ORDER — POTASSIUM CHLORIDE 29.8 MG/ML
80 INJECTION INTRAVENOUS PRN
Status: DISCONTINUED | OUTPATIENT
Start: 2022-06-02 | End: 2022-06-03 | Stop reason: HOSPADM

## 2022-06-02 RX ORDER — OXYCODONE HYDROCHLORIDE 5 MG/1
10 TABLET ORAL EVERY 4 HOURS PRN
Status: DISCONTINUED | OUTPATIENT
Start: 2022-06-02 | End: 2022-06-03 | Stop reason: HOSPADM

## 2022-06-02 RX ORDER — ONDANSETRON 2 MG/ML
8 INJECTION INTRAMUSCULAR; INTRAVENOUS ONCE
Status: DISCONTINUED | OUTPATIENT
Start: 2022-06-02 | End: 2022-06-03 | Stop reason: HOSPADM

## 2022-06-02 RX ORDER — POTASSIUM CHLORIDE 20 MEQ/1
40 TABLET, EXTENDED RELEASE ORAL PRN
Status: CANCELLED | OUTPATIENT
Start: 2022-06-03

## 2022-06-02 RX ORDER — PROCHLORPERAZINE EDISYLATE 5 MG/ML
10 INJECTION INTRAMUSCULAR; INTRAVENOUS EVERY 6 HOURS PRN
Status: CANCELLED | OUTPATIENT
Start: 2022-06-03

## 2022-06-02 RX ORDER — POTASSIUM CHLORIDE 29.8 MG/ML
80 INJECTION INTRAVENOUS PRN
Status: CANCELLED | OUTPATIENT
Start: 2022-06-03

## 2022-06-02 RX ORDER — HEPARIN SODIUM (PORCINE) LOCK FLUSH IV SOLN 100 UNIT/ML 100 UNIT/ML
500 SOLUTION INTRAVENOUS PRN
Status: DISCONTINUED | OUTPATIENT
Start: 2022-06-02 | End: 2022-06-03 | Stop reason: HOSPADM

## 2022-06-02 RX ORDER — PROCHLORPERAZINE EDISYLATE 5 MG/ML
10 INJECTION INTRAMUSCULAR; INTRAVENOUS EVERY 6 HOURS PRN
Status: DISCONTINUED | OUTPATIENT
Start: 2022-06-02 | End: 2022-06-03 | Stop reason: HOSPADM

## 2022-06-02 RX ADMIN — SODIUM CHLORIDE 1000 ML: 9 INJECTION, SOLUTION INTRAVENOUS at 08:57

## 2022-06-02 RX ADMIN — SODIUM CHLORIDE, PRESERVATIVE FREE 500 UNITS: 5 INJECTION INTRAVENOUS at 11:01

## 2022-06-02 NOTE — PROGRESS NOTES
[address] [facility] Progress Note      2022    Carlos Monroy    :  1940    MRN:  [id number]    Referring MD: Yahir Roberts MD  [facility] 1400 [facility],  [address]      Subjective: Feels well today, no fevers, no confusion, slept well last night, eating and drinking well. ECOG PS:  (2) Ambulatory and capable of self care, unable to carry out work activity, up and about > 50% or waking hours    KPS: 80% Normal activity with effort; some signs or symptoms of disease    Isolation:  None     Medications    Scheduled Meds:  Continuous Infusions:  PRN Meds:. ROS:  As noted above, otherwise remainder of 10-point ROS negative      Physical Exam:     Vital Signs:  /64   Pulse 70   Temp 97.7 °F (36.5 °C) (Oral)   Resp 16   Wt 175 lb 7.8 oz (79.6 kg)   SpO2 98%   BMI 23.80 kg/m²     Weight:    Wt Readings from Last 3 Encounters:   22 175 lb 7.8 oz (79.6 kg)   22 180 lb 6 oz (81.8 kg)   22 184 lb 8.4 oz (83.7 kg)       General: Awake, alert and oriented    HEENT: normocephalic, alopecia, PERRL, no scleral erythema or icterus, Oral mucosa moist and intact, throat clear.    NECK: supple without palpable adenopathy  BACK: Straight, negative CVAT  SKIN: warm dry and intact without lesions rashes or masses  CHEST: CTA bilaterally without use of accessory muscles  CV: Normal S1 S2, RRR, no MRG  ABD: NT ND normoactive BS, no palpable masses or hepatosplenomegaly  EXTREMITIES: without edema, denies calf tenderness  NEURO: CN II - XII grossly intact  CATHETER: RIJ Trifusion (IR: Jermaine, 4/15/22): CDI      Laboratory Data:  CBC:   Recent Labs     22  0409 22  0409 22  0907   WBC 2.3* 2.4* 2.6*   HGB 8.3* 8.7* 9.1*   HCT 24.2* 24.8* 26.4*   MCV 95.1 93.5 95.1   * 145 155     BMP/Mag:  Recent Labs     22  0409 22  0409 22  0907   * 139 138   K 3.9 4.0 3.7    105 102   CO2 22 22 25   PHOS 3.2 3.5 2.7   BUN 14 15 14   CREATININE 1.0 1.0 1.0   MG 1.90 1.90  --      LIVP:   Recent Labs     05/31/22  0409 06/01/22  0409   AST 40* 32   ALT 31 30   BILIDIR <0.2 <0.2   BILITOT <0.2 <0.2   ALKPHOS 63 69     Coags:   Recent Labs     05/31/22  0409 06/01/22  0409 06/02/22  0907   PROTIME 15.6* 15.1* 14.6*   INR 1.25* 1.20* 1.14   APTT 45.4* 43.9*  --      Uric Acid   Recent Labs     05/31/22  0409 06/01/22  0409   LABURIC 3.6 3.7     Lab Results   Component Value Date    CRP 46.6 (H) 06/02/2022    CRP 33.4 (H) 06/01/2022    CRP 57.0 (H) 05/31/2022     Lab Results   Component Value Date    FERRITIN 841.1 (H) 06/02/2022    FERRITIN 856.2 (H) 06/01/2022    FERRITIN 891.5 (H) 05/31/2022         PROBLEM LIST:            1. Follicular lymphoma with BM involvement  2. DLBC NHL      TREATMENT:            1. FL: 1/1995 CHOP             11/2003 to 8/2004 Rituxan             7/2007 R-CHOP  2. DLBCL: R-[place]/Ox C1 1/31/2022, C2 02/14/2022                     Chip-BR C1D1: 3/22/22                                    C2D1: 4/20/22        Lymphodepleting Chemotherapy:  Fludarabine and cyclophosphamide   Disease Status at time of Infusion:  Progressive Disease  CAR-T Infusion Date: 5/23/22  CAR-T Product:  Breyanzi  Batch ID Number:  [id number]        ASSESSMENT AND PLAN:            1.  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  Left ventricular ejection fraction 55 to 60% with no regional wall motion abnormalities noted.  Intermittent diastolic dysfunction.  - PFTs (3/21/22): Diffusion capacity 79%, FEV1 78%.  Consistent with mild restrictive ventilatory defect.   - CMI -  3 (age and pulmonary)  - PET (5/13/22):  Multiple indeterminate pulmonary nodules in the right lung which demonstrates increased activity in the right upper lobe. These may be infectious. These were not described on the previous PET/CT. Pulmonary lymphoma would be difficult to exclude. If the pulmonary nodule represents lymphoma, this would represent a Deauville score 5 exam. If this is infectious, overall the exam would represent a Deauville score 1. Previously described lymphadenopathy in FDG activity in the left retroperitoneum, left iliac region and in left upper thigh has completely resolved.  No residual lymphadenopathy or FDG avid lymph node identified      Lymphodepleting Chemotherapy:  Fludarabine and cyclophosphamide   Disease Status at time of Infusion:  Progressive Disease  CAR-T Infusion Date: 5/23/22  CAR-T Product:  Breyanzi  Batch ID Number:  [id number]     Day + 10     2.  CRS / Neuro:  Admitted with Grade 1 CRS (fever only) , now resolved   - Monitor CRP and Ferrtin closely             Lab Results   Component Value Date     CRP 22.2 (H) 03/21/2022                Lab Results   Component Value Date     FERRITIN 551.3 (H) 03/21/2022   - Start Keppra 500 mg BID on Monday, 5/23/22  - Neuro checks w/ CARTOX 10-point assessment Q4hrs     Toxicity Grading        CRS Grade: Grade 1 CRS (fever only): Resolved      ICANS Grade:  N/A     CAR-T Score: 10/10     3.  ID: Recently admitted with NF likely d/t CRS, Dry Cough d/t allergies: Exacerbated 5/25/22, new Wheezing throughout 5/27/22: resolved   - CXR & UA 5/23/22: Negative      - CXR 5/27/22: Negative    - CXR 5/30/22: MINIMAL BIBASILAR AIRSPACE DISEASE  - Contine Acyclovir ppx  - Restart Levaquin and Diflucan ppx if ANC < 1.5  - Cont daily Zyrtec   - Cont daily Flonase 5/25/22  - Sputum Culture 5/26/22: Negative   - Pan Cx 5/27/22: NGTD   - Covid-19 & Flu 5/27/22: Negative    - RR Viral Panel 5/28/22: Parainfluenza 3 Virus       Abx history:   - Cefepime Day (5/28/22 -5/29/22)   - Levaquin ppx (5/29/22 - 5/31/22)    4.  Heme: Anemia from chemotherapy   - Transfuse for Hgb < 7 and Platelets < 01L  - No transfusion today     5.  Metabolic: hypoNa .    TLS:  No evidence, cont allopurinol (D/C 6/2/22) and daily TLS labs   - Cont IVF hydration: 1 L NS daily in OP Infusion  - Replace potassium and magnesium per policy.     6. Cardiac: Overnight developed atrial fibrillation with rapid ventricular response 5/28/22  - Consult Cardiology  - Start Metoprolol 12.5 mg BID: Increase metoprolol 12.5 mg to every 6 hours (5/28/22). Change to Metoprolol 25 mg BID 5/29/22 per Cardiology   - per cardiology no indication for [facility] at this time      7. Nutrition:  Appetite and oral intake is good. - S/p taking Astragalus supplement at home: Stop 5/18/22 d/t interaction with Cytoxan  - Cont low microbial diet   - Follow closely with dietary    - Disposition: Return daily to OP Infusion for labs, MD visit and CAR-T assessment. The patient was seen and examined by Dr. Meagan Gutierrez.       JOAQUIM Mooney - ADELE

## 2022-06-02 NOTE — PROGRESS NOTES
Short Stay Communication Note  Valdez Vieira  Diagnosis: DLBC NHL  Primary MD: Dr. Valdez Sep  Treatment: Melphalan Fludarabine/Cytoxan  Day +10 of Car-T Karol  Pt seen in outpatient infusion today. Labs drawn and reviewed. CBC:   Recent Labs     05/31/22  0409 06/01/22  0409 06/02/22  0907   WBC 2.3* 2.4* 2.6*   HGB 8.3* 8.7* 9.1*   HCT 24.2* 24.8* 26.4*   MCV 95.1 93.5 95.1   * 145 155     BMP/Mag:  Recent Labs     05/31/22  0409 06/01/22  0409 06/02/22  0907   * 139 138   K 3.9 4.0 3.7    105 102   CO2 22 22 25   PHOS 3.2 3.5 2.7   BUN 14 15 14   CREATININE 1.0 1.0 1.0   MG 1.90 1.90  --      Standing parameters for replacement for this patient:   1 unit of pack red blood cells for a hemoglobin < or equal to 7.0  1 pack of platelets for a platelet count less than or equal to 10  40 MeQ of Potassium administered for a potassium level less than or equal to 3.4  4g of Magnesium Sulfate for a magnesum level less than or equal to 1.4  No transfusions required for the above lab values. Urinalysis last done: 5/28/22 Urinalysis next due: 6/6/22    Chest X-Ray last done: 5/30/22 Chest X-Ray next due: 6/6/22    Symptoms addressed and reported to care team this date:  cough  Treatments this date: IV Fluids    Reviewed medication schedule with pt and caregiver. Both able to verbalize all medications and schedule. Pt to be seen again tomorrow. Discharged ambulatory to home.

## 2022-06-03 ENCOUNTER — HOSPITAL ENCOUNTER (OUTPATIENT)
Dept: ONCOLOGY | Age: 82
Setting detail: INFUSION SERIES
Discharge: HOME | End: 2022-06-03
Payer: MEDICARE

## 2022-06-03 VITALS
SYSTOLIC BLOOD PRESSURE: 135 MMHG | HEART RATE: 67 BPM | RESPIRATION RATE: 18 BRPM | OXYGEN SATURATION: 98 % | WEIGHT: 178.35 LBS | BODY MASS INDEX: 24.19 KG/M2 | TEMPERATURE: 97.8 F | DIASTOLIC BLOOD PRESSURE: 71 MMHG

## 2022-06-03 DIAGNOSIS — C83.30 DIFFUSE LARGE B-CELL LYMPHOMA, UNSPECIFIED BODY REGION (HCC): Primary | ICD-10-CM

## 2022-06-03 LAB
ALBUMIN SERPL-MCNC: 3.6 G/DL (ref 3.4–5)
ALP BLD-CCNC: 72 U/L (ref 40–129)
ALT SERPL-CCNC: 28 U/L (ref 10–40)
ANION GAP SERPL CALCULATED.3IONS-SCNC: 11 MMOL/L (ref 3–16)
AST SERPL-CCNC: 33 U/L (ref 15–37)
BASOPHILS ABSOLUTE: 0 K/UL (ref 0–0.2)
BASOPHILS RELATIVE PERCENT: 0 %
BILIRUB SERPL-MCNC: <0.2 MG/DL (ref 0–1)
BILIRUBIN DIRECT: <0.2 MG/DL (ref 0–0.3)
BILIRUBIN URINE: NEGATIVE
BILIRUBIN, INDIRECT: ABNORMAL MG/DL (ref 0–1)
BLOOD, URINE: NEGATIVE
BUN BLDV-MCNC: 14 MG/DL (ref 7–20)
C-REACTIVE PROTEIN: 29.1 MG/L (ref 0–5.1)
CALCIUM SERPL-MCNC: 8.8 MG/DL (ref 8.3–10.6)
CHLORIDE BLD-SCNC: 105 MMOL/L (ref 99–110)
CLARITY: CLEAR
CO2: 25 MMOL/L (ref 21–32)
COLOR: YELLOW
CREAT SERPL-MCNC: 1 MG/DL (ref 0.8–1.3)
EOSINOPHILS ABSOLUTE: 0.2 K/UL (ref 0–0.6)
EOSINOPHILS RELATIVE PERCENT: 11 %
FERRITIN: 669.1 NG/ML (ref 30–400)
GFR AFRICAN AMERICAN: >60
GFR NON-AFRICAN AMERICAN: >60
GLUCOSE BLD-MCNC: 162 MG/DL (ref 70–99)
GLUCOSE URINE: NEGATIVE MG/DL
HCT VFR BLD CALC: 26.2 % (ref 40.5–52.5)
HEMOGLOBIN: 9 G/DL (ref 13.5–17.5)
KETONES, URINE: NEGATIVE MG/DL
LACTATE DEHYDROGENASE: 220 U/L (ref 100–190)
LEUKOCYTE ESTERASE, URINE: NEGATIVE
LYMPHOCYTES ABSOLUTE: 0.2 K/UL (ref 1–5.1)
LYMPHOCYTES RELATIVE PERCENT: 10 %
MAGNESIUM: 1.9 MG/DL (ref 1.8–2.4)
MCH RBC QN AUTO: 32.9 PG (ref 26–34)
MCHC RBC AUTO-ENTMCNC: 34.2 G/DL (ref 31–36)
MCV RBC AUTO: 96.2 FL (ref 80–100)
MICROSCOPIC EXAMINATION: NORMAL
MONOCYTES ABSOLUTE: 0.7 K/UL (ref 0–1.3)
MONOCYTES RELATIVE PERCENT: 35 %
NEUTROPHILS ABSOLUTE: 0.8 K/UL (ref 1.7–7.7)
NEUTROPHILS RELATIVE PERCENT: 44 %
NITRITE, URINE: NEGATIVE
PDW BLD-RTO: 14.8 % (ref 12.4–15.4)
PH UA: 6 (ref 5–8)
PHOSPHORUS: 2.7 MG/DL (ref 2.5–4.9)
PLATELET # BLD: 151 K/UL (ref 135–450)
PMV BLD AUTO: 9.2 FL (ref 5–10.5)
POTASSIUM SERPL-SCNC: 4.2 MMOL/L (ref 3.5–5.1)
PROTEIN UA: NEGATIVE MG/DL
RBC # BLD: 2.72 M/UL (ref 4.2–5.9)
SODIUM BLD-SCNC: 141 MMOL/L (ref 136–145)
SPECIFIC GRAVITY UA: 1.01 (ref 1–1.03)
TOTAL PROTEIN: 6.1 G/DL (ref 6.4–8.2)
URIC ACID, SERUM: 3.5 MG/DL (ref 3.5–7.2)
URINE TYPE: NORMAL
UROBILINOGEN, URINE: 0.2 E.U./DL
WBC # BLD: 1.9 K/UL (ref 4–11)

## 2022-06-03 PROCEDURE — 83615 LACTATE (LD) (LDH) ENZYME: CPT

## 2022-06-03 PROCEDURE — 83735 ASSAY OF MAGNESIUM: CPT

## 2022-06-03 PROCEDURE — 96372 THER/PROPH/DIAG INJ SC/IM: CPT

## 2022-06-03 PROCEDURE — 85025 COMPLETE CBC W/AUTO DIFF WBC: CPT

## 2022-06-03 PROCEDURE — 84100 ASSAY OF PHOSPHORUS: CPT

## 2022-06-03 PROCEDURE — 86140 C-REACTIVE PROTEIN: CPT

## 2022-06-03 PROCEDURE — 80048 BASIC METABOLIC PNL TOTAL CA: CPT

## 2022-06-03 PROCEDURE — 80076 HEPATIC FUNCTION PANEL: CPT

## 2022-06-03 PROCEDURE — 96360 HYDRATION IV INFUSION INIT: CPT

## 2022-06-03 PROCEDURE — 36592 COLLECT BLOOD FROM PICC: CPT

## 2022-06-03 PROCEDURE — 2580000003 HC RX 258: Performed by: NURSE PRACTITIONER

## 2022-06-03 PROCEDURE — 84550 ASSAY OF BLOOD/URIC ACID: CPT

## 2022-06-03 PROCEDURE — 81003 URINALYSIS AUTO W/O SCOPE: CPT

## 2022-06-03 PROCEDURE — 96361 HYDRATE IV INFUSION ADD-ON: CPT

## 2022-06-03 PROCEDURE — 6360000002 HC RX W HCPCS: Performed by: NURSE PRACTITIONER

## 2022-06-03 PROCEDURE — 82728 ASSAY OF FERRITIN: CPT

## 2022-06-03 RX ORDER — POTASSIUM CHLORIDE 20 MEQ/1
40 TABLET, EXTENDED RELEASE ORAL PRN
Status: DISCONTINUED | OUTPATIENT
Start: 2022-06-03 | End: 2022-06-04 | Stop reason: HOSPADM

## 2022-06-03 RX ORDER — MAGNESIUM SULFATE IN WATER 40 MG/ML
4000 INJECTION, SOLUTION INTRAVENOUS PRN
Status: DISCONTINUED | OUTPATIENT
Start: 2022-06-03 | End: 2022-06-04 | Stop reason: HOSPADM

## 2022-06-03 RX ORDER — DIMETHICONE, CAMPHOR (SYNTHETIC), MENTHOL, AND PHENOL 1.1; .5; .625; .5 G/100G; G/100G; G/100G; G/100G
OINTMENT TOPICAL PRN
Status: DISCONTINUED | OUTPATIENT
Start: 2022-06-03 | End: 2022-06-04 | Stop reason: HOSPADM

## 2022-06-03 RX ORDER — ONDANSETRON 2 MG/ML
8 INJECTION INTRAMUSCULAR; INTRAVENOUS ONCE
Status: CANCELLED | OUTPATIENT
Start: 2022-06-04 | End: 2022-06-04

## 2022-06-03 RX ORDER — OXYCODONE HYDROCHLORIDE 5 MG/1
10 TABLET ORAL EVERY 4 HOURS PRN
Status: CANCELLED | OUTPATIENT
Start: 2022-06-04

## 2022-06-03 RX ORDER — ONDANSETRON 4 MG/1
8 TABLET, FILM COATED ORAL ONCE
Status: DISCONTINUED | OUTPATIENT
Start: 2022-06-03 | End: 2022-06-04 | Stop reason: HOSPADM

## 2022-06-03 RX ORDER — HEPARIN SODIUM (PORCINE) LOCK FLUSH IV SOLN 100 UNIT/ML 100 UNIT/ML
500 SOLUTION INTRAVENOUS PRN
Status: DISCONTINUED | OUTPATIENT
Start: 2022-06-03 | End: 2022-06-04 | Stop reason: HOSPADM

## 2022-06-03 RX ORDER — 0.9 % SODIUM CHLORIDE 0.9 %
1000 INTRAVENOUS SOLUTION INTRAVENOUS ONCE
Status: CANCELLED | OUTPATIENT
Start: 2022-06-04 | End: 2022-06-04

## 2022-06-03 RX ORDER — POTASSIUM CHLORIDE 29.8 MG/ML
80 INJECTION INTRAVENOUS PRN
Status: CANCELLED | OUTPATIENT
Start: 2022-06-04

## 2022-06-03 RX ORDER — SODIUM CHLORIDE 9 MG/ML
INJECTION, SOLUTION INTRAVENOUS ONCE
Status: CANCELLED | OUTPATIENT
Start: 2022-06-04 | End: 2022-06-04

## 2022-06-03 RX ORDER — PROCHLORPERAZINE EDISYLATE 5 MG/ML
10 INJECTION INTRAMUSCULAR; INTRAVENOUS EVERY 6 HOURS PRN
Status: DISCONTINUED | OUTPATIENT
Start: 2022-06-03 | End: 2022-06-04 | Stop reason: HOSPADM

## 2022-06-03 RX ORDER — PETROLATUM, MENTHOL, UNSPECIFIED FORM, CAMPHOR (SYNTHETIC), AND PHENOL 59.14; 1; 1; .6 G/100G; G/100G; G/100G; G/100G
PASTE TOPICAL PRN
Status: CANCELLED | OUTPATIENT
Start: 2022-06-04

## 2022-06-03 RX ORDER — ONDANSETRON 2 MG/ML
8 INJECTION INTRAMUSCULAR; INTRAVENOUS ONCE
Status: DISCONTINUED | OUTPATIENT
Start: 2022-06-03 | End: 2022-06-04 | Stop reason: HOSPADM

## 2022-06-03 RX ORDER — SODIUM CHLORIDE 9 MG/ML
INJECTION, SOLUTION INTRAVENOUS ONCE
Status: DISCONTINUED | OUTPATIENT
Start: 2022-06-03 | End: 2022-06-04 | Stop reason: HOSPADM

## 2022-06-03 RX ORDER — ONDANSETRON 4 MG/1
8 TABLET, FILM COATED ORAL ONCE
Status: CANCELLED | OUTPATIENT
Start: 2022-06-04 | End: 2022-06-04

## 2022-06-03 RX ORDER — MAGNESIUM SULFATE IN WATER 40 MG/ML
4000 INJECTION, SOLUTION INTRAVENOUS PRN
Status: CANCELLED | OUTPATIENT
Start: 2022-06-04

## 2022-06-03 RX ORDER — HEPARIN SODIUM (PORCINE) LOCK FLUSH IV SOLN 100 UNIT/ML 100 UNIT/ML
500 SOLUTION INTRAVENOUS PRN
Status: CANCELLED | OUTPATIENT
Start: 2022-06-04

## 2022-06-03 RX ORDER — PROCHLORPERAZINE MALEATE 10 MG
10 TABLET ORAL EVERY 6 HOURS PRN
Status: CANCELLED | OUTPATIENT
Start: 2022-06-04

## 2022-06-03 RX ORDER — OXYCODONE HYDROCHLORIDE 5 MG/1
10 TABLET ORAL EVERY 4 HOURS PRN
Status: DISCONTINUED | OUTPATIENT
Start: 2022-06-03 | End: 2022-06-04 | Stop reason: HOSPADM

## 2022-06-03 RX ORDER — OXYCODONE HYDROCHLORIDE 5 MG/1
5 TABLET ORAL EVERY 4 HOURS PRN
Status: CANCELLED | OUTPATIENT
Start: 2022-06-04

## 2022-06-03 RX ORDER — POTASSIUM CHLORIDE 20 MEQ/1
40 TABLET, EXTENDED RELEASE ORAL PRN
Status: CANCELLED | OUTPATIENT
Start: 2022-06-04

## 2022-06-03 RX ORDER — PROCHLORPERAZINE MALEATE 10 MG
10 TABLET ORAL EVERY 6 HOURS PRN
Status: DISCONTINUED | OUTPATIENT
Start: 2022-06-03 | End: 2022-06-04 | Stop reason: HOSPADM

## 2022-06-03 RX ORDER — POTASSIUM CHLORIDE 29.8 MG/ML
80 INJECTION INTRAVENOUS PRN
Status: DISCONTINUED | OUTPATIENT
Start: 2022-06-03 | End: 2022-06-04 | Stop reason: HOSPADM

## 2022-06-03 RX ORDER — PROCHLORPERAZINE EDISYLATE 5 MG/ML
10 INJECTION INTRAMUSCULAR; INTRAVENOUS EVERY 6 HOURS PRN
Status: CANCELLED | OUTPATIENT
Start: 2022-06-04

## 2022-06-03 RX ORDER — 0.9 % SODIUM CHLORIDE 0.9 %
1000 INTRAVENOUS SOLUTION INTRAVENOUS ONCE
Status: COMPLETED | OUTPATIENT
Start: 2022-06-03 | End: 2022-06-03

## 2022-06-03 RX ORDER — OXYCODONE HYDROCHLORIDE 5 MG/1
5 TABLET ORAL EVERY 4 HOURS PRN
Status: DISCONTINUED | OUTPATIENT
Start: 2022-06-03 | End: 2022-06-04 | Stop reason: HOSPADM

## 2022-06-03 RX ADMIN — FILGRASTIM-AAFI 300 MCG: 480 INJECTION, SOLUTION SUBCUTANEOUS at 10:58

## 2022-06-03 RX ADMIN — SODIUM CHLORIDE 1000 ML: 9 INJECTION, SOLUTION INTRAVENOUS at 08:52

## 2022-06-03 RX ADMIN — SODIUM CHLORIDE, PRESERVATIVE FREE 500 UNITS: 5 INJECTION INTRAVENOUS at 10:58

## 2022-06-03 NOTE — PROGRESS NOTES
[facility] Progress Note      6/3/2022    Twin Record    :  1940    MRN:  [id number]    Referring MD: Cortes Pratt MD  [address], [place] [address],  [address]      Subjective: Patient feeling well today, cough improving, denies fevers &  confusion. Energy improving, eating and drinking well, no complaints at this time. ECOG PS:  (2) Ambulatory and capable of self care, unable to carry out work activity, up and about > 50% or waking hours    KPS: 80% Normal activity with effort; some signs or symptoms of disease    Isolation:  None     Medications    Scheduled Meds:  Continuous Infusions:  PRN Meds:. ROS:  As noted above, otherwise remainder of 10-point ROS negative      Physical Exam:     Vital Signs:  /71   Pulse 67   Temp 97.8 °F (36.6 °C) (Oral)   Resp 18   Wt 178 lb 5.6 oz (80.9 kg)   SpO2 98%   BMI 24.19 kg/m²     Weight:    Wt Readings from Last 3 Encounters:   22 178 lb 5.6 oz (80.9 kg)   22 175 lb 7.8 oz (79.6 kg)   22 180 lb 6 oz (81.8 kg)       General: Awake, alert and oriented    HEENT: normocephalic, alopecia, PERRL, no scleral erythema or icterus, Oral mucosa moist and intact, throat clear.    NECK: supple without palpable adenopathy  BACK: Straight, negative CVAT  SKIN: warm dry and intact without lesions rashes or masses  CHEST: CTA bilaterally without use of accessory muscles  CV: Normal S1 S2, RRR, no MRG  ABD: NT ND normoactive BS, no palpable masses or hepatosplenomegaly  EXTREMITIES: without edema, denies calf tenderness  NEURO: CN II - XII grossly intact  CATHETER: RIJ Trifusion (IR: Jermaine, 4/15/22): CDI      Laboratory Data:  CBC:   Recent Labs     22  0409 22  0907   WBC 2.4* 2.6*   HGB 8.7* 9.1*   HCT 24.8* 26.4*   MCV 93.5 95.1    155     BMP/Mag:  Recent Labs     22  0409 22  0907 22  0850    138 141   K 4.0 3.7 4.2    102 105   CO2 22 25 25   PHOS 3.5 2.7 2.7 BUN 15 14 14   CREATININE 1.0 1.0 1.0   MG 1.90  --  1.90     LIVP:   Recent Labs     06/01/22  0409 06/03/22  0850   AST 32 33   ALT 30 28   BILIDIR <0.2 <0.2   BILITOT <0.2 <0.2   ALKPHOS 69 72     Coags:   Recent Labs     06/01/22  0409 06/02/22  0907   PROTIME 15.1* 14.6*   INR 1.20* 1.14   APTT 43.9*  --      Uric Acid   Recent Labs     06/01/22  0409 06/03/22  0850   LABURIC 3.7 3.5     Lab Results   Component Value Date    CRP 29.1 (H) 06/03/2022    CRP 46.6 (H) 06/02/2022    CRP 33.4 (H) 06/01/2022     Lab Results   Component Value Date    FERRITIN 669.1 (H) 06/03/2022    FERRITIN 841.1 (H) 06/02/2022    FERRITIN 856.2 (H) 06/01/2022         PROBLEM LIST:            1. Follicular lymphoma with BM involvement  2. DLBC NHL      TREATMENT:            1. FL: 1/1995 CHOP             11/2003 to 8/2004 Rituxan             7/2007 R-CHOP  2. DLBCL: R-Phillips/Ox C1 1/31/2022, C2 02/14/2022                     Chip-BR C1D1: 3/22/22                                    C2D1: 4/20/22        Lymphodepleting Chemotherapy:  Fludarabine and cyclophosphamide   Disease Status at time of Infusion:  Progressive Disease  CAR-T Infusion Date: 5/23/22  CAR-T Product:  Breyanzi  Batch ID Number:  [id number]        ASSESSMENT AND PLAN:            1.  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  Left ventricular ejection fraction 55 to 60% with no regional wall motion abnormalities noted.  Intermittent diastolic dysfunction.  - PFTs (3/21/22): Diffusion capacity 79%, FEV1 78%.  Consistent with mild restrictive ventilatory defect.   - CMI -  3 (age and pulmonary)  - PET (5/13/22):  Multiple indeterminate pulmonary nodules in the right lung which demonstrates increased activity in the right upper lobe. These may be infectious. These were not described on the previous PET/CT. Pulmonary lymphoma would be difficult to exclude. If the pulmonary nodule represents lymphoma, this would represent a Deauville score 5 exam. If this is infectious, overall the exam would represent a Deauville score 1. Previously described lymphadenopathy in FDG activity in the left retroperitoneum, left iliac region and in left upper thigh has completely resolved.  No residual lymphadenopathy or FDG avid lymph node identified      Lymphodepleting Chemotherapy:  Fludarabine and cyclophosphamide   Disease Status at time of Infusion:  Progressive Disease  CAR-T Infusion Date: 5/23/22  CAR-T Product:  Breyanzi  Batch ID Number:  [id number]     Day + 11     2.  CRS / Neuro:  Admitted with Grade 1 CRS (fever only) , now resolved   - Monitor CRP and Ferrtin closely             Lab Results   Component Value Date     CRP 22.2 (H) 03/21/2022                Lab Results   Component Value Date     FERRITIN 551.3 (H) 03/21/2022   - Start Keppra 500 mg BID on Monday, 5/23/22  - Neuro checks w/ CARTOX 10-point assessment Q4hrs     Toxicity Grading        CRS Grade: Grade 1 CRS (fever only): Resolved      ICANS Grade:  N/A     CAR-T Score: 10/10     3.  ID: Recently admitted with NF likely d/t CRS, Dry Cough d/t allergies: Exacerbated 5/25/22, new Wheezing throughout 5/27/22: resolved   - CXR & UA 5/23/22: Negative      - CXR 5/27/22: Negative    - CXR 5/30/22: MINIMAL BIBASILAR AIRSPACE DISEASE  - Contine Acyclovir ppx  - Restart Levaquin and Diflucan ppx if ANC < 1.5  - Cont daily Zyrtec   - Cont daily Flonase 5/25/22  - Sputum Culture 5/26/22: Negative   - Pan Cx 5/27/22: NGTD   - Covid-19 & Flu 5/27/22: Negative    - RR Viral Panel 5/28/22: Parainfluenza 3 Virus       Abx history:   - Cefepime Day (5/28/22 -5/29/22)   - Levaquin ppx (5/29/22 - 5/31/22)    4.  Heme: Anemia from chemotherapy   - Transfuse for Hgb < 7 and Platelets < 58F  - No transfusion today     5.  Metabolic: hypoNa .    TLS:  No evidence, cont allopurinol (D/C 6/2/22) and daily TLS labs   - Cont IVF hydration: 1 L NS daily in OP Infusion  - Replace potassium and magnesium per policy.     6. Cardiac: Overnight developed atrial fibrillation with rapid ventricular response 5/28/22  - Consult Cardiology  - Start Metoprolol 12.5 mg BID: Increase metoprolol 12.5 mg to every 6 hours (5/28/22). Change to Metoprolol 25 mg BID 5/29/22 per Cardiology   - per cardiology no indication for [facility] at this time      7. Nutrition:  Appetite and oral intake is good. - S/p taking Astragalus supplement at home: Stop 5/18/22 d/t interaction with Cytoxan  - Cont low microbial diet   - Follow closely with dietary    - Disposition: Return Sunday, 6/5/22, to OP Infusion for labs, MD visit and CAR-T assessment. The patient was seen and examined by Dr. Alberto Turner.       JOAQUIM Jeffries - ADELE

## 2022-06-03 NOTE — PROGRESS NOTES
Pt reports that port has not been flushed since January 2022. Port flushed with brisk blood return. Pt de-accessed.

## 2022-06-03 NOTE — PROGRESS NOTES
Short Stay Communication Note  Ngoc Neff  Diagnosis: DLBC NHL  Primary MD: Dr. Maryanne Mcbride  Treatment: Melphalan Fludarabine/Cytoxan  Day +11 of Car-T Karol  Pt seen in outpatient infusion today. Labs drawn and reviewed. CBC:   Recent Labs     06/01/22  0409 06/02/22  0907 06/03/22  0850   WBC 2.4* 2.6* 1.9*   HGB 8.7* 9.1* 9.0*   HCT 24.8* 26.4* 26.2*   MCV 93.5 95.1 96.2    155 151     BMP/Mag:  Recent Labs     06/01/22  0409 06/02/22  0907 06/03/22  0850    138 141   K 4.0 3.7 4.2    102 105   CO2 22 25 25   PHOS 3.5 2.7 2.7   BUN 15 14 14   CREATININE 1.0 1.0 1.0   MG 1.90  --  1.90     Standing parameters for replacement for this patient:   1 unit of pack red blood cells for a hemoglobin < or equal to 7.0  1 pack of platelets for a platelet count less than or equal to 10  40 MeQ of Potassium administered for a potassium level less than or equal to 3.4  4g of Magnesium Sulfate for a magnesum level less than or equal to 1.4  No transfusions required for the above lab values. Urinalysis last done: 6/3//22 Urinalysis next due: 6/6/22    Chest X-Ray last done: 5/30/22 Chest X-Ray next due: 6/6/22    Symptoms addressed and reported to care team this date:  cough  Treatments this date: IV Fluids and Granix    Reviewed medication schedule with pt and caregiver. Both able to verbalize all medications and schedule. Pt to be seen again Sunday. Discharged ambulatory to home.

## 2022-06-05 ENCOUNTER — HOSPITAL ENCOUNTER (OUTPATIENT)
Dept: ONCOLOGY | Age: 82
Setting detail: INFUSION SERIES
Discharge: HOME | End: 2022-06-05
Payer: MEDICARE

## 2022-06-05 VITALS
SYSTOLIC BLOOD PRESSURE: 145 MMHG | HEART RATE: 75 BPM | BODY MASS INDEX: 24.19 KG/M2 | OXYGEN SATURATION: 100 % | TEMPERATURE: 98.2 F | WEIGHT: 178.35 LBS | DIASTOLIC BLOOD PRESSURE: 73 MMHG | RESPIRATION RATE: 18 BRPM

## 2022-06-05 DIAGNOSIS — C83.30 DIFFUSE LARGE B-CELL LYMPHOMA, UNSPECIFIED BODY REGION (HCC): Primary | ICD-10-CM

## 2022-06-05 LAB
ANION GAP SERPL CALCULATED.3IONS-SCNC: 11 MMOL/L (ref 3–16)
BASOPHILS ABSOLUTE: 0 K/UL (ref 0–0.2)
BASOPHILS RELATIVE PERCENT: 0.2 %
BILIRUBIN URINE: NEGATIVE
BLOOD, URINE: NEGATIVE
BUN BLDV-MCNC: 15 MG/DL (ref 7–20)
C-REACTIVE PROTEIN: 9.7 MG/L (ref 0–5.1)
CALCIUM SERPL-MCNC: 8.8 MG/DL (ref 8.3–10.6)
CHLORIDE BLD-SCNC: 102 MMOL/L (ref 99–110)
CLARITY: CLEAR
CO2: 25 MMOL/L (ref 21–32)
COLOR: YELLOW
CREAT SERPL-MCNC: 1 MG/DL (ref 0.8–1.3)
EOSINOPHILS ABSOLUTE: 0.3 K/UL (ref 0–0.6)
EOSINOPHILS RELATIVE PERCENT: 3.3 %
FERRITIN: 587.7 NG/ML (ref 30–400)
GFR AFRICAN AMERICAN: >60
GFR NON-AFRICAN AMERICAN: >60
GLUCOSE BLD-MCNC: 177 MG/DL (ref 70–99)
GLUCOSE URINE: NEGATIVE MG/DL
HCT VFR BLD CALC: 27.8 % (ref 40.5–52.5)
HEMOGLOBIN: 9.6 G/DL (ref 13.5–17.5)
KETONES, URINE: NEGATIVE MG/DL
LEUKOCYTE ESTERASE, URINE: NEGATIVE
LYMPHOCYTES ABSOLUTE: 1.2 K/UL (ref 1–5.1)
LYMPHOCYTES RELATIVE PERCENT: 13.2 %
MCH RBC QN AUTO: 33.2 PG (ref 26–34)
MCHC RBC AUTO-ENTMCNC: 34.5 G/DL (ref 31–36)
MCV RBC AUTO: 96.4 FL (ref 80–100)
MICROSCOPIC EXAMINATION: NORMAL
MONOCYTES ABSOLUTE: 0.7 K/UL (ref 0–1.3)
MONOCYTES RELATIVE PERCENT: 8 %
NEUTROPHILS ABSOLUTE: 7 K/UL (ref 1.7–7.7)
NEUTROPHILS RELATIVE PERCENT: 75.3 %
NITRITE, URINE: NEGATIVE
PDW BLD-RTO: 15 % (ref 12.4–15.4)
PH UA: 6 (ref 5–8)
PHOSPHORUS: 2.6 MG/DL (ref 2.5–4.9)
PLATELET # BLD: 174 K/UL (ref 135–450)
PMV BLD AUTO: 9.3 FL (ref 5–10.5)
POTASSIUM SERPL-SCNC: 3.6 MMOL/L (ref 3.5–5.1)
PROTEIN UA: NEGATIVE MG/DL
RBC # BLD: 2.89 M/UL (ref 4.2–5.9)
SODIUM BLD-SCNC: 138 MMOL/L (ref 136–145)
SPECIFIC GRAVITY UA: 1.02 (ref 1–1.03)
URINE TYPE: NORMAL
UROBILINOGEN, URINE: 0.2 E.U./DL
WBC # BLD: 9.3 K/UL (ref 4–11)

## 2022-06-05 PROCEDURE — 86140 C-REACTIVE PROTEIN: CPT

## 2022-06-05 PROCEDURE — 80048 BASIC METABOLIC PNL TOTAL CA: CPT

## 2022-06-05 PROCEDURE — 2580000003 HC RX 258: Performed by: NURSE PRACTITIONER

## 2022-06-05 PROCEDURE — 81003 URINALYSIS AUTO W/O SCOPE: CPT

## 2022-06-05 PROCEDURE — 96361 HYDRATE IV INFUSION ADD-ON: CPT

## 2022-06-05 PROCEDURE — 6360000002 HC RX W HCPCS: Performed by: NURSE PRACTITIONER

## 2022-06-05 PROCEDURE — 96360 HYDRATION IV INFUSION INIT: CPT

## 2022-06-05 PROCEDURE — 82728 ASSAY OF FERRITIN: CPT

## 2022-06-05 PROCEDURE — 36592 COLLECT BLOOD FROM PICC: CPT

## 2022-06-05 PROCEDURE — 84100 ASSAY OF PHOSPHORUS: CPT

## 2022-06-05 PROCEDURE — 85025 COMPLETE CBC W/AUTO DIFF WBC: CPT

## 2022-06-05 RX ORDER — HEPARIN SODIUM (PORCINE) LOCK FLUSH IV SOLN 100 UNIT/ML 100 UNIT/ML
500 SOLUTION INTRAVENOUS PRN
Status: DISCONTINUED | OUTPATIENT
Start: 2022-06-05 | End: 2022-06-06 | Stop reason: HOSPADM

## 2022-06-05 RX ORDER — MAGNESIUM SULFATE IN WATER 40 MG/ML
4000 INJECTION, SOLUTION INTRAVENOUS PRN
Status: DISCONTINUED | OUTPATIENT
Start: 2022-06-05 | End: 2022-06-06 | Stop reason: HOSPADM

## 2022-06-05 RX ORDER — ONDANSETRON 4 MG/1
8 TABLET, FILM COATED ORAL ONCE
Status: DISCONTINUED | OUTPATIENT
Start: 2022-06-05 | End: 2022-06-06 | Stop reason: HOSPADM

## 2022-06-05 RX ORDER — POTASSIUM CHLORIDE 29.8 MG/ML
80 INJECTION INTRAVENOUS PRN
Status: CANCELLED | OUTPATIENT
Start: 2022-06-06

## 2022-06-05 RX ORDER — OXYCODONE HYDROCHLORIDE 5 MG/1
5 TABLET ORAL EVERY 4 HOURS PRN
Status: CANCELLED | OUTPATIENT
Start: 2022-06-06

## 2022-06-05 RX ORDER — DIMETHICONE, CAMPHOR (SYNTHETIC), MENTHOL, AND PHENOL 1.1; .5; .625; .5 G/100G; G/100G; G/100G; G/100G
OINTMENT TOPICAL PRN
Status: DISCONTINUED | OUTPATIENT
Start: 2022-06-05 | End: 2022-06-06 | Stop reason: HOSPADM

## 2022-06-05 RX ORDER — 0.9 % SODIUM CHLORIDE 0.9 %
1000 INTRAVENOUS SOLUTION INTRAVENOUS ONCE
Status: CANCELLED | OUTPATIENT
Start: 2022-06-06 | End: 2022-06-06

## 2022-06-05 RX ORDER — SODIUM CHLORIDE 9 MG/ML
INJECTION, SOLUTION INTRAVENOUS ONCE
Status: DISCONTINUED | OUTPATIENT
Start: 2022-06-05 | End: 2022-06-06 | Stop reason: HOSPADM

## 2022-06-05 RX ORDER — POTASSIUM CHLORIDE 29.8 MG/ML
80 INJECTION INTRAVENOUS PRN
Status: DISCONTINUED | OUTPATIENT
Start: 2022-06-05 | End: 2022-06-06 | Stop reason: HOSPADM

## 2022-06-05 RX ORDER — MAGNESIUM SULFATE IN WATER 40 MG/ML
4000 INJECTION, SOLUTION INTRAVENOUS PRN
Status: CANCELLED | OUTPATIENT
Start: 2022-06-06

## 2022-06-05 RX ORDER — PROCHLORPERAZINE EDISYLATE 5 MG/ML
10 INJECTION INTRAMUSCULAR; INTRAVENOUS EVERY 6 HOURS PRN
Status: DISCONTINUED | OUTPATIENT
Start: 2022-06-05 | End: 2022-06-06 | Stop reason: HOSPADM

## 2022-06-05 RX ORDER — HEPARIN SODIUM (PORCINE) LOCK FLUSH IV SOLN 100 UNIT/ML 100 UNIT/ML
500 SOLUTION INTRAVENOUS PRN
Status: CANCELLED | OUTPATIENT
Start: 2022-06-06

## 2022-06-05 RX ORDER — 0.9 % SODIUM CHLORIDE 0.9 %
1000 INTRAVENOUS SOLUTION INTRAVENOUS ONCE
Status: COMPLETED | OUTPATIENT
Start: 2022-06-05 | End: 2022-06-05

## 2022-06-05 RX ORDER — PROCHLORPERAZINE EDISYLATE 5 MG/ML
10 INJECTION INTRAMUSCULAR; INTRAVENOUS EVERY 6 HOURS PRN
Status: CANCELLED | OUTPATIENT
Start: 2022-06-06

## 2022-06-05 RX ORDER — OXYCODONE HYDROCHLORIDE 5 MG/1
10 TABLET ORAL EVERY 4 HOURS PRN
Status: DISCONTINUED | OUTPATIENT
Start: 2022-06-05 | End: 2022-06-06 | Stop reason: HOSPADM

## 2022-06-05 RX ORDER — PROCHLORPERAZINE MALEATE 10 MG
10 TABLET ORAL EVERY 6 HOURS PRN
Status: CANCELLED | OUTPATIENT
Start: 2022-06-06

## 2022-06-05 RX ORDER — PROCHLORPERAZINE MALEATE 10 MG
10 TABLET ORAL EVERY 6 HOURS PRN
Status: DISCONTINUED | OUTPATIENT
Start: 2022-06-05 | End: 2022-06-06 | Stop reason: HOSPADM

## 2022-06-05 RX ORDER — ONDANSETRON 2 MG/ML
8 INJECTION INTRAMUSCULAR; INTRAVENOUS ONCE
Status: CANCELLED | OUTPATIENT
Start: 2022-06-06 | End: 2022-06-06

## 2022-06-05 RX ORDER — POTASSIUM CHLORIDE 20 MEQ/1
40 TABLET, EXTENDED RELEASE ORAL PRN
Status: DISCONTINUED | OUTPATIENT
Start: 2022-06-05 | End: 2022-06-06 | Stop reason: HOSPADM

## 2022-06-05 RX ORDER — OXYCODONE HYDROCHLORIDE 5 MG/1
5 TABLET ORAL EVERY 4 HOURS PRN
Status: DISCONTINUED | OUTPATIENT
Start: 2022-06-05 | End: 2022-06-06 | Stop reason: HOSPADM

## 2022-06-05 RX ORDER — SODIUM CHLORIDE 9 MG/ML
INJECTION, SOLUTION INTRAVENOUS ONCE
Status: CANCELLED | OUTPATIENT
Start: 2022-06-06 | End: 2022-06-06

## 2022-06-05 RX ORDER — PETROLATUM, MENTHOL, UNSPECIFIED FORM, CAMPHOR (SYNTHETIC), AND PHENOL 59.14; 1; 1; .6 G/100G; G/100G; G/100G; G/100G
PASTE TOPICAL PRN
Status: CANCELLED | OUTPATIENT
Start: 2022-06-06

## 2022-06-05 RX ORDER — OXYCODONE HYDROCHLORIDE 5 MG/1
10 TABLET ORAL EVERY 4 HOURS PRN
Status: CANCELLED | OUTPATIENT
Start: 2022-06-06

## 2022-06-05 RX ORDER — ONDANSETRON 4 MG/1
8 TABLET, FILM COATED ORAL ONCE
Status: CANCELLED | OUTPATIENT
Start: 2022-06-06 | End: 2022-06-06

## 2022-06-05 RX ORDER — POTASSIUM CHLORIDE 20 MEQ/1
40 TABLET, EXTENDED RELEASE ORAL PRN
Status: CANCELLED | OUTPATIENT
Start: 2022-06-06

## 2022-06-05 RX ORDER — ONDANSETRON 2 MG/ML
8 INJECTION INTRAMUSCULAR; INTRAVENOUS ONCE
Status: DISCONTINUED | OUTPATIENT
Start: 2022-06-05 | End: 2022-06-06 | Stop reason: HOSPADM

## 2022-06-05 RX ADMIN — SODIUM CHLORIDE, PRESERVATIVE FREE 500 UNITS: 5 INJECTION INTRAVENOUS at 10:39

## 2022-06-05 RX ADMIN — SODIUM CHLORIDE 1000 ML: 9 INJECTION, SOLUTION INTRAVENOUS at 08:35

## 2022-06-05 NOTE — PROGRESS NOTES
Short Stay Communication Note  Valdez Vieira  Diagnosis: DLBC NHL  Primary MD: Dr. Valdez Sep  Treatment: Melphalan Fludarabine/Cytoxan  Day +13 of Car-T Karol  Pt seen in outpatient infusion today. Labs drawn and reviewed. CBC:   Recent Labs     06/02/22  0907 06/03/22  0850   WBC 2.6* 1.9*   HGB 9.1* 9.0*   HCT 26.4* 26.2*   MCV 95.1 96.2    151     BMP/Mag:  Recent Labs     06/02/22  0907 06/03/22  0850    141   K 3.7 4.2    105   CO2 25 25   PHOS 2.7 2.7   BUN 14 14   CREATININE 1.0 1.0   MG  --  1.90     Standing parameters for replacement for this patient:   1 unit of pack red blood cells for a hemoglobin < or equal to 7.0  1 pack of platelets for a platelet count less than or equal to 10  40 MeQ of Potassium administered for a potassium level less than or equal to 3.4  4g of Magnesium Sulfate for a magnesum level less than or equal to 1.4  No transfusions required for the above lab values. Urinalysis last done: 6/5/22 Urinalysis next due: 6/12/22    Chest X-Ray last done: 5/30/22 Chest X-Ray next due: 6/6/22    Symptoms addressed and reported to care team this date:  Cough improving  Treatments this date: IV Fluids    Reviewed medication schedule with pt and caregiver. Both able to verbalize all medications and schedule. Pt to be seen again Sunday. Discharged ambulatory to home.

## 2022-06-05 NOTE — PROGRESS NOTES
Short Stay Communication Note  Courtney Scott  Diagnosis: DLBC NHL  Primary MD: Dr. Jennifer Whitaker  Treatment: Melphalan Fludarabine/Cytoxan  Day +13 of Car-T Karol  Pt seen in outpatient infusion today. Labs drawn and reviewed. CBC:   Recent Labs     06/03/22  0850 06/05/22  0833   WBC 1.9* 9.3   HGB 9.0* 9.6*   HCT 26.2* 27.8*   MCV 96.2 96.4    174     BMP/Mag:  Recent Labs     06/03/22  0850 06/05/22  0833    138   K 4.2 3.6    102   CO2 25 25   PHOS 2.7 2.6   BUN 14 15   CREATININE 1.0 1.0   MG 1.90  --      Standing parameters for replacement for this patient:   1 unit of pack red blood cells for a hemoglobin < or equal to 7.0  1 pack of platelets for a platelet count less than or equal to 10  40 MeQ of Potassium administered for a potassium level less than or equal to 3.4  4g of Magnesium Sulfate for a magnesum level less than or equal to 1.4  No transfusions required for the above lab values. Urinalysis last done: 6/5/22 Urinalysis next due: 6/11/22    Chest X-Ray last done: 5/30/22 Chest X-Ray next due: 6/6/22    Symptoms addressed and reported to care team this date:  Cough improving  Treatments this date: IV Fluids     Reviewed medication schedule with pt and caregiver. Both able to verbalize all medications and schedule. Pt to be seen again Tuesday. Per Dr. Rita Lal, dressing not to be changed today, but changed on Tuesday. See flowsheet. Discharged ambulatory to home.

## 2022-06-05 NOTE — PROGRESS NOTES
[facility] [employer] Progress Note      2022    Seema Oliva    :  1940    MRN:  [id number]    Referring MD: Ulysses Fredrickson, MD  [address] [facility] [address], [address],  [address]      Subjective: Yemi Carl is doing well. Denies any fevers. He is feeling well. His cough is much better     ECOG PS:  (2) Ambulatory and capable of self care, unable to carry out work activity, up and about > 50% or waking hours    KPS: 80% Normal activity with effort; some signs or symptoms of disease    Isolation:  None     Medications    Scheduled Meds:  Continuous Infusions:  PRN Meds:. ROS:  As noted above, otherwise remainder of 10-point ROS negative      Physical Exam:     Vital Signs:  BP (!) 145/73   Pulse 75   Temp 98.2 °F (36.8 °C) (Oral)   Resp 18   Wt 178 lb 5.6 oz (80.9 kg)   SpO2 100%   BMI 24.19 kg/m²     Weight:    Wt Readings from Last 3 Encounters:   22 178 lb 5.6 oz (80.9 kg)   22 178 lb 5.6 oz (80.9 kg)   22 175 lb 7.8 oz (79.6 kg)       General: Awake, alert and oriented    HEENT: normocephalic, alopecia, PERRL, no scleral erythema or icterus, Oral mucosa moist and intact, throat clear.    NECK: supple without palpable adenopathy  BACK: Straight, negative CVAT  SKIN: warm dry and intact without lesions rashes or masses  CHEST: CTA bilaterally without use of accessory muscles  CV: Normal S1 S2, RRR, no MRG  ABD: NT ND normoactive BS, no palpable masses or hepatosplenomegaly  EXTREMITIES: without edema, denies calf tenderness  NEURO: CN II - XII grossly intact  CATHETER: RIJ Trifusion (IR: Jermaine, 4/15/22): CDI      Laboratory Data:  CBC:   Recent Labs     22  0850 22  0833   WBC 1.9* 9.3   HGB 9.0* 9.6*   HCT 26.2* 27.8*   MCV 96.2 96.4    174     BMP/Mag:  Recent Labs     22  0850 22  0833    138   K 4.2 3.6    102   CO2 25 25   PHOS 2.7 2.6   BUN 14 15   CREATININE 1.0 1.0   MG 1.90  --      LIVP:   Recent Labs 06/03/22  0850   AST 33   ALT 28   BILIDIR <0.2   BILITOT <0.2   ALKPHOS 72     Coags:   No results for input(s): PROTIME, INR, APTT in the last 72 hours. Uric Acid   Recent Labs     06/03/22  0850   LABURIC 3.5     Lab Results   Component Value Date    CRP 9.7 (H) 06/05/2022    CRP 29.1 (H) 06/03/2022    CRP 46.6 (H) 06/02/2022     Lab Results   Component Value Date    FERRITIN 587.7 (H) 06/05/2022    FERRITIN 669.1 (H) 06/03/2022    FERRITIN 841.1 (H) 06/02/2022         PROBLEM LIST:            1. Follicular lymphoma with BM involvement  2. DLBC NHL      TREATMENT:            1. FL: 1/1995 CH[facility]             11/2003 to 8/2004 Rituxan             7/2007 R-CH[facility]  2. DLBCL: R-Boyd/Ox C1 1/31/2022, C2 02/14/2022                     Chip-BR C1D1: 3/22/22                                    C2D1: 4/20/22        Lymphodepleting Chemotherapy:  Fludarabine and cyclophosphamide   Disease Status at time of Infusion:  Progressive Disease  CAR-T Infusion Date: 5/23/22  CAR-T Product:  Breyanzi  Batch ID Number:  [id number]     ASSESSMENT AND PLAN:            1.  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  Left ventricular ejection fraction 55 to 60% with no regional wall motion abnormalities noted.  Intermittent diastolic dysfunction.  - PFTs (3/21/22): Diffusion capacity 79%, FEV1 78%.  Consistent with mild restrictive ventilatory defect. - CMI -  3 (age and pulmonary)  - PET (5/13/22):  Multiple indeterminate pulmonary nodules in the right lung which demonstrates increased activity in the right upper lobe. These may be infectious. These were not described on the previous PET/CT. Pulmonary lymphoma would be difficult to exclude. If the pulmonary nodule represents lymphoma, this would represent a Deauville score 5 exam. If this is infectious, overall the exam would represent a Deauville score 1. Previously described lymphadenopathy in FDG activity in the left retroperitoneum, left iliac region and in left upper thigh has completely resolved.  No residual lymphadenopathy or FDG avid lymph node identified      Lymphodepleting Chemotherapy:  Fludarabine and cyclophosphamide   Disease Status at time of Infusion:  Progressive Disease  CAR-T Infusion Date: 5/23/22  CAR-T Product:  Breyanzi  Batch ID Number:  [id number]     Day + 13     2.  CRS / Neuro:  Admitted with Grade 1 CRS (fever only) , now resolved   - Monitor CRP and Ferrtin closely   Lab Results   Component Value Date    CRP 9.7 (H) 06/05/2022    CRP 29.1 (H) 06/03/2022    CRP 46.6 (H) 06/02/2022     Lab Results   Component Value Date    FERRITIN 587.7 (H) 06/05/2022    FERRITIN 669.1 (H) 06/03/2022    FERRITIN 841.1 (H) 06/02/2022     - Start Keppra 500 mg BID on Monday, 5/23/22  - Neuro checks w/ CARTOX 10-point assessment Q4hrs     Toxicity Grading        CRS Grade: Grade 1 CRS (fever only): Resolved      ICANS Grade:  N/A     ICE Score: 10/10     3.  ID: Recently admitted with NF likely d/t CRS, Dry Cough d/t allergies: Exacerbated 5/25/22, new Wheezing throughout 5/27/22: Daun Allis  - CXR & UA 5/23/22: Negative - CXR 5/27/22: Negative - CXR 5/30/22: MINIMAL BIBASILAR AIRSPACE DISEASE  - Contine Acyclovir ppx  - Restart Levaquin and Diflucan ppx if ANC < 1.5  - Cont daily Zyrtec   - Cont daily Flonase 5/25/22  - Sputum Culture 5/26/22: Negative   - Pan Cx 5/27/22: NG  - Covid-19 & Flu 5/27/22: Negative    - RR Viral Panel 5/28/22: Parainfluenza 3 Virus    Abx history:  - Cefepime Day (5/28/22 -5/29/22)   - Levaquin ppx (5/29/22 - 5/31/22)    4.  Heme: Anemia from chemotherapy   - Transfuse for Hgb < 7 and Platelets < 75G  - No transfusion today     5.  Metabolic: hypoNa .    TLS:  No evidence, cont allopurinol (D/C 6/2/22) and daily TLS labs   - Cont IVF hydration: 1 L NS daily in OP Infusion  - Replace potassium and magnesium per policy.     6. Cardiac: Overnight developed atrial fibrillation with rapid ventricular response 5/28/22  - Consult Cardiology  - Start Metoprolol 12.5 mg BID: Increase metoprolol 12.5 mg to every 6 hours (5/28/22). Change to Metoprolol 25 mg BID 5/29/22 per Cardiology   - per cardiology no indication for [facility] at this time      7. Nutrition:  Appetite and oral intake is good. - S/p taking Astragalus supplement at home: Stop 5/18/22 d/t interaction with Cytoxan  - Cont low microbial diet   - Follow closely with dietary    - Disposition: Return Tuesday, 6/7/22, to OP Infusion for labs, MD visit and CAR-T assessment.      Anton Cedeno, APRN - CNP     Varun Tracy, DO

## 2022-06-07 ENCOUNTER — HOSPITAL ENCOUNTER (OUTPATIENT)
Dept: ONCOLOGY | Age: 82
Setting detail: INFUSION SERIES
Discharge: HOME | End: 2022-06-07
Payer: MEDICARE

## 2022-06-07 ENCOUNTER — HOSPITAL ENCOUNTER (INPATIENT)
Age: 82
LOS: 3 days | Discharge: HOME | DRG: 314 | End: 2022-06-10
Attending: STUDENT IN AN ORGANIZED HEALTH CARE EDUCATION/TRAINING PROGRAM | Admitting: STUDENT IN AN ORGANIZED HEALTH CARE EDUCATION/TRAINING PROGRAM
Payer: MEDICARE

## 2022-06-07 ENCOUNTER — HOSPITAL ENCOUNTER (OUTPATIENT)
Dept: GENERAL RADIOLOGY | Age: 82
Discharge: HOME | DRG: 314 | End: 2022-06-07
Payer: MEDICARE

## 2022-06-07 VITALS
OXYGEN SATURATION: 90 % | TEMPERATURE: 99.3 F | HEART RATE: 118 BPM | SYSTOLIC BLOOD PRESSURE: 126 MMHG | RESPIRATION RATE: 16 BRPM | DIASTOLIC BLOOD PRESSURE: 66 MMHG | WEIGHT: 179.45 LBS | BODY MASS INDEX: 24.34 KG/M2

## 2022-06-07 DIAGNOSIS — C83.30 DIFFUSE LARGE B-CELL LYMPHOMA, UNSPECIFIED BODY REGION (HCC): Primary | ICD-10-CM

## 2022-06-07 PROBLEM — R50.9 FEVER, UNKNOWN ORIGIN: Status: ACTIVE | Noted: 2022-06-07

## 2022-06-07 LAB
ALBUMIN SERPL-MCNC: 3.3 G/DL (ref 3.4–5)
ALBUMIN SERPL-MCNC: 3.8 G/DL (ref 3.4–5)
ALP BLD-CCNC: 84 U/L (ref 40–129)
ALP BLD-CCNC: 87 U/L (ref 40–129)
ALT SERPL-CCNC: 18 U/L (ref 10–40)
ALT SERPL-CCNC: 19 U/L (ref 10–40)
ANION GAP SERPL CALCULATED.3IONS-SCNC: 10 MMOL/L (ref 3–16)
ANION GAP SERPL CALCULATED.3IONS-SCNC: 12 MMOL/L (ref 3–16)
APTT: 29.2 SEC (ref 23–34.3)
AST SERPL-CCNC: 18 U/L (ref 15–37)
AST SERPL-CCNC: 20 U/L (ref 15–37)
BANDED NEUTROPHILS RELATIVE PERCENT: 4 % (ref 0–7)
BANDED NEUTROPHILS RELATIVE PERCENT: 9 % (ref 0–7)
BASOPHILS ABSOLUTE: 0 K/UL (ref 0–0.2)
BASOPHILS ABSOLUTE: 0 K/UL (ref 0–0.2)
BASOPHILS RELATIVE PERCENT: 0 %
BASOPHILS RELATIVE PERCENT: 1 %
BILIRUB SERPL-MCNC: <0.2 MG/DL (ref 0–1)
BILIRUB SERPL-MCNC: <0.2 MG/DL (ref 0–1)
BILIRUBIN DIRECT: <0.2 MG/DL (ref 0–0.3)
BILIRUBIN DIRECT: <0.2 MG/DL (ref 0–0.3)
BILIRUBIN, INDIRECT: ABNORMAL MG/DL (ref 0–1)
BILIRUBIN, INDIRECT: ABNORMAL MG/DL (ref 0–1)
BUN BLDV-MCNC: 12 MG/DL (ref 7–20)
BUN BLDV-MCNC: 14 MG/DL (ref 7–20)
C-REACTIVE PROTEIN: 18.8 MG/L (ref 0–5.1)
CALCIUM SERPL-MCNC: 7.6 MG/DL (ref 8.3–10.6)
CALCIUM SERPL-MCNC: 8.5 MG/DL (ref 8.3–10.6)
CHLORIDE BLD-SCNC: 102 MMOL/L (ref 99–110)
CHLORIDE BLD-SCNC: 106 MMOL/L (ref 99–110)
CO2: 21 MMOL/L (ref 21–32)
CO2: 25 MMOL/L (ref 21–32)
CREAT SERPL-MCNC: 1 MG/DL (ref 0.8–1.3)
CREAT SERPL-MCNC: 1 MG/DL (ref 0.8–1.3)
EOSINOPHILS ABSOLUTE: 0 K/UL (ref 0–0.6)
EOSINOPHILS ABSOLUTE: 0.1 K/UL (ref 0–0.6)
EOSINOPHILS RELATIVE PERCENT: 1 %
EOSINOPHILS RELATIVE PERCENT: 3 %
FERRITIN: 544.8 NG/ML (ref 30–400)
GFR AFRICAN AMERICAN: >60
GFR AFRICAN AMERICAN: >60
GFR NON-AFRICAN AMERICAN: >60
GFR NON-AFRICAN AMERICAN: >60
GLUCOSE BLD-MCNC: 114 MG/DL (ref 70–99)
GLUCOSE BLD-MCNC: 157 MG/DL (ref 70–99)
HCT VFR BLD CALC: 24.1 % (ref 40.5–52.5)
HCT VFR BLD CALC: 27.4 % (ref 40.5–52.5)
HEMOGLOBIN: 8.2 G/DL (ref 13.5–17.5)
HEMOGLOBIN: 9.4 G/DL (ref 13.5–17.5)
INR BLD: 1.18 (ref 0.87–1.14)
INR BLD: 1.27 (ref 0.87–1.14)
LACTATE DEHYDROGENASE: 164 U/L (ref 100–190)
LACTATE DEHYDROGENASE: 169 U/L (ref 100–190)
LACTIC ACID: 1.7 MMOL/L (ref 0.4–2)
LACTIC ACID: 2.6 MMOL/L (ref 0.4–2)
LYMPHOCYTES ABSOLUTE: 0.1 K/UL (ref 1–5.1)
LYMPHOCYTES ABSOLUTE: 0.5 K/UL (ref 1–5.1)
LYMPHOCYTES RELATIVE PERCENT: 11 %
LYMPHOCYTES RELATIVE PERCENT: 4 %
MACROCYTES: ABNORMAL
MAGNESIUM: 1.3 MG/DL (ref 1.8–2.4)
MAGNESIUM: 1.7 MG/DL (ref 1.8–2.4)
MCH RBC QN AUTO: 32.6 PG (ref 26–34)
MCH RBC QN AUTO: 32.9 PG (ref 26–34)
MCHC RBC AUTO-ENTMCNC: 34.1 G/DL (ref 31–36)
MCHC RBC AUTO-ENTMCNC: 34.4 G/DL (ref 31–36)
MCV RBC AUTO: 95.5 FL (ref 80–100)
MCV RBC AUTO: 95.6 FL (ref 80–100)
METAMYELOCYTES RELATIVE PERCENT: 1 %
MONOCYTES ABSOLUTE: 0 K/UL (ref 0–1.3)
MONOCYTES ABSOLUTE: 0.5 K/UL (ref 0–1.3)
MONOCYTES RELATIVE PERCENT: 1 %
MONOCYTES RELATIVE PERCENT: 10 %
NEUTROPHILS ABSOLUTE: 3.5 K/UL (ref 1.7–7.7)
NEUTROPHILS ABSOLUTE: 3.6 K/UL (ref 1.7–7.7)
NEUTROPHILS RELATIVE PERCENT: 65 %
NEUTROPHILS RELATIVE PERCENT: 90 %
PDW BLD-RTO: 14.8 % (ref 12.4–15.4)
PDW BLD-RTO: 15.1 % (ref 12.4–15.4)
PHOSPHORUS: 1.1 MG/DL (ref 2.5–4.9)
PHOSPHORUS: 2.5 MG/DL (ref 2.5–4.9)
PLATELET # BLD: 105 K/UL (ref 135–450)
PLATELET # BLD: 144 K/UL (ref 135–450)
PLATELET SLIDE REVIEW: ABNORMAL
PMV BLD AUTO: 8.7 FL (ref 5–10.5)
PMV BLD AUTO: 8.8 FL (ref 5–10.5)
POTASSIUM SERPL-SCNC: 3.7 MMOL/L (ref 3.5–5.1)
POTASSIUM SERPL-SCNC: 3.8 MMOL/L (ref 3.5–5.1)
PROCALCITONIN: 47.38 NG/ML (ref 0–0.15)
PROTHROMBIN TIME: 15 SEC (ref 11.7–14.5)
PROTHROMBIN TIME: 15.9 SEC (ref 11.7–14.5)
RBC # BLD: 2.52 M/UL (ref 4.2–5.9)
RBC # BLD: 2.87 M/UL (ref 4.2–5.9)
SLIDE REVIEW: ABNORMAL
SODIUM BLD-SCNC: 137 MMOL/L (ref 136–145)
SODIUM BLD-SCNC: 139 MMOL/L (ref 136–145)
TOTAL PROTEIN: 4.9 G/DL (ref 6.4–8.2)
TOTAL PROTEIN: 6.1 G/DL (ref 6.4–8.2)
URIC ACID, SERUM: 4.3 MG/DL (ref 3.5–7.2)
URIC ACID, SERUM: 4.3 MG/DL (ref 3.5–7.2)
WBC # BLD: 3.7 K/UL (ref 4–11)
WBC # BLD: 4.8 K/UL (ref 4–11)

## 2022-06-07 PROCEDURE — 6370000000 HC RX 637 (ALT 250 FOR IP): Performed by: NURSE PRACTITIONER

## 2022-06-07 PROCEDURE — 80048 BASIC METABOLIC PNL TOTAL CA: CPT

## 2022-06-07 PROCEDURE — 85610 PROTHROMBIN TIME: CPT

## 2022-06-07 PROCEDURE — 94761 N-INVAS EAR/PLS OXIMETRY MLT: CPT

## 2022-06-07 PROCEDURE — 80076 HEPATIC FUNCTION PANEL: CPT

## 2022-06-07 PROCEDURE — 2580000003 HC RX 258: Performed by: NURSE PRACTITIONER

## 2022-06-07 PROCEDURE — 87253 VIRUS INOCULATE TISSUE ADDL: CPT

## 2022-06-07 PROCEDURE — 84100 ASSAY OF PHOSPHORUS: CPT

## 2022-06-07 PROCEDURE — 6360000002 HC RX W HCPCS: Performed by: NURSE PRACTITIONER

## 2022-06-07 PROCEDURE — 87070 CULTURE OTHR SPECIMN AEROBIC: CPT

## 2022-06-07 PROCEDURE — 87102 FUNGUS ISOLATION CULTURE: CPT

## 2022-06-07 PROCEDURE — 83735 ASSAY OF MAGNESIUM: CPT

## 2022-06-07 PROCEDURE — 87103 BLOOD FUNGUS CULTURE: CPT

## 2022-06-07 PROCEDURE — A4217 STERILE WATER/SALINE, 500 ML: HCPCS | Performed by: NURSE PRACTITIONER

## 2022-06-07 PROCEDURE — 84145 PROCALCITONIN (PCT): CPT

## 2022-06-07 PROCEDURE — 85025 COMPLETE CBC W/AUTO DIFF WBC: CPT

## 2022-06-07 PROCEDURE — 71045 X-RAY EXAM CHEST 1 VIEW: CPT

## 2022-06-07 PROCEDURE — 86140 C-REACTIVE PROTEIN: CPT

## 2022-06-07 PROCEDURE — 93005 ELECTROCARDIOGRAM TRACING: CPT | Performed by: NURSE PRACTITIONER

## 2022-06-07 PROCEDURE — 87252 VIRUS INOCULATION TISSUE: CPT

## 2022-06-07 PROCEDURE — 87150 DNA/RNA AMPLIFIED PROBE: CPT

## 2022-06-07 PROCEDURE — 99211 OFF/OP EST MAY X REQ PHY/QHP: CPT

## 2022-06-07 PROCEDURE — 96365 THER/PROPH/DIAG IV INF INIT: CPT

## 2022-06-07 PROCEDURE — 36592 COLLECT BLOOD FROM PICC: CPT

## 2022-06-07 PROCEDURE — 83605 ASSAY OF LACTIC ACID: CPT

## 2022-06-07 PROCEDURE — 2580000003 HC RX 258: Performed by: STUDENT IN AN ORGANIZED HEALTH CARE EDUCATION/TRAINING PROGRAM

## 2022-06-07 PROCEDURE — 82728 ASSAY OF FERRITIN: CPT

## 2022-06-07 PROCEDURE — 83615 LACTATE (LD) (LDH) ENZYME: CPT

## 2022-06-07 PROCEDURE — 2700000000 HC OXYGEN THERAPY PER DAY

## 2022-06-07 PROCEDURE — 96360 HYDRATION IV INFUSION INIT: CPT

## 2022-06-07 PROCEDURE — 94150 VITAL CAPACITY TEST: CPT

## 2022-06-07 PROCEDURE — 87040 BLOOD CULTURE FOR BACTERIA: CPT

## 2022-06-07 PROCEDURE — 2500000003 HC RX 250 WO HCPCS: Performed by: NURSE PRACTITIONER

## 2022-06-07 PROCEDURE — 96367 TX/PROPH/DG ADDL SEQ IV INF: CPT

## 2022-06-07 PROCEDURE — 96361 HYDRATE IV INFUSION ADD-ON: CPT

## 2022-06-07 PROCEDURE — 87186 SC STD MICRODIL/AGAR DIL: CPT

## 2022-06-07 PROCEDURE — 2060000000 HC ICU INTERMEDIATE R&B

## 2022-06-07 PROCEDURE — 84550 ASSAY OF BLOOD/URIC ACID: CPT

## 2022-06-07 PROCEDURE — 85730 THROMBOPLASTIN TIME PARTIAL: CPT

## 2022-06-07 PROCEDURE — 87205 SMEAR GRAM STAIN: CPT

## 2022-06-07 RX ORDER — OXYCODONE HYDROCHLORIDE 5 MG/1
10 TABLET ORAL EVERY 4 HOURS PRN
Status: DISCONTINUED | OUTPATIENT
Start: 2022-06-07 | End: 2022-06-08 | Stop reason: HOSPADM

## 2022-06-07 RX ORDER — POTASSIUM CHLORIDE 20 MEQ/1
40 TABLET, EXTENDED RELEASE ORAL PRN
Status: DISCONTINUED | OUTPATIENT
Start: 2022-06-07 | End: 2022-06-08 | Stop reason: HOSPADM

## 2022-06-07 RX ORDER — 0.9 % SODIUM CHLORIDE 0.9 %
1000 INTRAVENOUS SOLUTION INTRAVENOUS ONCE
Status: COMPLETED | OUTPATIENT
Start: 2022-06-07 | End: 2022-06-07

## 2022-06-07 RX ORDER — ONDANSETRON 4 MG/1
8 TABLET, FILM COATED ORAL ONCE
Status: CANCELLED | OUTPATIENT
Start: 2022-06-08 | End: 2022-06-08

## 2022-06-07 RX ORDER — PROCHLORPERAZINE EDISYLATE 5 MG/ML
10 INJECTION INTRAMUSCULAR; INTRAVENOUS EVERY 4 HOURS PRN
Status: DISCONTINUED | OUTPATIENT
Start: 2022-06-07 | End: 2022-06-10 | Stop reason: HOSPADM

## 2022-06-07 RX ORDER — ONDANSETRON 4 MG/1
8 TABLET, FILM COATED ORAL ONCE
Status: DISCONTINUED | OUTPATIENT
Start: 2022-06-07 | End: 2022-06-08 | Stop reason: HOSPADM

## 2022-06-07 RX ORDER — GUAIFENESIN 600 MG/1
600 TABLET, EXTENDED RELEASE ORAL 2 TIMES DAILY PRN
Status: DISCONTINUED | OUTPATIENT
Start: 2022-06-07 | End: 2022-06-10 | Stop reason: HOSPADM

## 2022-06-07 RX ORDER — ACETAMINOPHEN 325 MG/1
650 TABLET ORAL ONCE
Status: COMPLETED | OUTPATIENT
Start: 2022-06-07 | End: 2022-06-07

## 2022-06-07 RX ORDER — SODIUM CHLORIDE 9 MG/ML
INJECTION, SOLUTION INTRAVENOUS CONTINUOUS PRN
Status: DISCONTINUED | OUTPATIENT
Start: 2022-06-07 | End: 2022-06-10 | Stop reason: HOSPADM

## 2022-06-07 RX ORDER — PROCHLORPERAZINE EDISYLATE 5 MG/ML
10 INJECTION INTRAMUSCULAR; INTRAVENOUS EVERY 6 HOURS PRN
Status: DISCONTINUED | OUTPATIENT
Start: 2022-06-07 | End: 2022-06-08 | Stop reason: HOSPADM

## 2022-06-07 RX ORDER — HEPARIN SODIUM (PORCINE) LOCK FLUSH IV SOLN 100 UNIT/ML 100 UNIT/ML
500 SOLUTION INTRAVENOUS PRN
Status: CANCELLED | OUTPATIENT
Start: 2022-06-08

## 2022-06-07 RX ORDER — SODIUM CHLORIDE 9 MG/ML
INJECTION, SOLUTION INTRAVENOUS ONCE
Status: CANCELLED | OUTPATIENT
Start: 2022-06-08 | End: 2022-06-08

## 2022-06-07 RX ORDER — ACETAMINOPHEN 325 MG/1
650 TABLET ORAL EVERY 4 HOURS PRN
Status: DISCONTINUED | OUTPATIENT
Start: 2022-06-07 | End: 2022-06-10 | Stop reason: HOSPADM

## 2022-06-07 RX ORDER — ONDANSETRON HYDROCHLORIDE 8 MG/1
8 TABLET, FILM COATED ORAL EVERY 8 HOURS PRN
Status: DISCONTINUED | OUTPATIENT
Start: 2022-06-07 | End: 2022-06-10 | Stop reason: HOSPADM

## 2022-06-07 RX ORDER — POTASSIUM CHLORIDE 29.8 MG/ML
20 INJECTION INTRAVENOUS PRN
Status: DISCONTINUED | OUTPATIENT
Start: 2022-06-07 | End: 2022-06-10 | Stop reason: HOSPADM

## 2022-06-07 RX ORDER — PROCHLORPERAZINE EDISYLATE 5 MG/ML
10 INJECTION INTRAMUSCULAR; INTRAVENOUS EVERY 6 HOURS PRN
Status: CANCELLED | OUTPATIENT
Start: 2022-06-08

## 2022-06-07 RX ORDER — SODIUM CHLORIDE 0.9 % (FLUSH) 0.9 %
5-40 SYRINGE (ML) INJECTION EVERY 12 HOURS SCHEDULED
Status: DISCONTINUED | OUTPATIENT
Start: 2022-06-07 | End: 2022-06-10 | Stop reason: HOSPADM

## 2022-06-07 RX ORDER — PROCHLORPERAZINE MALEATE 10 MG
10 TABLET ORAL EVERY 4 HOURS PRN
Status: DISCONTINUED | OUTPATIENT
Start: 2022-06-07 | End: 2022-06-10 | Stop reason: HOSPADM

## 2022-06-07 RX ORDER — BENZONATATE 100 MG/1
100 CAPSULE ORAL 3 TIMES DAILY PRN
Status: DISCONTINUED | OUTPATIENT
Start: 2022-06-07 | End: 2022-06-10 | Stop reason: HOSPADM

## 2022-06-07 RX ORDER — POTASSIUM CHLORIDE 20 MEQ/1
40 TABLET, EXTENDED RELEASE ORAL PRN
Status: CANCELLED | OUTPATIENT
Start: 2022-06-08

## 2022-06-07 RX ORDER — SODIUM CHLORIDE 0.9 % (FLUSH) 0.9 %
5-40 SYRINGE (ML) INJECTION PRN
Status: DISCONTINUED | OUTPATIENT
Start: 2022-06-07 | End: 2022-06-10 | Stop reason: HOSPADM

## 2022-06-07 RX ORDER — PROCHLORPERAZINE MALEATE 10 MG
10 TABLET ORAL EVERY 6 HOURS PRN
Status: CANCELLED | OUTPATIENT
Start: 2022-06-08

## 2022-06-07 RX ORDER — VALACYCLOVIR HYDROCHLORIDE 500 MG/1
500 TABLET, FILM COATED ORAL 2 TIMES DAILY
Status: DISCONTINUED | OUTPATIENT
Start: 2022-06-07 | End: 2022-06-10 | Stop reason: HOSPADM

## 2022-06-07 RX ORDER — OXYCODONE HYDROCHLORIDE 5 MG/1
5 TABLET ORAL EVERY 4 HOURS PRN
Status: DISCONTINUED | OUTPATIENT
Start: 2022-06-07 | End: 2022-06-08 | Stop reason: HOSPADM

## 2022-06-07 RX ORDER — ALBUTEROL SULFATE 2.5 MG/3ML
2.5 SOLUTION RESPIRATORY (INHALATION) EVERY 6 HOURS PRN
Status: DISCONTINUED | OUTPATIENT
Start: 2022-06-07 | End: 2022-06-10 | Stop reason: HOSPADM

## 2022-06-07 RX ORDER — DIMETHICONE, CAMPHOR (SYNTHETIC), MENTHOL, AND PHENOL 1.1; .5; .625; .5 G/100G; G/100G; G/100G; G/100G
OINTMENT TOPICAL PRN
Status: DISCONTINUED | OUTPATIENT
Start: 2022-06-07 | End: 2022-06-08 | Stop reason: HOSPADM

## 2022-06-07 RX ORDER — ONDANSETRON 2 MG/ML
8 INJECTION INTRAMUSCULAR; INTRAVENOUS EVERY 8 HOURS PRN
Status: DISCONTINUED | OUTPATIENT
Start: 2022-06-07 | End: 2022-06-10 | Stop reason: HOSPADM

## 2022-06-07 RX ORDER — DIPHENHYDRAMINE HCL 25 MG
25 TABLET ORAL NIGHTLY PRN
Status: DISCONTINUED | OUTPATIENT
Start: 2022-06-07 | End: 2022-06-10 | Stop reason: HOSPADM

## 2022-06-07 RX ORDER — LEVETIRACETAM 500 MG/1
500 TABLET ORAL 2 TIMES DAILY
Status: DISCONTINUED | OUTPATIENT
Start: 2022-06-07 | End: 2022-06-10 | Stop reason: HOSPADM

## 2022-06-07 RX ORDER — 0.9 % SODIUM CHLORIDE 0.9 %
1000 INTRAVENOUS SOLUTION INTRAVENOUS ONCE
Status: DISCONTINUED | OUTPATIENT
Start: 2022-06-07 | End: 2022-06-08 | Stop reason: HOSPADM

## 2022-06-07 RX ORDER — ONDANSETRON 2 MG/ML
8 INJECTION INTRAMUSCULAR; INTRAVENOUS ONCE
Status: DISCONTINUED | OUTPATIENT
Start: 2022-06-07 | End: 2022-06-08 | Stop reason: HOSPADM

## 2022-06-07 RX ORDER — POTASSIUM CHLORIDE 29.8 MG/ML
20 INJECTION INTRAVENOUS PRN
Status: DISCONTINUED | OUTPATIENT
Start: 2022-06-07 | End: 2022-06-08 | Stop reason: HOSPADM

## 2022-06-07 RX ORDER — PETROLATUM, MENTHOL, UNSPECIFIED FORM, CAMPHOR (SYNTHETIC), AND PHENOL 59.14; 1; 1; .6 G/100G; G/100G; G/100G; G/100G
PASTE TOPICAL PRN
Status: CANCELLED | OUTPATIENT
Start: 2022-06-08

## 2022-06-07 RX ORDER — HEPARIN SODIUM (PORCINE) LOCK FLUSH IV SOLN 100 UNIT/ML 100 UNIT/ML
500 SOLUTION INTRAVENOUS PRN
Status: DISCONTINUED | OUTPATIENT
Start: 2022-06-07 | End: 2022-06-08 | Stop reason: HOSPADM

## 2022-06-07 RX ORDER — 0.9 % SODIUM CHLORIDE 0.9 %
1000 INTRAVENOUS SOLUTION INTRAVENOUS ONCE
Status: CANCELLED | OUTPATIENT
Start: 2022-06-08 | End: 2022-06-08

## 2022-06-07 RX ORDER — PROCHLORPERAZINE MALEATE 10 MG
10 TABLET ORAL EVERY 6 HOURS PRN
Status: DISCONTINUED | OUTPATIENT
Start: 2022-06-07 | End: 2022-06-08 | Stop reason: HOSPADM

## 2022-06-07 RX ORDER — MAGNESIUM SULFATE IN WATER 40 MG/ML
4000 INJECTION, SOLUTION INTRAVENOUS PRN
Status: DISCONTINUED | OUTPATIENT
Start: 2022-06-07 | End: 2022-06-10 | Stop reason: HOSPADM

## 2022-06-07 RX ORDER — ENOXAPARIN SODIUM 100 MG/ML
40 INJECTION SUBCUTANEOUS DAILY
Status: DISCONTINUED | OUTPATIENT
Start: 2022-06-08 | End: 2022-06-10 | Stop reason: HOSPADM

## 2022-06-07 RX ORDER — SODIUM CHLORIDE 9 MG/ML
INJECTION, SOLUTION INTRAVENOUS ONCE
Status: DISCONTINUED | OUTPATIENT
Start: 2022-06-07 | End: 2022-06-08 | Stop reason: HOSPADM

## 2022-06-07 RX ORDER — POTASSIUM CHLORIDE 29.8 MG/ML
80 INJECTION INTRAVENOUS PRN
Status: CANCELLED | OUTPATIENT
Start: 2022-06-08

## 2022-06-07 RX ORDER — SODIUM CHLORIDE 9 MG/ML
25 INJECTION, SOLUTION INTRAVENOUS PRN
Status: DISCONTINUED | OUTPATIENT
Start: 2022-06-07 | End: 2022-06-10 | Stop reason: HOSPADM

## 2022-06-07 RX ORDER — OXYCODONE HYDROCHLORIDE 5 MG/1
5 TABLET ORAL EVERY 4 HOURS PRN
Status: CANCELLED | OUTPATIENT
Start: 2022-06-08

## 2022-06-07 RX ORDER — MAGNESIUM SULFATE IN WATER 40 MG/ML
4000 INJECTION, SOLUTION INTRAVENOUS PRN
Status: CANCELLED | OUTPATIENT
Start: 2022-06-08

## 2022-06-07 RX ORDER — OXYCODONE HYDROCHLORIDE 5 MG/1
10 TABLET ORAL EVERY 4 HOURS PRN
Status: CANCELLED | OUTPATIENT
Start: 2022-06-08

## 2022-06-07 RX ORDER — ONDANSETRON 2 MG/ML
8 INJECTION INTRAMUSCULAR; INTRAVENOUS ONCE
Status: CANCELLED | OUTPATIENT
Start: 2022-06-08 | End: 2022-06-08

## 2022-06-07 RX ORDER — MAGNESIUM SULFATE IN WATER 40 MG/ML
4000 INJECTION, SOLUTION INTRAVENOUS PRN
Status: DISCONTINUED | OUTPATIENT
Start: 2022-06-07 | End: 2022-06-08 | Stop reason: HOSPADM

## 2022-06-07 RX ADMIN — POTASSIUM PHOSPHATE, MONOBASIC AND POTASSIUM PHOSPHATE, DIBASIC 30 MMOL: 224; 236 INJECTION, SOLUTION, CONCENTRATE INTRAVENOUS at 16:42

## 2022-06-07 RX ADMIN — VALACYCLOVIR HYDROCHLORIDE 500 MG: 500 TABLET, FILM COATED ORAL at 15:02

## 2022-06-07 RX ADMIN — CEFEPIME HYDROCHLORIDE 2000 MG: 2 INJECTION, POWDER, FOR SOLUTION INTRAVENOUS at 22:42

## 2022-06-07 RX ADMIN — VANCOMYCIN HYDROCHLORIDE 1500 MG: 10 INJECTION, POWDER, LYOPHILIZED, FOR SOLUTION INTRAVENOUS at 18:49

## 2022-06-07 RX ADMIN — SODIUM CHLORIDE 1000 ML: 9 INJECTION, SOLUTION INTRAVENOUS at 08:55

## 2022-06-07 RX ADMIN — SODIUM CHLORIDE: 9 INJECTION, SOLUTION INTRAVENOUS at 14:36

## 2022-06-07 RX ADMIN — SODIUM CHLORIDE: 900 INJECTION, SOLUTION INTRAVENOUS at 17:32

## 2022-06-07 RX ADMIN — LEVETIRACETAM 500 MG: 500 TABLET, FILM COATED ORAL at 20:07

## 2022-06-07 RX ADMIN — ACETAMINOPHEN 650 MG: 325 TABLET ORAL at 12:38

## 2022-06-07 RX ADMIN — SODIUM CHLORIDE, PRESERVATIVE FREE 10 ML: 5 INJECTION INTRAVENOUS at 20:07

## 2022-06-07 RX ADMIN — SODIUM CHLORIDE 1000 ML: 9 INJECTION, SOLUTION INTRAVENOUS at 15:27

## 2022-06-07 RX ADMIN — METOPROLOL TARTRATE 25 MG: 25 TABLET, FILM COATED ORAL at 20:07

## 2022-06-07 RX ADMIN — SODIUM CHLORIDE 15 ML: 900 IRRIGANT IRRIGATION at 20:31

## 2022-06-07 RX ADMIN — SODIUM CHLORIDE 1000 MG: 900 INJECTION INTRAVENOUS at 12:42

## 2022-06-07 RX ADMIN — SODIUM CHLORIDE 15 ML: 900 IRRIGANT IRRIGATION at 16:43

## 2022-06-07 RX ADMIN — SODIUM CHLORIDE 1000 ML: 9 INJECTION, SOLUTION INTRAVENOUS at 12:39

## 2022-06-07 RX ADMIN — CEFEPIME HYDROCHLORIDE 2000 MG: 2 INJECTION, POWDER, FOR SOLUTION INTRAVENOUS at 14:39

## 2022-06-07 RX ADMIN — VALACYCLOVIR HYDROCHLORIDE 500 MG: 500 TABLET, FILM COATED ORAL at 20:07

## 2022-06-07 ASSESSMENT — LIFESTYLE VARIABLES: HOW OFTEN DO YOU HAVE A DRINK CONTAINING ALCOHOL: NEVER

## 2022-06-07 NOTE — PROGRESS NOTES
Short Stay Communication Note  Sonam Simms  Diagnosis: Diffuse Large B-Cell Lymphoma  Primary MD: Kunal  Treatment: Fludarabine & Cyclophosphamide  Day +15 of Car-T Karol   Pt seen in outpatient infusion today. Labs drawn and reviewed. CBC: Recent Labs     06/05/22  0833 06/07/22  0853   WBC 9.3 4.8   HGB 9.6* 9.4*   HCT 27.8* 27.4*   MCV 96.4 95.6    144     BMP/Mag:  Recent Labs     06/05/22  0833 06/07/22  0853    137   K 3.6 3.7    102   CO2 25 25   PHOS 2.6 2.5   BUN 15 12   CREATININE 1.0 1.0   MG  --  1.70*     Standing parameters for replacement for this patient:   1 unit of pack red blood cells for a hemoglobin < or equal to 7  1 pack of platelets for a platelet count < or equal to 20  40 MeQ of Potassium administered for a potassium < than or equal to 3.4  4g of Magnesium Sulfate for a magnesum level less than or equal to 1.4  No transfusions required for the above lab values. Urinalysis last done: 6/5/2022 Urinalysis next due: 6/13/2022    Chest X-Ray last done: 6/7/2022 Chest X-Ray next due: 6/13/2022    Symptoms addressed and reported to care team this date: Patient returned from the bathroom just prior to discharge shivering uncontrollably stating he was cold. NP Notified. Cultures were ordered, drawn and sent. Treatments this date: IV Fluids 650 mg of Tylenol and 1 Gram Invanz infusion. Vancomycin to be given on Inpatient Unit. Reviewed medication schedule with pt and caregiver. Both able to verbalize all medications and schedule. Pt to be seen again. Patient and caregiver verbalized understanding of change in status. Pt admitted to Inpatient Unit for possible line infection and Neurotoxicity.      Electronically signed by Richie Nuno RN on 6/7/2022 at 2:28 PM

## 2022-06-07 NOTE — PROGRESS NOTES
[facility] Progress Note      2022    Sabrina Bella    :  1940    MRN:  [id number]    Referring MD: Camryn Hartman MD  [address] [facility] [address], [address],  [address]      Subjective: Feels well today, no fevers, no confusion, cough better, eating and drinking well, no complaints at this time. ECOG PS:  (2) Ambulatory and capable of self care, unable to carry out work activity, up and about > 50% or waking hours    KPS: 80% Normal activity with effort; some signs or symptoms of disease    Isolation:  None     Medications    Scheduled Meds:  Continuous Infusions:  PRN Meds:. ROS:  As noted above, otherwise remainder of 10-point ROS negative      Physical Exam:     Vital Signs:  BP (!) 151/76   Pulse 73   Temp 98.2 °F (36.8 °C) (Oral)   Resp 18   Wt 179 lb 7.3 oz (81.4 kg)   SpO2 97%   BMI 24.34 kg/m²     Weight:    Wt Readings from Last 3 Encounters:   22 179 lb 7.3 oz (81.4 kg)   22 178 lb 5.6 oz (80.9 kg)   22 178 lb 5.6 oz (80.9 kg)       General: Awake, alert and oriented    HEENT: normocephalic, alopecia, PERRL, no scleral erythema or icterus, Oral mucosa moist and intact, throat clear.    NECK: supple without palpable adenopathy  BACK: Straight, negative CVAT  SKIN: warm dry and intact without lesions rashes or masses  CHEST: CTA bilaterally without use of accessory muscles  CV: Normal S1 S2, RRR, no MRG  ABD: NT ND normoactive BS, no palpable masses or hepatosplenomegaly  EXTREMITIES: without edema, denies calf tenderness  NEURO: CN II - XII grossly intact  CATHETER: RIJ Trifusion (IR: Jermaine, 4/15/22): CDI      Laboratory Data:  CBC:   Recent Labs     22  0833 22  0853   WBC 9.3 4.8   HGB 9.6* 9.4*   HCT 27.8* 27.4*   MCV 96.4 95.6    144     BMP/Mag:  Recent Labs     22  0833 22  0853    137   K 3.6 3.7    102   CO2 25 25   PHOS 2.6 2.5   BUN 15 12   CREATININE 1.0 1.0   MG  --  1.70*     LIVP: Recent Labs     06/07/22  0853   AST 18   ALT 19   BILIDIR <0.2   BILITOT <0.2   ALKPHOS 87     Coags:   Recent Labs     06/07/22  0853   PROTIME 15.0*   INR 1.18*     Uric Acid   Recent Labs     06/07/22  0853   LABURIC 4.3     Lab Results   Component Value Date    CRP 18.8 (H) 06/07/2022    CRP 9.7 (H) 06/05/2022    CRP 29.1 (H) 06/03/2022     Lab Results   Component Value Date    FERRITIN 544.8 (H) 06/07/2022    FERRITIN 587.7 (H) 06/05/2022    FERRITIN 669.1 (H) 06/03/2022         PROBLEM LIST:            1. Follicular lymphoma with BM involvement  2. DLBC NHL      TREATMENT:            1. FL: 1/1995 [facility]OP             11/2003 to 8/2004 Rituxan             7/2007 R-CHOP  2. DLBCL: R-Patrick/Ox C1 1/31/2022, C2 02/14/2022                     Chip-BR C1D1: 3/22/22                                    C2D1: 4/20/22        Lymphodepleting Chemotherapy:  Fludarabine and cyclophosphamide   Disease Status at time of Infusion:  Progressive Disease  CAR-T Infusion Date: 5/23/22  CAR-T Product:  Breyanzi  Batch ID Number:  [id number]     ASSESSMENT AND PLAN:            1.  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  Left ventricular ejection fraction 55 to 60% with no regional wall motion abnormalities noted.  Intermittent diastolic dysfunction.  - PFTs (3/21/22): Diffusion capacity 79%, FEV1 78%.  Consistent with mild restrictive ventilatory defect. - CMI -  3 (age and pulmonary)  - PET (5/13/22):  Multiple indeterminate pulmonary nodules in the right lung which demonstrates increased activity in the right upper lobe. These may be infectious.  These were not described on the previous PET/CT. Pulmonary lymphoma would be difficult to exclude. If the pulmonary nodule represents lymphoma, this would represent a Deauville score 5 exam. If this is infectious, overall the exam would represent a Deauville score 1. Previously described lymphadenopathy in FDG activity in the left retroperitoneum, left iliac region and in left upper thigh has completely resolved.  No residual lymphadenopathy or FDG avid lymph node identified      Lymphodepleting Chemotherapy:  Fludarabine and cyclophosphamide   Disease Status at time of Infusion:  Progressive Disease  CAR-T Infusion Date: 5/23/22  CAR-T Product:  Breyanzi  Batch ID Number:  [id number]     Day + 15     2.  CRS / Neuro:  Admitted with Grade 1 CRS (fever only) , now resolved   - Monitor CRP and Ferrtin closely   Lab Results   Component Value Date    CRP 18.8 (H) 06/07/2022    CRP 9.7 (H) 06/05/2022    CRP 29.1 (H) 06/03/2022     Lab Results   Component Value Date    FERRITIN 544.8 (H) 06/07/2022    FERRITIN 587.7 (H) 06/05/2022    FERRITIN 669.1 (H) 06/03/2022     - Start Keppra 500 mg BID on Monday, 5/23/22  - Neuro checks w/ CARTOX 10-point assessment Q4hrs     Toxicity Grading        CRS Grade: Grade 1 CRS (fever only): Resolved      ICANS Grade:  N/A     ICE Score: 10/10     3.  ID: Recently admitted with NF likely d/t CRS, Dry Cough d/t allergies: Exacerbated 5/25/22, new Wheezing throughout 5/27/22: Willy Huerta  - CXR & UA 5/23/22: Negative - CXR 5/27/22: Negative - CXR 5/30/22: MINIMAL BIBASILAR AIRSPACE DISEASE  - CXR 6/7/22: Pending   - Contine Acyclovir ppx  - Restart Levaquin and Diflucan ppx if ANC < 1.5  - Cont daily Zyrtec   - Cont daily Flonase 5/25/22  - Sputum Culture 5/26/22: Negative   - Pan Cx 5/27/22: NG  - Covid-19 & Flu 5/27/22: Negative    - RR Viral Panel 5/28/22: Parainfluenza 3 Virus    Abx history:  - Cefepime Day (5/28/22 -5/29/22)   - Levaquin ppx (5/29/22 - 5/31/22)    4.  Heme: Anemia from chemotherapy   - Transfuse for Hgb < 7 and Platelets < 64V  - No transfusion today     5.  Metabolic: hypoNa .    TLS:  No evidence, cont allopurinol (D/C 6/2/22) and daily TLS labs   - Cont IVF hydration: 1 L NS daily in OP Infusion  - Replace potassium and magnesium per policy.     6. Cardiac: Overnight developed atrial fibrillation with rapid ventricular response 5/28/22  - Consult Cardiology  - Start Metoprolol 12.5 mg BID: Increase metoprolol 12.5 mg to every 6 hours (5/28/22). Change to Metoprolol 25 mg BID 5/29/22 per Cardiology   - per cardiology no indication for [facility] at this time      7. Nutrition:  Appetite and oral intake is good. - S/p taking Astragalus supplement at home: Stop 5/18/22 d/t interaction with Cytoxan  - Cont low microbial diet   - Follow closely with dietary    - Disposition: Return Thursday, 6/9/22, to OP Infusion for labs, MD visit and CAR-T assessment.      JOAQUIM Winters - RIKKI Lopez DO

## 2022-06-07 NOTE — RT PROTOCOL NOTE
RT Nebulizer Bronchodilator Protocol Note    There is a bronchodilator order in the chart from a provider indicating to follow the RT Bronchodilator Protocol and there is an Initiate RT Bronchodilator Protocol order as well (see protocol at bottom of note). CXR Findings:  XR CHEST PORTABLE    Result Date: 6/7/2022  No acute disease in the chest.      The findings from the last RT Protocol Assessment were as follows:  Smoking: None or smoker <15 pack years  Respiratory Pattern: Regular pattern and RR 12-20 bpm  Breath Sounds: Clear breath sounds  Cough: Strong, spontaneous, non-productive  Indication for Bronchodilator Therapy:    Bronchodilator Assessment Score: 0    Aerosolized bronchodilator medication orders have been revised according to the RT Nebulizer Bronchodilator Protocol below. Respiratory Therapist to perform RT Therapy Protocol Assessment initially then follow the protocol. Repeat RT Therapy Protocol Assessment PRN for score 0-3 or on second treatment, BID, and PRN for scores above 3. No Indications  adjust the frequency to every 6 hours PRN wheezing or bronchospasm, if no treatments needed after 48 hours then discontinue using Per Protocol order mode. If indication present, adjust the RT bronchodilator orders based on the Bronchodilator Assessment Score as indicated below. If a patient is on this medication at home then do not decrease Frequency below that used at home. 0-3  enter or revise RT bronchodilator order(s) to equivalent RT Bronchodilator order with Frequency of every 4 hours PRN for wheezing or increased work of breathing using Per Protocol order mode. 4-6  enter or revise RT Bronchodilator order(s) to two equivalent RT bronchodilator orders with one order with BID Frequency and one order with Frequency of every 4 hours PRN wheezing or increased work of breathing using Per Protocol order mode.          7-10  enter or revise RT Bronchodilator order(s) to two equivalent RT bronchodilator orders with one order with TID Frequency and one order with Frequency of every 4 hours PRN wheezing or increased work of breathing using Per Protocol order mode. 11-13  enter or revise RT Bronchodilator order(s) to one equivalent RT bronchodilator order with QID Frequency and an Albuterol order with Frequency of every 4 hours PRN wheezing or increased work of breathing using Per Protocol order mode. Greater than 13  enter or revise RT Bronchodilator order(s) to one equivalent RT bronchodilator order with every 4 hours Frequency and an Albuterol order with Frequency of every 2 hours PRN wheezing or increased work of breathing using Per Protocol order mode. RT to enter RT Home Evaluation for COPD & MDI Assessment order using Per Protocol order mode.     Electronically signed by Shaila Max RCP on 6/7/2022 at 3:01 PM

## 2022-06-07 NOTE — H&P
[employer] History and Physical        Attending Physician: Leanne Carlson DO    Primary Care: Thais Oliver       Referring MD: Leanne Carlson, [facility] [place],  [address]    Name: Carlos Monroy :  1940  MRN:  [id number]    Admission: 2022      Date: 2022    Reason for Admission: Fever    History of Present Illness:   Mr. Collier Dance an 53-year-old man with a history of diffuse large cell B-cell non-Hodgkin's Ashleejeannie Ruggiero presented 2021 with knee pain and swelling.  Biopsy showed B-cell non-Hodgkin's lymphoma with a Ki-67 of about 60%.  Baseline PET/CT from 2022 shows extensive hypermetabolic metabolism in the retroperitoneum, left pelvis and left lower extremity consistent with lymphoma.       He has a history of follicular lymphoma diagnosed  (bone marrow involvement).  He was treated with CHOP chemotherapy from 1995 to 1996. José Miguel Pineda then had a recurrence and received rituximab times 2003 followed by maintenance rituximab 2004 and 2004. José Miguel Pineda then received R-CHOP beginning 2007 followed by maintenance rituximab from 2008 to 2009. Most recently he received R-[place]/Ox X 2 with F/U PET 3/1/22 showing progression in the retroperitoneum, left hemipelvis and left LE. He was then collected for CAR-T cell collection in preporation for Breyanzi CAR-T Infusion on 22. He is status post Bridging chemotherapy of Chip BR on 3/22/22 and . His most recent PET CT on 22 showed new pulmonary nudules in the right upper lung measuring 1.2 cm max SUV 3.9. Previously described lymphadenopathy and FDG in left retroperitoneum, left iliac region and left upper thigh has completely resolved.  He then received Lymphodepleting Chemo in OP Infusion on 22 - 22 followed by his Breyanzi CAR-T Infusion on 22. He then was followed daily in OP Infusion for any signs / symptoms of toxicity including CRS or EDUARD. Has had a mild nonproductive cough recently, no other localizing infectious symptoms.      He was recently admitted to [facility] with neutropenic fever on 5/27/22. He was positive for parainfluenza and was switched to levaquin for ppx. No further fevers and URI symptoms improving. He also developed Afib with RVr during admission. Cardiology was consulted and started him on metoprolol 25 mg BID for rate control. He was then discharged to OP Infusion again to follow up daily for CAR-T assessment. He then presented today in OP Infusion for daily follow up and began having rigors toward the end of his short stay. He stated he felt well and was afebrile but he quickly became more agitated and slow to respond to simple questions. Blood cultures were drawn, Amna Bruni was started, vital signs still stable. He is now being admitted for further workup and management of fever and possible bloodstream infection. He states he feel okay today but slow to respond and rigors and cough are worsening. He continues to have a chronically swollen left leg. He denies bleeding, or GI changes. Eating and drinking well.       Past Surgical History:   Procedure Laterality Date    CATHETER INSERTION N/A 4/18/2022    RIGHT INTERNAL JUGULAR TRIFUSION CENTRAL LINE PLACEMENT performed by Blessing Zuniga MD at [address]       No past medical history on file. Prior to Admission medications    Medication Sig Start Date End Date Taking?  Authorizing Provider   guaiFENesin (MUCINEX) 600 MG extended release tablet Take 1 tablet by mouth 2 times daily as needed for Congestion 6/1/22   Nilson Maurer, DO   benzonatate (TESSALON) 100 MG capsule Take 1 capsule by mouth 3 times daily as needed for Cough 6/1/22 6/15/22  Nilson Maurer, DO   metoprolol tartrate (LOPRESSOR) 25 MG tablet Take 1 tablet by mouth 2 times daily 6/1/22   Nilson Maurer, DO   acyclovir (ZOVIRAX) 800 MG tablet Take 1 tablet by mouth 2 times daily 5/27/22 JOAQUIM Buitrago NP   levETIRAcetam (KEPPRA) 500 MG tablet Take 1 tablet by mouth 2 times daily Start on Monday 5/23/22 5/23/22   JOAQUIM Buitrago NP   ondansetron (ZOFRAN ODT) 4 MG disintegrating tablet Take 1 tablet by mouth every 8 hours as needed for Nausea or Vomiting  Patient not taking: Reported on 5/27/2022 5/18/22   JOAQUIM Buitrago NP   prochlorperazine (COMPAZINE) 10 MG tablet Take 10 mg by mouth every 4-6 hours as needed  Patient not taking: Reported on 5/27/2022 1/30/22   Historical Provider, MD       Allergies   Allergen Reactions    Decadron [Dexamethasone]      Patient is receiving CAR-T. No steroids unless approved by [facility]. No family history on file. Social History     Socioeconomic History    Marital status:      Spouse name: Not on file    Number of children: Not on file    Years of education: Not on file    Highest education level: Not on file   Occupational History    Not on file   Tobacco Use    Smoking status: Never Smoker    Smokeless tobacco: Never Used   Substance and Sexual Activity    Alcohol use: Never    Drug use: Never    Sexual activity: Not on file   Other Topics Concern    Not on file   Social History Narrative    Not on file     Social Determinants of Health     Financial Resource Strain:     Difficulty of Paying Living Expenses: Not on file   Food Insecurity:     Worried About Running Out of Food in the Last Year: Not on file    Xavi of Food in the Last Year: Not on file   Transportation Needs:     Lack of Transportation (Medical): Not on file    Lack of Transportation (Non-Medical):  Not on file   Physical Activity:     Days of Exercise per Week: Not on file    Minutes of Exercise per Session: Not on file   Stress:     Feeling of Stress : Not on file   Social Connections:     Frequency of Communication with Friends and Family: Not on file    Frequency of Social Gatherings with Friends and Family: Not on file    Attends Zoroastrian Services: Not on file    Active Member of Clubs or Organizations: Not on file    Attends Club or Organization Meetings: Not on file    Marital Status: Not on file   Intimate Partner Violence:     Fear of Current or Ex-Partner: Not on file    Emotionally Abused: Not on file    Physically Abused: Not on file    Sexually Abused: Not on file   Housing Stability:     Unable to Pay for Housing in the Last Year: Not on file    Number of [place] in the Last Year: Not on file    Unstable Housing in the Last Year: Not on file        ROS:  As noted above, otherwise remainder of 10-point ROS negative      Physical Exam:     Vital Signs: There were no vitals taken for this visit. Weight:    Wt Readings from Last 3 Encounters:   06/07/22 179 lb 7.3 oz (81.4 kg)   06/05/22 178 lb 5.6 oz (80.9 kg)   06/03/22 178 lb 5.6 oz (80.9 kg)       KPS: 70% Cares for self; unable to carry on normal activity or to do active work    ECOG PS:  (2) Ambulatory and capable of self care, unable to carry out work activity, up and about > 50% or waking hours    General: Awake, alert and oriented    HEENT: normocephalic, alopecia, PERRL, no scleral erythema or icterus, Oral mucosa moist and intact, throat clear.    NECK: supple without palpable adenopathy  BACK: Straight, negative CVAT  SKIN: warm dry and intact without lesions rashes or masses  CHEST: CTA bilaterally without use of accessory muscles  CV: Normal S1 S2, RRR, no MRG  ABD: NT ND normoactive BS, no palpable masses or hepatosplenomegaly  EXTREMITIES: chronic edema in LLE, denies calf tenderness  NEURO: CN II - XII grossly intact  CATHETER: RIJ Trifusion (IR: Jermaine, 4/15/22): CDI      Laboratory Data:  CBC:   Recent Labs     06/05/22  0833 06/07/22  0853   WBC 9.3 4.8   HGB 9.6* 9.4*   HCT 27.8* 27.4*   MCV 96.4 95.6    144     BMP/Mag:  Recent Labs     06/05/22  0833 06/07/22  0853    137   K 3.6 3.7    102 CO2 25 25   PHOS 2.6 2.5   BUN 15 12   CREATININE 1.0 1.0   MG  --  1.70*     LIVP:   Recent Labs     06/07/22  0853   AST 18   ALT 19   BILIDIR <0.2   BILITOT <0.2   ALKPHOS 87     Coags:   Recent Labs     06/07/22  0853   PROTIME 15.0*   INR 1.18*     Uric Acid   Recent Labs     06/07/22  0853   LABURIC 4.3     Lab Results   Component Value Date    CRP 18.8 (H) 06/07/2022    CRP 9.7 (H) 06/05/2022    CRP 29.1 (H) 06/03/2022     Lab Results   Component Value Date    FERRITIN 544.8 (H) 06/07/2022    FERRITIN 587.7 (H) 06/05/2022    FERRITIN 669.1 (H) 06/03/2022       PROBLEM LIST:            1. Follicular lymphoma with BM involvement  2. DLBC NHL      TREATMENT:            1. FL: 1/1995 CHOP             11/2003 to 8/2004 Rituxan             7/2007 R-CHOP  2. DLBCL: R-[place]/Ox C1 1/31/2022, C2 02/14/2022                     Chip-BR C1D1: 3/22/22                                    C2D1: 4/20/22        Lymphodepleting Chemotherapy:  Fludarabine and cyclophosphamide   Disease Status at time of Infusion:  Progressive Disease  CAR-T Infusion Date: 5/23/22  CAR-T Product:  Breyanzi  Batch ID Number:  [id number]     ASSESSMENT AND PLAN:            1.  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  Left ventricular ejection fraction 55 to 60% with no regional wall motion abnormalities noted.  Intermittent diastolic dysfunction.  - PFTs (3/21/22): Diffusion capacity 79%, FEV1 78%.  Consistent with mild restrictive ventilatory defect.   - CMI -  3 (age and pulmonary)  - PET (5/13/22):  Multiple indeterminate pulmonary nodules in the right lung which demonstrates increased activity in the right upper lobe. These may be infectious. These were not described on the previous PET/CT. Pulmonary lymphoma would be difficult to exclude. If the pulmonary nodule represents lymphoma, this would represent a Deauville score 5 exam. If this is infectious, overall the exam would represent a Deauville score 1. Previously described lymphadenopathy in FDG activity in the left retroperitoneum, left iliac region and in left upper thigh has completely resolved.  No residual lymphadenopathy or FDG avid lymph node identified      Lymphodepleting Chemotherapy:  Fludarabine and cyclophosphamide   Disease Status at time of Infusion:  Progressive Disease  CAR-T Infusion Date: 5/23/22  CAR-T Product:  Breyanzi  Batch ID Number:  [id number]     Day + 15     2.  CRS / Neuro:  Admitted 6/7/22 with Grade 1 CRS (fever only), 5/27/22: Admitted with Grade 1 CRS (fever only) - Monitor CRP and Ferrtin closely         Lab Results   Component Value Date     CRP 18.8 (H) 06/07/2022     CRP 9.7 (H) 06/05/2022     CRP 29.1 (H) 06/03/2022            Lab Results   Component Value Date     FERRITIN 544.8 (H) 06/07/2022     FERRITIN 587.7 (H) 06/05/2022     FERRITIN 669.1 (H) 06/03/2022      - Start Keppra 500 mg BID on Monday, 5/23/22  - Neuro checks w/ CARTOX 10-point assessment Q4hrs     Toxicity Grading        CRS Grade: Grade 1 CRS (fever only)     ICANS Grade:  N/A     ICE Score: 10/10     3.  ID: Admitted with Fever d/t possible bld stream inf, Dry Cough d/t allergies: Exacerbated 5/25/22, new Wheezing throughout 5/27/22: resolved   - CXR & UA 5/23/22: Negative - CXR 5/27/22: Negative - CXR 5/30/22: MINIMAL BIBASILAR AIRSPACE DISEASE  - CXR 6/7/22: Negative    - Contine Acyclovir ppx  - Restart Levaquin and Diflucan ppx if ANC < 1.5  - Cont daily Zyrtec   - Cont daily Flonase 5/25/22  - Sputum Culture 5/26/22: Negative   - Pan Cx 5/27/22: NG  - Covid-19 & Flu 5/27/22: Negative    - RR Viral Panel 5/28/22: Parainfluenza 3 Virus  - Pan Cx 6/7/22: Pending   - Lactic Acid 6/7/22: 2.6  - Procalcitonin 6/7/22: Pending   - Start IV Vanco Day + 1 (6/7/22)   - Start IV Cefepime Day + 1 (6/7/22)        Abx history:  - Cefepime Day (5/28/22 -5/29/22)   - Levaquin ppx (5/29/22 - 5/31/22)  - Invanz x 1 6/7/22     4.  Heme: Anemia from chemotherapy   - Transfuse for Hgb < 7 and Platelets < 33G  - No transfusion today     5.  Metabolic: stable electrolytes & renal fx   TLS:  No evidence, s/p allopurinol (D/C 6/2/22) and daily TLS labs   - 1 L NS bolus 6/7/22  - Start IVF hydration:  mL/hr  - Replace potassium and magnesium per policy.     6. Cardiac: Overnight developed atrial fibrillation with rapid ventricular response 5/28/22  - Consult Cardiology  - Start Metoprolol 12.5 mg BID: Increase metoprolol 12.5 mg to every 6 hours (5/28/22). Change to Metoprolol 25 mg BID 5/29/22 per Cardiology   - per cardiology no indication for [facility] at this time      7. Nutrition:  Appetite and oral intake is good. - S/p taking Astragalus supplement at home: Stop 5/18/22 d/t interaction with Cytoxan  - Start low microbial diet   - Follow closely with dietary     - DVT Prophylaxis: Platelets >71,496 cells/dL, - daily lovenox prophylaxis ordered  Contraindications to pharmacologic prophylaxis: None  Contraindications to mechanical prophylaxis: None    - Disposition:  Uncertain at this time     The patient was seen and examined by Dr. Jenna Kingsley. This admission history and physical has been discussed and agreed upon by Dr. Jenna Kingsley.     JOAQUIM Connors - NP

## 2022-06-07 NOTE — PROGRESS NOTES
4 Eyes Admission Assessment     I agree as the admission nurse that 2 RN's have performed a thorough Head to Toe Skin Assessment on the patient. ALL assessment sites listed below have been assessed on admission. Areas assessed by both nurses: Rafia and Angelina  [x]   Head, Face, and Ears   [x]   Shoulders, Back, and Chest  [x]   Arms, Elbows, and Hands   [x]   Coccyx, Sacrum, and Ischium  [x]   Legs, Feet, and Heels        Does the Patient have Skin Breakdown?   No         Mal Prevention initiated:  NA   Wound Care Orders initiated:  NA      WOC nurse consulted for Pressure Injury (Stage 3,4, Unstageable, DTI, NWPT, and Complex wounds) or Mal score 18 or lower:  NA      Nurse 1 eSignature: Electronically signed by Jazzy Cuba RN on 6/7/22 at 7:04 PM EDT    **SHARE this note so that the co-signing nurse is able to place an eSignature**    Nurse 2 eSignature: Electronically signed by Ann Bruner RN on 6/7/22 at 7:04 PM EDT

## 2022-06-07 NOTE — PLAN OF CARE
Pt arrived to the unit febrile, and confused/forgetful. He was on 2L O2. He has since been weaned from oxygen and is tolerating it well. He is completely oriented and afebrile. Will continue to monitor. Problem: Safety - Adult  Goal: Free from fall injury  Outcome: Progressing     Orthostatic vital signs obtained at start of shift - see flowsheet for details. Pt does not meet criteria for orthostasis. Pt is a Med fall risk. See Lawayne Fraction Fall Score and ABCDS Injury Risk assessments. Pt bed is in low position, side rails up, call light and belongings are in reach. Fall risk light is on outside pts room. Pt encouraged to call for assistance as needed. Will continue with hourly rounds for PO intake, pain needs, toileting and repositioning as needed.

## 2022-06-07 NOTE — CONSULTS
Clinical Pharmacy Progress Note    Vancomycin - Management by Pharmacy    Consult Date(s): 6/7/22  Consulting Provider(s): JOAQUIM Shine-CNP    Assessment / Plan    Bloodstream infection - Vancomycin   Concurrent Antimicrobials: Cefepime - day #1   Day of Vanc Therapy: #1   Current Dosing Method: Bayesian-Guided AUC Dosing   Therapeutic Goal: 400-600 mg/L*hr   Current Dose / Frequency: 1500 mg IV q12h   Plan / Rationale:   o Per nurse, patient did not receive dose of 1250 mg IV x 1 that was ordered in outpatient infusion. o Will start vancomycin 1500 mg IV q24h. This regimen predicts an AUC of 484 mg/L*h with a steady state trough of 14 mg/L.  o Plan to obtain a level in 1-2 days to assess kinetics.  Will continue to monitor clinical condition and make adjustments to regimen as appropriate. Thank you for consulting Pharmacy! Guillermina Villarreal, ArleenD, [facility]  Wireless: [phone]  6/7/2022 2:36 PM      Subjective/Objective: Mr. Varghese  is a [de-identified] y.o. male with a PMHx significant for diffuse large cell B-cell non-Hodgkin's lymphoma, admitted for fever and possible bloodstream infection. Broad spectrum IV antibiotics were started in outpatient infusion and the patient was admitted. Pharmacy has been consulted to dose vancomycin. Ht Readings from Last 1 Encounters:   05/24/22 6' (1.829 m)     Wt Readings from Last 1 Encounters:   06/07/22 179 lb 7.3 oz (81.4 kg)       Current & Prior Antimicrobial Regimen(s):   Ertapenem x 1 dose (6/7)   Cefepime (6/7-current)   Vancomycin (6/7-current)    Level(s) / Doses:    Date Time Dose Level / Type of Level Interpretation                 Note: Serum levels collected for AUC-based dosing may be high if collected in close proximity to the dose administered. This is not necessarily indicative of toxicity.     Cultures & Sensitivities:    Date Site Micro Susceptibility / Result   6/7 Blood x2 Collected    6/7 Blood, fungus x2 Collected 6/7 Throat Collected    6/7 Throat, fungus Collected    6/7 Throat, HSV In process      Labs / Ancillary Data:    Estimated Creatinine Clearance: 65 mL/min (based on SCr of 1 mg/dL). Recent Labs     06/05/22  0833 06/07/22  0853   CREATININE 1.0 1.0   BUN 15 12   WBC 9.3 4.8       Additional Lab Values / Findings of Note:    Procalcitonin: No results for input(s): PROCAL in the last 72 hours.

## 2022-06-08 ENCOUNTER — APPOINTMENT (OUTPATIENT)
Dept: INTERVENTIONAL RADIOLOGY/VASCULAR | Age: 82
DRG: 314 | End: 2022-06-08
Attending: STUDENT IN AN ORGANIZED HEALTH CARE EDUCATION/TRAINING PROGRAM
Payer: MEDICARE

## 2022-06-08 LAB
ALBUMIN SERPL-MCNC: 3 G/DL (ref 3.4–5)
ALP BLD-CCNC: 85 U/L (ref 40–129)
ALT SERPL-CCNC: 26 U/L (ref 10–40)
ANION GAP SERPL CALCULATED.3IONS-SCNC: 12 MMOL/L (ref 3–16)
ANISOCYTOSIS: ABNORMAL
AST SERPL-CCNC: 32 U/L (ref 15–37)
BASOPHILS ABSOLUTE: 0 K/UL (ref 0–0.2)
BASOPHILS RELATIVE PERCENT: 0 %
BILIRUB SERPL-MCNC: <0.2 MG/DL (ref 0–1)
BILIRUBIN DIRECT: <0.2 MG/DL (ref 0–0.3)
BILIRUBIN, INDIRECT: ABNORMAL MG/DL (ref 0–1)
BUN BLDV-MCNC: 14 MG/DL (ref 7–20)
C-REACTIVE PROTEIN: 78.7 MG/L (ref 0–5.1)
CALCIUM SERPL-MCNC: 7.2 MG/DL (ref 8.3–10.6)
CHLORIDE BLD-SCNC: 104 MMOL/L (ref 99–110)
CO2: 20 MMOL/L (ref 21–32)
CREAT SERPL-MCNC: 1.1 MG/DL (ref 0.8–1.3)
EKG ATRIAL RATE: 93 BPM
EKG DIAGNOSIS: NORMAL
EKG P AXIS: 65 DEGREES
EKG P-R INTERVAL: 174 MS
EKG Q-T INTERVAL: 380 MS
EKG QRS DURATION: 110 MS
EKG QTC CALCULATION (BAZETT): 472 MS
EKG R AXIS: -15 DEGREES
EKG T AXIS: -7 DEGREES
EKG VENTRICULAR RATE: 93 BPM
EOSINOPHILS ABSOLUTE: 0 K/UL (ref 0–0.6)
EOSINOPHILS RELATIVE PERCENT: 0 %
FERRITIN: 627.1 NG/ML (ref 30–400)
GFR AFRICAN AMERICAN: >60
GFR NON-AFRICAN AMERICAN: >60
GLUCOSE BLD-MCNC: 107 MG/DL (ref 70–99)
HCT VFR BLD CALC: 22.3 % (ref 40.5–52.5)
HEMOGLOBIN: 7.7 G/DL (ref 13.5–17.5)
LACTATE DEHYDROGENASE: 187 U/L (ref 100–190)
LYMPHOCYTES ABSOLUTE: 0.4 K/UL (ref 1–5.1)
LYMPHOCYTES RELATIVE PERCENT: 5 %
MAGNESIUM: 1.5 MG/DL (ref 1.8–2.4)
MCH RBC QN AUTO: 32.9 PG (ref 26–34)
MCHC RBC AUTO-ENTMCNC: 34.3 G/DL (ref 31–36)
MCV RBC AUTO: 95.7 FL (ref 80–100)
METAMYELOCYTES RELATIVE PERCENT: 1 %
MONOCYTES ABSOLUTE: 1.2 K/UL (ref 0–1.3)
MONOCYTES RELATIVE PERCENT: 16 %
NEUTROPHILS ABSOLUTE: 6.1 K/UL (ref 1.7–7.7)
NEUTROPHILS RELATIVE PERCENT: 78 %
PDW BLD-RTO: 15.6 % (ref 12.4–15.4)
PHOSPHORUS: 3.8 MG/DL (ref 2.5–4.9)
PLATELET # BLD: 91 K/UL (ref 135–450)
PLATELET SLIDE REVIEW: ABNORMAL
PMV BLD AUTO: 9 FL (ref 5–10.5)
POTASSIUM SERPL-SCNC: 4 MMOL/L (ref 3.5–5.1)
PROCALCITONIN: 90.08 NG/ML (ref 0–0.15)
RBC # BLD: 2.33 M/UL (ref 4.2–5.9)
REPORT: NORMAL
SODIUM BLD-SCNC: 136 MMOL/L (ref 136–145)
TOTAL PROTEIN: 4.5 G/DL (ref 6.4–8.2)
URIC ACID, SERUM: 4.1 MG/DL (ref 3.5–7.2)
WBC # BLD: 7.7 K/UL (ref 4–11)

## 2022-06-08 PROCEDURE — 85025 COMPLETE CBC W/AUTO DIFF WBC: CPT

## 2022-06-08 PROCEDURE — 2580000003 HC RX 258: Performed by: NURSE PRACTITIONER

## 2022-06-08 PROCEDURE — 02PYX3Z REMOVAL OF INFUSION DEVICE FROM GREAT VESSEL, EXTERNAL APPROACH: ICD-10-PCS | Performed by: RADIOLOGY

## 2022-06-08 PROCEDURE — 82728 ASSAY OF FERRITIN: CPT

## 2022-06-08 PROCEDURE — 2060000000 HC ICU INTERMEDIATE R&B

## 2022-06-08 PROCEDURE — 6370000000 HC RX 637 (ALT 250 FOR IP): Performed by: NURSE PRACTITIONER

## 2022-06-08 PROCEDURE — 36592 COLLECT BLOOD FROM PICC: CPT

## 2022-06-08 PROCEDURE — 93010 ELECTROCARDIOGRAM REPORT: CPT | Performed by: INTERNAL MEDICINE

## 2022-06-08 PROCEDURE — 6360000002 HC RX W HCPCS: Performed by: NURSE PRACTITIONER

## 2022-06-08 PROCEDURE — 80048 BASIC METABOLIC PNL TOTAL CA: CPT

## 2022-06-08 PROCEDURE — 80076 HEPATIC FUNCTION PANEL: CPT

## 2022-06-08 PROCEDURE — 83735 ASSAY OF MAGNESIUM: CPT

## 2022-06-08 PROCEDURE — 86140 C-REACTIVE PROTEIN: CPT

## 2022-06-08 PROCEDURE — 84145 PROCALCITONIN (PCT): CPT

## 2022-06-08 PROCEDURE — 84550 ASSAY OF BLOOD/URIC ACID: CPT

## 2022-06-08 PROCEDURE — 87040 BLOOD CULTURE FOR BACTERIA: CPT

## 2022-06-08 PROCEDURE — 36415 COLL VENOUS BLD VENIPUNCTURE: CPT

## 2022-06-08 PROCEDURE — 83615 LACTATE (LD) (LDH) ENZYME: CPT

## 2022-06-08 PROCEDURE — 87103 BLOOD FUNGUS CULTURE: CPT

## 2022-06-08 PROCEDURE — 36589 REMOVAL TUNNELED CV CATH: CPT

## 2022-06-08 PROCEDURE — 84100 ASSAY OF PHOSPHORUS: CPT

## 2022-06-08 RX ADMIN — CEFEPIME HYDROCHLORIDE 2000 MG: 2 INJECTION, POWDER, FOR SOLUTION INTRAVENOUS at 06:15

## 2022-06-08 RX ADMIN — SODIUM CHLORIDE, PRESERVATIVE FREE 10 ML: 5 INJECTION INTRAVENOUS at 20:03

## 2022-06-08 RX ADMIN — MEROPENEM 1000 MG: 1 INJECTION, POWDER, FOR SOLUTION INTRAVENOUS at 08:14

## 2022-06-08 RX ADMIN — ENOXAPARIN SODIUM 40 MG: 100 INJECTION SUBCUTANEOUS at 08:11

## 2022-06-08 RX ADMIN — SODIUM CHLORIDE: 900 INJECTION, SOLUTION INTRAVENOUS at 22:32

## 2022-06-08 RX ADMIN — MEROPENEM 1000 MG: 1 INJECTION, POWDER, FOR SOLUTION INTRAVENOUS at 16:21

## 2022-06-08 RX ADMIN — LEVETIRACETAM 500 MG: 500 TABLET, FILM COATED ORAL at 20:02

## 2022-06-08 RX ADMIN — VALACYCLOVIR HYDROCHLORIDE 500 MG: 500 TABLET, FILM COATED ORAL at 20:02

## 2022-06-08 RX ADMIN — VALACYCLOVIR HYDROCHLORIDE 500 MG: 500 TABLET, FILM COATED ORAL at 08:11

## 2022-06-08 RX ADMIN — SODIUM CHLORIDE, PRESERVATIVE FREE 10 ML: 5 INJECTION INTRAVENOUS at 08:26

## 2022-06-08 RX ADMIN — LEVETIRACETAM 500 MG: 500 TABLET, FILM COATED ORAL at 08:11

## 2022-06-08 RX ADMIN — METOPROLOL TARTRATE 25 MG: 25 TABLET, FILM COATED ORAL at 08:11

## 2022-06-08 RX ADMIN — ACETAMINOPHEN 650 MG: 325 TABLET ORAL at 00:57

## 2022-06-08 RX ADMIN — SODIUM CHLORIDE: 900 INJECTION, SOLUTION INTRAVENOUS at 06:16

## 2022-06-08 RX ADMIN — METOPROLOL TARTRATE 25 MG: 25 TABLET, FILM COATED ORAL at 20:02

## 2022-06-08 NOTE — PROGRESS NOTES
[facility] Progress Note      2022    Magdy Sanders    :  1940    MRN:  [id number]    Referring MD: Bulmaro Acevedo,   [address],  [address]      Subjective:  One episode of fever last night. He feels much better today. Ate breakfast well.       ECOG PS:  (2) Ambulatory and capable of self care, unable to carry out work activity, up and about > 50% or waking hours    KPS: 70% Cares for self; unable to carry on normal activity or to do active work    Isolation:  None     Medications    Scheduled Meds:   meropenem  1,000 mg IntraVENous Q8H    sodium chloride flush  5-40 mL IntraVENous 2 times per day    Saline Mouthwash  15 mL Swish & Spit 4x Daily AC & HS    valACYclovir  500 mg Oral BID    levETIRAcetam  500 mg Oral BID    metoprolol tartrate  25 mg Oral BID    vancomycin  1,500 mg IntraVENous Q24H    enoxaparin  40 mg SubCUTAneous Daily     Continuous Infusions:   sodium chloride 20 mL/hr at 22 1436    sodium chloride      IV infusion builder 100 mL/hr at 22 0616     PRN Meds:.diphenhydrAMINE, sodium chloride, sodium chloride flush, sodium chloride, potassium chloride, magnesium sulfate, magnesium hydroxide, Saline Mouthwash, alteplase (CATHFLO) with sterile water injection, acetaminophen, benzonatate, guaiFENesin, ondansetron, ondansetron, prochlorperazine **OR** prochlorperazine, albuterol, potassium phosphate IVPB      ROS:  As noted above, otherwise remainder of 10-point ROS negative      Physical Exam:     Vital Signs:  /64   Pulse 68   Temp 98.2 °F (36.8 °C) (Oral)   Resp 18   Wt 181 lb (82.1 kg)   SpO2 95%   BMI 24.55 kg/m²     Weight:    Wt Readings from Last 3 Encounters:   22 181 lb (82.1 kg)   22 179 lb 7.3 oz (81.4 kg)   22 178 lb 5.6 oz (80.9 kg)       General: Awake, alert and oriented    HEENT: normocephalic, alopecia, PERRL, no scleral erythema or icterus, Oral mucosa moist and intact, throat clear.   NECK: supple without palpable adenopathy  BACK: Straight, negative CVAT  SKIN: warm dry and intact without lesions rashes or masses  CHEST: CTA bilaterally without use of accessory muscles  CV: Normal S1 S2, RRR, no MRG  ABD: NT ND normoactive BS, no palpable masses or hepatosplenomegaly  EXTREMITIES: chronic edema in LLE, denies calf tenderness  NEURO: CN II - XII grossly intact  CATHETER: RIJ Trifusion (IR: Dean, 4/15/22): CDI    Laboratory Data:  CBC:   Recent Labs     06/07/22 0853 06/07/22 1417 06/08/22  0355   WBC 4.8 3.7* 7.7   HGB 9.4* 8.2* 7.7*   HCT 27.4* 24.1* 22.3*   MCV 95.6 95.5 95.7    105* 91*     BMP/Mag:  Recent Labs     06/07/22  0853 06/07/22 1417 06/08/22  0355    139 136   K 3.7 3.8 4.0    106 104   CO2 25 21 20*   PHOS 2.5 1.1* 3.8   BUN 12 14 14   CREATININE 1.0 1.0 1.1   MG 1.70* 1.30*  --      LIVP:   Recent Labs     06/07/22  0853 06/07/22 1417 06/08/22  0355   AST 18 20 32   ALT 19 18 26   BILIDIR <0.2 <0.2 <0.2   BILITOT <0.2 <0.2 <0.2   ALKPHOS 87 84 85     Coags:   Recent Labs     06/07/22  0853 06/07/22  1417   PROTIME 15.0* 15.9*   INR 1.18* 1.27*   APTT  --  29.2     Uric Acid   Recent Labs     06/07/22  0853 06/07/22 1417 06/08/22  0355   LABURIC 4.3 4.3 4.1     Lab Results   Component Value Date    CRP 78.7 (H) 06/08/2022    CRP 18.8 (H) 06/07/2022    CRP 9.7 (H) 06/05/2022     Lab Results   Component Value Date    FERRITIN 627.1 (H) 06/08/2022    FERRITIN 544.8 (H) 06/07/2022    FERRITIN 587.7 (H) 06/05/2022         PROBLEM LIST:          1. Follicular lymphoma with BM involvement  2. DLBC NHL      TREATMENT:            1. FL: 1/1995 CHOP             11/2003 to 8/2004 Rituxan             7/2007 R-CHOP  2.  DLBCL: R-Gentry/Ox C1 1/31/2022, C2 02/14/2022                     Chip-BR C1D1: 3/22/22                                    C2D1: 4/20/22        Lymphodepleting Chemotherapy:  Fludarabine and cyclophosphamide   Disease Status at time of Infusion:  Progressive Disease  CAR-T Infusion Date: 5/23/22  CAR-T Product:  Breyanzi  Batch ID Number:  [id number]    Post CAR-T complications  -Enterobacter bacteremia 6/7/22     ASSESSMENT AND PLAN:            1.  Refractory large cell non-Hodgkin's lymphoma: progressive disease   - PET (3/1/22): extensive hypermetabolic lymphadenopathy in the retroperitoneum, left hemipelvis and LLE   - BMBX (3/14/22): negative   - S/p Chip/Bendamustine/Rituxan x 2 cycles  - PLAN: [facility]  5/18/22 followed by CAR-T infusion  5/23/22     CAR-T Work up:   - Echo (3/21/22): Centric mild left ventricular hypertrophy.  Left ventricular ejection fraction 55 to 60% with no regional wall motion abnormalities noted.  Intermittent diastolic dysfunction.  - PFTs (3/21/22): Diffusion capacity 79%, FEV1 78%.  Consistent with mild restrictive ventilatory defect. - CMI -  3 (age and pulmonary)  - PET (5/13/22):  Multiple indeterminate pulmonary nodules in the right lung which demonstrates increased activity in the right upper lobe. These may be infectious. These were not described on the previous PET/CT. Pulmonary lymphoma would be difficult to exclude. If the pulmonary nodule represents lymphoma, this would represent a Deauville score 5 exam. If this is infectious, overall the exam would represent a Deauville score 1. Previously described lymphadenopathy in FDG activity in the left retroperitoneum, left iliac region and in left upper thigh has completely resolved.  No residual lymphadenopathy or FDG avid lymph node identified      Lymphodepleting Chemotherapy:  Fludarabine and cyclophosphamide   Disease Status at time of Infusion:  Progressive Disease  CAR-T Infusion Date: 5/23/22  CAR-T Product:  Breyanzi  Batch ID Number:  [id number]     Day + 16     2.  CRS / Neuro:  Admitted 6/7/22 with Grade 1 CRS (fever only) but possibly r/t bacteremia  - Monitor CRP and Ferrtin closely   Lab Results   Component Value Date    CRP 78. 7 (H) 06/08/2022    CRP 18.8 (H) 06/07/2022    CRP 9.7 (H) 06/05/2022     Lab Results   Component Value Date    FERRITIN 627.1 (H) 06/08/2022    FERRITIN 544.8 (H) 06/07/2022    FERRITIN 587.7 (H) 06/05/2022     - Neuro checks w/ CARTOX 10-point assessment Q4hrs    Toxicity Grading    CRS Grade: 1 (fever only)    ICANS stGstrstastdstest:st st1st ICE Score:  CAR-T Score: 10    3.  ID:  Admitted with sepsis (POA) r/t Enterobacter bacteremia +/- CRS. Tmax 103.4  - CXR 6/7/22: Negative    - Contine Acyclovir ppx  - Restart Levaquin and Diflucan ppx if ANC < 1.5  - RR Viral Panel 5/28/22: Parainfluenza 3 Virus  - Pan Cx 6/7/22: Enterobacter x 2 bottles  - Lactic Acid 6/7/22: 2.6  - Procalcitonin 6/7/22: 47, increased to 90 6/8/22  - Cont IV Vanco Day + 2 (6/7/22)   - Start IV Merrem Day + 1 (6/8/22)        Abx history:  - Cefepime Day (5/28/22 -5/29/22), 6/7/22  - Levaquin ppx (5/29/22 - 5/31/22)  - Invanz x 1 6/7/22     4.  Heme: Anemia and thrombocytopenia from chemotherapy   - Transfuse for Hgb < 7 and Platelets < 73J  - No transfusion today     5.  Metabolic: stable electrolytes & renal fx except metabolic acidosis  TLS:  No evidence, s/p allopurinol (D/C 6/2/22) and daily TLS labs   - 1 L NS bolus 6/7/22  - Cont IVF hydration: NS 50 mg /hr   - Replace potassium and magnesium per policy.     6. Cardiac: Atrial fibrillation with rapid ventricular response 5/28/22  - Consult Cardiology  - Start Metoprolol 12.5 mg BID: Increase metoprolol 12.5 mg to every 6 hours (5/28/22). Change to Metoprolol 25 mg BID 5/29/22 per Cardiology   - per cardiology no indication for [facility] at this time      7. Nutrition:  Appetite and oral intake is good.    - S/p taking Astragalus supplement at home: Stop 5/18/22 d/t interaction with Cytoxan  - Start low microbial diet   - Follow closely with dietary         - DVT Prophylaxis: Platelets >89,740 cells/dL, - daily lovenox prophylaxis ordered  Contraindications to pharmacologic prophylaxis: None  Contraindications to mechanical prophylaxis: None    - Disposition:  Once afebrile and off IV abx    The patient was seen and examined by Dr. Zaina Christiansen, APRN - [address] [address], DO

## 2022-06-08 NOTE — PROGRESS NOTES
Department of Pharmacy    Notification received from laboratory of positive blood culture results. Organism(s) detected:  Enterobacter cloacae complex in 4 bottles    Applicable Antimicrobial Resistance Genes: none noted    Pt currently receiving meropenem which provides good coverage for Enterobacter cloacae ( 98% per antibiogram.)    Please call with any questions:    Angelica Prairie  [facility]   6/8/2022 9:56 AM     [phone] ([address] [place] [address])

## 2022-06-08 NOTE — PLAN OF CARE
Problem: Safety - Adult  Goal: Free from fall injury  Outcome: Progressing   Orthostatic vital signs obtained at start of shift - see flowsheet for details. Pt does not meet criteria for orthostasis. Pt is a Med fall risk. See Dara Eliana Fall Score and ABCDS Injury Risk assessments. - Screening for Orthostasis AND not a [place] Risk per ARNETT/ABCDS: Pt bed is in low position, side rails up, call light and belongings are in reach. Fall risk light is on outside pts room. Pt encouraged to call for assistance as needed. Will continue with hourly rounds for PO intake, pain needs, toileting and repositioning as needed. Problem: Skin/Tissue Integrity - Adult  Goal: Incisions, wounds, or drain sites healing without S/S of infection  Outcome: Progressing     Pt had his trifusion removed this shift. Site is closed with stitches and covered with bandage. No bleeding at site. No complaints of pain. Problem: Infection - Adult  Goal: Absence of infection during hospitalization  Outcome: Progressing   CVC site remains free of signs/symptoms of infection. No drainage, edema, erythema, pain, or warmth noted at site. Dressing changes continue per protocol and on an as needed basis - see flowsheet. Compliant with [facility] Bath Protocol:  Performed CHG bath today per [facility] protocol utilizing Bed bath with CHG wipes. CVC site cleansed with CHG wipe over dressing, skin surrounding dressing, and first 6\" of IV tubing. Pt tolerated well. Continued to encourage daily CHG bathing per [facility] protocol. Problem: Hematologic - Adult  Goal: Maintains hematologic stability  Outcome: Progressing   Patient's hemoglobin this AM:   Recent Labs     06/08/22  0355   HGB 7.7*     Patient's platelet count this AM:   Recent Labs     06/08/22  0355   PLT 91*    Thrombocytopenia Precautions in place. Patient showing no signs or symptoms of active bleeding. Transfusion not indicated at this time.   Patient verbalizes understanding of all instructions. Will continue to assess and implement POC. Call light within reach and hourly rounding in place.

## 2022-06-08 NOTE — CONSULTS
Clinical Pharmacy Progress Note    Vancomycin has been discontinued. Will sign off pharmacy to dose Vancomycin consult. If medication is restarted and pharmacy is to manage dosing, please re-consult at that time.     Please call with questions--  Thanks--  Berta Sheldon, PharmD, [address] [facility]  [id number] ([facility])   6/8/2022 9:10 AM

## 2022-06-08 NOTE — CARE COORDINATION
Case Management Assessment           Initial Evaluation                Date / Time of Evaluation: 6/8/2022 9:18 AM                 Assessment Completed by: Lennox Plaza, LSW    Patient Name: Maninder Washington     YOB: 1940  Diagnosis: Fever, unknown origin [R50.9]  DLBCL (diffuse large B cell lymphoma) ([facility] [address] [C83.30]     Date / Time: 6/7/2022  1:12 PM    Patient Admission Status: Inpatient    If patient is discharged prior to next notation, then this note serves as note for discharge by case management. Current PCP: Mariam Patient: No    Chart Reviewed: Yes  Patient/ Family Interviewed: Yes    Initial assessment completed at bedside with: patient during prior admission    Hospitalization in the last 30 days: Yes    Emergency Contacts:  Extended Emergency Contact Information  Primary Emergency Contact: [facility] Phone: [phone]  Mobile Phone: [phone]  Relation: Spouse    Advance Directives:   Code Status: Prior    Financial  Payor: MEDICARE / Plan: MEDICARE PART A AND B / Product Type: *No Product type* /     Pre-cert required for SNF: No    Pharmacy    [address] [phone] - [facility], [address] [place] [address] [phone] [facility] Number [phone]  [address] [place] [address]  Phone: [phone] Fax: [phone]      Potential assistance Purchasing Medications: Potential Assistance Purchasing Medications: No  Does Patient want to participate in local refill/ meds to beds program?: Yes    Meds To Beds General Rules:  1. Can ONLY be done Monday- Friday between 8:30am-5pm  2. Prescription(s) must be in pharmacy by 3pm to be filled same day  3. Copy of patient's insurance/ prescription drug card and patient face sheet must be sent along with the prescription(s)  4. Cost of Rx cannot be added to hospital bill. If financial assistance is needed, please contact unit  or ;   or  CANNOT provide pharmacy voucher for patients co-pays  5. Patients can then  the prescription on their way out of the hospital at discharge, or pharmacy can deliver to the bedside if staff is available. (payment due at time of pick-up or delivery - cash, check, or card accepted)     Able to afford home medications/ co-pay costs: Yes    ADLS  Support Systems: Spouse/Significant Other    PT AM-PAC:   /24  OT AM-PAC:   /24    [place]: home with wife  Steps: yes    Plans to RETURN to current housing: Yes  Barriers to RETURNING to current housing: medical clearance    Home Care Information  Currently ACTIVE with [address]: No  Home Care Agency: Not Applicable    Currently ACTIVE with [place] on Aging: No  Passport/ Waiver: No  Passport/ Waiver Services: Not Applicable      Durable Medical Equipment  DME Provider: none  Equipment: none    Home Oxygen and [address] prior to admission: No  [address]: Not Applicable  Other Respiratory Equipment: none    Informed of need to bring portable home O2 tank on day of DISCHARGE for nursing to connect prior to leaving: No  Verbalized agreement/Understanding: No  Person to bring portable tank at discharge: none    Dialysis  Active with HD/PD prior to admission: No  Nephrologist: none    HD Center:  Not Applicable    DISCHARGE PLAN:  Disposition: Home- No Services Needed    Transportation PLAN for discharge: family     Factors facilitating achievement of predicted outcomes: Family support, Motivated and Cooperative    Barriers to discharge: medical clearance    Additional Case Management Notes: Patient is from home with his wife. He is independent at baseline. Following for possible needs at discharge.     The Plan for Transition of Care is related to the following treatment goals of Fever, unknown origin [R50.9]  DLBCL (diffuse large B cell lymphoma) ([facility] [address] [C83.30]    The Patient and/or patient representative Benjamin Thompson and his family were provided with a choice of provider and agrees with the discharge plan Yes    Freedom of choice list was provided with basic dialogue that supports the patient's individualized plan of care/goals and shares the quality data associated with the providers.  Yes    Care Transition patient: No    Jennifer Warren   Case Management Department  Ph: [phone]   Fax: [phone]

## 2022-06-09 LAB
ABO/RH: NORMAL
ACANTHOCYTES: ABNORMAL
ALBUMIN SERPL-MCNC: 3.1 G/DL (ref 3.4–5)
ALP BLD-CCNC: 83 U/L (ref 40–129)
ALT SERPL-CCNC: 24 U/L (ref 10–40)
ANION GAP SERPL CALCULATED.3IONS-SCNC: 11 MMOL/L (ref 3–16)
ANISOCYTOSIS: ABNORMAL
ANTIBODY SCREEN: NORMAL
APTT: 39.6 SEC (ref 23–34.3)
AST SERPL-CCNC: 25 U/L (ref 15–37)
BASOPHILS ABSOLUTE: 0 K/UL (ref 0–0.2)
BASOPHILS RELATIVE PERCENT: 0 %
BILIRUB SERPL-MCNC: <0.2 MG/DL (ref 0–1)
BILIRUBIN DIRECT: <0.2 MG/DL (ref 0–0.3)
BILIRUBIN, INDIRECT: ABNORMAL MG/DL (ref 0–1)
BUN BLDV-MCNC: 12 MG/DL (ref 7–20)
C-REACTIVE PROTEIN: 91.9 MG/L (ref 0–5.1)
CALCIUM SERPL-MCNC: 7.7 MG/DL (ref 8.3–10.6)
CHLORIDE BLD-SCNC: 106 MMOL/L (ref 99–110)
CO2: 21 MMOL/L (ref 21–32)
CREAT SERPL-MCNC: 0.9 MG/DL (ref 0.8–1.3)
EOSINOPHILS ABSOLUTE: 0.2 K/UL (ref 0–0.6)
EOSINOPHILS RELATIVE PERCENT: 4 %
FERRITIN: 645.2 NG/ML (ref 30–400)
GFR AFRICAN AMERICAN: >60
GFR NON-AFRICAN AMERICAN: >60
GLUCOSE BLD-MCNC: 99 MG/DL (ref 70–99)
HCT VFR BLD CALC: 23.3 % (ref 40.5–52.5)
HEMOGLOBIN: 7.8 G/DL (ref 13.5–17.5)
HYPOCHROMIA: ABNORMAL
INR BLD: 1.24 (ref 0.87–1.14)
LACTATE DEHYDROGENASE: 166 U/L (ref 100–190)
LYMPHOCYTES ABSOLUTE: 0.2 K/UL (ref 1–5.1)
LYMPHOCYTES RELATIVE PERCENT: 4 %
MAGNESIUM: 1.7 MG/DL (ref 1.8–2.4)
MCH RBC QN AUTO: 32.1 PG (ref 26–34)
MCHC RBC AUTO-ENTMCNC: 33.4 G/DL (ref 31–36)
MCV RBC AUTO: 96.1 FL (ref 80–100)
MICROCYTES: ABNORMAL
MONOCYTES ABSOLUTE: 0.8 K/UL (ref 0–1.3)
MONOCYTES RELATIVE PERCENT: 19 %
NEUTROPHILS ABSOLUTE: 3.2 K/UL (ref 1.7–7.7)
NEUTROPHILS RELATIVE PERCENT: 73 %
OVALOCYTES: ABNORMAL
PDW BLD-RTO: 15.5 % (ref 12.4–15.4)
PHOSPHORUS: 2.9 MG/DL (ref 2.5–4.9)
PLATELET # BLD: 85 K/UL (ref 135–450)
PMV BLD AUTO: 9.2 FL (ref 5–10.5)
POIKILOCYTES: ABNORMAL
POLYCHROMASIA: ABNORMAL
POTASSIUM SERPL-SCNC: 3.8 MMOL/L (ref 3.5–5.1)
PROTHROMBIN TIME: 15.5 SEC (ref 11.7–14.5)
RBC # BLD: 2.42 M/UL (ref 4.2–5.9)
SODIUM BLD-SCNC: 138 MMOL/L (ref 136–145)
THROAT CULTURE: NORMAL
TOTAL PROTEIN: 4.9 G/DL (ref 6.4–8.2)
URIC ACID, SERUM: 3.9 MG/DL (ref 3.5–7.2)
WBC # BLD: 4.4 K/UL (ref 4–11)

## 2022-06-09 PROCEDURE — 82728 ASSAY OF FERRITIN: CPT

## 2022-06-09 PROCEDURE — 2060000000 HC ICU INTERMEDIATE R&B

## 2022-06-09 PROCEDURE — 36591 DRAW BLOOD OFF VENOUS DEVICE: CPT

## 2022-06-09 PROCEDURE — 80048 BASIC METABOLIC PNL TOTAL CA: CPT

## 2022-06-09 PROCEDURE — 86850 RBC ANTIBODY SCREEN: CPT

## 2022-06-09 PROCEDURE — 85025 COMPLETE CBC W/AUTO DIFF WBC: CPT

## 2022-06-09 PROCEDURE — 86900 BLOOD TYPING SEROLOGIC ABO: CPT

## 2022-06-09 PROCEDURE — 6370000000 HC RX 637 (ALT 250 FOR IP): Performed by: NURSE PRACTITIONER

## 2022-06-09 PROCEDURE — 83615 LACTATE (LD) (LDH) ENZYME: CPT

## 2022-06-09 PROCEDURE — 6360000002 HC RX W HCPCS: Performed by: NURSE PRACTITIONER

## 2022-06-09 PROCEDURE — 84100 ASSAY OF PHOSPHORUS: CPT

## 2022-06-09 PROCEDURE — 85730 THROMBOPLASTIN TIME PARTIAL: CPT

## 2022-06-09 PROCEDURE — 83735 ASSAY OF MAGNESIUM: CPT

## 2022-06-09 PROCEDURE — 2580000003 HC RX 258: Performed by: NURSE PRACTITIONER

## 2022-06-09 PROCEDURE — 85610 PROTHROMBIN TIME: CPT

## 2022-06-09 PROCEDURE — 86140 C-REACTIVE PROTEIN: CPT

## 2022-06-09 PROCEDURE — 80076 HEPATIC FUNCTION PANEL: CPT

## 2022-06-09 PROCEDURE — 84550 ASSAY OF BLOOD/URIC ACID: CPT

## 2022-06-09 PROCEDURE — 86901 BLOOD TYPING SEROLOGIC RH(D): CPT

## 2022-06-09 RX ADMIN — LEVETIRACETAM 500 MG: 500 TABLET, FILM COATED ORAL at 20:15

## 2022-06-09 RX ADMIN — METOPROLOL TARTRATE 25 MG: 25 TABLET, FILM COATED ORAL at 20:15

## 2022-06-09 RX ADMIN — VALACYCLOVIR HYDROCHLORIDE 500 MG: 500 TABLET, FILM COATED ORAL at 08:21

## 2022-06-09 RX ADMIN — VALACYCLOVIR HYDROCHLORIDE 500 MG: 500 TABLET, FILM COATED ORAL at 20:15

## 2022-06-09 RX ADMIN — LEVETIRACETAM 500 MG: 500 TABLET, FILM COATED ORAL at 08:21

## 2022-06-09 RX ADMIN — METOPROLOL TARTRATE 25 MG: 25 TABLET, FILM COATED ORAL at 08:21

## 2022-06-09 RX ADMIN — ENOXAPARIN SODIUM 40 MG: 100 INJECTION SUBCUTANEOUS at 08:21

## 2022-06-09 RX ADMIN — MEROPENEM 1000 MG: 1 INJECTION, POWDER, FOR SOLUTION INTRAVENOUS at 00:17

## 2022-06-09 RX ADMIN — SODIUM CHLORIDE 15 ML: 900 IRRIGANT IRRIGATION at 16:58

## 2022-06-09 RX ADMIN — SODIUM CHLORIDE 15 ML: 900 IRRIGANT IRRIGATION at 20:16

## 2022-06-09 RX ADMIN — SODIUM CHLORIDE, PRESERVATIVE FREE 10 ML: 5 INJECTION INTRAVENOUS at 20:17

## 2022-06-09 RX ADMIN — MEROPENEM 1000 MG: 1 INJECTION, POWDER, FOR SOLUTION INTRAVENOUS at 08:23

## 2022-06-09 RX ADMIN — MEROPENEM 1000 MG: 1 INJECTION, POWDER, FOR SOLUTION INTRAVENOUS at 16:45

## 2022-06-09 RX ADMIN — SODIUM CHLORIDE, PRESERVATIVE FREE 10 ML: 5 INJECTION INTRAVENOUS at 08:22

## 2022-06-09 NOTE — PLAN OF CARE
Problem: Safety - Adult  Goal: Free from fall injury  6/9/2022 0021 by Bart Samuels RN  Outcome: Progressing  Note: Orthostatic vital signs obtained at start of shift - see flowsheet for details. Pt does not meet criteria for orthostasis. Pt is a Med fall risk. See Júnior Nice Fall Score and ABCDS Injury Risk assessments. - Screening for Orthostasis AND not a [place] Risk per ARNETT/ABCDS: Pt bed is in low position, side rails up, call light and belongings are in reach. Fall risk light is on outside pts room. Pt encouraged to call for assistance as needed. Will continue with hourly rounds for PO intake, pain needs, toileting and repositioning as needed. Problem: ABCDS Injury Assessment  Goal: Absence of physical injury  6/9/2022 0021 by Bart Samuels RN  Outcome: Progressing     Problem: Skin/Tissue Integrity - Adult  Goal: Skin integrity remains intact  6/9/2022 0021 by Bart Samuels RN  Outcome: Progressing  Note: Pt not noted to have any new skin breakdown this PM. Pt's existing skin breakdown noted in flowsheets. Pt ambulatory and able to turn self in bed. Will continue to monitor. Problem: Infection - Adult  Goal: Absence of infection during hospitalization  6/9/2022 0021 by Bart Samuels RN  Outcome: Progressing     Problem: Hematologic - Adult  Goal: Maintains hematologic stability  6/9/2022 0021 by Bart Samuels RN  Outcome: Progressing  Note:   Patient's hemoglobin this AM: Recent Labs     06/09/22  0410   HGB 7.8*     Patient's platelet count this AM:   Recent Labs     06/09/22  0410   PLT 85*    Thrombocytopenia Precautions in place. Patient showing no signs or symptoms of active bleeding. Transfusion not indicated at this time. Patient verbalizes understanding of all instructions. Will continue to assess and implement POC. Call light within reach and hourly rounding in place.

## 2022-06-09 NOTE — PLAN OF CARE
Problem: Safety - Adult  Goal: Free from fall injury  Outcome: Progressing     Orthostatic vital signs obtained at start of shift - see flowsheet for details. Pt does not meet criteria for orthostasis. Pt is a Med fall risk. See Mearl Kirk Fall Score and ABCDS Injury Risk assessments. - Screening for Orthostasis AND not a [place] Risk per ARNETT/ABCDS: Pt bed is in low position, side rails up, call light and belongings are in reach. Fall risk light is on outside pts room. Pt encouraged to call for assistance as needed. Will continue with hourly rounds for PO intake, pain needs, toileting and repositioning as needed. Problem: Infection - Adult  Goal: Absence of infection during hospitalization  Outcome: Progressing  Flowsheets (Taken 6/9/2022 0818)  Absence of infection during hospitalization:   Assess and monitor for signs and symptoms of infection   Monitor lab/diagnostic results   Monitor all insertion sites i.e., indwelling lines, tubes and drains     CVC site remains free of signs/symptoms of infection. No drainage, edema, erythema, pain, or warmth noted at site. Dressing changes continue per protocol and on an as needed basis - see flowsheet. Compliant with [facility] Bath Protocol:  Performed CHG bath today per [facility] protocol utilizing Bed bath with CHG wipes. CVC site cleansed with CHG wipe over dressing, skin surrounding dressing, and first 6\" of IV tubing. Pt tolerated well. Continued to encourage daily CHG bathing per [address] [employer] [employer] protocol. Problem: Hematologic - Adult  Goal: Maintains hematologic stability  Outcome: Progressing  Flowsheets (Taken 6/9/2022 0818)  Maintains hematologic stability:   Monitor labs for bleeding or clotting disorders   Assess for signs and symptoms of bleeding or hemorrhage   Patient's hemoglobin this AM:   Recent Labs     06/09/22  0410   HGB 7.8*     Patient's platelet count this AM:   Recent Labs     06/09/22  0410   PLT 85*    Thrombocytopenia Precautions in place. Patient showing no signs or symptoms of active bleeding. Transfusion not indicated at this time. Patient verbalizes understanding of all instructions. Will continue to assess and implement POC. Call light within reach and hourly rounding in place.

## 2022-06-09 NOTE — PROGRESS NOTES
Behavioral Health Intervention Note    Met with patient briefly. Patient denied behavioral health needs today.     ----------------------------------------------------------------------------------------------------------    At initial encounter with patient, discussed role of psychologist including addressing emotional and behavioral needs while receiving care at the [facility] and [address] [place] [address]. Discussed short-term nature of services. Also discussed with patient that a component of provider's role is completing the pre-surgical psychological evaluations for bone marrow transplant and CAR-T. Informed patient of psychologist's various roles in order for the patient to be fully informed when consenting to behavioral health treatment. Patient was agreeable to engaging in care with psychologist. Discussed limits of confidentiality including that psychologist coordinates with patient's treatment team and other limits of confidentiality were discussed as needed.

## 2022-06-09 NOTE — PROGRESS NOTES
[address] Progress Note      2022    Marybeth Carlson    :  1940    MRN:  [id number]    Subjective:  Doing well. Anxious to go home. He is eating well. No fevers      ECOG PS:  (2) Ambulatory and capable of self care, unable to carry out work activity, up and about > 50% or waking hours    KPS: 70% Cares for self; unable to carry on normal activity or to do active work    Isolation:  None     Medications    Scheduled Meds:   meropenem  1,000 mg IntraVENous Q8H    sodium chloride flush  5-40 mL IntraVENous 2 times per day    Saline Mouthwash  15 mL Swish & Spit 4x Daily AC & HS    valACYclovir  500 mg Oral BID    levETIRAcetam  500 mg Oral BID    metoprolol tartrate  25 mg Oral BID    enoxaparin  40 mg SubCUTAneous Daily     Continuous Infusions:   sodium chloride 20 mL/hr at 22 1436    sodium chloride      IV infusion builder 50 mL/hr at 22 2232     PRN Meds:.diphenhydrAMINE, sodium chloride, sodium chloride flush, sodium chloride, potassium chloride, magnesium sulfate, magnesium hydroxide, Saline Mouthwash, alteplase (CATHFLO) with sterile water injection, acetaminophen, benzonatate, guaiFENesin, ondansetron, ondansetron, prochlorperazine **OR** prochlorperazine, albuterol, potassium phosphate IVPB      ROS:  As noted above, otherwise remainder of 10-point ROS negative      Physical Exam:     Vital Signs:  /67   Pulse 68   Temp 98 °F (36.7 °C) (Oral)   Resp 16   Wt 183 lb 6.4 oz (83.2 kg)   SpO2 96%   BMI 24.87 kg/m²     Weight:    Wt Readings from Last 3 Encounters:   22 183 lb 6.4 oz (83.2 kg)   22 179 lb 7.3 oz (81.4 kg)   22 178 lb 5.6 oz (80.9 kg)       General: Awake, alert and oriented    HEENT: normocephalic, alopecia, PERRL, no scleral erythema or icterus, Oral mucosa moist and intact, throat clear.    NECK: supple without palpable adenopathy  BACK: Straight, negative CVAT  SKIN: warm dry and intact without lesions rashes or masses  CHEST: CTA bilaterally without use of accessory muscles  CV: Normal S1 S2, RRR, no MRG  ABD: NT ND normoactive BS, no palpable masses or hepatosplenomegaly  EXTREMITIES: chronic edema in LLE, denies calf tenderness  NEURO: CN II - XII grossly intact  CATHETER: PAC (3/21/22): CDI    Laboratory Data:  CBC:   Recent Labs     06/07/22  1417 06/08/22  0355 06/09/22  0410   WBC 3.7* 7.7 4.4   HGB 8.2* 7.7* 7.8*   HCT 24.1* 22.3* 23.3*   MCV 95.5 95.7 96.1   * 91* 85*     BMP/Mag:  Recent Labs     06/07/22  1417 06/08/22  0355 06/09/22  0410    136 138   K 3.8 4.0 3.8    104 106   CO2 21 20* 21   PHOS 1.1* 3.8 2.9   BUN 14 14 12   CREATININE 1.0 1.1 0.9   MG 1.30* 1.50* 1.70*     LIVP:   Recent Labs     06/07/22  1417 06/08/22  0355 06/09/22  0410   AST 20 32 25   ALT 18 26 24   BILIDIR <0.2 <0.2 <0.2   BILITOT <0.2 <0.2 <0.2   ALKPHOS 84 85 83     Coags:   Recent Labs     06/07/22  0853 06/07/22  1417 06/09/22  0410   PROTIME 15.0* 15.9* 15.5*   INR 1.18* 1.27* 1.24*   APTT  --  29.2 39.6*     Uric Acid   Recent Labs     06/07/22  1417 06/08/22  0355 06/09/22  0410   LABURIC 4.3 4.1 3.9     Lab Results   Component Value Date    CRP 91.9 (H) 06/09/2022    CRP 78.7 (H) 06/08/2022    CRP 18.8 (H) 06/07/2022     Lab Results   Component Value Date    FERRITIN 645.2 (H) 06/09/2022    FERRITIN 627.1 (H) 06/08/2022    FERRITIN 544.8 (H) 06/07/2022     PROBLEM LIST:          1. Follicular lymphoma with BM involvement  2. DLBC NHL  3. Atrial fibrillation w/ RVR (5/2022)  4. Enterobacter bacteremia / sepsis (6/2022)      TREATMENT:            FL:   1  CHOP (1/1995)  2. Rituxan (11/2003 - 8/2004)  3. R-CHOP (7/2007)     DLBCL:   1.  R-[place]/Ox x 2 cycles (1/31/22 & 2/14/22)  2. Chip-BR x 2 cycles  (3/22/22 & 4/20/22)  3.   CAR-T w/ Breyanzi                      Lymphodepleting Chemotherapy:  Fludarabine and cyclophosphamide   Disease Status at time of Infusion:  Progressive Disease  CAR-T Infusion Date: 5/23/22  CAR-T Product:  Breyanzi  Batch ID Number:  [id number]     ASSESSMENT AND PLAN:            1.  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 These may be infectious. These were not described on the previous PET/CT. Pulmonary lymphoma would be difficult to exclude. If the pulmonary nodule represents lymphoma, this would represent a Deauville score 5 exam. If this is infectious, overall the exam would represent a Deauville score 1. Previously described lymphadenopathy in FDG activity in the left retroperitoneum, left iliac region and in left upper thigh has completely resolved. No residual lymphadenopathy or FDG avid lymph node identified      Lymphodepleting Chemotherapy:  Fludarabine and cyclophosphamide   Disease Status at time of Infusion:  Progressive Disease  CAR-T Infusion Date: 5/23/22  CAR-T Product:  Breyanzi  Batch ID Number:  [id number]     Day + 17     2.  CRS / Neuro:  Admitted 6/7/22, fever is unlikely d/t CRS from CAR-T   - Monitor CRP and Ferrtin closely   Lab Results   Component Value Date    CRP 91.9 (H) 06/09/2022    CRP 78.7 (H) 06/08/2022    CRP 18.8 (H) 06/07/2022     Lab Results   Component Value Date    FERRITIN 645.2 (H) 06/09/2022    FERRITIN 627.1 (H) 06/08/2022    FERRITIN 544.8 (H) 06/07/2022     - Neuro checks w/ CARTOX 10-point assessment Q4hrs    Toxicity Grading    CRS Grade: none    ICANS stGstrstastdstest:st st1st ICE Score:  CAR-T Score: 10    3.  ID:  Admitted with sepsis (POA) r/t Enterobacter bacteremia, fever from CRS d/t CAR-T was ruled out. - CXR (6/7/22):  Negative    - Restart Levaquin and Diflucan ppx if ANC < 1.5  - RR Viral Panel (5/28/22): Parainfluenza

## 2022-06-09 NOTE — PROGRESS NOTES
Physician Progress Note      PATIENT:               Angel Hackett  CSN #:                  [id number]  :                       1940  ADMIT DATE:       2022 1:12 PM  [address] DATE:  RESPONDING  PROVIDER #:        Trinidad Urbina CNP          QUERY TEXT:    Pt admitted with Sepsis and noted to have confusion per admitting RN. If   possible, please document in the progress notes and discharge summary if you   are evaluating and / or treating any of the following: The medical record reflects the following:  Risk Factors: Sepsis  Clinical Indicators:  per admitting RN, patient arrived febrile,   confused/forgetful; per H/P \" became more agitated and slow to respond to   simple questions\"  Treatment: Merrem, IV Fluid bolus, IV fluids, Cefepime, Pan culture, CXR  Options provided:  -- Metabolic encephalopathy POA due to Sepsis, resolved  -- Septic encephalopathy POA, resolved  -- Other - I will add my own diagnosis  -- Disagree - Not applicable / Not valid  -- Disagree - Clinically unable to determine / Unknown  -- Refer to Clinical Documentation Reviewer    PROVIDER RESPONSE TEXT:    This patient has septic encephalopathy, POA, resolved.     Query created by: Troy Barton on 2022 3:01 PM      Electronically signed by:  Trinidad Urbina CNP 2022 6:03 AM

## 2022-06-10 VITALS
TEMPERATURE: 97.4 F | BODY MASS INDEX: 24.54 KG/M2 | WEIGHT: 181.2 LBS | RESPIRATION RATE: 16 BRPM | SYSTOLIC BLOOD PRESSURE: 150 MMHG | DIASTOLIC BLOOD PRESSURE: 82 MMHG | HEIGHT: 72 IN | OXYGEN SATURATION: 98 % | HEART RATE: 67 BPM

## 2022-06-10 LAB
ACANTHOCYTES: ABNORMAL
ALBUMIN SERPL-MCNC: 3.6 G/DL (ref 3.4–5)
ALP BLD-CCNC: 95 U/L (ref 40–129)
ALT SERPL-CCNC: 22 U/L (ref 10–40)
ANION GAP SERPL CALCULATED.3IONS-SCNC: 9 MMOL/L (ref 3–16)
ANISOCYTOSIS: ABNORMAL
AST SERPL-CCNC: 23 U/L (ref 15–37)
BASOPHILS ABSOLUTE: 0 K/UL (ref 0–0.2)
BASOPHILS RELATIVE PERCENT: 0 %
BILIRUB SERPL-MCNC: <0.2 MG/DL (ref 0–1)
BILIRUBIN DIRECT: <0.2 MG/DL (ref 0–0.3)
BILIRUBIN, INDIRECT: ABNORMAL MG/DL (ref 0–1)
BLOOD CULTURE, ROUTINE: ABNORMAL
BLOOD CULTURE, ROUTINE: ABNORMAL
BUN BLDV-MCNC: 10 MG/DL (ref 7–20)
C-REACTIVE PROTEIN: 52.3 MG/L (ref 0–5.1)
CALCIUM SERPL-MCNC: 8.2 MG/DL (ref 8.3–10.6)
CHLORIDE BLD-SCNC: 105 MMOL/L (ref 99–110)
CO2: 23 MMOL/L (ref 21–32)
CREAT SERPL-MCNC: 0.8 MG/DL (ref 0.8–1.3)
CULTURE, BLOOD 2: ABNORMAL
EOSINOPHILS ABSOLUTE: 0 K/UL (ref 0–0.6)
EOSINOPHILS RELATIVE PERCENT: 0 %
FERRITIN: 691.3 NG/ML (ref 30–400)
FINAL REPORT: NORMAL
GFR AFRICAN AMERICAN: >60
GFR NON-AFRICAN AMERICAN: >60
GLUCOSE BLD-MCNC: 99 MG/DL (ref 70–99)
HCT VFR BLD CALC: 25.5 % (ref 40.5–52.5)
HEMOGLOBIN: 8.7 G/DL (ref 13.5–17.5)
HYPOCHROMIA: ABNORMAL
LACTATE DEHYDROGENASE: 170 U/L (ref 100–190)
LYMPHOCYTES ABSOLUTE: 0.5 K/UL (ref 1–5.1)
LYMPHOCYTES RELATIVE PERCENT: 10 %
MCH RBC QN AUTO: 32.6 PG (ref 26–34)
MCHC RBC AUTO-ENTMCNC: 34.2 G/DL (ref 31–36)
MCV RBC AUTO: 95.5 FL (ref 80–100)
MICROCYTES: ABNORMAL
MONOCYTES ABSOLUTE: 0.4 K/UL (ref 0–1.3)
MONOCYTES RELATIVE PERCENT: 8 %
NEUTROPHILS ABSOLUTE: 3.9 K/UL (ref 1.7–7.7)
NEUTROPHILS RELATIVE PERCENT: 82 %
ORGANISM: ABNORMAL
OVALOCYTES: ABNORMAL
PDW BLD-RTO: 15.1 % (ref 12.4–15.4)
PHOSPHORUS: 2.7 MG/DL (ref 2.5–4.9)
PLATELET # BLD: 99 K/UL (ref 135–450)
PMV BLD AUTO: 9.4 FL (ref 5–10.5)
POIKILOCYTES: ABNORMAL
POTASSIUM SERPL-SCNC: 4 MMOL/L (ref 3.5–5.1)
PRELIMINARY: NORMAL
PROCALCITONIN: 24.74 NG/ML (ref 0–0.15)
RBC # BLD: 2.67 M/UL (ref 4.2–5.9)
SMUDGE CELLS: PRESENT
SODIUM BLD-SCNC: 137 MMOL/L (ref 136–145)
TOTAL PROTEIN: 5.3 G/DL (ref 6.4–8.2)
URIC ACID, SERUM: 3.9 MG/DL (ref 3.5–7.2)
WBC # BLD: 4.7 K/UL (ref 4–11)

## 2022-06-10 PROCEDURE — 36591 DRAW BLOOD OFF VENOUS DEVICE: CPT

## 2022-06-10 PROCEDURE — 83615 LACTATE (LD) (LDH) ENZYME: CPT

## 2022-06-10 PROCEDURE — 6360000002 HC RX W HCPCS: Performed by: NURSE PRACTITIONER

## 2022-06-10 PROCEDURE — 2580000003 HC RX 258: Performed by: NURSE PRACTITIONER

## 2022-06-10 PROCEDURE — 6370000000 HC RX 637 (ALT 250 FOR IP): Performed by: NURSE PRACTITIONER

## 2022-06-10 PROCEDURE — 80048 BASIC METABOLIC PNL TOTAL CA: CPT

## 2022-06-10 PROCEDURE — 80076 HEPATIC FUNCTION PANEL: CPT

## 2022-06-10 PROCEDURE — 85025 COMPLETE CBC W/AUTO DIFF WBC: CPT

## 2022-06-10 PROCEDURE — 84145 PROCALCITONIN (PCT): CPT

## 2022-06-10 PROCEDURE — 84100 ASSAY OF PHOSPHORUS: CPT

## 2022-06-10 PROCEDURE — 6360000002 HC RX W HCPCS: Performed by: INTERNAL MEDICINE

## 2022-06-10 PROCEDURE — 86140 C-REACTIVE PROTEIN: CPT

## 2022-06-10 PROCEDURE — 2580000003 HC RX 258: Performed by: INTERNAL MEDICINE

## 2022-06-10 PROCEDURE — 84550 ASSAY OF BLOOD/URIC ACID: CPT

## 2022-06-10 PROCEDURE — 82728 ASSAY OF FERRITIN: CPT

## 2022-06-10 RX ADMIN — SODIUM CHLORIDE, PRESERVATIVE FREE 10 ML: 5 INJECTION INTRAVENOUS at 10:10

## 2022-06-10 RX ADMIN — VALACYCLOVIR HYDROCHLORIDE 500 MG: 500 TABLET, FILM COATED ORAL at 08:05

## 2022-06-10 RX ADMIN — LEVETIRACETAM 500 MG: 500 TABLET, FILM COATED ORAL at 08:05

## 2022-06-10 RX ADMIN — METOPROLOL TARTRATE 25 MG: 25 TABLET, FILM COATED ORAL at 08:05

## 2022-06-10 RX ADMIN — SODIUM CHLORIDE: 9 INJECTION, SOLUTION INTRAVENOUS at 10:53

## 2022-06-10 RX ADMIN — MEROPENEM 1000 MG: 1 INJECTION, POWDER, FOR SOLUTION INTRAVENOUS at 00:08

## 2022-06-10 RX ADMIN — ENOXAPARIN SODIUM 40 MG: 100 INJECTION SUBCUTANEOUS at 10:10

## 2022-06-10 RX ADMIN — ERTAPENEM 1000 MG: 1 INJECTION INTRAMUSCULAR; INTRAVENOUS at 10:56

## 2022-06-10 RX ADMIN — MEROPENEM 1000 MG: 1 INJECTION, POWDER, FOR SOLUTION INTRAVENOUS at 08:00

## 2022-06-10 NOTE — PLAN OF CARE
Problem: Safety - Adult  Goal: Free from fall injury  6/10/2022 0030 by Jenny Middleton RN  Outcome: Progressing  Note: Orthostatic vital signs obtained at start of shift - see flowsheet for details. Pt does not meet criteria for orthostasis. Pt is a Med fall risk. See [place] Siemens Fall Score and ABCDS Injury Risk assessments. - Screening for Orthostasis AND not a [place] Risk per ARNETT/ABCDS: Pt bed is in low position, side rails up, call light and belongings are in reach. Fall risk light is on outside pts room. Pt encouraged to call for assistance as needed. Will continue with hourly rounds for PO intake, pain needs, toileting and repositioning as needed. Problem: ABCDS Injury Assessment  Goal: Absence of physical injury  6/10/2022 0030 by Jenny Middleton RN  Outcome: Progressing     Problem: Skin/Tissue Integrity - Adult  Goal: Skin integrity remains intact  6/10/2022 0030 by Jenny Middleton RN  Outcome: Progressing  Note: Pt not noted to have any new skin breakdown this PM. Pt's existing skin breakdown noted in flowsheets. Pt ambulatory and able to turn self in bed. Will continue to monitor. Problem: Infection - Adult  Goal: Absence of infection during hospitalization  6/10/2022 0030 by Jenny Middleton RN  Outcome: Progressing     Problem: Hematologic - Adult  Goal: Maintains hematologic stability  6/10/2022 0030 by Jenny Middleton RN  Outcome: Progressing  Note:  Patient's hemoglobin this AM: Recent Labs     06/10/22  0427   HGB 8.7*     Patient's platelet count this AM:   Recent Labs     06/10/22  0427   PLT 99*    Thrombocytopenia Precautions in place. Patient showing no signs or symptoms of active bleeding. Transfusion not indicated at this time. Patient verbalizes understanding of all instructions. Will continue to assess and implement POC. Call light within reach and hourly rounding in place.

## 2022-06-10 NOTE — PLAN OF CARE
Problem: Safety - Adult  Goal: Free from fall injury  6/10/2022 0950 by Yesenia Woodward RN  Outcome: Adequate for Discharge     Orthostatic vital signs obtained at start of shift - see flowsheet for details. Pt does not meet criteria for orthostasis. Pt is a Med fall risk. See Janice Harjinder Fall Score and ABCDS Injury Risk assessments. Pt bed is in low position, side rails up, call light and belongings are in reach. Fall risk light is on outside pts room. Pt encouraged to call for assistance as needed. Will continue with hourly rounds for PO intake, pain needs, toileting and repositioning as needed. Problem: Skin/Tissue Integrity - Adult  Goal: Skin integrity remains intact  6/10/2022 0950 by Yesenia Woodward RN  Outcome: Adequate for Discharge    No skin integrity issues noted     Problem: Hematologic - Adult  Goal: Maintains hematologic stability  6/10/2022 0950 by Yesenia Woodward RN  Outcome: Adequate for Discharge    Patient's hemoglobin this AM:   Recent Labs     06/10/22  0427   HGB 8.7*     Patient's platelet count this AM:   Recent Labs     06/10/22  0427   PLT 99*    Thrombocytopenia not present at this time. Patient showing no signs or symptoms of active bleeding. Transfusion not indicated at this time. Patient verbalizes understanding of all instructions. Will continue to assess and implement POC. Call light within reach and hourly rounding in place.

## 2022-06-10 NOTE — DISCHARGE SUMMARY
[facility] Discharge Summary             Attending Physician: DO Carmella Sapp MD: Eligio Aquino, [address]  [address]    Name: Yan Orosco :  1940  MRN:  [id number]    Admission: 2022   Discharge:   6/10/22    Date: 6/10/2022    Diagnosis on admit: Fever / Sepsis    Procedures: Routine chest x-ray, laboratories, EKG, IV fluid hydration, Panculture for fevers, IV antimicrobial therapy, Oxygen therapy      Medications:      Medication List      START taking these medications    ertapenem  infusion  Commonly known as: INVanz  Infuse 1,000 mg intravenously every 24 hours for 11 days Compound per protocol. CONTINUE taking these medications    acyclovir 800 MG tablet  Commonly known as: ZOVIRAX  Take 1 tablet by mouth 2 times daily     benzonatate 100 MG capsule  Commonly known as: TESSALON  Take 1 capsule by mouth 3 times daily as needed for Cough     guaiFENesin 600 MG extended release tablet  Commonly known as: Mucinex  Take 1 tablet by mouth 2 times daily as needed for Congestion     levETIRAcetam 500 MG tablet  Commonly known as: Keppra  Take 1 tablet by mouth 2 times daily Start on 22     metoprolol tartrate 25 MG tablet  Commonly known as: LOPRESSOR  Take 1 tablet by mouth 2 times daily     ondansetron 4 MG disintegrating tablet  Commonly known as: Zofran ODT  Take 1 tablet by mouth every 8 hours as needed for Nausea or Vomiting     prochlorperazine 10 mg tablet  Commonly known as: COMPAZINE           Where to Get Your Medications      Information about where to get these medications is not yet available    Ask your nurse or doctor about these medications  · ertapenem  infusion         Reason for Admission: Management of fever and sepsis in immunocompromised patient    Hospital Course:     Mr. 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   His PMH is also significant for follicular lymphoma (Dx 1995) and PAF. His DLBCL was diagnosed after he presented in December 2021 with knee pain and swelling.  He underwent a biopsy showed B-cell non-Hodgkin's lymphoma with a Ki-67 of about 60%. PET/CT scan (1/21/22) showed extensive hypermetabolic metabolism in the retroperitoneum, left pelvis and left lower extremity consistent with lymphoma. His follicular lymphoma was treated w/ CHOP (1/1995) and maintenance Rituxan (11/2003 - 8/2004). He then relapsed and received R-CHOP (7/2007). His DLBCL was treated w/  R-[place]/Ox x 2 cycles (1/31/22 & 2/14/22) followed by Chip-BR x 2 cycles  (3/22/22 & 4/20/22) as bridging chemotherapy prior to CAR-T w/ Breyanzi. His PET scan (5/13/22) showed new pulmonary nodules in the right upper lung measuring 1.2 cm max SUV 3.9. Previously described lymphadenopathy and FDG in left retroperitoneum, left iliac region and left upper thigh has completely resolved. He had progressive disease when he starting lymphodepleting Chemotherapy w/ Fludarabine and cyclophosphamide and received CAR-T w/ Breyanzi (5/23/22). Following CAR-T infusion he experienced neutropenic fever possible from Parainfluenza 3 virus and Grade 1 CRS. During this hospitalization he developed Atrial fibrillation w/ RVR and was started on Lopressor. He was discharged home w/ daily follow up in outpatient infusion (6/1/22). He then presented to outpatient infusion (6/7/22) and began having rigors toward the end of his short stay. He stated he felt well and was afebrile but he quickly became more agitated and slow to respond to simple questions. He was admitted w/ concerns for sepsis and possible bacteremia. Blood cultures were drawn and he was given a dose of Invanz, but his condition didn't improved. He was then admitted w/ sepsis from Enterobacter bacteremia. He was given 2 liters of IVFs and started on maintenance IVFs.   He was empirically started on Cefepime, but was switched to [facility] when blood cultures were positive for Enterobacter. His lactic acid was 2.6 when admitted and procalcitonin was 90.08. With these finding, his Trifusion catheter was removed. He is now eating, drinking and ambulating well. He is anxious for discharge home today. He is afebrile and will be discharged home after receiving a dose of Invanz. He will continue Invanz 1 gm daily in outpatient infusion through 6/20/22. Physical Exam:     Vital Signs:  BP (!) 150/82   Pulse 67   Temp 97.4 °F (36.3 °C) (Oral)   Resp 16   Ht 6' (1.829 m)   Wt 181 lb 3.2 oz (82.2 kg)   SpO2 98%   BMI 24.58 kg/m²     Weight:    Wt Readings from Last 3 Encounters:   06/10/22 181 lb 3.2 oz (82.2 kg)   06/07/22 179 lb 7.3 oz (81.4 kg)   06/05/22 178 lb 5.6 oz (80.9 kg)       KPS: 80% Normal activity with effort; some signs or symptoms of disease    PE performed by Dr. Katelyn Andrade: Awake, alert and oriented    HEENT: normocephalic, alopecia, PERRL, no scleral erythema or icterus, Oral mucosa moist and intact, throat clear.    NECK: supple without palpable adenopathy  BACK: Straight, negative CVAT  SKIN: warm dry and intact without lesions rashes or masses  CHEST: CTA bilaterally without use of accessory muscles  CV: Normal S1 S2, RRR, no MRG  ABD: NT ND normoactive BS, no palpable masses or hepatosplenomegaly  EXTREMITIES: chronic edema in LLE, denies calf tenderness  NEURO: CN II - XII grossly intact  CATHETER:  Left IJ PAC (1/26/22, [facility]): CDI    Discharge Laboratory Data:  CBC:   Recent Labs     06/08/22  0355 06/09/22  0410 06/10/22  0427   WBC 7.7 4.4 4.7   HGB 7.7* 7.8* 8.7*   HCT 22.3* 23.3* 25.5*   MCV 95.7 96.1 95.5   PLT 91* 85* 99*     BMP/Mag:  Recent Labs     06/07/22  1417 06/07/22  1417 06/08/22  0355 06/09/22  0410 06/10/22  0427      < > 136 138 137   K 3.8   < > 4.0 3.8 4.0      < > 104 106 105   CO2 21   < > 20* 21 23   PHOS 1.1*   < > 3.8 2.9 2.7   BUN 14   < > 14 12 10   CREATININE 1.0   < > 1.1 0.9 0.8   MG 1.30*  --  1.50* 1.70*  --     < > = values in this interval not displayed. LIVP:   Recent Labs     06/08/22  0355 06/09/22  0410 06/10/22  0427   AST 32 25 23   ALT 26 24 22   BILIDIR <0.2 <0.2 <0.2   BILITOT <0.2 <0.2 <0.2   ALKPHOS 85 83 95     Coags:   Recent Labs     06/07/22  1417 06/09/22  0410   PROTIME 15.9* 15.5*   INR 1.27* 1.24*   APTT 29.2 39.6*     Uric Acid   Recent Labs     06/08/22  0355 06/09/22  0410 06/10/22  0427   LABURIC 4.1 3.9 3.9     Microbiology:  1. Blood cx x 2 bottles (6/7/22): Enterobacter cloacae complex (2)    Antibiotic Interpretation Microscan  Method   ceFAZolin Resistant >=64 mcg/mL BACTERIAL SUSCEPTIBILITY PANEL BY YADIRA   cefepime Sensitive <=0.12 mcg/mL BACTERIAL SUSCEPTIBILITY PANEL BY YADIRA   ciprofloxacin Sensitive <=0.25 mcg/mL BACTERIAL SUSCEPTIBILITY PANEL BY YADIRA   ertapenem Sensitive <=0.12 mcg/mL BACTERIAL SUSCEPTIBILITY PANEL BY YADIRA   gentamicin Sensitive <=1 mcg/mL BACTERIAL SUSCEPTIBILITY PANEL BY YADIRA   levofloxacin Sensitive 0.5 mcg/mL BACTERIAL SUSCEPTIBILITY PANEL BY YADIRA   piperacillin-tazobactam Sensitive 8 mcg/mL BACTERIAL SUSCEPTIBILITY PANEL BY YADIRA   trimethoprim-sulfamethoxazole Sensitive <=20 mcg/mL BACTERIAL SUSCEPTIBILITY PANEL BY YADIRA       PROBLEM LIST:            1. Follicular lymphoma with BM involvement  2. DLBC NHL  3. Atrial fibrillation w/ RVR (5/2022)  4. Grade 1 CRS  5. Parainfluenza 3 virus / PNA (5/2022)  5. Enterobacter bacteremia / sepsis (6/2022)      TREATMENT:            FL:   1  CHOP (1/1995)  2. Rituxan (11/2003 - 8/2004)  3. R-CHOP (7/2007)      DLBCL:   1.  R-[place]/Ox x 2 cycles (1/31/22 & 2/14/22)  2. Chip-BR x 2 cycles  (3/22/22 & 4/20/22)  3.   CAR-T w/ Breyanzi                      Lymphodepleting Chemotherapy:  Fludarabine and cyclophosphamide   Disease Status at time of Infusion:  Progressive Disease  CAR-T Infusion Date: 5/23/22  CAR-T Product:  Breyanzi  Batch ID Number:  [id number] ASSESSMENT AND PLAN:           1.  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 These may be infectious. These were not described on the previous PET/CT. Pulmonary lymphoma would be difficult to exclude. If the pulmonary nodule represents lymphoma, this would represent a Deauville score 5 exam. If this is infectious, overall the exam would represent a Deauville score 1. Previously described lymphadenopathy in FDG activity in the left retroperitoneum, left iliac region and in left upper thigh has completely resolved.  No residual lymphadenopathy or FDG avid lymph node identified     PLAN:   S/p lymphodepleting Chemotherapy w/ Fludarabine and cyclophosphamide & CAR-T w/ Adalberto Etienne (5/23/22)      Day + 18     2.  CRS / Neuro:  - H/o Grade 1 CRS  - Monitor CRP and Ferrtin closely         Lab Results   Component Value Date     CRP 52.3 (H) 06/10/2022     CRP 91.9 (H) 06/09/2022     CRP 78.7 (H) 06/08/2022            Lab Results   Component Value Date     FERRITIN 691.3 (H) 06/10/2022     FERRITIN 645.2 (H) 06/09/2022     FERRITIN 627.1 (H) 06/08/2022      - Neuro checks w/ CARTOX 10-point assessment daily in outpatient infusion      Toxicity Grading     CRS Grade: none     ICANS stGstrstastdstest:st st1st ICE Score:  CAR-T Score: 10     3.  ID:  Admitted with sepsis (POA) r/t Enterobacter bacteremia, fever from CRS d/t CAR-T was ruled out.    - H/o Parainfluenza 3 Virus (5/28/22)  - Restart Levaquin and Diflucan ppx if ANC < 1.5  - Blood Cx x 2 bottles (6/7/22): Enterobacter, sensitive to ertapenem.    - Cont Acyclovir ppx  - Cont Invanz 1 gm daily through 6/20/22      4.  Heme: Anemia and thrombocytopenia from chemotherapy   - Transfuse for Hgb < 7 and Platelets < 76J  - No transfusion today     5.  Metabolic: stable electrolytes & renal fx; metabolic acidosis resolved  TLS:  No evidence, s/p allopurinol (stopped  6/2/22)       6. Cardiac:   - H/o A. Fib w/ RVR (5/28/22)  - Echo (3/21/22): Centric mild left ventricular hypertrophy.  Left ventricular ejection fraction 55 to 60% with no regional wall motion abnormalities noted.  Intermittent diastolic dysfunction. A. Fib:  Remains in NSR  - Cont Metoprolol 25 mg BID (started 5/29/22)  - No indication for AC at this time (per cardiology)  HTN:  BP intermittently elevated. He has no h/o HTN  - Cont Lopressor 25 mg bid for A. Fib  - Cont to monitor      7. GI / Nutrition:    Nutrition: Appetite and oral intake is good. - Resume Astragalus supplement (stopped 5/18/22 d/t interaction with Cytoxan)   - Cont low microbial diet          Condition on discharge:  stable    Discharge Instructions:  Return to outpatient infusion daily for CAR-T toxicity assessment, labs, provider visit and Invanz 1 gm daily/      The patient was advised on activity and dietary restrictions. The patient was advised to follow up in the emergency department or contact the physician with any unresolved nausea/vomiting/diarrhea/pain or temperature greater than 100.5 F or any other unusual symptoms. This discharge summary and plan was discussed and agreed upon with Dr. Samuel Gay.     JOAQUIM Auguste - CNP

## 2022-06-10 NOTE — PROGRESS NOTES
Reviewed discharge instructions with patient and  spouse. Reviewed discharge medications including dosing, schedule, indication, and adverse reactions. Reviewed which medications were already taken today and next dosage due for each medication. Reviewed signs and symptoms that prompt a call to the physician and appropriate phone numbers. [facility] ER card given to the patient with explanations of its use. Reviewed follow up appointments that have been made in [facility]. Low microbial diet, activity restrictions, and increased risk of infection were reviewed. Patient is being discharged with IV access d/t need for ongoing therapy:      Type:  Left Port                        CVC care and maintenance was reviewed with patient and spouse. Pt verbalizes understanding of line care and maintenance. Patient verbalized understanding of all instructions and questions were answered to his. satisfaction. Signed discharge instructions were given to the patient and a copy placed in the paper-lite chart. Patient discharged to home per self with spouse.       Kaykay Parikh RN

## 2022-06-11 ENCOUNTER — HOSPITAL ENCOUNTER (OUTPATIENT)
Dept: ONCOLOGY | Age: 82
Setting detail: INFUSION SERIES
Discharge: HOME | End: 2022-06-11
Payer: MEDICARE

## 2022-06-11 VITALS
HEART RATE: 80 BPM | RESPIRATION RATE: 16 BRPM | BODY MASS INDEX: 24.64 KG/M2 | WEIGHT: 181.66 LBS | DIASTOLIC BLOOD PRESSURE: 77 MMHG | TEMPERATURE: 97.4 F | SYSTOLIC BLOOD PRESSURE: 149 MMHG | OXYGEN SATURATION: 96 %

## 2022-06-11 DIAGNOSIS — C83.30 DIFFUSE LARGE B-CELL LYMPHOMA, UNSPECIFIED BODY REGION (HCC): Primary | ICD-10-CM

## 2022-06-11 LAB
ALBUMIN SERPL-MCNC: 3.7 G/DL (ref 3.4–5)
ALP BLD-CCNC: 101 U/L (ref 40–129)
ALT SERPL-CCNC: 19 U/L (ref 10–40)
ANION GAP SERPL CALCULATED.3IONS-SCNC: 13 MMOL/L (ref 3–16)
AST SERPL-CCNC: 18 U/L (ref 15–37)
BASOPHILS ABSOLUTE: 0 K/UL (ref 0–0.2)
BASOPHILS RELATIVE PERCENT: 0.2 %
BILIRUB SERPL-MCNC: <0.2 MG/DL (ref 0–1)
BILIRUBIN DIRECT: <0.2 MG/DL (ref 0–0.3)
BILIRUBIN, INDIRECT: ABNORMAL MG/DL (ref 0–1)
BUN BLDV-MCNC: 12 MG/DL (ref 7–20)
C-REACTIVE PROTEIN: 25.8 MG/L (ref 0–5.1)
CALCIUM SERPL-MCNC: 8.9 MG/DL (ref 8.3–10.6)
CHLORIDE BLD-SCNC: 102 MMOL/L (ref 99–110)
CO2: 25 MMOL/L (ref 21–32)
CREAT SERPL-MCNC: 0.9 MG/DL (ref 0.8–1.3)
EOSINOPHILS ABSOLUTE: 0.1 K/UL (ref 0–0.6)
EOSINOPHILS RELATIVE PERCENT: 1.6 %
FERRITIN: 616.5 NG/ML (ref 30–400)
GFR AFRICAN AMERICAN: >60
GFR NON-AFRICAN AMERICAN: >60
GLUCOSE BLD-MCNC: 146 MG/DL (ref 70–99)
HCT VFR BLD CALC: 27.2 % (ref 40.5–52.5)
HEMOGLOBIN: 9 G/DL (ref 13.5–17.5)
INR BLD: 1.14 (ref 0.87–1.14)
LACTATE DEHYDROGENASE: 188 U/L (ref 100–190)
LYMPHOCYTES ABSOLUTE: 0.5 K/UL (ref 1–5.1)
LYMPHOCYTES RELATIVE PERCENT: 12.8 %
MAGNESIUM: 1.8 MG/DL (ref 1.8–2.4)
MCH RBC QN AUTO: 31.9 PG (ref 26–34)
MCHC RBC AUTO-ENTMCNC: 33 G/DL (ref 31–36)
MCV RBC AUTO: 96.5 FL (ref 80–100)
MONOCYTES ABSOLUTE: 0.4 K/UL (ref 0–1.3)
MONOCYTES RELATIVE PERCENT: 8.8 %
NEUTROPHILS ABSOLUTE: 3.2 K/UL (ref 1.7–7.7)
NEUTROPHILS RELATIVE PERCENT: 76.6 %
PDW BLD-RTO: 15.6 % (ref 12.4–15.4)
PHOSPHORUS: 2.3 MG/DL (ref 2.5–4.9)
PLATELET # BLD: 105 K/UL (ref 135–450)
PMV BLD AUTO: 9.3 FL (ref 5–10.5)
POTASSIUM SERPL-SCNC: 3.8 MMOL/L (ref 3.5–5.1)
PROTHROMBIN TIME: 14.5 SEC (ref 11.7–14.5)
RBC # BLD: 2.82 M/UL (ref 4.2–5.9)
SODIUM BLD-SCNC: 140 MMOL/L (ref 136–145)
TOTAL PROTEIN: 6 G/DL (ref 6.4–8.2)
URIC ACID, SERUM: 4.3 MG/DL (ref 3.5–7.2)
WBC # BLD: 4.1 K/UL (ref 4–11)

## 2022-06-11 PROCEDURE — 83615 LACTATE (LD) (LDH) ENZYME: CPT

## 2022-06-11 PROCEDURE — 84100 ASSAY OF PHOSPHORUS: CPT

## 2022-06-11 PROCEDURE — 85025 COMPLETE CBC W/AUTO DIFF WBC: CPT

## 2022-06-11 PROCEDURE — 85610 PROTHROMBIN TIME: CPT

## 2022-06-11 PROCEDURE — 80048 BASIC METABOLIC PNL TOTAL CA: CPT

## 2022-06-11 PROCEDURE — 6360000002 HC RX W HCPCS: Performed by: NURSE PRACTITIONER

## 2022-06-11 PROCEDURE — 86140 C-REACTIVE PROTEIN: CPT

## 2022-06-11 PROCEDURE — 36591 DRAW BLOOD OFF VENOUS DEVICE: CPT

## 2022-06-11 PROCEDURE — 96360 HYDRATION IV INFUSION INIT: CPT

## 2022-06-11 PROCEDURE — 2580000003 HC RX 258: Performed by: NURSE PRACTITIONER

## 2022-06-11 PROCEDURE — 82728 ASSAY OF FERRITIN: CPT

## 2022-06-11 PROCEDURE — 80076 HEPATIC FUNCTION PANEL: CPT

## 2022-06-11 PROCEDURE — 83735 ASSAY OF MAGNESIUM: CPT

## 2022-06-11 PROCEDURE — 96365 THER/PROPH/DIAG IV INF INIT: CPT

## 2022-06-11 PROCEDURE — 84550 ASSAY OF BLOOD/URIC ACID: CPT

## 2022-06-11 PROCEDURE — 96361 HYDRATE IV INFUSION ADD-ON: CPT

## 2022-06-11 RX ORDER — PETROLATUM, MENTHOL, UNSPECIFIED FORM, CAMPHOR (SYNTHETIC), AND PHENOL 59.14; 1; 1; .6 G/100G; G/100G; G/100G; G/100G
PASTE TOPICAL PRN
Status: CANCELLED | OUTPATIENT
Start: 2022-06-12

## 2022-06-11 RX ORDER — PROCHLORPERAZINE EDISYLATE 5 MG/ML
10 INJECTION INTRAMUSCULAR; INTRAVENOUS EVERY 6 HOURS PRN
Status: DISCONTINUED | OUTPATIENT
Start: 2022-06-11 | End: 2022-06-12 | Stop reason: HOSPADM

## 2022-06-11 RX ORDER — SODIUM CHLORIDE 9 MG/ML
INJECTION, SOLUTION INTRAVENOUS ONCE
Status: DISCONTINUED | OUTPATIENT
Start: 2022-06-11 | End: 2022-06-12 | Stop reason: HOSPADM

## 2022-06-11 RX ORDER — POTASSIUM CHLORIDE 20 MEQ/1
40 TABLET, EXTENDED RELEASE ORAL PRN
Status: DISCONTINUED | OUTPATIENT
Start: 2022-06-11 | End: 2022-06-12 | Stop reason: HOSPADM

## 2022-06-11 RX ORDER — HEPARIN SODIUM (PORCINE) LOCK FLUSH IV SOLN 100 UNIT/ML 100 UNIT/ML
500 SOLUTION INTRAVENOUS PRN
Status: DISCONTINUED | OUTPATIENT
Start: 2022-06-11 | End: 2022-06-12 | Stop reason: HOSPADM

## 2022-06-11 RX ORDER — OXYCODONE HYDROCHLORIDE 5 MG/1
10 TABLET ORAL EVERY 4 HOURS PRN
Status: CANCELLED | OUTPATIENT
Start: 2022-06-12

## 2022-06-11 RX ORDER — 0.9 % SODIUM CHLORIDE 0.9 %
1000 INTRAVENOUS SOLUTION INTRAVENOUS ONCE
Status: CANCELLED | OUTPATIENT
Start: 2022-06-12 | End: 2022-06-12

## 2022-06-11 RX ORDER — ONDANSETRON 4 MG/1
8 TABLET, FILM COATED ORAL ONCE
Status: CANCELLED | OUTPATIENT
Start: 2022-06-12 | End: 2022-06-12

## 2022-06-11 RX ORDER — 0.9 % SODIUM CHLORIDE 0.9 %
1000 INTRAVENOUS SOLUTION INTRAVENOUS ONCE
Status: COMPLETED | OUTPATIENT
Start: 2022-06-11 | End: 2022-06-11

## 2022-06-11 RX ORDER — DIMETHICONE, CAMPHOR (SYNTHETIC), MENTHOL, AND PHENOL 1.1; .5; .625; .5 G/100G; G/100G; G/100G; G/100G
OINTMENT TOPICAL PRN
Status: DISCONTINUED | OUTPATIENT
Start: 2022-06-11 | End: 2022-06-12 | Stop reason: HOSPADM

## 2022-06-11 RX ORDER — PROCHLORPERAZINE EDISYLATE 5 MG/ML
10 INJECTION INTRAMUSCULAR; INTRAVENOUS EVERY 6 HOURS PRN
Status: CANCELLED | OUTPATIENT
Start: 2022-06-12

## 2022-06-11 RX ORDER — POTASSIUM CHLORIDE 29.8 MG/ML
80 INJECTION INTRAVENOUS PRN
Status: CANCELLED | OUTPATIENT
Start: 2022-06-12

## 2022-06-11 RX ORDER — PROCHLORPERAZINE MALEATE 10 MG
10 TABLET ORAL EVERY 6 HOURS PRN
Status: DISCONTINUED | OUTPATIENT
Start: 2022-06-11 | End: 2022-06-12 | Stop reason: HOSPADM

## 2022-06-11 RX ORDER — MAGNESIUM SULFATE IN WATER 40 MG/ML
4000 INJECTION, SOLUTION INTRAVENOUS PRN
Status: DISCONTINUED | OUTPATIENT
Start: 2022-06-11 | End: 2022-06-12 | Stop reason: HOSPADM

## 2022-06-11 RX ORDER — POTASSIUM CHLORIDE 20 MEQ/1
40 TABLET, EXTENDED RELEASE ORAL PRN
Status: CANCELLED | OUTPATIENT
Start: 2022-06-12

## 2022-06-11 RX ORDER — SODIUM CHLORIDE 9 MG/ML
INJECTION, SOLUTION INTRAVENOUS ONCE
Status: CANCELLED | OUTPATIENT
Start: 2022-06-12 | End: 2022-06-12

## 2022-06-11 RX ORDER — OXYCODONE HYDROCHLORIDE 5 MG/1
5 TABLET ORAL EVERY 4 HOURS PRN
Status: DISCONTINUED | OUTPATIENT
Start: 2022-06-11 | End: 2022-06-12 | Stop reason: HOSPADM

## 2022-06-11 RX ORDER — HEPARIN SODIUM (PORCINE) LOCK FLUSH IV SOLN 100 UNIT/ML 100 UNIT/ML
500 SOLUTION INTRAVENOUS PRN
Status: CANCELLED | OUTPATIENT
Start: 2022-06-12

## 2022-06-11 RX ORDER — POTASSIUM CHLORIDE 29.8 MG/ML
20 INJECTION INTRAVENOUS PRN
Status: DISCONTINUED | OUTPATIENT
Start: 2022-06-11 | End: 2022-06-12 | Stop reason: HOSPADM

## 2022-06-11 RX ORDER — OXYCODONE HYDROCHLORIDE 5 MG/1
10 TABLET ORAL EVERY 4 HOURS PRN
Status: DISCONTINUED | OUTPATIENT
Start: 2022-06-11 | End: 2022-06-12 | Stop reason: HOSPADM

## 2022-06-11 RX ORDER — ONDANSETRON 2 MG/ML
8 INJECTION INTRAMUSCULAR; INTRAVENOUS ONCE
Status: DISCONTINUED | OUTPATIENT
Start: 2022-06-11 | End: 2022-06-12 | Stop reason: HOSPADM

## 2022-06-11 RX ORDER — PROCHLORPERAZINE MALEATE 10 MG
10 TABLET ORAL EVERY 6 HOURS PRN
Status: CANCELLED | OUTPATIENT
Start: 2022-06-12

## 2022-06-11 RX ORDER — ONDANSETRON 2 MG/ML
8 INJECTION INTRAMUSCULAR; INTRAVENOUS ONCE
Status: CANCELLED | OUTPATIENT
Start: 2022-06-12 | End: 2022-06-12

## 2022-06-11 RX ORDER — ONDANSETRON 4 MG/1
8 TABLET, FILM COATED ORAL ONCE
Status: DISCONTINUED | OUTPATIENT
Start: 2022-06-11 | End: 2022-06-12 | Stop reason: HOSPADM

## 2022-06-11 RX ORDER — MAGNESIUM SULFATE IN WATER 40 MG/ML
4000 INJECTION, SOLUTION INTRAVENOUS PRN
Status: CANCELLED | OUTPATIENT
Start: 2022-06-12

## 2022-06-11 RX ORDER — OXYCODONE HYDROCHLORIDE 5 MG/1
5 TABLET ORAL EVERY 4 HOURS PRN
Status: CANCELLED | OUTPATIENT
Start: 2022-06-12

## 2022-06-11 RX ADMIN — SODIUM CHLORIDE 1000 ML: 9 INJECTION, SOLUTION INTRAVENOUS at 08:34

## 2022-06-11 RX ADMIN — SODIUM CHLORIDE, PRESERVATIVE FREE 500 UNITS: 5 INJECTION INTRAVENOUS at 10:30

## 2022-06-11 RX ADMIN — SODIUM CHLORIDE 1000 MG: 900 INJECTION INTRAVENOUS at 08:48

## 2022-06-11 NOTE — PROGRESS NOTES
[address] Progress Note      2022    Stephen Canada    :  1940    MRN:  [id number]    Referring MD: JOAQUIM Alvarado - NP  [place], [place] [address]      Subjective:  Doing well without complaints. ECOG PS:  (1) Restricted in physically strenuous activity, ambulatory and able to do work of light nature    KPS: 80% Normal activity with effort; some signs or symptoms of disease    Isolation:  None     Medications    Scheduled Meds:   sodium chloride  1,000 mL IntraVENous Once    ondansetron  8 mg Oral Once    ondansetron  8 mg IntraVENous Once     Continuous Infusions:   sodium chloride       PRN Meds:.potassium chloride, potassium chloride, magnesium sulfate, magnesium hydroxide, medicated lip ointment, oxyCODONE, oxyCODONE, prochlorperazine, prochlorperazine, heparin flush      ROS:  As noted above, otherwise remainder of 10-point ROS negative      Physical Exam:     Vital Signs:  BP (!) 149/77   Pulse 80   Temp 97.4 °F (36.3 °C) (Oral)   Resp 16   Wt 181 lb 10.5 oz (82.4 kg)   SpO2 96%   BMI 24.64 kg/m²     Weight:    Wt Readings from Last 3 Encounters:   22 181 lb 10.5 oz (82.4 kg)   06/10/22 181 lb 3.2 oz (82.2 kg)   22 179 lb 7.3 oz (81.4 kg)       General: Awake, alert and oriented    HEENT: normocephalic, alopecia, PERRL, no scleral erythema or icterus, Oral mucosa moist and intact, throat clear.    NECK: supple without palpable adenopathy  BACK: Straight, negative CVAT  SKIN: warm dry and intact without lesions rashes or masses  CHEST: CTA bilaterally without use of accessory muscles  CV: Normal S1 S2, RRR, no MRG  ABD: NT ND normoactive BS, no palpable masses or hepatosplenomegaly  EXTREMITIES: chronic edema in LLE, denies calf tenderness  NEURO: CN II - XII grossly intact  CATHETER:  Left IJ PAC (22, [facility]): CDI    Laboratory Data:  CBC:   Recent Labs     22  0410 06/10/22  0427 22  0843   WBC 4.4 4.7 4.1 HGB 7.8* 8.7* 9.0*   HCT 23.3* 25.5* 27.2*   MCV 96.1 95.5 96.5   PLT 85* 99* 105*     BMP/Mag:  Recent Labs     06/09/22  0410 06/10/22  0427 06/11/22  0843    137 140   K 3.8 4.0 3.8    105 102   CO2 21 23 25   PHOS 2.9 2.7 2.3*   BUN 12 10 12   CREATININE 0.9 0.8 0.9   MG 1.70*  --  1.80     LIVP:   Recent Labs     06/09/22  0410 06/10/22  0427 06/11/22  0843   AST 25 23 18   ALT 24 22 19   BILIDIR <0.2 <0.2 <0.2   BILITOT <0.2 <0.2 <0.2   ALKPHOS 83 95 101     Coags:   Recent Labs     06/09/22  0410 06/11/22  0843   PROTIME 15.5* 14.5   INR 1.24* 1.14   APTT 39.6*  --      Uric Acid   Recent Labs     06/09/22  0410 06/10/22  0427 06/11/22  0843   LABURIC 3.9 3.9 4.3     Lab Results   Component Value Date    CRP 25.8 (H) 06/11/2022    CRP 52.3 (H) 06/10/2022    CRP 91.9 (H) 06/09/2022     Lab Results   Component Value Date    FERRITIN 616.5 (H) 06/11/2022    FERRITIN 691.3 (H) 06/10/2022    FERRITIN 645.2 (H) 06/09/2022         PROBLEM LIST:          1. Follicular lymphoma with BM involvement  2. DLBC NHL  3.  Atrial fibrillation w/ RVR (5/2022)  4. Grade 1 CRS  5. Parainfluenza 3 virus / PNA (5/2022)  5.  Enterobacter bacteremia / sepsis (6/2022)      TREATMENT:            FL:   1  CHOP (1/1995)  2.  Rituxan (11/2003 - 8/2004)  3.  R-CHOP (7/2007)      DLBCL:   1.  R-Dalton/Ox x 2 cycles (1/31/22 & 2/14/22)  2.  Chip-BR x 2 cycles  (3/22/22 & 4/20/22)  3.   CAR-T w/ Breyanzi                      Lymphodepleting Chemotherapy:  Fludarabine and cyclophosphamide   Disease Status at time of Infusion:  Progressive Disease  CAR-T Infusion Date: 5/23/22  CAR-T Product:  Breyanzi  Batch ID Number:  [id number]      ASSESSMENT AND PLAN:           1.  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 These may be infectious. These were not described on the previous PET/CT. Pulmonary lymphoma would be difficult to exclude. If the pulmonary nodule represents lymphoma, this would represent a Deauville score 5 exam. If this is infectious, overall the exam would represent a Deauville score 1. Previously described lymphadenopathy in FDG activity in the left retroperitoneum, left iliac region and in left upper thigh has completely resolved. No residual lymphadenopathy or FDG avid lymph node identified      PLAN:   S/p lymphodepleting Chemotherapy w/ Fludarabine and cyclophosphamide & CAR-T w/ Breyanzi (5/23/22)      Day + 19     2.  CRS / Neuro:  - H/o Grade 1 CRS  - Monitor CRP and Ferrtin closely       Lab Results   Component Value Date    CRP 25.8 (H) 06/11/2022    CRP 52.3 (H) 06/10/2022    CRP 91.9 (H) 06/09/2022     Lab Results   Component Value Date    FERRITIN 616.5 (H) 06/11/2022    FERRITIN 691.3 (H) 06/10/2022    FERRITIN 645.2 (H) 06/09/2022     - Neuro checks w/ CARTOX 10-point assessment Q4hrs    Toxicity Grading    CRS stGstrstastdstest:st st1st ICANS stGstrstastdstest:st st1st ICE Score:  CAR-T Score: 10    3.  ID:  Admitted with sepsis (POA) r/t Enterobacter bacteremia, fever from CRS d/t CAR-T was ruled out.     - CXR (6/7/22): Negative    - Restart Levaquin and Diflucan ppx if ANC < 1.5  - RR Viral Panel (5/28/22): Parainfluenza 3 Virus  - Blood Cx x 2 bottles (6/7/22): Enterobacter   - LA (6/7/22): 2.6 & Procalcitonin - 47, 90 (6/8/22); trended down  - Cont Acyclovir ppx  - s/p Merrem Day + 2 (started 6/8/22-6/9/22)- change to Ertapenem (6/10/22) Day 4 of total therapy        Abx history:  Vanco x 2 days (6/7/22)  [place] & Cefepime Day x 1 day (6/7/22)  Levaquin ppx (5/29/22 - 5/31/22)     4.  Heme: Anemia and thrombocytopenia from chemotherapy   - Transfuse for Hgb < 7 and Platelets < 83P  - No transfusion today     5.  Metabolic: stable electrolytes & renal fx; metabolic acidosis resolve  TLS:  No evidence, s/p allopurinol (stopped  6/2/22)  -1 L NS bolus daily     6. Cardiac:   - H/o A. Fib w/ RVR (5/28/22)  - Echo (3/21/22): Centric mild left ventricular hypertrophy.  Left ventricular ejection fraction 55 to 60% with no regional wall motion abnormalities noted.  Intermittent diastolic dysfunction. A. Fib:    - Cont Metoprolol 25 mg BID (started 5/29/22)  - No indication for AC at this time (per cardiology)     7. GI / Nutrition:    Nutrition: Appetite and oral intake is good. - S/p taking Astragalus supplement (stopped 5/18/22 d/t interaction with Cytoxan) - can resume on d/c   - Cont low microbial diet   - Follow closely with dietary       - Disposition:  Will be seen daily in Outpatient Infusion    Anna Santiago.  Carmen Cuevas, [facility] [address]

## 2022-06-11 NOTE — PROGRESS NOTES
Short Stay Communication Note    Talat Camara  Diagnosis: DLBC NHL  Primary MD: Dr. Luann Perry  Treatment: Melphalan Fludarabine/Cytoxan  Day +19 of Car-T Karol  Pt seen in outpatient infusion today. Labs drawn and reviewed. CBC:   Recent Labs     06/09/22 0410 06/10/22  0427 06/11/22  0843   WBC 4.4 4.7 4.1   HGB 7.8* 8.7* 9.0*   HCT 23.3* 25.5* 27.2*   MCV 96.1 95.5 96.5   PLT 85* 99* 105*     BMP/Mag:  Recent Labs     06/09/22  0410 06/10/22  0427 06/11/22  0843    137 140   K 3.8 4.0 3.8    105 102   CO2 21 23 25   PHOS 2.9 2.7 2.3*   BUN 12 10 12   CREATININE 0.9 0.8 0.9   MG 1.70*  --  1.80     Standing parameters for replacement for this patient:   1 unit of pack red blood cells for a hemoglobin < or equal to 7.0  1 pack of platelets for a platelet count less than or equal to 10  40 MeQ of Potassium administered for a potassium level less than or equal to 3.4  4g of Magnesium Sulfate for a magnesum level less than or equal to 1.4  No transfusions required for the above lab values. Urinalysis last done: 6/5/22 Urinalysis next due: 6/12/22    Chest X-Ray last done: 6/7/22 Chest X-Ray next due: 6/13/22    Symptoms addressed and reported to care team this date:Cough improving  Treatments this date: Labs, NS Bolus, Invanz    Reviewed medication schedule with pt and caregiver. Both able to verbalize all medications and schedule. See flowsheet. Discharged ambulatory to home. Seen by Dr. Meera Shay and Sada Medel CNP. Will return tomorrow for the same.      Carolyn Rowan RN

## 2022-06-12 ENCOUNTER — HOSPITAL ENCOUNTER (OUTPATIENT)
Dept: ONCOLOGY | Age: 82
Setting detail: INFUSION SERIES
Discharge: HOME | End: 2022-06-12
Payer: MEDICARE

## 2022-06-12 VITALS
OXYGEN SATURATION: 96 % | SYSTOLIC BLOOD PRESSURE: 141 MMHG | TEMPERATURE: 97.5 F | DIASTOLIC BLOOD PRESSURE: 77 MMHG | RESPIRATION RATE: 16 BRPM | WEIGHT: 177.69 LBS | BODY MASS INDEX: 24.1 KG/M2 | HEART RATE: 67 BPM

## 2022-06-12 DIAGNOSIS — C83.30 DIFFUSE LARGE B-CELL LYMPHOMA, UNSPECIFIED BODY REGION (HCC): Primary | ICD-10-CM

## 2022-06-12 LAB
ANION GAP SERPL CALCULATED.3IONS-SCNC: 10 MMOL/L (ref 3–16)
BASOPHILS ABSOLUTE: 0 K/UL (ref 0–0.2)
BASOPHILS RELATIVE PERCENT: 0.7 %
BLOOD CULTURE, ROUTINE: NORMAL
BUN BLDV-MCNC: 14 MG/DL (ref 7–20)
C-REACTIVE PROTEIN: 13.6 MG/L (ref 0–5.1)
CALCIUM SERPL-MCNC: 8.8 MG/DL (ref 8.3–10.6)
CHLORIDE BLD-SCNC: 103 MMOL/L (ref 99–110)
CO2: 26 MMOL/L (ref 21–32)
CREAT SERPL-MCNC: 0.9 MG/DL (ref 0.8–1.3)
EOSINOPHILS ABSOLUTE: 0.1 K/UL (ref 0–0.6)
EOSINOPHILS RELATIVE PERCENT: 1.7 %
FERRITIN: 577.7 NG/ML (ref 30–400)
GFR AFRICAN AMERICAN: >60
GFR NON-AFRICAN AMERICAN: >60
GLUCOSE BLD-MCNC: 154 MG/DL (ref 70–99)
HCT VFR BLD CALC: 27.1 % (ref 40.5–52.5)
HEMOGLOBIN: 9.2 G/DL (ref 13.5–17.5)
LYMPHOCYTES ABSOLUTE: 0.6 K/UL (ref 1–5.1)
LYMPHOCYTES RELATIVE PERCENT: 14 %
MCH RBC QN AUTO: 32.5 PG (ref 26–34)
MCHC RBC AUTO-ENTMCNC: 33.9 G/DL (ref 31–36)
MCV RBC AUTO: 95.8 FL (ref 80–100)
MONOCYTES ABSOLUTE: 0.3 K/UL (ref 0–1.3)
MONOCYTES RELATIVE PERCENT: 8.2 %
NEUTROPHILS ABSOLUTE: 3.1 K/UL (ref 1.7–7.7)
NEUTROPHILS RELATIVE PERCENT: 75.4 %
PDW BLD-RTO: 15.1 % (ref 12.4–15.4)
PHOSPHORUS: 2.5 MG/DL (ref 2.5–4.9)
PLATELET # BLD: 102 K/UL (ref 135–450)
PMV BLD AUTO: 10 FL (ref 5–10.5)
POTASSIUM SERPL-SCNC: 3.8 MMOL/L (ref 3.5–5.1)
RBC # BLD: 2.83 M/UL (ref 4.2–5.9)
SODIUM BLD-SCNC: 139 MMOL/L (ref 136–145)
WBC # BLD: 4.1 K/UL (ref 4–11)

## 2022-06-12 PROCEDURE — 2580000003 HC RX 258: Performed by: NURSE PRACTITIONER

## 2022-06-12 PROCEDURE — 6360000002 HC RX W HCPCS: Performed by: NURSE PRACTITIONER

## 2022-06-12 PROCEDURE — 86140 C-REACTIVE PROTEIN: CPT

## 2022-06-12 PROCEDURE — 82728 ASSAY OF FERRITIN: CPT

## 2022-06-12 PROCEDURE — 96361 HYDRATE IV INFUSION ADD-ON: CPT

## 2022-06-12 PROCEDURE — 96360 HYDRATION IV INFUSION INIT: CPT

## 2022-06-12 PROCEDURE — 84100 ASSAY OF PHOSPHORUS: CPT

## 2022-06-12 PROCEDURE — 36591 DRAW BLOOD OFF VENOUS DEVICE: CPT

## 2022-06-12 PROCEDURE — 96365 THER/PROPH/DIAG IV INF INIT: CPT

## 2022-06-12 PROCEDURE — 85025 COMPLETE CBC W/AUTO DIFF WBC: CPT

## 2022-06-12 PROCEDURE — 80048 BASIC METABOLIC PNL TOTAL CA: CPT

## 2022-06-12 RX ORDER — OXYCODONE HYDROCHLORIDE 5 MG/1
10 TABLET ORAL EVERY 4 HOURS PRN
Status: DISCONTINUED | OUTPATIENT
Start: 2022-06-12 | End: 2022-06-13 | Stop reason: HOSPADM

## 2022-06-12 RX ORDER — PROCHLORPERAZINE EDISYLATE 5 MG/ML
10 INJECTION INTRAMUSCULAR; INTRAVENOUS EVERY 6 HOURS PRN
Status: DISCONTINUED | OUTPATIENT
Start: 2022-06-12 | End: 2022-06-13 | Stop reason: HOSPADM

## 2022-06-12 RX ORDER — OXYCODONE HYDROCHLORIDE 5 MG/1
5 TABLET ORAL EVERY 4 HOURS PRN
Status: DISCONTINUED | OUTPATIENT
Start: 2022-06-12 | End: 2022-06-13 | Stop reason: HOSPADM

## 2022-06-12 RX ORDER — PETROLATUM, MENTHOL, UNSPECIFIED FORM, CAMPHOR (SYNTHETIC), AND PHENOL 59.14; 1; 1; .6 G/100G; G/100G; G/100G; G/100G
PASTE TOPICAL PRN
Status: CANCELLED | OUTPATIENT
Start: 2022-06-13

## 2022-06-12 RX ORDER — MAGNESIUM SULFATE IN WATER 40 MG/ML
4000 INJECTION, SOLUTION INTRAVENOUS PRN
Status: DISCONTINUED | OUTPATIENT
Start: 2022-06-12 | End: 2022-06-13 | Stop reason: HOSPADM

## 2022-06-12 RX ORDER — PROCHLORPERAZINE EDISYLATE 5 MG/ML
10 INJECTION INTRAMUSCULAR; INTRAVENOUS EVERY 6 HOURS PRN
Status: CANCELLED | OUTPATIENT
Start: 2022-06-13

## 2022-06-12 RX ORDER — POTASSIUM CHLORIDE 29.8 MG/ML
80 INJECTION INTRAVENOUS PRN
Status: DISCONTINUED | OUTPATIENT
Start: 2022-06-12 | End: 2022-06-13 | Stop reason: HOSPADM

## 2022-06-12 RX ORDER — ONDANSETRON 4 MG/1
8 TABLET, FILM COATED ORAL ONCE
Status: CANCELLED | OUTPATIENT
Start: 2022-06-13 | End: 2022-06-13

## 2022-06-12 RX ORDER — HEPARIN SODIUM (PORCINE) LOCK FLUSH IV SOLN 100 UNIT/ML 100 UNIT/ML
500 SOLUTION INTRAVENOUS PRN
Status: CANCELLED | OUTPATIENT
Start: 2022-06-13

## 2022-06-12 RX ORDER — ONDANSETRON 2 MG/ML
8 INJECTION INTRAMUSCULAR; INTRAVENOUS ONCE
Status: CANCELLED | OUTPATIENT
Start: 2022-06-13 | End: 2022-06-13

## 2022-06-12 RX ORDER — HEPARIN SODIUM (PORCINE) LOCK FLUSH IV SOLN 100 UNIT/ML 100 UNIT/ML
500 SOLUTION INTRAVENOUS PRN
Status: DISCONTINUED | OUTPATIENT
Start: 2022-06-12 | End: 2022-06-13 | Stop reason: HOSPADM

## 2022-06-12 RX ORDER — 0.9 % SODIUM CHLORIDE 0.9 %
1000 INTRAVENOUS SOLUTION INTRAVENOUS ONCE
Status: CANCELLED | OUTPATIENT
Start: 2022-06-13 | End: 2022-06-13

## 2022-06-12 RX ORDER — POTASSIUM CHLORIDE 29.8 MG/ML
80 INJECTION INTRAVENOUS PRN
Status: CANCELLED | OUTPATIENT
Start: 2022-06-13

## 2022-06-12 RX ORDER — MAGNESIUM SULFATE IN WATER 40 MG/ML
4000 INJECTION, SOLUTION INTRAVENOUS PRN
Status: CANCELLED | OUTPATIENT
Start: 2022-06-13

## 2022-06-12 RX ORDER — SODIUM CHLORIDE 9 MG/ML
INJECTION, SOLUTION INTRAVENOUS ONCE
Status: CANCELLED | OUTPATIENT
Start: 2022-06-13 | End: 2022-06-13

## 2022-06-12 RX ORDER — PROCHLORPERAZINE MALEATE 10 MG
10 TABLET ORAL EVERY 6 HOURS PRN
Status: CANCELLED | OUTPATIENT
Start: 2022-06-13

## 2022-06-12 RX ORDER — SODIUM CHLORIDE 9 MG/ML
INJECTION, SOLUTION INTRAVENOUS ONCE
Status: DISCONTINUED | OUTPATIENT
Start: 2022-06-12 | End: 2022-06-13 | Stop reason: HOSPADM

## 2022-06-12 RX ORDER — OXYCODONE HYDROCHLORIDE 5 MG/1
5 TABLET ORAL EVERY 4 HOURS PRN
Status: CANCELLED | OUTPATIENT
Start: 2022-06-13

## 2022-06-12 RX ORDER — POTASSIUM CHLORIDE 20 MEQ/1
40 TABLET, EXTENDED RELEASE ORAL PRN
Status: CANCELLED | OUTPATIENT
Start: 2022-06-13

## 2022-06-12 RX ORDER — POTASSIUM CHLORIDE 20 MEQ/1
40 TABLET, EXTENDED RELEASE ORAL PRN
Status: DISCONTINUED | OUTPATIENT
Start: 2022-06-12 | End: 2022-06-13 | Stop reason: HOSPADM

## 2022-06-12 RX ORDER — 0.9 % SODIUM CHLORIDE 0.9 %
1000 INTRAVENOUS SOLUTION INTRAVENOUS ONCE
Status: COMPLETED | OUTPATIENT
Start: 2022-06-12 | End: 2022-06-12

## 2022-06-12 RX ORDER — DIMETHICONE, CAMPHOR (SYNTHETIC), MENTHOL, AND PHENOL 1.1; .5; .625; .5 G/100G; G/100G; G/100G; G/100G
1 OINTMENT TOPICAL PRN
Status: DISCONTINUED | OUTPATIENT
Start: 2022-06-12 | End: 2022-06-13 | Stop reason: HOSPADM

## 2022-06-12 RX ORDER — OXYCODONE HYDROCHLORIDE 5 MG/1
10 TABLET ORAL EVERY 4 HOURS PRN
Status: CANCELLED | OUTPATIENT
Start: 2022-06-13

## 2022-06-12 RX ORDER — PROCHLORPERAZINE MALEATE 10 MG
10 TABLET ORAL EVERY 6 HOURS PRN
Status: DISCONTINUED | OUTPATIENT
Start: 2022-06-12 | End: 2022-06-13 | Stop reason: HOSPADM

## 2022-06-12 RX ADMIN — SODIUM CHLORIDE, PRESERVATIVE FREE 500 UNITS: 5 INJECTION INTRAVENOUS at 10:29

## 2022-06-12 RX ADMIN — SODIUM CHLORIDE 1000 MG: 900 INJECTION INTRAVENOUS at 08:58

## 2022-06-12 RX ADMIN — SODIUM CHLORIDE 1000 ML: 9 INJECTION, SOLUTION INTRAVENOUS at 08:38

## 2022-06-12 NOTE — PROGRESS NOTES
[facility] Progress Note      2022    Yan Orosco    :  1940    MRN:  [id number]    Referring MD: JOAQUIM Jeffries - NP  [place],  [address] [address]      Subjective:  Doing well without complaints. ECOG PS:  (1) Restricted in physically strenuous activity, ambulatory and able to do work of light nature    KPS: 80% Normal activity with effort; some signs or symptoms of disease    Isolation:  None     Medications    Scheduled Meds:   sodium chloride  1,000 mL IntraVENous Once     Continuous Infusions:   sodium chloride       PRN Meds:.potassium chloride, potassium chloride, magnesium sulfate, magnesium hydroxide, medicated lip ointment, oxyCODONE, oxyCODONE, prochlorperazine, prochlorperazine, heparin flush      ROS:  As noted above, otherwise remainder of 10-point ROS negative      Physical Exam:     Vital Signs:  BP (!) 141/77   Pulse 67   Temp 97.5 °F (36.4 °C) (Oral)   Resp 16   Wt 177 lb 11.1 oz (80.6 kg)   SpO2 96%   BMI 24.10 kg/m²     Weight:    Wt Readings from Last 3 Encounters:   22 177 lb 11.1 oz (80.6 kg)   22 181 lb 10.5 oz (82.4 kg)   06/10/22 181 lb 3.2 oz (82.2 kg)       General: Awake, alert and oriented    HEENT: normocephalic, alopecia, PERRL, no scleral erythema or icterus, Oral mucosa moist and intact, throat clear.    NECK: supple without palpable adenopathy  BACK: Straight, negative CVAT  SKIN: warm dry and intact without lesions rashes or masses  CHEST: CTA bilaterally without use of accessory muscles  CV: Normal S1 S2, RRR, no MRG  ABD: NT ND normoactive BS, no palpable masses or hepatosplenomegaly  EXTREMITIES: chronic edema in LLE, denies calf tenderness  NEURO: CN II - XII grossly intact  CATHETER:  Left IJ PAC (22, [facility]): CDI    Laboratory Data:  CBC:   Recent Labs     06/10/22  0427 22  0843 22  0915   WBC 4.7 4.1 4.1   HGB 8.7* 9.0* 9.2*   HCT 25.5* 27.2* 27.1*   MCV 95.5 96.5 95.8   PLT 99* 105* 102*     BMP/Mag:  Recent Labs     06/10/22  0427 06/11/22  0843    140   K 4.0 3.8    102   CO2 23 25   PHOS 2.7 2.3*   BUN 10 12   CREATININE 0.8 0.9   MG  --  1.80     LIVP:   Recent Labs     06/10/22  0427 06/11/22  0843   AST 23 18   ALT 22 19   BILIDIR <0.2 <0.2   BILITOT <0.2 <0.2   ALKPHOS 95 101     Coags:   Recent Labs     06/11/22  0843   PROTIME 14.5   INR 1.14     Uric Acid   Recent Labs     06/10/22  0427 06/11/22  0843   LABURIC 3.9 4.3     Lab Results   Component Value Date    CRP 25.8 (H) 06/11/2022    CRP 52.3 (H) 06/10/2022    CRP 91.9 (H) 06/09/2022     Lab Results   Component Value Date    FERRITIN 616.5 (H) 06/11/2022    FERRITIN 691.3 (H) 06/10/2022    FERRITIN 645.2 (H) 06/09/2022         PROBLEM LIST:          1. Follicular lymphoma with BM involvement  2. DLBC NHL  3.  Atrial fibrillation w/ RVR (5/2022)  4. Grade 1 CRS  5. Parainfluenza 3 virus / PNA (5/2022)  5.  Enterobacter bacteremia / sepsis (6/2022)      TREATMENT:            FL:   1  CHOP (1/1995)  2.  Rituxan (11/2003 - 8/2004)  3.  R-CHOP (7/2007)      DLBCL:   1.  R-Reagan/Ox x 2 cycles (1/31/22 & 2/14/22)  2.  Chip-BR x 2 cycles  (3/22/22 & 4/20/22)  3.  CAR-T w/ Breyanzi                      Lymphodepleting Chemotherapy:  Fludarabine and cyclophosphamide   Disease Status at time of Infusion:  Progressive Disease  CAR-T Infusion Date: 5/23/22  CAR-T Product:  Breyanzi  Batch ID Number:  [id number]      ASSESSMENT AND PLAN:           1.  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 These may be infectious. These were not described on the previous PET/CT. Pulmonary lymphoma would be difficult to exclude.  If the pulmonary nodule represents lymphoma, this would represent a Deauville score 5 exam. If this is infectious, overall the exam would represent a Deauville score 1. Previously described lymphadenopathy in FDG activity in the left retroperitoneum, left iliac region and in left upper thigh has completely resolved. No residual lymphadenopathy or FDG avid lymph node identified      PLAN:   S/p lymphodepleting Chemotherapy w/ Fludarabine and cyclophosphamide & CAR-T w/ Breyanzi (5/23/22)      Day + 20     2.  CRS / Neuro:  - H/o Grade 1 CRS  - Monitor CRP and Ferrtin closely       Lab Results   Component Value Date    CRP 25.8 (H) 06/11/2022    CRP 52.3 (H) 06/10/2022    CRP 91.9 (H) 06/09/2022     Lab Results   Component Value Date    FERRITIN 616.5 (H) 06/11/2022    FERRITIN 691.3 (H) 06/10/2022    FERRITIN 645.2 (H) 06/09/2022     - Neuro checks w/ CARTOX 10-point assessment Q4hrs    Toxicity Grading    CRS stGstrstastdstest:st st1st ICANS stGstrstastdstest:st st1st ICE Score:  CAR-T Score: 10    3.  ID:  Admitted with sepsis (POA) r/t Enterobacter bacteremia, fever from CRS d/t CAR-T was ruled out. - CXR (6/7/22): Negative    - Restart Levaquin and Diflucan ppx if ANC < 1.5  - RR Viral Panel (5/28/22): Parainfluenza 3 Virus  - Blood Cx x 2 bottles (6/7/22): Enterobacter   - LA (6/7/22): 2.6 & Procalcitonin - 47, 90 (6/8/22); trended down  - Cont Acyclovir ppx  - s/p Merrem Day + 2 (started 6/8/22-6/9/22)- change to Ertapenem (6/10/22) Day 5 of total therapy        Abx history:  Vanco x 2 days (6/7/22)  [place] & Cefepime Day x 1 day (6/7/22)  Levaquin ppx (5/29/22 - 5/31/22)     4.  Heme: Anemia and thrombocytopenia from chemotherapy   - Transfuse for Hgb < 7 and Platelets < 21K  - No transfusion today     5.  Metabolic: stable electrolytes & renal fx; metabolic acidosis resolve  TLS:  No evidence, s/p allopurinol (stopped  6/2/22)  -1 L NS bolus daily     6. Cardiac:   - H/o A. Fib w/ RVR (5/28/22)  - Echo (3/21/22):  Centric mild left ventricular hypertrophy.  Left ventricular ejection fraction 55 to 60% with no regional wall motion abnormalities noted.  Intermittent diastolic dysfunction. A. Fib:    - Cont Metoprolol 25 mg BID (started 5/29/22)  - No indication for AC at this time (per cardiology)     7. GI / Nutrition:    Nutrition: Appetite and oral intake is good. - S/p taking Astragalus supplement (stopped 5/18/22 d/t interaction with Cytoxan) - can resume on d/c   - Cont low microbial diet   - Follow closely with dietary       - Disposition:  Will be seen daily in Outpatient Infusion    Peg Ramirez.  Irving Joyner, [facility] [address]

## 2022-06-12 NOTE — PROGRESS NOTES
Short Stay Communication Note    Silvina Rodríguez  Diagnosis: DLBC NHL  Primary MD: Dr. Wing Valdivia  Treatment: Melphalan Fludarabine/Cytoxan  Day +20 of Car-T Karol  Pt seen in outpatient infusion today. Labs drawn and reviewed. CBC:   Recent Labs     06/10/22  0427 06/11/22  0843 06/12/22  0915   WBC 4.7 4.1 4.1   HGB 8.7* 9.0* 9.2*   HCT 25.5* 27.2* 27.1*   MCV 95.5 96.5 95.8   PLT 99* 105* 102*     BMP/Mag:  Recent Labs     06/10/22  0427 06/11/22  0843 06/12/22  0915    140 139   K 4.0 3.8 3.8    102 103   CO2 23 25 26   PHOS 2.7 2.3* 2.5   BUN 10 12 14   CREATININE 0.8 0.9 0.9   MG  --  1.80  --      Standing parameters for replacement for this patient:   1 unit of pack red blood cells for a hemoglobin < or equal to 7.0  1 pack of platelets for a platelet count less than or equal to 10  40 MeQ of Potassium administered for a potassium level less than or equal to 3.4  4g of Magnesium Sulfate for a magnesum level less than or equal to 1.4  No transfusions required for the above lab values. Urinalysis last done: 6/5/22 Urinalysis next due: 6/13/22    Chest X-Ray last done: 6/7/22 Chest X-Ray next due: 6/13/22    Symptoms addressed and reported to care team this date:Cough improving  Treatments this date: Labs, NS Bolus, Invanz    Reviewed medication schedule with pt and caregiver. Both able to verbalize all medications and schedule. See flowsheet. Discharged ambulatory to home. Seen by Dr. Starr Bruce and Haley Chawla CNP. Will return tomorrow for the same.      Sulaiman Nunez RN

## 2022-06-13 ENCOUNTER — HOSPITAL ENCOUNTER (OUTPATIENT)
Dept: ONCOLOGY | Age: 82
Setting detail: INFUSION SERIES
Discharge: HOME | End: 2022-06-13
Payer: MEDICARE

## 2022-06-13 ENCOUNTER — HOSPITAL ENCOUNTER (OUTPATIENT)
Dept: GENERAL RADIOLOGY | Age: 82
Discharge: HOME | End: 2022-06-13
Payer: MEDICARE

## 2022-06-13 VITALS
BODY MASS INDEX: 23.89 KG/M2 | HEART RATE: 76 BPM | HEIGHT: 72 IN | TEMPERATURE: 97.9 F | OXYGEN SATURATION: 97 % | SYSTOLIC BLOOD PRESSURE: 136 MMHG | DIASTOLIC BLOOD PRESSURE: 76 MMHG | WEIGHT: 176.37 LBS

## 2022-06-13 DIAGNOSIS — C83.30 DIFFUSE LARGE B-CELL LYMPHOMA, UNSPECIFIED BODY REGION (HCC): Primary | ICD-10-CM

## 2022-06-13 LAB
ALBUMIN SERPL-MCNC: 3.8 G/DL (ref 3.4–5)
ALP BLD-CCNC: 101 U/L (ref 40–129)
ALT SERPL-CCNC: 26 U/L (ref 10–40)
ANION GAP SERPL CALCULATED.3IONS-SCNC: 11 MMOL/L (ref 3–16)
AST SERPL-CCNC: 25 U/L (ref 15–37)
BASOPHILS ABSOLUTE: 0 K/UL (ref 0–0.2)
BASOPHILS RELATIVE PERCENT: 0.6 %
BILIRUB SERPL-MCNC: <0.2 MG/DL (ref 0–1)
BILIRUBIN DIRECT: <0.2 MG/DL (ref 0–0.3)
BILIRUBIN URINE: NEGATIVE
BILIRUBIN, INDIRECT: ABNORMAL MG/DL (ref 0–1)
BLOOD, URINE: NEGATIVE
BUN BLDV-MCNC: 14 MG/DL (ref 7–20)
C-REACTIVE PROTEIN: 8 MG/L (ref 0–5.1)
CALCIUM SERPL-MCNC: 9 MG/DL (ref 8.3–10.6)
CHLORIDE BLD-SCNC: 102 MMOL/L (ref 99–110)
CLARITY: CLEAR
CO2: 26 MMOL/L (ref 21–32)
COLOR: YELLOW
CREAT SERPL-MCNC: 0.9 MG/DL (ref 0.8–1.3)
EOSINOPHILS ABSOLUTE: 0.1 K/UL (ref 0–0.6)
EOSINOPHILS RELATIVE PERCENT: 2.3 %
FERRITIN: 562 NG/ML (ref 30–400)
GFR AFRICAN AMERICAN: >60
GFR NON-AFRICAN AMERICAN: >60
GLUCOSE BLD-MCNC: 166 MG/DL (ref 70–99)
GLUCOSE URINE: NEGATIVE MG/DL
HCT VFR BLD CALC: 27 % (ref 40.5–52.5)
HEMOGLOBIN: 9.1 G/DL (ref 13.5–17.5)
INR BLD: 1.12 (ref 0.87–1.14)
KETONES, URINE: NEGATIVE MG/DL
LACTATE DEHYDROGENASE: 167 U/L (ref 100–190)
LEUKOCYTE ESTERASE, URINE: NEGATIVE
LYMPHOCYTES ABSOLUTE: 0.4 K/UL (ref 1–5.1)
LYMPHOCYTES RELATIVE PERCENT: 9.6 %
MAGNESIUM: 1.8 MG/DL (ref 1.8–2.4)
MCH RBC QN AUTO: 32.5 PG (ref 26–34)
MCHC RBC AUTO-ENTMCNC: 33.7 G/DL (ref 31–36)
MCV RBC AUTO: 96.5 FL (ref 80–100)
MICROSCOPIC EXAMINATION: NORMAL
MONOCYTES ABSOLUTE: 0.3 K/UL (ref 0–1.3)
MONOCYTES RELATIVE PERCENT: 8 %
NEUTROPHILS ABSOLUTE: 3.2 K/UL (ref 1.7–7.7)
NEUTROPHILS RELATIVE PERCENT: 79.5 %
NITRITE, URINE: NEGATIVE
PDW BLD-RTO: 15.1 % (ref 12.4–15.4)
PH UA: 6 (ref 5–8)
PHOSPHORUS: 2.9 MG/DL (ref 2.5–4.9)
PLATELET # BLD: 101 K/UL (ref 135–450)
PMV BLD AUTO: 9.5 FL (ref 5–10.5)
POTASSIUM SERPL-SCNC: 4 MMOL/L (ref 3.5–5.1)
PROTEIN UA: NEGATIVE MG/DL
PROTHROMBIN TIME: 14.3 SEC (ref 11.7–14.5)
RBC # BLD: 2.8 M/UL (ref 4.2–5.9)
SODIUM BLD-SCNC: 139 MMOL/L (ref 136–145)
SPECIFIC GRAVITY UA: 1.02 (ref 1–1.03)
TOTAL PROTEIN: 6.2 G/DL (ref 6.4–8.2)
URIC ACID, SERUM: 4.4 MG/DL (ref 3.5–7.2)
URINE TYPE: NORMAL
UROBILINOGEN, URINE: 0.2 E.U./DL
WBC # BLD: 4.1 K/UL (ref 4–11)

## 2022-06-13 PROCEDURE — 83735 ASSAY OF MAGNESIUM: CPT

## 2022-06-13 PROCEDURE — 84100 ASSAY OF PHOSPHORUS: CPT

## 2022-06-13 PROCEDURE — 2580000003 HC RX 258: Performed by: NURSE PRACTITIONER

## 2022-06-13 PROCEDURE — 85025 COMPLETE CBC W/AUTO DIFF WBC: CPT

## 2022-06-13 PROCEDURE — 96361 HYDRATE IV INFUSION ADD-ON: CPT

## 2022-06-13 PROCEDURE — 84550 ASSAY OF BLOOD/URIC ACID: CPT

## 2022-06-13 PROCEDURE — 80048 BASIC METABOLIC PNL TOTAL CA: CPT

## 2022-06-13 PROCEDURE — 83615 LACTATE (LD) (LDH) ENZYME: CPT

## 2022-06-13 PROCEDURE — 81003 URINALYSIS AUTO W/O SCOPE: CPT

## 2022-06-13 PROCEDURE — 36591 DRAW BLOOD OFF VENOUS DEVICE: CPT

## 2022-06-13 PROCEDURE — 6360000002 HC RX W HCPCS: Performed by: NURSE PRACTITIONER

## 2022-06-13 PROCEDURE — 86140 C-REACTIVE PROTEIN: CPT

## 2022-06-13 PROCEDURE — 71045 X-RAY EXAM CHEST 1 VIEW: CPT

## 2022-06-13 PROCEDURE — 82728 ASSAY OF FERRITIN: CPT

## 2022-06-13 PROCEDURE — 85610 PROTHROMBIN TIME: CPT

## 2022-06-13 PROCEDURE — 96365 THER/PROPH/DIAG IV INF INIT: CPT

## 2022-06-13 PROCEDURE — 96360 HYDRATION IV INFUSION INIT: CPT

## 2022-06-13 PROCEDURE — 80076 HEPATIC FUNCTION PANEL: CPT

## 2022-06-13 RX ORDER — HEPARIN SODIUM (PORCINE) LOCK FLUSH IV SOLN 100 UNIT/ML 100 UNIT/ML
500 SOLUTION INTRAVENOUS PRN
Status: DISCONTINUED | OUTPATIENT
Start: 2022-06-13 | End: 2022-06-14 | Stop reason: HOSPADM

## 2022-06-13 RX ORDER — PETROLATUM, MENTHOL, UNSPECIFIED FORM, CAMPHOR (SYNTHETIC), AND PHENOL 59.14; 1; 1; .6 G/100G; G/100G; G/100G; G/100G
PASTE TOPICAL PRN
Status: CANCELLED | OUTPATIENT
Start: 2022-06-14

## 2022-06-13 RX ORDER — PROCHLORPERAZINE MALEATE 10 MG
10 TABLET ORAL EVERY 6 HOURS PRN
Status: CANCELLED | OUTPATIENT
Start: 2022-06-14

## 2022-06-13 RX ORDER — SODIUM CHLORIDE 9 MG/ML
INJECTION, SOLUTION INTRAVENOUS ONCE
Status: CANCELLED | OUTPATIENT
Start: 2022-06-14 | End: 2022-06-14

## 2022-06-13 RX ORDER — HEPARIN SODIUM (PORCINE) LOCK FLUSH IV SOLN 100 UNIT/ML 100 UNIT/ML
500 SOLUTION INTRAVENOUS PRN
Status: CANCELLED | OUTPATIENT
Start: 2022-06-14

## 2022-06-13 RX ORDER — MAGNESIUM SULFATE IN WATER 40 MG/ML
4000 INJECTION, SOLUTION INTRAVENOUS PRN
Status: CANCELLED | OUTPATIENT
Start: 2022-06-14

## 2022-06-13 RX ORDER — ONDANSETRON 4 MG/1
8 TABLET, FILM COATED ORAL ONCE
Status: DISCONTINUED | OUTPATIENT
Start: 2022-06-13 | End: 2022-06-14 | Stop reason: HOSPADM

## 2022-06-13 RX ORDER — OXYCODONE HYDROCHLORIDE 5 MG/1
5 TABLET ORAL EVERY 4 HOURS PRN
Status: DISCONTINUED | OUTPATIENT
Start: 2022-06-13 | End: 2022-06-14 | Stop reason: HOSPADM

## 2022-06-13 RX ORDER — POTASSIUM CHLORIDE 20 MEQ/1
40 TABLET, EXTENDED RELEASE ORAL PRN
Status: DISCONTINUED | OUTPATIENT
Start: 2022-06-13 | End: 2022-06-14 | Stop reason: HOSPADM

## 2022-06-13 RX ORDER — PROCHLORPERAZINE EDISYLATE 5 MG/ML
10 INJECTION INTRAMUSCULAR; INTRAVENOUS EVERY 6 HOURS PRN
Status: DISCONTINUED | OUTPATIENT
Start: 2022-06-13 | End: 2022-06-14 | Stop reason: HOSPADM

## 2022-06-13 RX ORDER — OXYCODONE HYDROCHLORIDE 5 MG/1
10 TABLET ORAL EVERY 4 HOURS PRN
Status: CANCELLED | OUTPATIENT
Start: 2022-06-14

## 2022-06-13 RX ORDER — ONDANSETRON 2 MG/ML
8 INJECTION INTRAMUSCULAR; INTRAVENOUS ONCE
Status: DISCONTINUED | OUTPATIENT
Start: 2022-06-13 | End: 2022-06-14 | Stop reason: HOSPADM

## 2022-06-13 RX ORDER — 0.9 % SODIUM CHLORIDE 0.9 %
1000 INTRAVENOUS SOLUTION INTRAVENOUS ONCE
Status: COMPLETED | OUTPATIENT
Start: 2022-06-13 | End: 2022-06-13

## 2022-06-13 RX ORDER — SODIUM CHLORIDE 0.9 % (FLUSH) 0.9 %
5-40 SYRINGE (ML) INJECTION PRN
OUTPATIENT
Start: 2022-06-13

## 2022-06-13 RX ORDER — PROCHLORPERAZINE MALEATE 10 MG
10 TABLET ORAL EVERY 6 HOURS PRN
Status: DISCONTINUED | OUTPATIENT
Start: 2022-06-13 | End: 2022-06-14 | Stop reason: HOSPADM

## 2022-06-13 RX ORDER — PROCHLORPERAZINE EDISYLATE 5 MG/ML
10 INJECTION INTRAMUSCULAR; INTRAVENOUS EVERY 6 HOURS PRN
Status: CANCELLED | OUTPATIENT
Start: 2022-06-14

## 2022-06-13 RX ORDER — ONDANSETRON 4 MG/1
8 TABLET, FILM COATED ORAL ONCE
Status: CANCELLED | OUTPATIENT
Start: 2022-06-14 | End: 2022-06-14

## 2022-06-13 RX ORDER — DIMETHICONE, CAMPHOR (SYNTHETIC), MENTHOL, AND PHENOL 1.1; .5; .625; .5 G/100G; G/100G; G/100G; G/100G
OINTMENT TOPICAL PRN
Status: DISCONTINUED | OUTPATIENT
Start: 2022-06-13 | End: 2022-06-14 | Stop reason: HOSPADM

## 2022-06-13 RX ORDER — POTASSIUM CHLORIDE 20 MEQ/1
40 TABLET, EXTENDED RELEASE ORAL PRN
Status: CANCELLED | OUTPATIENT
Start: 2022-06-14

## 2022-06-13 RX ORDER — OXYCODONE HYDROCHLORIDE 5 MG/1
10 TABLET ORAL EVERY 4 HOURS PRN
Status: DISCONTINUED | OUTPATIENT
Start: 2022-06-13 | End: 2022-06-14 | Stop reason: HOSPADM

## 2022-06-13 RX ORDER — OXYCODONE HYDROCHLORIDE 5 MG/1
5 TABLET ORAL EVERY 4 HOURS PRN
Status: CANCELLED | OUTPATIENT
Start: 2022-06-14

## 2022-06-13 RX ORDER — ONDANSETRON 2 MG/ML
8 INJECTION INTRAMUSCULAR; INTRAVENOUS ONCE
Status: CANCELLED | OUTPATIENT
Start: 2022-06-14 | End: 2022-06-14

## 2022-06-13 RX ORDER — POTASSIUM CHLORIDE 29.8 MG/ML
80 INJECTION INTRAVENOUS PRN
Status: DISCONTINUED | OUTPATIENT
Start: 2022-06-13 | End: 2022-06-14 | Stop reason: HOSPADM

## 2022-06-13 RX ORDER — 0.9 % SODIUM CHLORIDE 0.9 %
1000 INTRAVENOUS SOLUTION INTRAVENOUS ONCE
Status: CANCELLED | OUTPATIENT
Start: 2022-06-14 | End: 2022-06-14

## 2022-06-13 RX ORDER — POTASSIUM CHLORIDE 29.8 MG/ML
80 INJECTION INTRAVENOUS PRN
Status: CANCELLED | OUTPATIENT
Start: 2022-06-14

## 2022-06-13 RX ORDER — SODIUM CHLORIDE 9 MG/ML
INJECTION, SOLUTION INTRAVENOUS ONCE
Status: DISCONTINUED | OUTPATIENT
Start: 2022-06-13 | End: 2022-06-14 | Stop reason: HOSPADM

## 2022-06-13 RX ORDER — HEPARIN SODIUM (PORCINE) LOCK FLUSH IV SOLN 100 UNIT/ML 100 UNIT/ML
500 SOLUTION INTRAVENOUS PRN
OUTPATIENT
Start: 2022-06-13

## 2022-06-13 RX ORDER — SODIUM CHLORIDE 9 MG/ML
25 INJECTION, SOLUTION INTRAVENOUS PRN
OUTPATIENT
Start: 2022-06-13

## 2022-06-13 RX ORDER — MAGNESIUM SULFATE IN WATER 40 MG/ML
4000 INJECTION, SOLUTION INTRAVENOUS PRN
Status: DISCONTINUED | OUTPATIENT
Start: 2022-06-13 | End: 2022-06-14 | Stop reason: HOSPADM

## 2022-06-13 RX ADMIN — ERTAPENEM 1000 MG: 1 INJECTION INTRAMUSCULAR; INTRAVENOUS at 08:50

## 2022-06-13 RX ADMIN — SODIUM CHLORIDE, PRESERVATIVE FREE 500 UNITS: 5 INJECTION INTRAVENOUS at 11:10

## 2022-06-13 RX ADMIN — SODIUM CHLORIDE 1000 ML: 9 INJECTION, SOLUTION INTRAVENOUS at 08:33

## 2022-06-13 NOTE — PROGRESS NOTES
Oncology  Nutrition Assessment    RECOMMENDATIONS:  1. PO Diet: Continue current. (Food safety precautions for immunocompromised pt only if ANC < 1000)  2. ONS: encourage 1-daily or nutritional snack qd to meet increased nutrient needs s/p car-t. NUTRITION ASSESSMENT:   Nutritional summary & status: D+ 21 s/p CAR-t. Nutritionally stable with adequate PO intake, even through recent hospitalization for sepsis. Weight slightly decreased from pt UBW but pt denies changes to po intake, appetite or other barriers to nutrition at this time. PGSGA score noted; no nutrition interventions at this time. RD will continue to follow. · PG-SGA: Score 0-1; No intervention required at this time. Re-assess on routine/regular basis during treatment    Admission/PMH: S/P Fludarabine and cyclophosphamide 5/18/22 - 5/20/22 followed by Lakhwinder Good on 5/23/22 for refractory DLBC NHL dx Dec 2021; PET/CT from January 21, 2022 shows extensive hypermetabolic metabolism in the retroperitoneum, left pelvis and left lower extremity consistent with lymphoma.  PMH of follicular lymphoma diagnosed 1995 (bone marrow involvement); received various chemotx starting 11/1996 through as recent as 3/22/22. MALNUTRITION ASSESSMENT  Context of Malnutrition: Acute Illness (on chronic )   Malnutrition Status: No malnutrition    NUTRITION DIAGNOSIS   Increased nutrient needs related to increase demand for energy/nutrients as evidenced by  (s/p chemotx then car-t)    [address] and/or Nutrient Delivery:  Continue Current Diet  Nutrition Education/Counseling:  Education completed   Goals:  pt will maintain adequate po intake by consuming 75% or greater of meals/snacks s/p car-t to promote meeting increased nutrient needs.           Nutrition Monitoring and Evaluation:   Food/Nutrient Intake Outcomes:  Food and Nutrient Intake  Physical Signs/Symptoms Outcomes:  Biochemical Data,Weight     OBJECTIVE DATA: Significant to nutrition assessment  · Nutrition-Focused Physical Findings: [place];   · Labs: Reviewed; ; WBC 4.1; ANC 3.2   · Meds: Reviewed;   · Wounds:         CURRENT NUTRITION THERAPIES  PO Diet: General diet   ONS: n/a   PO Intake: %   PO Supplement Intake:None Ordered  Additional Sources of Calories/IVF:  1 L NS daily    ANTHROPOMETRICS  Current Height: 6' (182.9 cm)  Current Weight: 176 lb 5.9 oz (80 kg)    Admission weight: 176 lb 5.9 oz (80 kg)  Ideal Body Weight (IBW): 178 lbs  (81 kg)    Usual Bodyweight 180 lb (81.6 kg)   Weight Changes mild decrease; not signifiant      BMI: 24    Wt Readings from Last 50 Encounters:   06/13/22 176 lb 5.9 oz (80 kg)   06/12/22 177 lb 11.1 oz (80.6 kg)   06/11/22 181 lb 10.5 oz (82.4 kg)   06/10/22 181 lb 3.2 oz (82.2 kg)   06/07/22 179 lb 7.3 oz (81.4 kg)   06/05/22 178 lb 5.6 oz (80.9 kg)   06/03/22 178 lb 5.6 oz (80.9 kg)   06/02/22 175 lb 7.8 oz (79.6 kg)   06/01/22 180 lb 6 oz (81.8 kg)   05/27/22 184 lb 8.4 oz (83.7 kg)   05/26/22 183 lb 3.2 oz (83.1 kg)   05/25/22 182 lb 5.1 oz (82.7 kg)   05/24/22 181 lb 10.5 oz (82.4 kg)   05/23/22 180 lb 12.4 oz (82 kg)   05/23/22 180 lb 12.4 oz (82 kg)   05/20/22 183 lb 3.2 oz (83.1 kg)   05/19/22 182 lb 15.7 oz (83 kg)   05/18/22 180 lb 1.9 oz (81.7 kg)   04/18/22 186 lb (84.4 kg)   03/21/22 188 lb 7.9 oz (85.5 kg)       COMPARATIVE STANDARDS  Energy (kcal):  9258-9051     Protein (g):  107-124 (1.3-1.5)       Fluid (ml/day):  7085-6228    Consult dietitian if nutrition interventions essential to patient care is needed.      Elis Sanabria RD, LD

## 2022-06-13 NOTE — PROGRESS NOTES
Short Stay Communication Note    Michelle Blanco  Diagnosis: DLBC NHL  Primary MD: Dr. Reno Gaston  Treatment: Melphalan Fludarabine/Cytoxan  Day +21 of Car-T Karol  Pt seen in outpatient infusion today. Labs drawn and reviewed. CBC:   Recent Labs     06/11/22  0843 06/12/22  0915 06/13/22  0915   WBC 4.1 4.1 4.1   HGB 9.0* 9.2* 9.1*   HCT 27.2* 27.1* 27.0*   MCV 96.5 95.8 96.5   * 102* 101*     BMP/Mag:  Recent Labs     06/11/22  0843 06/12/22  0915 06/13/22  0853    139 139   K 3.8 3.8 4.0    103 102   CO2 25 26 26   PHOS 2.3* 2.5 2.9   BUN 12 14 14   CREATININE 0.9 0.9 0.9   MG 1.80  --  1.80     Standing parameters for replacement for this patient:   1 unit of pack red blood cells for a hemoglobin < or equal to 7.0  1 pack of platelets for a platelet count less than or equal to 10  40 MeQ of Potassium administered for a potassium level less than or equal to 3.4  4g of Magnesium Sulfate for a magnesum level less than or equal to 1.4  No transfusions required for the above lab values. Urinalysis last done: 6/13/22 Urinalysis next due: 6/20/22    Chest X-Ray last done: 6/13/22 Chest X-Ray next due: 6/20/22    Symptoms addressed and reported to care team this date:Cough improving  Treatments this date: Labs, NS Bolus, Invanz    Reviewed medication schedule with pt and caregiver. Both able to verbalize all medications and schedule. See flowsheet. Discharged ambulatory to home. Seen by Dr. Юлия Trinidad and Melissa Gar CNP. Will return tomorrow for the same.      Kevin Maria RN

## 2022-06-13 NOTE — PROGRESS NOTES
[facility] Progress [facility]      2022    Sanna Rapp    :  1940    MRN:  [id number]    Referring MD: Vandana Joyce MD  [address], [address],  [address]      Subjective: Patient feels well today, no fevers, no confusion, still has intermittent dry cough but much better. Eating and drinking well, no complaints at this time. ECOG PS:  (1) Restricted in physically strenuous activity, ambulatory and able to do work of light nature    KPS: 80% Normal activity with effort; some signs or symptoms of disease    Isolation:  None     Medications    Scheduled Meds:    Continuous Infusions:    PRN Meds:. ROS:  As noted above, otherwise remainder of 10-point ROS negative      Physical Exam:     Vital Signs:  /76   Pulse 76   Temp 97.9 °F (36.6 °C) (Oral)   Ht 6' (1.829 m)   Wt 176 lb 5.9 oz (80 kg)   SpO2 97%   BMI 23.92 kg/m²     Weight:    Wt Readings from Last 3 Encounters:   22 176 lb 5.9 oz (80 kg)   22 177 lb 11.1 oz (80.6 kg)   22 181 lb 10.5 oz (82.4 kg)       General: Awake, alert and oriented    HEENT: normocephalic, alopecia, PERRL, no scleral erythema or icterus, Oral mucosa moist and intact, throat clear.    NECK: supple without palpable adenopathy  BACK: Straight, negative CVAT  SKIN: warm dry and intact without lesions rashes or masses  CHEST: CTA bilaterally without use of accessory muscles  CV: Normal S1 S2, RRR, no MRG  ABD: NT ND normoactive BS, no palpable masses or hepatosplenomegaly  EXTREMITIES: chronic edema in LLE, denies calf tenderness  NEURO: CN II - XII grossly intact  CATHETER:  Left IJ PAC (22, [facility]): CDI    Laboratory Data:  CBC:   Recent Labs     22  0843 22  0915 22  0915   WBC 4.1 4.1 4.1   HGB 9.0* 9.2* 9.1*   HCT 27.2* 27.1* 27.0*   MCV 96.5 95.8 96.5   * 102* 101*     BMP/Mag:  Recent Labs     22  0843 22  0915 22  0853    139 139   K 3.8 3.8 4.0  103 102   CO2 25 26 26   PHOS 2.3* 2.5 2.9   BUN 12 14 14   CREATININE 0.9 0.9 0.9   MG 1.80  --  1.80     LIVP:   Recent Labs     06/11/22  0843 06/13/22  0853   AST 18 25   ALT 19 26   BILIDIR <0.2 <0.2   BILITOT <0.2 <0.2   ALKPHOS 101 101     Coags:   Recent Labs     06/11/22  0843 06/13/22  0854   PROTIME 14.5 14.3   INR 1.14 1.12     Uric Acid   Recent Labs     06/11/22  0843 06/13/22  0853   LABURIC 4.3 4.4     Lab Results   Component Value Date    CRP 8.0 (H) 06/13/2022    CRP 13.6 (H) 06/12/2022    CRP 25.8 (H) 06/11/2022     Lab Results   Component Value Date    FERRITIN 562.0 (H) 06/13/2022    FERRITIN 577.7 (H) 06/12/2022    FERRITIN 616.5 (H) 06/11/2022         PROBLEM LIST:          1. Follicular lymphoma with BM involvement  2. DLBC NHL  3. Atrial fibrillation w/ RVR (5/2022)  4. Grade 1 CRS  5. Parainfluenza 3 virus / PNA (5/2022)  5. Enterobacter bacteremia / sepsis (6/2022)      TREATMENT:            FL:   1  CHOP (1/1995)  2. Rituxan (11/2003 - 8/2004)  3. R-CHOP (7/2007)      DLBCL:   1.  R-[place]/Ox x 2 cycles (1/31/22 & 2/14/22)  2. Chip-BR x 2 cycles  (3/22/22 & 4/20/22)  3. CAR-T w/ Yefri Rousseau                      Lymphodepleting Chemotherapy:  Fludarabine and cyclophosphamide   Disease Status at time of Infusion:  Progressive Disease  CAR-T Infusion Date: 5/23/22  CAR-T Product:  Breyanzi  Batch ID Number:  [id number]      ASSESSMENT AND PLAN:           1. 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 These may be infectious. These were not described on the previous PET/CT. Pulmonary lymphoma would be difficult to exclude.  If the pulmonary nodule represents lymphoma, this would represent a Deauville score 5 exam. If this is infectious, overall the exam would represent a Deauville score 1. Previously described lymphadenopathy in FDG activity in the left retroperitoneum, left iliac region and in left upper thigh has completely resolved. No residual lymphadenopathy or FDG avid lymph node identified      PLAN:   S/p lymphodepleting Chemotherapy w/ Fludarabine and cyclophosphamide & CAR-T w/ Breyanzi (5/23/22)      Day + 21     2. CRS / Neuro:   - Ferritin & CRP trending down   - H/o Grade 1 CRS  - Monitor CRP and Ferrtin closely       Lab Results   Component Value Date    CRP 8.0 (H) 06/13/2022    CRP 13.6 (H) 06/12/2022    CRP 25.8 (H) 06/11/2022     Lab Results   Component Value Date    FERRITIN 562.0 (H) 06/13/2022    FERRITIN 577.7 (H) 06/12/2022    FERRITIN 616.5 (H) 06/11/2022     - Neuro checks w/ CARTOX 10-point assessment Q4hrs    Toxicity Grading    CRS stGstrstastdstest:st st1st ICANS stGstrstastdstest:st st1st ICE Score:  CAR-T Score: 10    3. ID:  Afebrile, recently admitted with sepsis (POA) r/t Enterobacter bacteremia, fever from CRS d/t CAR-T was ruled out. - CXR & UA 6/13/22: Negative   - Restart Levaquin and Diflucan ppx if ANC < 1.5  - RR Viral Panel (5/28/22): Parainfluenza 3 Virus  - Blood Cx x 2 bottles (6/7/22): Enterobacter   - LA (6/7/22): 2.6 & Procalcitonin - 47, 90 (6/8/22); trended down  - Cont Acyclovir ppx  - s/p Merrem Day + 2 (started 6/8/22-6/9/22)- change to Ertapenem (6/10/22) Day +  6/14 of total therapy        Abx history:  Vanco x 2 days (6/7/22)  Justin Salk & Cefepime Day x 1 day (6/7/22)  Levaquin ppx (5/29/22 - 5/31/22)     4. Heme: Anemia and thrombocytopenia from chemotherapy   - Transfuse for Hgb < 7 and Platelets < 66L  - No transfusion today     5. Metabolic: Hyperglycemia otherwise stable electrolytes & renal fx  TLS:  No evidence, s/p allopurinol (stopped  6/2/22)  -1 L NS bolus daily     6. Cardiac:   - H/o A. Fib w/ RVR (5/28/22)  - Echo (3/21/22):  Centric mild left ventricular hypertrophy. Left ventricular ejection fraction 55 to 60% with no regional wall motion abnormalities noted. Intermittent diastolic dysfunction. A. Fib:    - Cont Metoprolol 25 mg BID (started 5/29/22)  - No indication for AC at this time (per cardiology)     7. GI / Nutrition:    Nutrition: Appetite and oral intake is good. - S/p taking Astragalus supplement (stopped 5/18/22 d/t interaction with Cytoxan) - can resume on d/c   - Cont low microbial diet   - Follow closely with dietary       - Disposition:  Will be seen daily in Outpatient Infusion for Invanz, CAR-T assessment, labs and MD visit.     JOAQUIM Yadav - NP

## 2022-06-14 ENCOUNTER — HOSPITAL ENCOUNTER (OUTPATIENT)
Dept: ONCOLOGY | Age: 82
Setting detail: INFUSION SERIES
Discharge: HOME | End: 2022-06-14
Payer: MEDICARE

## 2022-06-14 VITALS
BODY MASS INDEX: 23.8 KG/M2 | RESPIRATION RATE: 16 BRPM | OXYGEN SATURATION: 98 % | SYSTOLIC BLOOD PRESSURE: 124 MMHG | TEMPERATURE: 97.6 F | WEIGHT: 175.49 LBS | HEART RATE: 64 BPM | DIASTOLIC BLOOD PRESSURE: 55 MMHG

## 2022-06-14 DIAGNOSIS — C83.30 DIFFUSE LARGE B-CELL LYMPHOMA, UNSPECIFIED BODY REGION (HCC): Primary | ICD-10-CM

## 2022-06-14 LAB
ANION GAP SERPL CALCULATED.3IONS-SCNC: 9 MMOL/L (ref 3–16)
BASOPHILS ABSOLUTE: 0 K/UL (ref 0–0.2)
BASOPHILS RELATIVE PERCENT: 0.5 %
BUN BLDV-MCNC: 15 MG/DL (ref 7–20)
C-REACTIVE PROTEIN: 5.4 MG/L (ref 0–5.1)
CALCIUM SERPL-MCNC: 9.1 MG/DL (ref 8.3–10.6)
CHLORIDE BLD-SCNC: 103 MMOL/L (ref 99–110)
CO2: 27 MMOL/L (ref 21–32)
CREAT SERPL-MCNC: 0.9 MG/DL (ref 0.8–1.3)
EOSINOPHILS ABSOLUTE: 0.1 K/UL (ref 0–0.6)
EOSINOPHILS RELATIVE PERCENT: 2.6 %
FERRITIN: 556.5 NG/ML (ref 30–400)
GFR AFRICAN AMERICAN: >60
GFR NON-AFRICAN AMERICAN: >60
GLUCOSE BLD-MCNC: 120 MG/DL (ref 70–99)
HCT VFR BLD CALC: 27.5 % (ref 40.5–52.5)
HEMOGLOBIN: 9.4 G/DL (ref 13.5–17.5)
LYMPHOCYTES ABSOLUTE: 0.4 K/UL (ref 1–5.1)
LYMPHOCYTES RELATIVE PERCENT: 9.5 %
MCH RBC QN AUTO: 32.6 PG (ref 26–34)
MCHC RBC AUTO-ENTMCNC: 34 G/DL (ref 31–36)
MCV RBC AUTO: 95.6 FL (ref 80–100)
MONOCYTES ABSOLUTE: 0.3 K/UL (ref 0–1.3)
MONOCYTES RELATIVE PERCENT: 7.1 %
NEUTROPHILS ABSOLUTE: 3.5 K/UL (ref 1.7–7.7)
NEUTROPHILS RELATIVE PERCENT: 80.3 %
PDW BLD-RTO: 15.2 % (ref 12.4–15.4)
PHOSPHORUS: 3 MG/DL (ref 2.5–4.9)
PLATELET # BLD: 110 K/UL (ref 135–450)
PMV BLD AUTO: 9.7 FL (ref 5–10.5)
POTASSIUM SERPL-SCNC: 3.9 MMOL/L (ref 3.5–5.1)
RBC # BLD: 2.87 M/UL (ref 4.2–5.9)
SODIUM BLD-SCNC: 139 MMOL/L (ref 136–145)
WBC # BLD: 4.3 K/UL (ref 4–11)

## 2022-06-14 PROCEDURE — 82728 ASSAY OF FERRITIN: CPT

## 2022-06-14 PROCEDURE — 36591 DRAW BLOOD OFF VENOUS DEVICE: CPT

## 2022-06-14 PROCEDURE — 2580000003 HC RX 258: Performed by: NURSE PRACTITIONER

## 2022-06-14 PROCEDURE — 86140 C-REACTIVE PROTEIN: CPT

## 2022-06-14 PROCEDURE — 80048 BASIC METABOLIC PNL TOTAL CA: CPT

## 2022-06-14 PROCEDURE — 96365 THER/PROPH/DIAG IV INF INIT: CPT

## 2022-06-14 PROCEDURE — 96360 HYDRATION IV INFUSION INIT: CPT

## 2022-06-14 PROCEDURE — 6360000002 HC RX W HCPCS: Performed by: NURSE PRACTITIONER

## 2022-06-14 PROCEDURE — 96361 HYDRATE IV INFUSION ADD-ON: CPT

## 2022-06-14 PROCEDURE — 85025 COMPLETE CBC W/AUTO DIFF WBC: CPT

## 2022-06-14 PROCEDURE — 84100 ASSAY OF PHOSPHORUS: CPT

## 2022-06-14 RX ORDER — POTASSIUM CHLORIDE 29.8 MG/ML
80 INJECTION INTRAVENOUS PRN
Status: DISCONTINUED | OUTPATIENT
Start: 2022-06-14 | End: 2022-06-15 | Stop reason: HOSPADM

## 2022-06-14 RX ORDER — OXYCODONE HYDROCHLORIDE 5 MG/1
5 TABLET ORAL EVERY 4 HOURS PRN
Status: DISCONTINUED | OUTPATIENT
Start: 2022-06-14 | End: 2022-06-15 | Stop reason: HOSPADM

## 2022-06-14 RX ORDER — POTASSIUM CHLORIDE 20 MEQ/1
40 TABLET, EXTENDED RELEASE ORAL PRN
Status: CANCELLED | OUTPATIENT
Start: 2022-06-15

## 2022-06-14 RX ORDER — HEPARIN SODIUM (PORCINE) LOCK FLUSH IV SOLN 100 UNIT/ML 100 UNIT/ML
500 SOLUTION INTRAVENOUS PRN
Status: DISCONTINUED | OUTPATIENT
Start: 2022-06-14 | End: 2022-06-15 | Stop reason: HOSPADM

## 2022-06-14 RX ORDER — PROCHLORPERAZINE EDISYLATE 5 MG/ML
10 INJECTION INTRAMUSCULAR; INTRAVENOUS EVERY 6 HOURS PRN
Status: CANCELLED | OUTPATIENT
Start: 2022-06-15

## 2022-06-14 RX ORDER — MAGNESIUM SULFATE IN WATER 40 MG/ML
4000 INJECTION, SOLUTION INTRAVENOUS PRN
Status: CANCELLED | OUTPATIENT
Start: 2022-06-15

## 2022-06-14 RX ORDER — OXYCODONE HYDROCHLORIDE 5 MG/1
10 TABLET ORAL EVERY 4 HOURS PRN
Status: CANCELLED | OUTPATIENT
Start: 2022-06-15

## 2022-06-14 RX ORDER — ONDANSETRON 2 MG/ML
8 INJECTION INTRAMUSCULAR; INTRAVENOUS ONCE
Status: CANCELLED | OUTPATIENT
Start: 2022-06-15 | End: 2022-06-15

## 2022-06-14 RX ORDER — PROCHLORPERAZINE MALEATE 10 MG
10 TABLET ORAL EVERY 6 HOURS PRN
Status: DISCONTINUED | OUTPATIENT
Start: 2022-06-14 | End: 2022-06-15 | Stop reason: HOSPADM

## 2022-06-14 RX ORDER — PROCHLORPERAZINE MALEATE 10 MG
10 TABLET ORAL EVERY 6 HOURS PRN
Status: CANCELLED | OUTPATIENT
Start: 2022-06-15

## 2022-06-14 RX ORDER — POTASSIUM CHLORIDE 20 MEQ/1
40 TABLET, EXTENDED RELEASE ORAL PRN
Status: DISCONTINUED | OUTPATIENT
Start: 2022-06-14 | End: 2022-06-15 | Stop reason: HOSPADM

## 2022-06-14 RX ORDER — SODIUM CHLORIDE 9 MG/ML
INJECTION, SOLUTION INTRAVENOUS ONCE
Status: CANCELLED | OUTPATIENT
Start: 2022-06-15 | End: 2022-06-15

## 2022-06-14 RX ORDER — 0.9 % SODIUM CHLORIDE 0.9 %
1000 INTRAVENOUS SOLUTION INTRAVENOUS ONCE
Status: COMPLETED | OUTPATIENT
Start: 2022-06-14 | End: 2022-06-14

## 2022-06-14 RX ORDER — OXYCODONE HYDROCHLORIDE 5 MG/1
5 TABLET ORAL EVERY 4 HOURS PRN
Status: CANCELLED | OUTPATIENT
Start: 2022-06-15

## 2022-06-14 RX ORDER — DIMETHICONE, CAMPHOR (SYNTHETIC), MENTHOL, AND PHENOL 1.1; .5; .625; .5 G/100G; G/100G; G/100G; G/100G
OINTMENT TOPICAL PRN
Status: DISCONTINUED | OUTPATIENT
Start: 2022-06-14 | End: 2022-06-15 | Stop reason: HOSPADM

## 2022-06-14 RX ORDER — PROCHLORPERAZINE EDISYLATE 5 MG/ML
10 INJECTION INTRAMUSCULAR; INTRAVENOUS EVERY 6 HOURS PRN
Status: DISCONTINUED | OUTPATIENT
Start: 2022-06-14 | End: 2022-06-15 | Stop reason: HOSPADM

## 2022-06-14 RX ORDER — MAGNESIUM SULFATE IN WATER 40 MG/ML
4000 INJECTION, SOLUTION INTRAVENOUS PRN
Status: DISCONTINUED | OUTPATIENT
Start: 2022-06-14 | End: 2022-06-15 | Stop reason: HOSPADM

## 2022-06-14 RX ORDER — OXYCODONE HYDROCHLORIDE 5 MG/1
10 TABLET ORAL EVERY 4 HOURS PRN
Status: DISCONTINUED | OUTPATIENT
Start: 2022-06-14 | End: 2022-06-15 | Stop reason: HOSPADM

## 2022-06-14 RX ORDER — 0.9 % SODIUM CHLORIDE 0.9 %
1000 INTRAVENOUS SOLUTION INTRAVENOUS ONCE
Status: CANCELLED | OUTPATIENT
Start: 2022-06-15 | End: 2022-06-15

## 2022-06-14 RX ORDER — SODIUM CHLORIDE 9 MG/ML
INJECTION, SOLUTION INTRAVENOUS ONCE
Status: COMPLETED | OUTPATIENT
Start: 2022-06-14 | End: 2022-06-14

## 2022-06-14 RX ORDER — HEPARIN SODIUM (PORCINE) LOCK FLUSH IV SOLN 100 UNIT/ML 100 UNIT/ML
500 SOLUTION INTRAVENOUS PRN
Status: CANCELLED | OUTPATIENT
Start: 2022-06-15

## 2022-06-14 RX ORDER — POTASSIUM CHLORIDE 29.8 MG/ML
80 INJECTION INTRAVENOUS PRN
Status: CANCELLED | OUTPATIENT
Start: 2022-06-15

## 2022-06-14 RX ORDER — PETROLATUM, MENTHOL, UNSPECIFIED FORM, CAMPHOR (SYNTHETIC), AND PHENOL 59.14; 1; 1; .6 G/100G; G/100G; G/100G; G/100G
PASTE TOPICAL PRN
Status: CANCELLED | OUTPATIENT
Start: 2022-06-15

## 2022-06-14 RX ORDER — ONDANSETRON 4 MG/1
8 TABLET, FILM COATED ORAL ONCE
Status: CANCELLED | OUTPATIENT
Start: 2022-06-15 | End: 2022-06-15

## 2022-06-14 RX ADMIN — SODIUM CHLORIDE 1000 MG: 900 INJECTION INTRAVENOUS at 09:18

## 2022-06-14 RX ADMIN — SODIUM CHLORIDE 1000 ML: 9 INJECTION, SOLUTION INTRAVENOUS at 09:15

## 2022-06-14 RX ADMIN — SODIUM CHLORIDE: 9 INJECTION, SOLUTION INTRAVENOUS at 09:17

## 2022-06-14 RX ADMIN — SODIUM CHLORIDE, PRESERVATIVE FREE 500 UNITS: 5 INJECTION INTRAVENOUS at 10:49

## 2022-06-14 NOTE — PROGRESS NOTES
Short Stay Communication Note    Dave Bazan  Diagnosis: DLBC NHL  Primary MD: Dr. Hinton January  Treatment: Melphalan Fludarabine/Cytoxan  Day +22 of Car-T Karol  Pt seen in outpatient infusion today. Labs drawn and reviewed. CBC:   Recent Labs     06/12/22  0915 06/13/22  0915 06/14/22  0850   WBC 4.1 4.1 4.3   HGB 9.2* 9.1* 9.4*   HCT 27.1* 27.0* 27.5*   MCV 95.8 96.5 95.6   * 101* 110*     BMP/Mag:  Recent Labs     06/12/22  0915 06/13/22  0853 06/14/22  0850    139 139   K 3.8 4.0 3.9    102 103   CO2 26 26 27   PHOS 2.5 2.9 3.0   BUN 14 14 15   CREATININE 0.9 0.9 0.9   MG  --  1.80  --      Standing parameters for replacement for this patient:   1 unit of pack red blood cells for a hemoglobin < or equal to 7.0  1 pack of platelets for a platelet count less than or equal to 10  40 MeQ of Potassium administered for a potassium level less than or equal to 3.4  4g of Magnesium Sulfate for a magnesum level less than or equal to 1.4  No transfusions required for the above lab values. Urinalysis last done: 6/13/22 Urinalysis next due: 6/20/22    Chest X-Ray last done: 6/13/22 Chest X-Ray next due: 6/20/22    Symptoms addressed and reported to care team this date:Cough improving  Treatments this date: Labs, NS Bolus, Invanz    Reviewed medication schedule with pt and caregiver. Both able to verbalize all medications and schedule. See flowsheet. Discharged ambulatory to home. Will return tomorrow for the same.      César Soto RN

## 2022-06-15 ENCOUNTER — HOSPITAL ENCOUNTER (OUTPATIENT)
Dept: ONCOLOGY | Age: 82
Setting detail: INFUSION SERIES
Discharge: HOME | End: 2022-06-15
Payer: MEDICARE

## 2022-06-15 VITALS
HEART RATE: 56 BPM | TEMPERATURE: 97.9 F | WEIGHT: 175.93 LBS | DIASTOLIC BLOOD PRESSURE: 68 MMHG | BODY MASS INDEX: 23.86 KG/M2 | RESPIRATION RATE: 18 BRPM | OXYGEN SATURATION: 97 % | SYSTOLIC BLOOD PRESSURE: 128 MMHG

## 2022-06-15 DIAGNOSIS — C83.30 DIFFUSE LARGE B-CELL LYMPHOMA, UNSPECIFIED BODY REGION (HCC): Primary | ICD-10-CM

## 2022-06-15 LAB
ALBUMIN SERPL-MCNC: 3.7 G/DL (ref 3.4–5)
ALP BLD-CCNC: 97 U/L (ref 40–129)
ALT SERPL-CCNC: 22 U/L (ref 10–40)
ANION GAP SERPL CALCULATED.3IONS-SCNC: 9 MMOL/L (ref 3–16)
AST SERPL-CCNC: 19 U/L (ref 15–37)
BASOPHILS ABSOLUTE: 0 K/UL (ref 0–0.2)
BASOPHILS RELATIVE PERCENT: 0.9 %
BILIRUB SERPL-MCNC: <0.2 MG/DL (ref 0–1)
BILIRUBIN DIRECT: <0.2 MG/DL (ref 0–0.3)
BILIRUBIN, INDIRECT: ABNORMAL MG/DL (ref 0–1)
BUN BLDV-MCNC: 16 MG/DL (ref 7–20)
C-REACTIVE PROTEIN: 3.9 MG/L (ref 0–5.1)
CALCIUM SERPL-MCNC: 9 MG/DL (ref 8.3–10.6)
CHLORIDE BLD-SCNC: 104 MMOL/L (ref 99–110)
CO2: 27 MMOL/L (ref 21–32)
CREAT SERPL-MCNC: 1 MG/DL (ref 0.8–1.3)
EOSINOPHILS ABSOLUTE: 0.1 K/UL (ref 0–0.6)
EOSINOPHILS RELATIVE PERCENT: 2.9 %
FERRITIN: 530.3 NG/ML (ref 30–400)
GFR AFRICAN AMERICAN: >60
GFR NON-AFRICAN AMERICAN: >60
GLUCOSE BLD-MCNC: 106 MG/DL (ref 70–99)
HCT VFR BLD CALC: 27.7 % (ref 40.5–52.5)
HEMOGLOBIN: 9.2 G/DL (ref 13.5–17.5)
LACTATE DEHYDROGENASE: 162 U/L (ref 100–190)
LYMPHOCYTES ABSOLUTE: 0.4 K/UL (ref 1–5.1)
LYMPHOCYTES RELATIVE PERCENT: 8.4 %
MAGNESIUM: 1.9 MG/DL (ref 1.8–2.4)
MCH RBC QN AUTO: 32.3 PG (ref 26–34)
MCHC RBC AUTO-ENTMCNC: 33.1 G/DL (ref 31–36)
MCV RBC AUTO: 97.5 FL (ref 80–100)
MONOCYTES ABSOLUTE: 0.5 K/UL (ref 0–1.3)
MONOCYTES RELATIVE PERCENT: 10.9 %
NEUTROPHILS ABSOLUTE: 3.4 K/UL (ref 1.7–7.7)
NEUTROPHILS RELATIVE PERCENT: 76.9 %
PDW BLD-RTO: 15.7 % (ref 12.4–15.4)
PHOSPHORUS: 3.1 MG/DL (ref 2.5–4.9)
PLATELET # BLD: 123 K/UL (ref 135–450)
PMV BLD AUTO: 10 FL (ref 5–10.5)
POTASSIUM SERPL-SCNC: 4 MMOL/L (ref 3.5–5.1)
RBC # BLD: 2.84 M/UL (ref 4.2–5.9)
SODIUM BLD-SCNC: 140 MMOL/L (ref 136–145)
TOTAL PROTEIN: 5.8 G/DL (ref 6.4–8.2)
URIC ACID, SERUM: 4.7 MG/DL (ref 3.5–7.2)
WBC # BLD: 4.5 K/UL (ref 4–11)

## 2022-06-15 PROCEDURE — 96365 THER/PROPH/DIAG IV INF INIT: CPT

## 2022-06-15 PROCEDURE — 82728 ASSAY OF FERRITIN: CPT

## 2022-06-15 PROCEDURE — 96361 HYDRATE IV INFUSION ADD-ON: CPT

## 2022-06-15 PROCEDURE — 80076 HEPATIC FUNCTION PANEL: CPT

## 2022-06-15 PROCEDURE — 86140 C-REACTIVE PROTEIN: CPT

## 2022-06-15 PROCEDURE — 2580000003 HC RX 258: Performed by: NURSE PRACTITIONER

## 2022-06-15 PROCEDURE — 85025 COMPLETE CBC W/AUTO DIFF WBC: CPT

## 2022-06-15 PROCEDURE — 80048 BASIC METABOLIC PNL TOTAL CA: CPT

## 2022-06-15 PROCEDURE — 83735 ASSAY OF MAGNESIUM: CPT

## 2022-06-15 PROCEDURE — 36591 DRAW BLOOD OFF VENOUS DEVICE: CPT

## 2022-06-15 PROCEDURE — 83615 LACTATE (LD) (LDH) ENZYME: CPT

## 2022-06-15 PROCEDURE — 6360000002 HC RX W HCPCS: Performed by: NURSE PRACTITIONER

## 2022-06-15 PROCEDURE — 84550 ASSAY OF BLOOD/URIC ACID: CPT

## 2022-06-15 PROCEDURE — 96360 HYDRATION IV INFUSION INIT: CPT

## 2022-06-15 PROCEDURE — 84100 ASSAY OF PHOSPHORUS: CPT

## 2022-06-15 RX ORDER — POTASSIUM CHLORIDE 20 MEQ/1
40 TABLET, EXTENDED RELEASE ORAL PRN
Status: CANCELLED | OUTPATIENT
Start: 2022-06-16

## 2022-06-15 RX ORDER — SODIUM CHLORIDE 9 MG/ML
INJECTION, SOLUTION INTRAVENOUS ONCE
Status: DISCONTINUED | OUTPATIENT
Start: 2022-06-15 | End: 2022-06-16 | Stop reason: HOSPADM

## 2022-06-15 RX ORDER — OXYCODONE HYDROCHLORIDE 5 MG/1
10 TABLET ORAL EVERY 4 HOURS PRN
Status: CANCELLED | OUTPATIENT
Start: 2022-06-16

## 2022-06-15 RX ORDER — POTASSIUM CHLORIDE 29.8 MG/ML
80 INJECTION INTRAVENOUS PRN
Status: CANCELLED | OUTPATIENT
Start: 2022-06-16

## 2022-06-15 RX ORDER — MAGNESIUM SULFATE IN WATER 40 MG/ML
4000 INJECTION, SOLUTION INTRAVENOUS PRN
Status: CANCELLED | OUTPATIENT
Start: 2022-06-16

## 2022-06-15 RX ORDER — OXYCODONE HYDROCHLORIDE 5 MG/1
10 TABLET ORAL EVERY 4 HOURS PRN
Status: DISCONTINUED | OUTPATIENT
Start: 2022-06-15 | End: 2022-06-16 | Stop reason: HOSPADM

## 2022-06-15 RX ORDER — POTASSIUM CHLORIDE 29.8 MG/ML
20 INJECTION INTRAVENOUS PRN
Status: DISCONTINUED | OUTPATIENT
Start: 2022-06-15 | End: 2022-06-16 | Stop reason: HOSPADM

## 2022-06-15 RX ORDER — 0.9 % SODIUM CHLORIDE 0.9 %
1000 INTRAVENOUS SOLUTION INTRAVENOUS ONCE
Status: COMPLETED | OUTPATIENT
Start: 2022-06-15 | End: 2022-06-15

## 2022-06-15 RX ORDER — PROCHLORPERAZINE MALEATE 10 MG
10 TABLET ORAL EVERY 6 HOURS PRN
Status: DISCONTINUED | OUTPATIENT
Start: 2022-06-15 | End: 2022-06-16 | Stop reason: HOSPADM

## 2022-06-15 RX ORDER — OXYCODONE HYDROCHLORIDE 5 MG/1
5 TABLET ORAL EVERY 4 HOURS PRN
Status: DISCONTINUED | OUTPATIENT
Start: 2022-06-15 | End: 2022-06-16 | Stop reason: HOSPADM

## 2022-06-15 RX ORDER — 0.9 % SODIUM CHLORIDE 0.9 %
1000 INTRAVENOUS SOLUTION INTRAVENOUS ONCE
Status: CANCELLED | OUTPATIENT
Start: 2022-06-16 | End: 2022-06-16

## 2022-06-15 RX ORDER — PROCHLORPERAZINE MALEATE 10 MG
10 TABLET ORAL EVERY 6 HOURS PRN
Status: CANCELLED | OUTPATIENT
Start: 2022-06-16

## 2022-06-15 RX ORDER — OXYCODONE HYDROCHLORIDE 5 MG/1
5 TABLET ORAL EVERY 4 HOURS PRN
Status: CANCELLED | OUTPATIENT
Start: 2022-06-16

## 2022-06-15 RX ORDER — DIMETHICONE, CAMPHOR (SYNTHETIC), MENTHOL, AND PHENOL 1.1; .5; .625; .5 G/100G; G/100G; G/100G; G/100G
OINTMENT TOPICAL PRN
Status: DISCONTINUED | OUTPATIENT
Start: 2022-06-15 | End: 2022-06-16 | Stop reason: HOSPADM

## 2022-06-15 RX ORDER — PROCHLORPERAZINE EDISYLATE 5 MG/ML
10 INJECTION INTRAMUSCULAR; INTRAVENOUS EVERY 6 HOURS PRN
Status: DISCONTINUED | OUTPATIENT
Start: 2022-06-15 | End: 2022-06-16 | Stop reason: HOSPADM

## 2022-06-15 RX ORDER — HEPARIN SODIUM (PORCINE) LOCK FLUSH IV SOLN 100 UNIT/ML 100 UNIT/ML
500 SOLUTION INTRAVENOUS PRN
Status: DISCONTINUED | OUTPATIENT
Start: 2022-06-15 | End: 2022-06-16 | Stop reason: HOSPADM

## 2022-06-15 RX ORDER — SODIUM CHLORIDE 9 MG/ML
INJECTION, SOLUTION INTRAVENOUS ONCE
Status: CANCELLED | OUTPATIENT
Start: 2022-06-16 | End: 2022-06-16

## 2022-06-15 RX ORDER — ONDANSETRON 4 MG/1
8 TABLET, FILM COATED ORAL ONCE
Status: DISCONTINUED | OUTPATIENT
Start: 2022-06-15 | End: 2022-06-16 | Stop reason: HOSPADM

## 2022-06-15 RX ORDER — POTASSIUM CHLORIDE 20 MEQ/1
40 TABLET, EXTENDED RELEASE ORAL PRN
Status: DISCONTINUED | OUTPATIENT
Start: 2022-06-15 | End: 2022-06-16 | Stop reason: HOSPADM

## 2022-06-15 RX ORDER — HEPARIN SODIUM (PORCINE) LOCK FLUSH IV SOLN 100 UNIT/ML 100 UNIT/ML
500 SOLUTION INTRAVENOUS PRN
Status: CANCELLED | OUTPATIENT
Start: 2022-06-16

## 2022-06-15 RX ORDER — PROCHLORPERAZINE EDISYLATE 5 MG/ML
10 INJECTION INTRAMUSCULAR; INTRAVENOUS EVERY 6 HOURS PRN
Status: CANCELLED | OUTPATIENT
Start: 2022-06-16

## 2022-06-15 RX ORDER — MAGNESIUM SULFATE IN WATER 40 MG/ML
4000 INJECTION, SOLUTION INTRAVENOUS PRN
Status: DISCONTINUED | OUTPATIENT
Start: 2022-06-15 | End: 2022-06-16 | Stop reason: HOSPADM

## 2022-06-15 RX ORDER — ONDANSETRON 2 MG/ML
8 INJECTION INTRAMUSCULAR; INTRAVENOUS ONCE
Status: DISCONTINUED | OUTPATIENT
Start: 2022-06-15 | End: 2022-06-16 | Stop reason: HOSPADM

## 2022-06-15 RX ORDER — ONDANSETRON 4 MG/1
8 TABLET, FILM COATED ORAL ONCE
Status: CANCELLED | OUTPATIENT
Start: 2022-06-16 | End: 2022-06-16

## 2022-06-15 RX ORDER — ONDANSETRON 2 MG/ML
8 INJECTION INTRAMUSCULAR; INTRAVENOUS ONCE
Status: CANCELLED | OUTPATIENT
Start: 2022-06-16 | End: 2022-06-16

## 2022-06-15 RX ORDER — PETROLATUM, MENTHOL, UNSPECIFIED FORM, CAMPHOR (SYNTHETIC), AND PHENOL 59.14; 1; 1; .6 G/100G; G/100G; G/100G; G/100G
PASTE TOPICAL PRN
Status: CANCELLED | OUTPATIENT
Start: 2022-06-16

## 2022-06-15 RX ADMIN — SODIUM CHLORIDE 1000 ML: 9 INJECTION, SOLUTION INTRAVENOUS at 08:52

## 2022-06-15 RX ADMIN — SODIUM CHLORIDE, PRESERVATIVE FREE 500 UNITS: 5 INJECTION INTRAVENOUS at 11:53

## 2022-06-15 RX ADMIN — SODIUM CHLORIDE 1000 MG: 900 INJECTION INTRAVENOUS at 09:11

## 2022-06-15 NOTE — PROGRESS NOTES
Physician Progress Note      PATIENT:               Moreno Massey  CSN #:                  [id number]  :                       1940  ADMIT DATE:       2022 1:12 PM  Chele Crespo DATE:        6/10/2022 11:42 AM  RESPONDING  PROVIDER #:        Becka Urbina CNP          QUERY TEXT:    Pt admitted with Sepsis. If possible, please document in the progress notes   and discharge summary if you are evaluating and / or treating any of the   following: The medical record reflects the following:  Risk Factors: immunocompromised patient (Refractory large cell non-Hodgkin's   lymphoma: progressive disease) receiving  chemo through Trifusion device  Clinical Indicators: sepsis from Enterobacter bacteremia; per D/C notes:  His   lactic acid was 2.6 when admitted and procalcitonin  was 90.08. With these finding, his Trifusion catheter was removed. Treatment: sepsis work-up per protocol; Cefepime, Invanz, Merrem, Vancomycin;   removal of Trifusion catheter  Options provided:  -- Sepsis related to Trifusion device, POA  -- Sepsis unrelated to Trifusion device  -- Other - I will add my own diagnosis  -- Disagree - Not applicable / Not valid  -- Disagree - Clinically unable to determine / Unknown  -- Refer to Clinical Documentation Reviewer    PROVIDER RESPONSE TEXT:    This patient has sepsis related to Trifusion device, POA.     Query created by: Angel Luis Pagan on 6/15/2022 10:00 AM      Electronically signed by:  Becka Urbina CNP 6/15/2022 11:56 AM

## 2022-06-15 NOTE — PROGRESS NOTES
Short Stay Communication Note  Thyra Sin  Diagnosis:Diffuse Lrg B-Cell Lymphoma  Primary MD: Dr. Rosalia Estrada  Treatment: Melphalan Fludarabine/Cytoxan  Day +24 of Car-T Karol   Pt seen in outpatient infusion today. Labs drawn and reviewed. CBC: Recent Labs     06/13/22  0915 06/14/22  0850 06/15/22  0845   WBC 4.1 4.3 4.5   HGB 9.1* 9.4* 9.2*   HCT 27.0* 27.5* 27.7*   MCV 96.5 95.6 97.5   * 110* 123*     BMP/Mag:  Recent Labs     06/13/22  0853 06/14/22  0850 06/15/22  0845    139 140   K 4.0 3.9 4.0    103 104   CO2 26 27 27   PHOS 2.9 3.0 3.1   BUN 14 15 16   CREATININE 0.9 0.9 1.0   MG 1.80  --  1.90     Standing parameters for replacement for this patient:   1 unit of pack red blood cells for a hemoglobin < or equal to 7.0  1 pack of platelets for a platelet count less than or equal to 20.0  40 MeQ of Potassium administered for a potassium level less than or equal to 3.4  4g of Magnesium Sulfate for a magnesum level less than or equal to 1.4  No transfusions required for the above lab values. Urinalysis last done: 6/13/2022 Urinalysis next due: 6/20/2022    Chest X-Ray last done: 6/13/2022 Chest X-Ray next due: 6/20/2022    Symptoms addressed and reported to care team this date:   Treatments this date: IV Fluids    Reviewed medication schedule with pt and caregiver. Both able to verbalize all medications and schedule. Pt to be seen again tomorrow. Patient and caregiver verbalized understanding of discharge instructions including when and how to call the doctor and when to report  to the ER. Discharged ambulatory to home. Port flushed, heparin locked, de-accessed.  Electronically signed by Rambo Medrano RN on 6/15/2022 at 11:55 AM

## 2022-06-15 NOTE — PROGRESS NOTES
[facility] Progress Note      6/15/2022    Carlo Frost    :  1940    MRN:  [id number]    Referring MD: Sonu Esposito, APRN - NP  [place],  [address]      Subjective: Feels well today, no fevers, no confusion, no cough, eating and drinking well, feels like he has more energy, no complaints at this time. ECOG PS:  (1) Restricted in physically strenuous activity, ambulatory and able to do work of light nature    KPS: 80% Normal activity with effort; some signs or symptoms of disease    Isolation:  None     Medications    Scheduled Meds:    Continuous Infusions:    PRN Meds:. ROS:  As noted above, otherwise remainder of 10-point ROS negative      Physical Exam:     Vital Signs:  /68   Pulse 56   Temp 97.9 °F (36.6 °C) (Oral)   Resp 18   Wt 175 lb 14.8 oz (79.8 kg)   SpO2 97%   BMI 23.86 kg/m²     Weight:    Wt Readings from Last 3 Encounters:   06/15/22 175 lb 14.8 oz (79.8 kg)   22 175 lb 7.8 oz (79.6 kg)   22 176 lb 5.9 oz (80 kg)       General: Awake, alert and oriented    HEENT: normocephalic, alopecia, PERRL, no scleral erythema or icterus, Oral mucosa moist and intact, throat clear.    NECK: supple without palpable adenopathy  BACK: Straight, negative CVAT  SKIN: warm dry and intact without lesions rashes or masses  CHEST: CTA bilaterally without use of accessory muscles  CV: Normal S1 S2, RRR, no MRG  ABD: NT ND normoactive BS, no palpable masses or hepatosplenomegaly  EXTREMITIES: chronic edema in LLE, denies calf tenderness  NEURO: CN II - XII grossly intact  CATHETER:  Left IJ PAC (22, [facility]): CDI    Laboratory Data:  CBC:   Recent Labs     22  0915 22  0850 06/15/22  0845   WBC 4.1 4.3 4.5   HGB 9.1* 9.4* 9.2*   HCT 27.0* 27.5* 27.7*   MCV 96.5 95.6 97.5   * 110* 123*     BMP/Mag:  Recent Labs     22  0853 22  0850 06/15/22  0845    139 140   K 4.0 3.9 4.0    103 104   CO2 26 27 27   PHOS 2.9 3.0 3.1   BUN 14 15 16   CREATININE 0.9 0.9 1.0   MG 1.80  --  1.90     LIVP:   Recent Labs     06/13/22  0853 06/15/22  0845   AST 25 19   ALT 26 22   BILIDIR <0.2 <0.2   BILITOT <0.2 <0.2   ALKPHOS 101 97     Coags:   Recent Labs     06/13/22  0854   PROTIME 14.3   INR 1.12     Uric Acid   Recent Labs     06/13/22  0853 06/15/22  0845   LABURIC 4.4 4.7     Lab Results   Component Value Date    CRP 3.9 06/15/2022    CRP 5.4 (H) 06/14/2022    CRP 8.0 (H) 06/13/2022     Lab Results   Component Value Date    FERRITIN 530.3 (H) 06/15/2022    FERRITIN 556.5 (H) 06/14/2022    FERRITIN 562.0 (H) 06/13/2022         PROBLEM LIST:          1. Follicular lymphoma with BM involvement  2. DLBC NHL  3. Atrial fibrillation w/ RVR (5/2022)  4. Grade 1 CRS  5. Parainfluenza 3 virus / PNA (5/2022)  5. Enterobacter bacteremia / sepsis (6/2022)      TREATMENT:            FL:   1  CHOP (1/1995)  2. Rituxan (11/2003 - 8/2004)  3. R-CHOP (7/2007)      DLBCL:   1.  R-[place]/Ox x 2 cycles (1/31/22 & 2/14/22)  2. Chip-BR x 2 cycles  (3/22/22 & 4/20/22)  3. CAR-T w/ Jordyn Shipley                      Lymphodepleting Chemotherapy:  Fludarabine and cyclophosphamide   Disease Status at time of Infusion:  Progressive Disease  CAR-T Infusion Date: 5/23/22  CAR-T Product:  Breyanzi  Batch ID Number:  [id number]      ASSESSMENT AND PLAN:           1. 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 These may be infectious. These were not described on the previous PET/CT. Pulmonary lymphoma would be difficult to exclude.  If the pulmonary nodule represents lymphoma, this would represent a Deauville score 5 exam. If this is infectious, overall the exam would represent a Deauville score 1. Previously described lymphadenopathy in FDG activity in the left retroperitoneum, left iliac region and in left upper thigh has completely resolved. No residual lymphadenopathy or FDG avid lymph node identified      PLAN:   S/p lymphodepleting Chemotherapy w/ Fludarabine and cyclophosphamide & CAR-T w/ Breyanzi (5/23/22)      Day + 23     2. CRS / Neuro:   - Ferritin & CRP trending down   - H/o Grade 1 CRS  - Monitor CRP and Ferrtin closely       Lab Results   Component Value Date    CRP 3.9 06/15/2022    CRP 5.4 (H) 06/14/2022    CRP 8.0 (H) 06/13/2022     Lab Results   Component Value Date    FERRITIN 530.3 (H) 06/15/2022    FERRITIN 556.5 (H) 06/14/2022    FERRITIN 562.0 (H) 06/13/2022     - Neuro checks w/ CARTOX 10-point assessment Q4hrs    Toxicity Grading    CRS stGstrstastdstest:st st1st ICANS stGstrstastdstest:st st1st ICE Score:  CAR-T Score: 10    3. ID:  Afebrile, recently admitted with sepsis (POA) r/t Enterobacter bacteremia, fever from CRS d/t CAR-T was ruled out. - CXR & UA 6/13/22: Negative   - Restart Levaquin and Diflucan ppx if ANC < 1.5  - RR Viral Panel (5/28/22): Parainfluenza 3 Virus  - Blood Cx x 2 bottles (6/7/22): Enterobacter   - LA (6/7/22): 2.6 & Procalcitonin - 47, 90 (6/8/22); trended down  - Cont Acyclovir ppx  - Cont daily Ertapenem (6/10/22) Day +  9/14 of total therapy     Abx history:  Merrem x 2 days (6/8/22-6/9/22)  Vanco x 2 days (6/7/22)  Invanz & Cefepime Day x 1 day (6/7/22)     4. Heme: Anemia and thrombocytopenia from chemotherapy   - Transfuse for Hgb < 7 and Platelets < 02A  - No transfusion today     5. Metabolic: Hyperglycemia otherwise stable electrolytes & renal fx  TLS:  No evidence, s/p allopurinol (stopped  6/2/22)  -1 L NS bolus daily     6. Cardiac:   - H/o A. Fib w/ RVR (5/28/22)  - Echo (3/21/22): Centric mild left ventricular hypertrophy.   Left ventricular ejection fraction 55 to 60% with no regional wall motion abnormalities noted. Intermittent diastolic dysfunction. A. Fib:    - Cont Metoprolol 25 mg BID (started 5/29/22)  - No indication for AC at this time (per cardiology)     7. GI / Nutrition:    Nutrition: Appetite and oral intake is good. - S/p taking Astragalus supplement (stopped 5/18/22 d/t interaction with Cytoxan) - can resume on d/c   - Cont low microbial diet   - Follow closely with dietary       - Disposition:  Will be seen daily in Outpatient Infusion for Invanz, CAR-T assessment, labs and MD visit.     JOAQUIM Campos - NP

## 2022-06-16 ENCOUNTER — HOSPITAL ENCOUNTER (OUTPATIENT)
Dept: ONCOLOGY | Age: 82
Setting detail: INFUSION SERIES
Discharge: HOME | End: 2022-06-16
Payer: MEDICARE

## 2022-06-16 VITALS
OXYGEN SATURATION: 100 % | TEMPERATURE: 97.3 F | SYSTOLIC BLOOD PRESSURE: 145 MMHG | RESPIRATION RATE: 18 BRPM | HEART RATE: 51 BPM | DIASTOLIC BLOOD PRESSURE: 82 MMHG | WEIGHT: 175.71 LBS | BODY MASS INDEX: 23.83 KG/M2

## 2022-06-16 DIAGNOSIS — C83.30 DIFFUSE LARGE B-CELL LYMPHOMA, UNSPECIFIED BODY REGION (HCC): Primary | ICD-10-CM

## 2022-06-16 LAB
ANION GAP SERPL CALCULATED.3IONS-SCNC: 10 MMOL/L (ref 3–16)
BASOPHILS ABSOLUTE: 0 K/UL (ref 0–0.2)
BASOPHILS RELATIVE PERCENT: 0.3 %
BUN BLDV-MCNC: 21 MG/DL (ref 7–20)
C-REACTIVE PROTEIN: 3.2 MG/L (ref 0–5.1)
CALCIUM SERPL-MCNC: 8.8 MG/DL (ref 8.3–10.6)
CHLORIDE BLD-SCNC: 102 MMOL/L (ref 99–110)
CO2: 26 MMOL/L (ref 21–32)
CREAT SERPL-MCNC: 1 MG/DL (ref 0.8–1.3)
EOSINOPHILS ABSOLUTE: 0.2 K/UL (ref 0–0.6)
EOSINOPHILS RELATIVE PERCENT: 4.7 %
FERRITIN: 508.1 NG/ML (ref 30–400)
GFR AFRICAN AMERICAN: >60
GFR NON-AFRICAN AMERICAN: >60
GLUCOSE BLD-MCNC: 178 MG/DL (ref 70–99)
HCT VFR BLD CALC: 26.6 % (ref 40.5–52.5)
HEMOGLOBIN: 8.9 G/DL (ref 13.5–17.5)
INR BLD: 1.15 (ref 0.87–1.14)
LYMPHOCYTES ABSOLUTE: 0.4 K/UL (ref 1–5.1)
LYMPHOCYTES RELATIVE PERCENT: 11.1 %
MCH RBC QN AUTO: 32.2 PG (ref 26–34)
MCHC RBC AUTO-ENTMCNC: 33.5 G/DL (ref 31–36)
MCV RBC AUTO: 96 FL (ref 80–100)
MONOCYTES ABSOLUTE: 0.4 K/UL (ref 0–1.3)
MONOCYTES RELATIVE PERCENT: 11.8 %
NEUTROPHILS ABSOLUTE: 2.5 K/UL (ref 1.7–7.7)
NEUTROPHILS RELATIVE PERCENT: 72.1 %
PDW BLD-RTO: 15.9 % (ref 12.4–15.4)
PHOSPHORUS: 2.8 MG/DL (ref 2.5–4.9)
PLATELET # BLD: 130 K/UL (ref 135–450)
PMV BLD AUTO: 9.8 FL (ref 5–10.5)
POTASSIUM SERPL-SCNC: 3.8 MMOL/L (ref 3.5–5.1)
PROTHROMBIN TIME: 14.6 SEC (ref 11.7–14.5)
RBC # BLD: 2.77 M/UL (ref 4.2–5.9)
SODIUM BLD-SCNC: 138 MMOL/L (ref 136–145)
WBC # BLD: 3.4 K/UL (ref 4–11)

## 2022-06-16 PROCEDURE — 6360000002 HC RX W HCPCS

## 2022-06-16 PROCEDURE — 2580000003 HC RX 258: Performed by: NURSE PRACTITIONER

## 2022-06-16 PROCEDURE — 96365 THER/PROPH/DIAG IV INF INIT: CPT

## 2022-06-16 PROCEDURE — 96361 HYDRATE IV INFUSION ADD-ON: CPT

## 2022-06-16 PROCEDURE — 82728 ASSAY OF FERRITIN: CPT

## 2022-06-16 PROCEDURE — 85610 PROTHROMBIN TIME: CPT

## 2022-06-16 PROCEDURE — 84100 ASSAY OF PHOSPHORUS: CPT

## 2022-06-16 PROCEDURE — 96360 HYDRATION IV INFUSION INIT: CPT

## 2022-06-16 PROCEDURE — 6360000002 HC RX W HCPCS: Performed by: NURSE PRACTITIONER

## 2022-06-16 PROCEDURE — 85025 COMPLETE CBC W/AUTO DIFF WBC: CPT

## 2022-06-16 PROCEDURE — 36591 DRAW BLOOD OFF VENOUS DEVICE: CPT

## 2022-06-16 PROCEDURE — 86140 C-REACTIVE PROTEIN: CPT

## 2022-06-16 PROCEDURE — 80048 BASIC METABOLIC PNL TOTAL CA: CPT

## 2022-06-16 RX ORDER — PROCHLORPERAZINE EDISYLATE 5 MG/ML
10 INJECTION INTRAMUSCULAR; INTRAVENOUS EVERY 6 HOURS PRN
Status: CANCELLED | OUTPATIENT
Start: 2022-06-17

## 2022-06-16 RX ORDER — 0.9 % SODIUM CHLORIDE 0.9 %
1000 INTRAVENOUS SOLUTION INTRAVENOUS ONCE
Status: CANCELLED | OUTPATIENT
Start: 2022-06-17 | End: 2022-06-17

## 2022-06-16 RX ORDER — HEPARIN SODIUM (PORCINE) LOCK FLUSH IV SOLN 100 UNIT/ML 100 UNIT/ML
500 SOLUTION INTRAVENOUS PRN
Status: DISCONTINUED | OUTPATIENT
Start: 2022-06-16 | End: 2022-06-17 | Stop reason: HOSPADM

## 2022-06-16 RX ORDER — PROCHLORPERAZINE MALEATE 10 MG
10 TABLET ORAL EVERY 6 HOURS PRN
Status: DISCONTINUED | OUTPATIENT
Start: 2022-06-16 | End: 2022-06-17 | Stop reason: HOSPADM

## 2022-06-16 RX ORDER — MAGNESIUM SULFATE IN WATER 40 MG/ML
4000 INJECTION, SOLUTION INTRAVENOUS PRN
Status: DISCONTINUED | OUTPATIENT
Start: 2022-06-16 | End: 2022-06-17 | Stop reason: HOSPADM

## 2022-06-16 RX ORDER — ONDANSETRON 4 MG/1
8 TABLET, FILM COATED ORAL ONCE
Status: CANCELLED | OUTPATIENT
Start: 2022-06-17 | End: 2022-06-17

## 2022-06-16 RX ORDER — OXYCODONE HYDROCHLORIDE 5 MG/1
10 TABLET ORAL EVERY 4 HOURS PRN
Status: DISCONTINUED | OUTPATIENT
Start: 2022-06-16 | End: 2022-06-17 | Stop reason: HOSPADM

## 2022-06-16 RX ORDER — OXYCODONE HYDROCHLORIDE 5 MG/1
10 TABLET ORAL EVERY 4 HOURS PRN
Status: CANCELLED | OUTPATIENT
Start: 2022-06-17

## 2022-06-16 RX ORDER — SODIUM CHLORIDE 9 MG/ML
INJECTION, SOLUTION INTRAVENOUS ONCE
Status: CANCELLED | OUTPATIENT
Start: 2022-06-17 | End: 2022-06-17

## 2022-06-16 RX ORDER — HEPARIN SODIUM (PORCINE) LOCK FLUSH IV SOLN 100 UNIT/ML 100 UNIT/ML
SOLUTION INTRAVENOUS
Status: DISPENSED
Start: 2022-06-16 | End: 2022-06-16

## 2022-06-16 RX ORDER — POTASSIUM CHLORIDE 29.8 MG/ML
80 INJECTION INTRAVENOUS PRN
Status: CANCELLED | OUTPATIENT
Start: 2022-06-17

## 2022-06-16 RX ORDER — PROCHLORPERAZINE MALEATE 10 MG
10 TABLET ORAL EVERY 6 HOURS PRN
Status: CANCELLED | OUTPATIENT
Start: 2022-06-17

## 2022-06-16 RX ORDER — OXYCODONE HYDROCHLORIDE 5 MG/1
5 TABLET ORAL EVERY 4 HOURS PRN
Status: CANCELLED | OUTPATIENT
Start: 2022-06-17

## 2022-06-16 RX ORDER — DIMETHICONE, CAMPHOR (SYNTHETIC), MENTHOL, AND PHENOL 1.1; .5; .625; .5 G/100G; G/100G; G/100G; G/100G
OINTMENT TOPICAL PRN
Status: DISCONTINUED | OUTPATIENT
Start: 2022-06-16 | End: 2022-06-17 | Stop reason: HOSPADM

## 2022-06-16 RX ORDER — ONDANSETRON 2 MG/ML
8 INJECTION INTRAMUSCULAR; INTRAVENOUS ONCE
Status: CANCELLED | OUTPATIENT
Start: 2022-06-17 | End: 2022-06-17

## 2022-06-16 RX ORDER — 0.9 % SODIUM CHLORIDE 0.9 %
1000 INTRAVENOUS SOLUTION INTRAVENOUS ONCE
Status: COMPLETED | OUTPATIENT
Start: 2022-06-16 | End: 2022-06-16

## 2022-06-16 RX ORDER — OXYCODONE HYDROCHLORIDE 5 MG/1
5 TABLET ORAL EVERY 4 HOURS PRN
Status: DISCONTINUED | OUTPATIENT
Start: 2022-06-16 | End: 2022-06-17 | Stop reason: HOSPADM

## 2022-06-16 RX ORDER — POTASSIUM CHLORIDE 20 MEQ/1
40 TABLET, EXTENDED RELEASE ORAL PRN
Status: DISCONTINUED | OUTPATIENT
Start: 2022-06-16 | End: 2022-06-17 | Stop reason: HOSPADM

## 2022-06-16 RX ORDER — MAGNESIUM SULFATE IN WATER 40 MG/ML
4000 INJECTION, SOLUTION INTRAVENOUS PRN
Status: CANCELLED | OUTPATIENT
Start: 2022-06-17

## 2022-06-16 RX ORDER — PETROLATUM, MENTHOL, UNSPECIFIED FORM, CAMPHOR (SYNTHETIC), AND PHENOL 59.14; 1; 1; .6 G/100G; G/100G; G/100G; G/100G
PASTE TOPICAL PRN
Status: CANCELLED | OUTPATIENT
Start: 2022-06-17

## 2022-06-16 RX ORDER — POTASSIUM CHLORIDE 29.8 MG/ML
20 INJECTION INTRAVENOUS PRN
Status: DISCONTINUED | OUTPATIENT
Start: 2022-06-16 | End: 2022-06-17 | Stop reason: HOSPADM

## 2022-06-16 RX ORDER — POTASSIUM CHLORIDE 20 MEQ/1
40 TABLET, EXTENDED RELEASE ORAL PRN
Status: CANCELLED | OUTPATIENT
Start: 2022-06-17

## 2022-06-16 RX ORDER — PROCHLORPERAZINE EDISYLATE 5 MG/ML
10 INJECTION INTRAMUSCULAR; INTRAVENOUS EVERY 6 HOURS PRN
Status: DISCONTINUED | OUTPATIENT
Start: 2022-06-16 | End: 2022-06-17 | Stop reason: HOSPADM

## 2022-06-16 RX ORDER — HEPARIN SODIUM (PORCINE) LOCK FLUSH IV SOLN 100 UNIT/ML 100 UNIT/ML
500 SOLUTION INTRAVENOUS PRN
Status: CANCELLED | OUTPATIENT
Start: 2022-06-17

## 2022-06-16 RX ORDER — SODIUM CHLORIDE 9 MG/ML
INJECTION, SOLUTION INTRAVENOUS ONCE
Status: COMPLETED | OUTPATIENT
Start: 2022-06-16 | End: 2022-06-16

## 2022-06-16 RX ADMIN — SODIUM CHLORIDE 1000 ML: 9 INJECTION, SOLUTION INTRAVENOUS at 09:27

## 2022-06-16 RX ADMIN — SODIUM CHLORIDE 1000 MG: 900 INJECTION INTRAVENOUS at 09:26

## 2022-06-16 RX ADMIN — HEPARIN SODIUM (PORCINE) LOCK FLUSH IV SOLN 100 UNIT/ML 500 UNITS: 100 SOLUTION at 11:34

## 2022-06-16 RX ADMIN — SODIUM CHLORIDE: 9 INJECTION, SOLUTION INTRAVENOUS at 09:26

## 2022-06-16 NOTE — PROGRESS NOTES
Short Stay Communication Note  Bryant Jeronimo  Diagnosis:Diffuse Lrg B-Cell Lymphoma  Primary MD: Dr. Neri Barron  Treatment: Melphalan Fludarabine/Cytoxan  Day +25 of Car-T Karol   Pt seen in outpatient infusion today. Labs drawn and reviewed. CBC:   Recent Labs     06/14/22  0850 06/15/22  0845 06/16/22  0915   WBC 4.3 4.5 3.4*   HGB 9.4* 9.2* 8.9*   HCT 27.5* 27.7* 26.6*   MCV 95.6 97.5 96.0   * 123* 130*     BMP/Mag:  Recent Labs     06/14/22  0850 06/15/22  0845 06/16/22  0915    140 138   K 3.9 4.0 3.8    104 102   CO2 27 27 26   PHOS 3.0 3.1 2.8   BUN 15 16 21*   CREATININE 0.9 1.0 1.0   MG  --  1.90  --      Standing parameters for replacement for this patient:   1 unit of pack red blood cells for a hemoglobin < or equal to 7.0  1 pack of platelets for a platelet count less than or equal to 20.0  40 MeQ of Potassium administered for a potassium level less than or equal to 3.4  4g of Magnesium Sulfate for a magnesum level less than or equal to 1.4  No transfusions required for the above lab values. Urinalysis last done: 6/13/2022 Urinalysis next due: 6/20/2022    Chest X-Ray last done: 6/13/2022 Chest X-Ray next due: 6/20/2022    Symptoms addressed and reported to care team this date: Cough is getting better  Treatments this date: IV Fluids, Invanz    Reviewed medication schedule with pt and caregiver. Both able to verbalize all medications and schedule. Pt to be seen again tomorrow. Patient and caregiver verbalized understanding of discharge instructions including when and how to call the doctor and when to report  to the ER. Discharged ambulatory to home. Port flushed, heparin locked, de-accessed.     Electronically signed by Prashant Perdomo RN on 6/16/2022 at 11:24 AM

## 2022-06-16 NOTE — PROGRESS NOTES
[address] [facility] [employer] Progress Note      2022    Dalila Obrien    :  1940    MRN:  [id number]    Referring MD: JOAQUIM Garcia - NP  [place], [place] [address]      Subjective: Feels wonderful today. Power turned back on at house. Denies fever, confusion, & cough. Eating and drinking well. No complaints at this time. ECOG PS:  (1) Restricted in physically strenuous activity, ambulatory and able to do work of light nature    KPS: 80% Normal activity with effort; some signs or symptoms of disease    Isolation:  None     Medications    Scheduled Meds:    Continuous Infusions:    PRN Meds:. ROS:  As noted above, otherwise remainder of 10-point ROS negative      Physical Exam:     Vital Signs:  BP (!) 143/67   Pulse 62   Temp 97.5 °F (36.4 °C) (Oral)   Resp 18   Wt 175 lb 11.3 oz (79.7 kg)   SpO2 97%   BMI 23.83 kg/m²     Weight:    Wt Readings from Last 3 Encounters:   22 175 lb 11.3 oz (79.7 kg)   06/15/22 175 lb 14.8 oz (79.8 kg)   22 175 lb 7.8 oz (79.6 kg)       General: Awake, alert and oriented    HEENT: normocephalic, alopecia, PERRL, no scleral erythema or icterus, Oral mucosa moist and intact, throat clear.    NECK: supple without palpable adenopathy  BACK: Straight, negative CVAT  SKIN: warm dry and intact without lesions rashes or masses  CHEST: CTA bilaterally without use of accessory muscles  CV: Normal S1 S2, RRR, no MRG  ABD: NT ND normoactive BS, no palpable masses or hepatosplenomegaly  EXTREMITIES: chronic edema in LLE, denies calf tenderness  NEURO: CN II - XII grossly intact  CATHETER:  Left IJ PAC (22, [facility]): CDI    Laboratory Data:  CBC:   Recent Labs     22  0850 06/15/22  0845 22  0915   WBC 4.3 4.5 3.4*   HGB 9.4* 9.2* 8.9*   HCT 27.5* 27.7* 26.6*   MCV 95.6 97.5 96.0   * 123* 130*     BMP/Mag:  Recent Labs     22  0850 06/15/22  0845 22  0915    140 138   K 3.9 4.0 3.8    104 102   CO2 27 27 26   PHOS 3.0 3.1 2.8   BUN 15 16 21*   CREATININE 0.9 1.0 1.0   MG  --  1.90  --      LIVP:   Recent Labs     06/15/22  0845   AST 19   ALT 22   BILIDIR <0.2   BILITOT <0.2   ALKPHOS 97     Coags:   Recent Labs     06/16/22  0915   PROTIME 14.6*   INR 1.15*     Uric Acid   Recent Labs     06/15/22  0845   LABURIC 4.7     Lab Results   Component Value Date    CRP 3.2 06/16/2022    CRP 3.9 06/15/2022    CRP 5.4 (H) 06/14/2022     Lab Results   Component Value Date    FERRITIN 508.1 (H) 06/16/2022    FERRITIN 530.3 (H) 06/15/2022    FERRITIN 556.5 (H) 06/14/2022         PROBLEM LIST:          1. Follicular lymphoma with BM involvement  2. DLBC NHL  3. Atrial fibrillation w/ RVR (5/2022)  4. Grade 1 CRS  5. Parainfluenza 3 virus / PNA (5/2022)  5. Enterobacter bacteremia / sepsis (6/2022)      TREATMENT:            FL:   1  CHOP (1/1995)  2. Rituxan (11/2003 - 8/2004)  3. R-CHOP (7/2007)      DLBCL:   1.  R-[place]/Ox x 2 cycles (1/31/22 & 2/14/22)  2. Chip-BR x 2 cycles  (3/22/22 & 4/20/22)  3. CAR-T w/ Reubin Andrés                      Lymphodepleting Chemotherapy:  Fludarabine and cyclophosphamide   Disease Status at time of Infusion:  Progressive Disease  CAR-T Infusion Date: 5/23/22  CAR-T Product:  Breyanzi  Batch ID Number:  [id number]      ASSESSMENT AND PLAN:           1. 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 These may be infectious. These were not described on the previous PET/CT. Pulmonary lymphoma would be difficult to exclude.  If the pulmonary nodule represents lymphoma, this would represent a Deauville score 5 exam. If this is infectious, overall the exam would represent a Deauville score 1. Previously described lymphadenopathy in FDG activity in the left retroperitoneum, left iliac region and in left upper thigh has completely resolved. No residual lymphadenopathy or FDG avid lymph node identified      PLAN:   S/p lymphodepleting Chemotherapy w/ Fludarabine and cyclophosphamide & CAR-T w/ Breyanzi (5/23/22)      Day + 24     2. CRS / Neuro:   - Ferritin & CRP trending down   - H/o Grade 1 CRS  - Monitor CRP and Ferrtin closely       Lab Results   Component Value Date    CRP 3.2 06/16/2022    CRP 3.9 06/15/2022    CRP 5.4 (H) 06/14/2022     Lab Results   Component Value Date    FERRITIN 508.1 (H) 06/16/2022    FERRITIN 530.3 (H) 06/15/2022    FERRITIN 556.5 (H) 06/14/2022     - Neuro checks w/ CARTOX 10-point assessment Q4hrs    Toxicity Grading    CRS stGstrstastdstest:st st1st ICANS stGstrstastdstest:st st1st ICE Score:  CAR-T Score: 10    3. ID:  Afebrile, recently admitted with sepsis (POA) r/t Enterobacter bacteremia, fever from CRS d/t CAR-T was ruled out. - CXR & UA 6/13/22: Negative   - Restart Levaquin and Diflucan ppx if ANC < 1.5  - RR Viral Panel (5/28/22): Parainfluenza 3 Virus  - Blood Cx x 2 bottles (6/7/22): Enterobacter   - LA (6/7/22): 2.6 & Procalcitonin - 47, 90 (6/8/22); trended down  - Cont Acyclovir ppx  - Cont daily Ertapenem (6/10/22) Day +  10/14 of total therapy     Abx history:  Merrem x 2 days (6/8/22-6/9/22)  Vanco x 2 days (6/7/22)  Invanz & Cefepime Day x 1 day (6/7/22)     4. Heme: Anemia and thrombocytopenia from chemotherapy   - Transfuse for Hgb < 7 and Platelets < 28B  - No transfusion today     5. Metabolic: Hyperglycemia otherwise stable electrolytes & renal fx  TLS:  No evidence, s/p allopurinol (stopped  6/2/22)  -1 L NS bolus daily     6. Cardiac:   - H/o A. Fib w/ RVR (5/28/22)  - Echo (3/21/22): Centric mild left ventricular hypertrophy. Left ventricular ejection fraction 55 to 60% with no regional wall motion abnormalities noted.   Intermittent diastolic dysfunction. A. Fib:    - Cont Metoprolol 25 mg BID (started 5/29/22)  - No indication for AC at this time (per cardiology)     7. GI / Nutrition:    Nutrition: Appetite and oral intake is good. - S/p taking Astragalus supplement (stopped 5/18/22 d/t interaction with Cytoxan) - can resume on d/c   - Cont low microbial diet   - Follow closely with dietary       - Disposition:  Will be seen daily in Outpatient Infusion for Invanz, CAR-T assessment, labs and MD visit.     Asa Haver, APRN - NP

## 2022-06-17 ENCOUNTER — HOSPITAL ENCOUNTER (OUTPATIENT)
Dept: ONCOLOGY | Age: 82
Setting detail: INFUSION SERIES
Discharge: HOME | End: 2022-06-17
Payer: MEDICARE

## 2022-06-17 VITALS
RESPIRATION RATE: 16 BRPM | SYSTOLIC BLOOD PRESSURE: 116 MMHG | OXYGEN SATURATION: 95 % | BODY MASS INDEX: 23.95 KG/M2 | WEIGHT: 176.59 LBS | TEMPERATURE: 97.6 F | DIASTOLIC BLOOD PRESSURE: 62 MMHG | HEART RATE: 62 BPM

## 2022-06-17 DIAGNOSIS — C83.30 DIFFUSE LARGE B-CELL LYMPHOMA, UNSPECIFIED BODY REGION (HCC): Primary | ICD-10-CM

## 2022-06-17 LAB
ALBUMIN SERPL-MCNC: 3.8 G/DL (ref 3.4–5)
ALP BLD-CCNC: 99 U/L (ref 40–129)
ALT SERPL-CCNC: 19 U/L (ref 10–40)
ANION GAP SERPL CALCULATED.3IONS-SCNC: 11 MMOL/L (ref 3–16)
AST SERPL-CCNC: 21 U/L (ref 15–37)
BASOPHILS ABSOLUTE: 0 K/UL (ref 0–0.2)
BASOPHILS RELATIVE PERCENT: 1.3 %
BILIRUB SERPL-MCNC: <0.2 MG/DL (ref 0–1)
BILIRUBIN DIRECT: <0.2 MG/DL (ref 0–0.3)
BILIRUBIN, INDIRECT: ABNORMAL MG/DL (ref 0–1)
BUN BLDV-MCNC: 18 MG/DL (ref 7–20)
C-REACTIVE PROTEIN: <3 MG/L (ref 0–5.1)
CALCIUM SERPL-MCNC: 8.9 MG/DL (ref 8.3–10.6)
CHLORIDE BLD-SCNC: 103 MMOL/L (ref 99–110)
CO2: 25 MMOL/L (ref 21–32)
CREAT SERPL-MCNC: 1 MG/DL (ref 0.8–1.3)
EOSINOPHILS ABSOLUTE: 0.3 K/UL (ref 0–0.6)
EOSINOPHILS RELATIVE PERCENT: 7.2 %
FERRITIN: 521.3 NG/ML (ref 30–400)
GFR AFRICAN AMERICAN: >60
GFR NON-AFRICAN AMERICAN: >60
GLUCOSE BLD-MCNC: 129 MG/DL (ref 70–99)
HCT VFR BLD CALC: 27.3 % (ref 40.5–52.5)
HEMOGLOBIN: 9.3 G/DL (ref 13.5–17.5)
LACTATE DEHYDROGENASE: 202 U/L (ref 100–190)
LYMPHOCYTES ABSOLUTE: 0.5 K/UL (ref 1–5.1)
LYMPHOCYTES RELATIVE PERCENT: 14.4 %
MAGNESIUM: 1.8 MG/DL (ref 1.8–2.4)
MCH RBC QN AUTO: 33.2 PG (ref 26–34)
MCHC RBC AUTO-ENTMCNC: 34.1 G/DL (ref 31–36)
MCV RBC AUTO: 97.4 FL (ref 80–100)
MONOCYTES ABSOLUTE: 0.4 K/UL (ref 0–1.3)
MONOCYTES RELATIVE PERCENT: 11.5 %
NEUTROPHILS ABSOLUTE: 2.3 K/UL (ref 1.7–7.7)
NEUTROPHILS RELATIVE PERCENT: 65.6 %
PDW BLD-RTO: 16.1 % (ref 12.4–15.4)
PHOSPHORUS: 2.7 MG/DL (ref 2.5–4.9)
PLATELET # BLD: 137 K/UL (ref 135–450)
PMV BLD AUTO: 9.6 FL (ref 5–10.5)
POTASSIUM SERPL-SCNC: 4 MMOL/L (ref 3.5–5.1)
RBC # BLD: 2.8 M/UL (ref 4.2–5.9)
SODIUM BLD-SCNC: 139 MMOL/L (ref 136–145)
TOTAL PROTEIN: 6.1 G/DL (ref 6.4–8.2)
URIC ACID, SERUM: 5.5 MG/DL (ref 3.5–7.2)
WBC # BLD: 3.5 K/UL (ref 4–11)

## 2022-06-17 PROCEDURE — 96361 HYDRATE IV INFUSION ADD-ON: CPT

## 2022-06-17 PROCEDURE — 82728 ASSAY OF FERRITIN: CPT

## 2022-06-17 PROCEDURE — 85025 COMPLETE CBC W/AUTO DIFF WBC: CPT

## 2022-06-17 PROCEDURE — 83615 LACTATE (LD) (LDH) ENZYME: CPT

## 2022-06-17 PROCEDURE — 80076 HEPATIC FUNCTION PANEL: CPT

## 2022-06-17 PROCEDURE — 96360 HYDRATION IV INFUSION INIT: CPT

## 2022-06-17 PROCEDURE — 83735 ASSAY OF MAGNESIUM: CPT

## 2022-06-17 PROCEDURE — 84100 ASSAY OF PHOSPHORUS: CPT

## 2022-06-17 PROCEDURE — 6360000002 HC RX W HCPCS: Performed by: NURSE PRACTITIONER

## 2022-06-17 PROCEDURE — 80048 BASIC METABOLIC PNL TOTAL CA: CPT

## 2022-06-17 PROCEDURE — 86140 C-REACTIVE PROTEIN: CPT

## 2022-06-17 PROCEDURE — 96365 THER/PROPH/DIAG IV INF INIT: CPT

## 2022-06-17 PROCEDURE — 36591 DRAW BLOOD OFF VENOUS DEVICE: CPT

## 2022-06-17 PROCEDURE — 2580000003 HC RX 258: Performed by: NURSE PRACTITIONER

## 2022-06-17 PROCEDURE — 84550 ASSAY OF BLOOD/URIC ACID: CPT

## 2022-06-17 RX ORDER — HEPARIN SODIUM (PORCINE) LOCK FLUSH IV SOLN 100 UNIT/ML 100 UNIT/ML
500 SOLUTION INTRAVENOUS PRN
Status: DISCONTINUED | OUTPATIENT
Start: 2022-06-17 | End: 2022-06-18 | Stop reason: HOSPADM

## 2022-06-17 RX ORDER — MAGNESIUM SULFATE IN WATER 40 MG/ML
4000 INJECTION, SOLUTION INTRAVENOUS PRN
Status: DISCONTINUED | OUTPATIENT
Start: 2022-06-17 | End: 2022-06-18 | Stop reason: HOSPADM

## 2022-06-17 RX ORDER — POTASSIUM CHLORIDE 20 MEQ/1
40 TABLET, EXTENDED RELEASE ORAL PRN
Status: CANCELLED | OUTPATIENT
Start: 2022-06-18

## 2022-06-17 RX ORDER — SODIUM CHLORIDE 9 MG/ML
INJECTION, SOLUTION INTRAVENOUS ONCE
Status: CANCELLED | OUTPATIENT
Start: 2022-06-18 | End: 2022-06-18

## 2022-06-17 RX ORDER — MAGNESIUM SULFATE IN WATER 40 MG/ML
4000 INJECTION, SOLUTION INTRAVENOUS PRN
Status: CANCELLED | OUTPATIENT
Start: 2022-06-18

## 2022-06-17 RX ORDER — POTASSIUM CHLORIDE 29.8 MG/ML
80 INJECTION INTRAVENOUS PRN
Status: CANCELLED | OUTPATIENT
Start: 2022-06-18

## 2022-06-17 RX ORDER — OXYCODONE HYDROCHLORIDE 5 MG/1
10 TABLET ORAL EVERY 4 HOURS PRN
Status: CANCELLED | OUTPATIENT
Start: 2022-06-18

## 2022-06-17 RX ORDER — HEPARIN SODIUM (PORCINE) LOCK FLUSH IV SOLN 100 UNIT/ML 100 UNIT/ML
500 SOLUTION INTRAVENOUS PRN
Status: CANCELLED | OUTPATIENT
Start: 2022-06-18

## 2022-06-17 RX ORDER — OXYCODONE HYDROCHLORIDE 5 MG/1
5 TABLET ORAL EVERY 4 HOURS PRN
Status: CANCELLED | OUTPATIENT
Start: 2022-06-18

## 2022-06-17 RX ORDER — PETROLATUM, MENTHOL, UNSPECIFIED FORM, CAMPHOR (SYNTHETIC), AND PHENOL 59.14; 1; 1; .6 G/100G; G/100G; G/100G; G/100G
PASTE TOPICAL PRN
Status: CANCELLED | OUTPATIENT
Start: 2022-06-18

## 2022-06-17 RX ORDER — 0.9 % SODIUM CHLORIDE 0.9 %
1000 INTRAVENOUS SOLUTION INTRAVENOUS ONCE
Status: COMPLETED | OUTPATIENT
Start: 2022-06-17 | End: 2022-06-17

## 2022-06-17 RX ORDER — SODIUM CHLORIDE 9 MG/ML
INJECTION, SOLUTION INTRAVENOUS ONCE
Status: DISCONTINUED | OUTPATIENT
Start: 2022-06-17 | End: 2022-06-18 | Stop reason: HOSPADM

## 2022-06-17 RX ORDER — 0.9 % SODIUM CHLORIDE 0.9 %
1000 INTRAVENOUS SOLUTION INTRAVENOUS ONCE
Status: CANCELLED | OUTPATIENT
Start: 2022-06-18 | End: 2022-06-18

## 2022-06-17 RX ORDER — DIMETHICONE, CAMPHOR (SYNTHETIC), MENTHOL, AND PHENOL 1.1; .5; .625; .5 G/100G; G/100G; G/100G; G/100G
OINTMENT TOPICAL PRN
Status: DISCONTINUED | OUTPATIENT
Start: 2022-06-17 | End: 2022-06-18 | Stop reason: HOSPADM

## 2022-06-17 RX ORDER — PROCHLORPERAZINE EDISYLATE 5 MG/ML
10 INJECTION INTRAMUSCULAR; INTRAVENOUS EVERY 6 HOURS PRN
Status: CANCELLED | OUTPATIENT
Start: 2022-06-18

## 2022-06-17 RX ORDER — ONDANSETRON 2 MG/ML
8 INJECTION INTRAMUSCULAR; INTRAVENOUS ONCE
Status: CANCELLED | OUTPATIENT
Start: 2022-06-18 | End: 2022-06-18

## 2022-06-17 RX ORDER — PROCHLORPERAZINE MALEATE 10 MG
10 TABLET ORAL EVERY 6 HOURS PRN
Status: CANCELLED | OUTPATIENT
Start: 2022-06-18

## 2022-06-17 RX ORDER — POTASSIUM CHLORIDE 20 MEQ/1
40 TABLET, EXTENDED RELEASE ORAL PRN
Status: DISCONTINUED | OUTPATIENT
Start: 2022-06-17 | End: 2022-06-18 | Stop reason: HOSPADM

## 2022-06-17 RX ORDER — ONDANSETRON 4 MG/1
8 TABLET, FILM COATED ORAL ONCE
Status: CANCELLED | OUTPATIENT
Start: 2022-06-18 | End: 2022-06-18

## 2022-06-17 RX ORDER — POTASSIUM CHLORIDE 29.8 MG/ML
20 INJECTION INTRAVENOUS PRN
Status: DISCONTINUED | OUTPATIENT
Start: 2022-06-17 | End: 2022-06-18 | Stop reason: HOSPADM

## 2022-06-17 RX ADMIN — SODIUM CHLORIDE 1000 MG: 900 INJECTION INTRAVENOUS at 09:00

## 2022-06-17 RX ADMIN — SODIUM CHLORIDE 1000 ML: 9 INJECTION, SOLUTION INTRAVENOUS at 08:48

## 2022-06-17 RX ADMIN — SODIUM CHLORIDE, PRESERVATIVE FREE 500 UNITS: 5 INJECTION INTRAVENOUS at 10:56

## 2022-06-17 NOTE — PROGRESS NOTES
Short Stay Communication Note  Clive Sat  Diagnosis: Diffuse large b-cell lymphoma  Primary MD: Dr. Hannah Velasquez  Treatment: Fludarabine and Cytoxan  Day + 26 of Car-T Sjzi   Pt seen in outpatient infusion today. Labs drawn and reviewed. CBC: Recent Labs     06/15/22  0845 06/16/22  0915 06/17/22  0830   WBC 4.5 3.4* 3.5*   HGB 9.2* 8.9* 9.3*   HCT 27.7* 26.6* 27.3*   MCV 97.5 96.0 97.4   * 130* 137     BMP/Mag:  Recent Labs     06/15/22  0845 06/16/22  0915 06/17/22  0830    138 139   K 4.0 3.8 4.0    102 103   CO2 27 26 25   PHOS 3.1 2.8 2.7   BUN 16 21* 18   CREATININE 1.0 1.0 1.0   MG 1.90  --  1.80     Standing parameters for replacement for this patient:   1 unit of pack red blood cells for a hemoglobin < or equal to 7.0.  1 pack of platelets for a platelet count less than or equal to 10.0.  40 MeQ of Potassium administered for a potassium level less than or equal to 3.4.  4g of Magnesium Sulfate for a magnesum level less than or equal to 1.4. No transfusions required for the above lab values. Urinalysis last done: 6/13/2022 Urinalysis next due: 6/20/200    Chest X-Ray last done: 6/13/2022 Chest X-Ray next due: 6/20/2022. Symptoms addressed and reported to care team this date: none. Treatments this date: IV Fluids    Reviewed medication schedule with pt and caregiver. Both able to verbalize all medications and schedule. Pt to be seen again tomorrow. Patient and caregiver verbalized understanding of discharge instructions including when and how to call the doctor and when to report  to the ER. Discharged ambulatory to home.   Lequita Dubin, RN

## 2022-06-17 NOTE — PROGRESS NOTES
[facility]      2022    Bryant Jeronimo    :  1940    MRN:  [id number]    Referring MD: Carol Samuels APRN - NP  [place], [place] [address]      Subjective: doing well. No fevers. Tolerating IV abx well      ECOG PS:  (1) Restricted in physically strenuous activity, ambulatory and able to do work of light nature    KPS: 80% Normal activity with effort; some signs or symptoms of disease    Isolation:  None     Medications    Scheduled Meds:    Continuous Infusions:    PRN Meds:. ROS:  As noted above, otherwise remainder of 10-point ROS negative      Physical Exam:     Vital Signs:  /62   Pulse 62   Temp 97.6 °F (36.4 °C) (Oral)   Resp 16   Wt 176 lb 9.4 oz (80.1 kg)   SpO2 95%   BMI 23.95 kg/m²     Weight:    Wt Readings from Last 3 Encounters:   22 176 lb 9.4 oz (80.1 kg)   22 175 lb 11.3 oz (79.7 kg)   06/15/22 175 lb 14.8 oz (79.8 kg)       General: Awake, alert and oriented    HEENT: normocephalic, alopecia, PERRL, no scleral erythema or icterus, Oral mucosa moist and intact, throat clear.    NECK: supple without palpable adenopathy  BACK: Straight, negative CVAT  SKIN: warm dry and intact without lesions rashes or masses  CHEST: CTA bilaterally without use of accessory muscles  CV: Normal S1 S2, RRR, no MRG  ABD: NT ND normoactive BS, no palpable masses or hepatosplenomegaly  EXTREMITIES: chronic edema in LLE, denies calf tenderness  NEURO: CN II - XII grossly intact  CATHETER:  Left IJ PAC (22, [facility]): CDI    Laboratory Data:  CBC:   Recent Labs     06/15/22  0845 22  0915 22  0830   WBC 4.5 3.4* 3.5*   HGB 9.2* 8.9* 9.3*   HCT 27.7* 26.6* 27.3*   MCV 97.5 96.0 97.4   * 130* 137     BMP/Mag:  Recent Labs     06/15/22  0845 22  0915 22  0830    138 139   K 4.0 3.8 4.0    102 103   CO2 27 26 25   PHOS 3.1 2.8 2.7   BUN 16 21* 18   CREATININE 1.0 1.0 1.0   MG 1.90  --  1.80 LIVP:   Recent Labs     06/15/22  0845 06/17/22  0830   AST 19 21   ALT 22 19   BILIDIR <0.2 <0.2   BILITOT <0.2 <0.2   ALKPHOS 97 99     Coags:   Recent Labs     06/16/22  0915   PROTIME 14.6*   INR 1.15*     Uric Acid   Recent Labs     06/15/22  0845 06/17/22  0830   LABURIC 4.7 5.5     Lab Results   Component Value Date    CRP <3.0 06/17/2022    CRP 3.2 06/16/2022    CRP 3.9 06/15/2022     Lab Results   Component Value Date    FERRITIN 521.3 (H) 06/17/2022    FERRITIN 508.1 (H) 06/16/2022    FERRITIN 530.3 (H) 06/15/2022         PROBLEM LIST:          1. Follicular lymphoma with BM involvement  2. DLBC NHL  3. Atrial fibrillation w/ RVR (5/2022)  4. Grade 1 CRS  5. Parainfluenza 3 virus / PNA (5/2022)  5. Enterobacter bacteremia / sepsis (6/2022)      TREATMENT:            FL:   1  CHOP (1/1995)  2. Rituxan (11/2003 - 8/2004)  3. R-CHOP (7/2007)      DLBCL:   1.  R-[place]/Ox x 2 cycles (1/31/22 & 2/14/22)  2. Chip-BR x 2 cycles  (3/22/22 & 4/20/22)  3. CAR-T w/ Marylouise New                      Lymphodepleting Chemotherapy:  Fludarabine and cyclophosphamide   Disease Status at time of Infusion:  Progressive Disease  CAR-T Infusion Date: 5/23/22  CAR-T Product:  Breyanzi  Batch ID Number:  [id number]      ASSESSMENT AND PLAN:           1. 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 These may be infectious. These were not described on the previous PET/CT. Pulmonary lymphoma would be difficult to exclude. If the pulmonary nodule represents lymphoma, this would represent a Deauville score 5 exam. If this is infectious, overall the exam would represent a Deauville score 1.  Previously described lymphadenopathy in FDG activity in the left retroperitoneum, left iliac region and in left upper thigh has completely resolved. No residual lymphadenopathy or FDG avid lymph node identified      PLAN:   S/p lymphodepleting Chemotherapy w/ Fludarabine and cyclophosphamide & CAR-T w/ Breyanzi (5/23/22)      Day + 25     2. CRS / Neuro:   - Ferritin & CRP trending down   - H/o Grade 1 CRS  - Monitor CRP and Ferrtin closely       Lab Results   Component Value Date    CRP <3.0 06/17/2022    CRP 3.2 06/16/2022    CRP 3.9 06/15/2022     Lab Results   Component Value Date    FERRITIN 521.3 (H) 06/17/2022    FERRITIN 508.1 (H) 06/16/2022    FERRITIN 530.3 (H) 06/15/2022     - Neuro checks w/ CARTOX 10-point assessment Q4hrs    Toxicity Grading    CRS stGstrstastdstest:st st1st ICANS stGstrstastdstest:st st1st ICE Score:  CAR-T Score: 10    3. ID:  Afebrile, recently admitted with sepsis (POA) r/t Enterobacter bacteremia, fever from CRS d/t CAR-T was ruled out. - CXR & UA 6/13/22: Negative   - Restart Levaquin and Diflucan ppx if ANC < 1.5  - RR Viral Panel (5/28/22): Parainfluenza 3 Virus  - Blood Cx x 2 bottles (6/7/22): Enterobacter   - LA (6/7/22): 2.6 & Procalcitonin - 47, 90 (6/8/22); trended down  - Cont Acyclovir ppx  - Cont daily Ertapenem (6/10/22) Day +  11/14 of total therapy     Abx history:  Merrem x 2 days (6/8/22-6/9/22)  Vanco x 2 days (6/7/22)  Invanz & Cefepime Day x 1 day (6/7/22)     4. Heme: Anemia and thrombocytopenia from chemotherapy   - Transfuse for Hgb < 7 and Platelets < 62B  - No transfusion today     5. Metabolic: Hyperglycemia otherwise stable electrolytes & renal fx  TLS:  No evidence, s/p allopurinol (stopped  6/2/22)  -1 L NS bolus daily     6. Cardiac:   - H/o A. Fib w/ RVR (5/28/22)  - Echo (3/21/22): Centric mild left ventricular hypertrophy. Left ventricular ejection fraction 55 to 60% with no regional wall motion abnormalities noted. Intermittent diastolic dysfunction.   A. Fib:    - Cont Metoprolol 25 mg BID (started 5/29/22)  - No indication for AC at this time (per cardiology)     7. GI / Nutrition:    Nutrition: Appetite and oral intake is good. - S/p taking Astragalus supplement (stopped 5/18/22 d/t interaction with Cytoxan) - can resume on d/c   - Cont low microbial diet   - Follow closely with dietary       - Disposition:  Will be seen daily in Outpatient Infusion for Invanz, CAR-T assessment, labs and MD visit.     Gemma Tellez DO

## 2022-06-18 ENCOUNTER — HOSPITAL ENCOUNTER (OUTPATIENT)
Dept: ONCOLOGY | Age: 82
Setting detail: INFUSION SERIES
Discharge: HOME | End: 2022-06-18
Payer: MEDICARE

## 2022-06-18 VITALS
HEART RATE: 67 BPM | WEIGHT: 177.25 LBS | OXYGEN SATURATION: 99 % | BODY MASS INDEX: 24.04 KG/M2 | TEMPERATURE: 97.3 F | DIASTOLIC BLOOD PRESSURE: 66 MMHG | SYSTOLIC BLOOD PRESSURE: 123 MMHG | RESPIRATION RATE: 16 BRPM

## 2022-06-18 DIAGNOSIS — C83.30 DIFFUSE LARGE B-CELL LYMPHOMA, UNSPECIFIED BODY REGION (HCC): Primary | ICD-10-CM

## 2022-06-18 LAB
ANION GAP SERPL CALCULATED.3IONS-SCNC: 8 MMOL/L (ref 3–16)
BASOPHILS ABSOLUTE: 0.1 K/UL (ref 0–0.2)
BASOPHILS RELATIVE PERCENT: 1.4 %
BUN BLDV-MCNC: 17 MG/DL (ref 7–20)
C-REACTIVE PROTEIN: <3 MG/L (ref 0–5.1)
CALCIUM SERPL-MCNC: 8.8 MG/DL (ref 8.3–10.6)
CHLORIDE BLD-SCNC: 104 MMOL/L (ref 99–110)
CO2: 27 MMOL/L (ref 21–32)
CREAT SERPL-MCNC: 0.9 MG/DL (ref 0.8–1.3)
EOSINOPHILS ABSOLUTE: 0.3 K/UL (ref 0–0.6)
EOSINOPHILS RELATIVE PERCENT: 8.4 %
FERRITIN: 487.2 NG/ML (ref 30–400)
GFR AFRICAN AMERICAN: >60
GFR NON-AFRICAN AMERICAN: >60
GLUCOSE BLD-MCNC: 135 MG/DL (ref 70–99)
HCT VFR BLD CALC: 27.6 % (ref 40.5–52.5)
HEMOGLOBIN: 9.4 G/DL (ref 13.5–17.5)
LYMPHOCYTES ABSOLUTE: 0.5 K/UL (ref 1–5.1)
LYMPHOCYTES RELATIVE PERCENT: 13.2 %
MCH RBC QN AUTO: 32.7 PG (ref 26–34)
MCHC RBC AUTO-ENTMCNC: 33.9 G/DL (ref 31–36)
MCV RBC AUTO: 96.4 FL (ref 80–100)
MONOCYTES ABSOLUTE: 0.5 K/UL (ref 0–1.3)
MONOCYTES RELATIVE PERCENT: 12.7 %
NEUTROPHILS ABSOLUTE: 2.3 K/UL (ref 1.7–7.7)
NEUTROPHILS RELATIVE PERCENT: 64.3 %
PDW BLD-RTO: 16.4 % (ref 12.4–15.4)
PHOSPHORUS: 2.9 MG/DL (ref 2.5–4.9)
PLATELET # BLD: 152 K/UL (ref 135–450)
PMV BLD AUTO: 9.6 FL (ref 5–10.5)
POTASSIUM SERPL-SCNC: 3.8 MMOL/L (ref 3.5–5.1)
RBC # BLD: 2.87 M/UL (ref 4.2–5.9)
SODIUM BLD-SCNC: 139 MMOL/L (ref 136–145)
WBC # BLD: 3.6 K/UL (ref 4–11)

## 2022-06-18 PROCEDURE — 6360000002 HC RX W HCPCS: Performed by: NURSE PRACTITIONER

## 2022-06-18 PROCEDURE — 84100 ASSAY OF PHOSPHORUS: CPT

## 2022-06-18 PROCEDURE — 36591 DRAW BLOOD OFF VENOUS DEVICE: CPT

## 2022-06-18 PROCEDURE — 2580000003 HC RX 258: Performed by: NURSE PRACTITIONER

## 2022-06-18 PROCEDURE — 96360 HYDRATION IV INFUSION INIT: CPT

## 2022-06-18 PROCEDURE — 86140 C-REACTIVE PROTEIN: CPT

## 2022-06-18 PROCEDURE — 85025 COMPLETE CBC W/AUTO DIFF WBC: CPT

## 2022-06-18 PROCEDURE — 96361 HYDRATE IV INFUSION ADD-ON: CPT

## 2022-06-18 PROCEDURE — 96365 THER/PROPH/DIAG IV INF INIT: CPT

## 2022-06-18 PROCEDURE — 80048 BASIC METABOLIC PNL TOTAL CA: CPT

## 2022-06-18 PROCEDURE — 82728 ASSAY OF FERRITIN: CPT

## 2022-06-18 RX ORDER — PROCHLORPERAZINE MALEATE 10 MG
10 TABLET ORAL EVERY 6 HOURS PRN
Status: DISCONTINUED | OUTPATIENT
Start: 2022-06-18 | End: 2022-06-19 | Stop reason: HOSPADM

## 2022-06-18 RX ORDER — PROCHLORPERAZINE EDISYLATE 5 MG/ML
10 INJECTION INTRAMUSCULAR; INTRAVENOUS EVERY 6 HOURS PRN
Status: CANCELLED | OUTPATIENT
Start: 2022-06-19

## 2022-06-18 RX ORDER — HEPARIN SODIUM (PORCINE) LOCK FLUSH IV SOLN 100 UNIT/ML 100 UNIT/ML
500 SOLUTION INTRAVENOUS PRN
Status: CANCELLED | OUTPATIENT
Start: 2022-06-19

## 2022-06-18 RX ORDER — ONDANSETRON 4 MG/1
8 TABLET, FILM COATED ORAL ONCE
Status: CANCELLED | OUTPATIENT
Start: 2022-06-19 | End: 2022-06-19

## 2022-06-18 RX ORDER — SODIUM CHLORIDE 9 MG/ML
INJECTION, SOLUTION INTRAVENOUS ONCE
Status: DISCONTINUED | OUTPATIENT
Start: 2022-06-18 | End: 2022-06-19 | Stop reason: HOSPADM

## 2022-06-18 RX ORDER — ONDANSETRON 2 MG/ML
8 INJECTION INTRAMUSCULAR; INTRAVENOUS ONCE
Status: CANCELLED | OUTPATIENT
Start: 2022-06-19 | End: 2022-06-19

## 2022-06-18 RX ORDER — POTASSIUM CHLORIDE 20 MEQ/1
40 TABLET, EXTENDED RELEASE ORAL PRN
Status: DISCONTINUED | OUTPATIENT
Start: 2022-06-18 | End: 2022-06-19 | Stop reason: HOSPADM

## 2022-06-18 RX ORDER — OXYCODONE HYDROCHLORIDE 5 MG/1
5 TABLET ORAL EVERY 4 HOURS PRN
Status: CANCELLED | OUTPATIENT
Start: 2022-06-19

## 2022-06-18 RX ORDER — MAGNESIUM SULFATE IN WATER 40 MG/ML
4000 INJECTION, SOLUTION INTRAVENOUS PRN
Status: DISCONTINUED | OUTPATIENT
Start: 2022-06-18 | End: 2022-06-19 | Stop reason: HOSPADM

## 2022-06-18 RX ORDER — OXYCODONE HYDROCHLORIDE 5 MG/1
10 TABLET ORAL EVERY 4 HOURS PRN
Status: DISCONTINUED | OUTPATIENT
Start: 2022-06-18 | End: 2022-06-19 | Stop reason: HOSPADM

## 2022-06-18 RX ORDER — OXYCODONE HYDROCHLORIDE 5 MG/1
10 TABLET ORAL EVERY 4 HOURS PRN
Status: CANCELLED | OUTPATIENT
Start: 2022-06-19

## 2022-06-18 RX ORDER — PETROLATUM, MENTHOL, UNSPECIFIED FORM, CAMPHOR (SYNTHETIC), AND PHENOL 59.14; 1; 1; .6 G/100G; G/100G; G/100G; G/100G
PASTE TOPICAL PRN
Status: CANCELLED | OUTPATIENT
Start: 2022-06-19

## 2022-06-18 RX ORDER — POTASSIUM CHLORIDE 20 MEQ/1
40 TABLET, EXTENDED RELEASE ORAL PRN
Status: CANCELLED | OUTPATIENT
Start: 2022-06-19

## 2022-06-18 RX ORDER — DIMETHICONE, CAMPHOR (SYNTHETIC), MENTHOL, AND PHENOL 1.1; .5; .625; .5 G/100G; G/100G; G/100G; G/100G
OINTMENT TOPICAL PRN
Status: DISCONTINUED | OUTPATIENT
Start: 2022-06-18 | End: 2022-06-19 | Stop reason: HOSPADM

## 2022-06-18 RX ORDER — SODIUM CHLORIDE 9 MG/ML
INJECTION, SOLUTION INTRAVENOUS ONCE
Status: CANCELLED | OUTPATIENT
Start: 2022-06-19 | End: 2022-06-19

## 2022-06-18 RX ORDER — PROCHLORPERAZINE EDISYLATE 5 MG/ML
10 INJECTION INTRAMUSCULAR; INTRAVENOUS EVERY 6 HOURS PRN
Status: DISCONTINUED | OUTPATIENT
Start: 2022-06-18 | End: 2022-06-19 | Stop reason: HOSPADM

## 2022-06-18 RX ORDER — 0.9 % SODIUM CHLORIDE 0.9 %
1000 INTRAVENOUS SOLUTION INTRAVENOUS ONCE
Status: CANCELLED | OUTPATIENT
Start: 2022-06-19 | End: 2022-06-19

## 2022-06-18 RX ORDER — 0.9 % SODIUM CHLORIDE 0.9 %
1000 INTRAVENOUS SOLUTION INTRAVENOUS ONCE
Status: COMPLETED | OUTPATIENT
Start: 2022-06-18 | End: 2022-06-18

## 2022-06-18 RX ORDER — POTASSIUM CHLORIDE 29.8 MG/ML
20 INJECTION INTRAVENOUS PRN
Status: DISCONTINUED | OUTPATIENT
Start: 2022-06-18 | End: 2022-06-19 | Stop reason: HOSPADM

## 2022-06-18 RX ORDER — POTASSIUM CHLORIDE 29.8 MG/ML
80 INJECTION INTRAVENOUS PRN
Status: CANCELLED | OUTPATIENT
Start: 2022-06-19

## 2022-06-18 RX ORDER — PROCHLORPERAZINE MALEATE 10 MG
10 TABLET ORAL EVERY 6 HOURS PRN
Status: CANCELLED | OUTPATIENT
Start: 2022-06-19

## 2022-06-18 RX ORDER — HEPARIN SODIUM (PORCINE) LOCK FLUSH IV SOLN 100 UNIT/ML 100 UNIT/ML
500 SOLUTION INTRAVENOUS PRN
Status: DISCONTINUED | OUTPATIENT
Start: 2022-06-18 | End: 2022-06-19 | Stop reason: HOSPADM

## 2022-06-18 RX ORDER — MAGNESIUM SULFATE IN WATER 40 MG/ML
4000 INJECTION, SOLUTION INTRAVENOUS PRN
Status: CANCELLED | OUTPATIENT
Start: 2022-06-19

## 2022-06-18 RX ORDER — OXYCODONE HYDROCHLORIDE 5 MG/1
5 TABLET ORAL EVERY 4 HOURS PRN
Status: DISCONTINUED | OUTPATIENT
Start: 2022-06-18 | End: 2022-06-19 | Stop reason: HOSPADM

## 2022-06-18 RX ORDER — ONDANSETRON 4 MG/1
8 TABLET, FILM COATED ORAL ONCE
Status: DISCONTINUED | OUTPATIENT
Start: 2022-06-18 | End: 2022-06-19 | Stop reason: HOSPADM

## 2022-06-18 RX ORDER — ONDANSETRON 2 MG/ML
8 INJECTION INTRAMUSCULAR; INTRAVENOUS ONCE
Status: DISCONTINUED | OUTPATIENT
Start: 2022-06-18 | End: 2022-06-19 | Stop reason: HOSPADM

## 2022-06-18 RX ADMIN — SODIUM CHLORIDE 1000 MG: 900 INJECTION INTRAVENOUS at 09:03

## 2022-06-18 RX ADMIN — SODIUM CHLORIDE 1000 ML: 9 INJECTION, SOLUTION INTRAVENOUS at 08:38

## 2022-06-18 RX ADMIN — SODIUM CHLORIDE, PRESERVATIVE FREE 500 UNITS: 5 INJECTION INTRAVENOUS at 10:50

## 2022-06-18 NOTE — PROGRESS NOTES
Short Stay Communication Note  Silvina Rodríguez  Diagnosis: Diffuse large b-cell lymphoma  Primary MD: Dr. Juan Vanessa  Treatment: Fludarabine and Cytoxan  Day + 27 of Car-T Sjzi   Pt seen in outpatient infusion today. Labs drawn and reviewed. CBC:   Recent Labs     06/16/22  0915 06/17/22  0830 06/18/22  0846   WBC 3.4* 3.5* 3.6*   HGB 8.9* 9.3* 9.4*   HCT 26.6* 27.3* 27.6*   MCV 96.0 97.4 96.4   * 137 152     BMP/Mag:  Recent Labs     06/16/22  0915 06/17/22  0830 06/18/22  0846    139 139   K 3.8 4.0 3.8    103 104   CO2 26 25 27   PHOS 2.8 2.7 2.9   BUN 21* 18 17   CREATININE 1.0 1.0 0.9   MG  --  1.80  --      Standing parameters for replacement for this patient:   1 unit of pack red blood cells for a hemoglobin < or equal to 7.0.  1 pack of platelets for a platelet count less than or equal to 10.0.  40 MeQ of Potassium administered for a potassium level less than or equal to 3.4.  4g of Magnesium Sulfate for a magnesum level less than or equal to 1.4. No transfusions required for the above lab values. Urinalysis last done: 6/13/2022 Urinalysis next due: 6/20/200    Chest X-Ray last done: 6/13/2022 Chest X-Ray next due: 6/20/2022. Symptoms addressed and reported to care team this date: none. Treatments this date: IV Fluids and IV invanz    Reviewed medication schedule with pt and caregiver. Both able to verbalize all medications and schedule. Pt to be seen again tomorrow. Patient and caregiver verbalized understanding of discharge instructions including when and how to call the doctor and when to report  to the ER. Discharged ambulatory to home.   Delvis Salas RN

## 2022-06-18 NOTE — PROGRESS NOTES
[facility] Progress Note      2022    Beatriz Vale    :  1940    MRN:  [id number]    Referring MD: JOAQUIM Mcbride - ADELE  [place], [place] [address]    Subjective: doing well. No fevers. ECOG PS:  (1) Restricted in physically strenuous activity, ambulatory and able to do work of light nature    KPS: 80% Normal activity with effort; some signs or symptoms of disease    Isolation:  None     Medications    Scheduled Meds:    Continuous Infusions:    PRN Meds:. ROS:  As noted above, otherwise remainder of 10-point ROS negative      Physical Exam:     Vital Signs:  /66   Pulse 67   Temp 97.3 °F (36.3 °C) (Temporal)   Resp 16   Wt 177 lb 4 oz (80.4 kg)   SpO2 99%   BMI 24.04 kg/m²     Weight:    Wt Readings from Last 3 Encounters:   22 177 lb 4 oz (80.4 kg)   22 176 lb 9.4 oz (80.1 kg)   22 175 lb 11.3 oz (79.7 kg)       General: Awake, alert and oriented    HEENT: normocephalic, alopecia, PERRL, no scleral erythema or icterus, Oral mucosa moist and intact, throat clear.    NECK: supple   BACK: Straight,  SKIN: warm dry and intact without lesions rashes or masses  CHEST: CTA bilaterally without use of accessory muscles  CV: Normal S1 S2, RRR, no MRG  ABD: NT ND normoactive BS, no palpable masses or hepatosplenomegaly  EXTREMITIES: chronic edema in LLE, denies calf tenderness  NEURO: CN II - XII grossly intact  CATHETER:  Left IJ PAC (22, [facility]): CDI    Laboratory Data:  CBC:   Recent Labs     22  0915 22  0830 22  0846   WBC 3.4* 3.5* 3.6*   HGB 8.9* 9.3* 9.4*   HCT 26.6* 27.3* 27.6*   MCV 96.0 97.4 96.4   * 137 152     BMP/Mag:  Recent Labs     22  0915 22  0830    139   K 3.8 4.0    103   CO2 26 25   PHOS 2.8 2.7   BUN 21* 18   CREATININE 1.0 1.0   MG  --  1.80     LIVP:   Recent Labs     22  0830   AST 21   ALT 19   BILIDIR <0.2   BILITOT <0.2   ALKPHOS 99     Coags: Recent Labs     06/16/22  0915   PROTIME 14.6*   INR 1.15*     Uric Acid   Recent Labs     06/17/22  0830   LABURIC 5.5     Lab Results   Component Value Date    CRP <3.0 06/17/2022    CRP 3.2 06/16/2022    CRP 3.9 06/15/2022     Lab Results   Component Value Date    FERRITIN 521.3 (H) 06/17/2022    FERRITIN 508.1 (H) 06/16/2022    FERRITIN 530.3 (H) 06/15/2022         PROBLEM LIST:          1. Follicular lymphoma with BM involvement  2. DLBC NHL  3. Atrial fibrillation w/ RVR (5/2022)  4. Grade 1 CRS  5. Parainfluenza 3 virus / PNA (5/2022)  5. Enterobacter bacteremia / sepsis (6/2022)      TREATMENT:            FL:   1  CHOP (1/1995)  2. Rituxan (11/2003 - 8/2004)  3. R-CHOP (7/2007)      DLBCL:   1.  R-[place]/Ox x 2 cycles (1/31/22 & 2/14/22)  2. Chip-BR x 2 cycles  (3/22/22 & 4/20/22)  3. CAR-T w/ Dwayne Lundborg                      Lymphodepleting Chemotherapy:  Fludarabine and cyclophosphamide   Disease Status at time of Infusion:  Progressive Disease  CAR-T Infusion Date: 5/23/22  CAR-T Product:  Breyanzi  Batch ID Number:  [id number]      ASSESSMENT AND PLAN:           1. 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 These may be infectious. These were not described on the previous PET/CT. Pulmonary lymphoma would be difficult to exclude. If the pulmonary nodule represents lymphoma, this would represent a Deauville score 5 exam. If this is infectious, overall the exam would represent a Deauville score 1. Previously described lymphadenopathy in FDG activity in the left retroperitoneum, left iliac region and in left upper thigh has completely resolved.  No residual lymphadenopathy or FDG avid lymph node identified      PLAN:   S/p lymphodepleting Chemotherapy w/ Fludarabine and cyclophosphamide & CAR-T w/ Breyanzi (5/23/22)      Day + 26     2. CRS / Neuro:   - Ferritin & CRP trending down   - H/o Grade 1 CRS  - Monitor CRP and Ferrtin closely       Lab Results   Component Value Date    CRP <3.0 06/17/2022    CRP 3.2 06/16/2022    CRP 3.9 06/15/2022     Lab Results   Component Value Date    FERRITIN 521.3 (H) 06/17/2022    FERRITIN 508.1 (H) 06/16/2022    FERRITIN 530.3 (H) 06/15/2022     - Neuro checks w/ CARTOX 10-point assessment Q4hrs    Toxicity Grading    CRS stGstrstastdstest:st st1st ICANS stGstrstastdstest:st st1st ICE Score:       3. ID:  Afebrile, recently admitted with sepsis (POA) r/t Enterobacter bacteremia, fever from CRS d/t CAR-T was ruled out. - CXR & UA 6/13/22: Negative   - Restart Levaquin and Diflucan ppx if ANC < 1.5  - RR Viral Panel (5/28/22): Parainfluenza 3 Virus  - Blood Cx x 2 bottles (6/7/22): Enterobacter   - LA (6/7/22): 2.6 & Procalcitonin - 47, 90 (6/8/22); trended down  - Cont Acyclovir ppx  - Cont daily Ertapenem (6/10/22) Day +  12/14 of total therapy     Abx history:  Merrem x 2 days (6/8/22-6/9/22)  Vanco x 2 days (6/7/22)  Invanz & Cefepime Day x 1 day (6/7/22)     4. Heme: Anemia and thrombocytopenia from chemotherapy   - Transfuse for Hgb < 7 and Platelets < 81B  - No transfusion today     5. Metabolic: Hyperglycemia otherwise stable electrolytes & renal fx  TLS:  No evidence, s/p allopurinol (stopped  6/2/22)  -1 L NS bolus daily     6. Cardiac:   - H/o A. Fib w/ RVR (5/28/22)  - Echo (3/21/22): Centric mild left ventricular hypertrophy. Left ventricular ejection fraction 55 to 60% with no regional wall motion abnormalities noted. Intermittent diastolic dysfunction. A. Fib:    - Cont Metoprolol 25 mg BID (started 5/29/22)  - No indication for AC at this time (per cardiology)     7. GI / Nutrition:    Nutrition: Appetite and oral intake is good.    - S/p taking Astragalus supplement (stopped 5/18/22 d/t interaction with Cytoxan) - can resume on d/c   - Cont low microbial diet   - Follow closely with dietary     - Disposition:  Will be seen daily in Outpatient Infusion for Invanz, CAR-T assessment, labs and MD visit.     Josefa Priest MD

## 2022-06-18 NOTE — PROGRESS NOTES
[facility] Progress Note      2022    Shima Stewart    :  1940    MRN:  [id number]    Referring MD: JOAQUIM Davis - NP  [facility] [id number]    Subjective: doing well. No fevers. Tolerating IV abx well      ECOG PS:  (1) Restricted in physically strenuous activity, ambulatory and able to do work of light nature    KPS: 80% Normal activity with effort; some signs or symptoms of disease    Isolation:  None     Medications    Scheduled Meds:    Continuous Infusions:    PRN Meds:. ROS:  As noted above, otherwise remainder of 10-point ROS negative      Physical Exam:     Vital Signs:  /66   Pulse 67   Temp 97.3 °F (36.3 °C) (Temporal)   Resp 16   Wt 177 lb 4 oz (80.4 kg)   SpO2 99%   BMI 24.04 kg/m²     Weight:    Wt Readings from Last 3 Encounters:   22 177 lb 4 oz (80.4 kg)   22 176 lb 9.4 oz (80.1 kg)   22 175 lb 11.3 oz (79.7 kg)       General: Awake, alert and oriented    HEENT: normocephalic, alopecia, PERRL, no scleral erythema or icterus, Oral mucosa moist and intact, throat clear.    NECK: supple without palpable adenopathy  BACK: Straight, negative CVAT  SKIN: warm dry and intact without lesions rashes or masses  CHEST: CTA bilaterally without use of accessory muscles  CV: Normal S1 S2, RRR, no MRG  ABD: NT ND normoactive BS, no palpable masses or hepatosplenomegaly  EXTREMITIES: chronic edema in LLE, denies calf tenderness  NEURO: CN II - XII grossly intact  CATHETER:  Left IJ PAC (22, [facility]): CDI    Laboratory Data:  CBC:   Recent Labs     22  0915 22  0830 22  0846   WBC 3.4* 3.5* 3.6*   HGB 8.9* 9.3* 9.4*   HCT 26.6* 27.3* 27.6*   MCV 96.0 97.4 96.4   * 137 152     BMP/Mag:  Recent Labs     22  0915 22  0830    139   K 3.8 4.0    103   CO2 26 25   PHOS 2.8 2.7   BUN 21* 18   CREATININE 1.0 1.0   MG  --  1.80     LIVP:   Recent Labs     22  0830   AST 21   ALT 19   BILIDIR <0.2   BILITOT <0.2   ALKPHOS 99     Coags:   Recent Labs     06/16/22  0915   PROTIME 14.6*   INR 1.15*     Uric Acid   Recent Labs     06/17/22  0830   LABURIC 5.5     Lab Results   Component Value Date    CRP <3.0 06/17/2022    CRP 3.2 06/16/2022    CRP 3.9 06/15/2022     Lab Results   Component Value Date    FERRITIN 521.3 (H) 06/17/2022    FERRITIN 508.1 (H) 06/16/2022    FERRITIN 530.3 (H) 06/15/2022         PROBLEM LIST:          1. Follicular lymphoma with BM involvement  2. DLBC NHL  3. Atrial fibrillation w/ RVR (5/2022)  4. Grade 1 CRS  5. Parainfluenza 3 virus / PNA (5/2022)  5. Enterobacter bacteremia / sepsis (6/2022)      TREATMENT:            FL:   1  CHOP (1/1995)  2. Rituxan (11/2003 - 8/2004)  3. R-CHOP (7/2007)      DLBCL:   1.  R-[place]/Ox x 2 cycles (1/31/22 & 2/14/22)  2. Chip-BR x 2 cycles  (3/22/22 & 4/20/22)  3. CAR-T w/ Michael Mills                      Lymphodepleting Chemotherapy:  Fludarabine and cyclophosphamide   Disease Status at time of Infusion:  Progressive Disease  CAR-T Infusion Date: 5/23/22  CAR-T Product:  Breyanzi  Batch ID Number:  [id number]      ASSESSMENT AND PLAN:           1. 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 These may be infectious. These were not described on the previous PET/CT. Pulmonary lymphoma would be difficult to exclude. If the pulmonary nodule represents lymphoma, this would represent a Deauville score 5 exam. If this is infectious, overall the exam would represent a Deauville score 1.  Previously described lymphadenopathy in FDG activity in the left retroperitoneum, left iliac region and in left upper thigh has and oral intake is good. - S/p taking Astragalus supplement (stopped 5/18/22 d/t interaction with Cytoxan) - can resume on d/c   - Cont low microbial diet   - Follow closely with dietary       - Disposition:  Will be seen daily in Outpatient Infusion for Invanz, CAR-T assessment, labs and MD visit.     Gertrudis Del Valle MD

## 2022-06-19 ENCOUNTER — HOSPITAL ENCOUNTER (OUTPATIENT)
Dept: ONCOLOGY | Age: 82
Setting detail: INFUSION SERIES
Discharge: HOME | End: 2022-06-19
Payer: MEDICARE

## 2022-06-19 VITALS
SYSTOLIC BLOOD PRESSURE: 131 MMHG | BODY MASS INDEX: 24.28 KG/M2 | HEART RATE: 56 BPM | RESPIRATION RATE: 20 BRPM | WEIGHT: 179.01 LBS | TEMPERATURE: 97.7 F | OXYGEN SATURATION: 99 % | DIASTOLIC BLOOD PRESSURE: 67 MMHG

## 2022-06-19 DIAGNOSIS — C83.30 DIFFUSE LARGE B-CELL LYMPHOMA, UNSPECIFIED BODY REGION (HCC): Primary | ICD-10-CM

## 2022-06-19 LAB
ANION GAP SERPL CALCULATED.3IONS-SCNC: 9 MMOL/L (ref 3–16)
BASOPHILS ABSOLUTE: 0 K/UL (ref 0–0.2)
BASOPHILS RELATIVE PERCENT: 0.9 %
BILIRUBIN URINE: NEGATIVE
BLOOD, URINE: NEGATIVE
BUN BLDV-MCNC: 17 MG/DL (ref 7–20)
C-REACTIVE PROTEIN: <3 MG/L (ref 0–5.1)
CALCIUM SERPL-MCNC: 8.7 MG/DL (ref 8.3–10.6)
CHLORIDE BLD-SCNC: 104 MMOL/L (ref 99–110)
CLARITY: CLEAR
CO2: 27 MMOL/L (ref 21–32)
COLOR: YELLOW
CREAT SERPL-MCNC: 1 MG/DL (ref 0.8–1.3)
EOSINOPHILS ABSOLUTE: 0.3 K/UL (ref 0–0.6)
EOSINOPHILS RELATIVE PERCENT: 10.1 %
FERRITIN: 496.1 NG/ML (ref 30–400)
GFR AFRICAN AMERICAN: >60
GFR NON-AFRICAN AMERICAN: >60
GLUCOSE BLD-MCNC: 110 MG/DL (ref 70–99)
GLUCOSE URINE: NEGATIVE MG/DL
HCT VFR BLD CALC: 27.8 % (ref 40.5–52.5)
HEMOGLOBIN: 9.2 G/DL (ref 13.5–17.5)
KETONES, URINE: NEGATIVE MG/DL
LEUKOCYTE ESTERASE, URINE: NEGATIVE
LYMPHOCYTES ABSOLUTE: 0.4 K/UL (ref 1–5.1)
LYMPHOCYTES RELATIVE PERCENT: 12.5 %
MCH RBC QN AUTO: 32.5 PG (ref 26–34)
MCHC RBC AUTO-ENTMCNC: 33.3 G/DL (ref 31–36)
MCV RBC AUTO: 97.6 FL (ref 80–100)
MICROSCOPIC EXAMINATION: NORMAL
MONOCYTES ABSOLUTE: 0.4 K/UL (ref 0–1.3)
MONOCYTES RELATIVE PERCENT: 12 %
NEUTROPHILS ABSOLUTE: 2.1 K/UL (ref 1.7–7.7)
NEUTROPHILS RELATIVE PERCENT: 64.5 %
NITRITE, URINE: NEGATIVE
PDW BLD-RTO: 16.3 % (ref 12.4–15.4)
PH UA: 6 (ref 5–8)
PHOSPHORUS: 3.1 MG/DL (ref 2.5–4.9)
PLATELET # BLD: 163 K/UL (ref 135–450)
PMV BLD AUTO: 9.4 FL (ref 5–10.5)
POTASSIUM SERPL-SCNC: 4 MMOL/L (ref 3.5–5.1)
PROTEIN UA: NEGATIVE MG/DL
RBC # BLD: 2.84 M/UL (ref 4.2–5.9)
SODIUM BLD-SCNC: 140 MMOL/L (ref 136–145)
SPECIFIC GRAVITY UA: 1.02 (ref 1–1.03)
URINE TYPE: NORMAL
UROBILINOGEN, URINE: 0.2 E.U./DL
WBC # BLD: 3.2 K/UL (ref 4–11)

## 2022-06-19 PROCEDURE — 85025 COMPLETE CBC W/AUTO DIFF WBC: CPT

## 2022-06-19 PROCEDURE — 80048 BASIC METABOLIC PNL TOTAL CA: CPT

## 2022-06-19 PROCEDURE — 6360000002 HC RX W HCPCS: Performed by: NURSE PRACTITIONER

## 2022-06-19 PROCEDURE — 96365 THER/PROPH/DIAG IV INF INIT: CPT

## 2022-06-19 PROCEDURE — 82728 ASSAY OF FERRITIN: CPT

## 2022-06-19 PROCEDURE — 2580000003 HC RX 258: Performed by: NURSE PRACTITIONER

## 2022-06-19 PROCEDURE — 86140 C-REACTIVE PROTEIN: CPT

## 2022-06-19 PROCEDURE — 81003 URINALYSIS AUTO W/O SCOPE: CPT

## 2022-06-19 PROCEDURE — 84100 ASSAY OF PHOSPHORUS: CPT

## 2022-06-19 PROCEDURE — 96361 HYDRATE IV INFUSION ADD-ON: CPT

## 2022-06-19 PROCEDURE — 96360 HYDRATION IV INFUSION INIT: CPT

## 2022-06-19 PROCEDURE — 36591 DRAW BLOOD OFF VENOUS DEVICE: CPT

## 2022-06-19 RX ORDER — POTASSIUM CHLORIDE 29.8 MG/ML
20 INJECTION INTRAVENOUS PRN
Status: DISCONTINUED | OUTPATIENT
Start: 2022-06-19 | End: 2022-06-20 | Stop reason: HOSPADM

## 2022-06-19 RX ORDER — POTASSIUM CHLORIDE 20 MEQ/1
40 TABLET, EXTENDED RELEASE ORAL PRN
Status: DISCONTINUED | OUTPATIENT
Start: 2022-06-19 | End: 2022-06-20 | Stop reason: HOSPADM

## 2022-06-19 RX ORDER — ONDANSETRON 2 MG/ML
8 INJECTION INTRAMUSCULAR; INTRAVENOUS ONCE
Status: DISCONTINUED | OUTPATIENT
Start: 2022-06-19 | End: 2022-06-20 | Stop reason: HOSPADM

## 2022-06-19 RX ORDER — 0.9 % SODIUM CHLORIDE 0.9 %
1000 INTRAVENOUS SOLUTION INTRAVENOUS ONCE
Status: COMPLETED | OUTPATIENT
Start: 2022-06-19 | End: 2022-06-19

## 2022-06-19 RX ORDER — HEPARIN SODIUM (PORCINE) LOCK FLUSH IV SOLN 100 UNIT/ML 100 UNIT/ML
500 SOLUTION INTRAVENOUS PRN
Status: CANCELLED | OUTPATIENT
Start: 2022-06-20

## 2022-06-19 RX ORDER — OXYCODONE HYDROCHLORIDE 5 MG/1
5 TABLET ORAL EVERY 4 HOURS PRN
Status: DISCONTINUED | OUTPATIENT
Start: 2022-06-19 | End: 2022-06-20 | Stop reason: HOSPADM

## 2022-06-19 RX ORDER — MAGNESIUM SULFATE IN WATER 40 MG/ML
4000 INJECTION, SOLUTION INTRAVENOUS PRN
Status: CANCELLED | OUTPATIENT
Start: 2022-06-20

## 2022-06-19 RX ORDER — OXYCODONE HYDROCHLORIDE 5 MG/1
10 TABLET ORAL EVERY 4 HOURS PRN
Status: DISCONTINUED | OUTPATIENT
Start: 2022-06-19 | End: 2022-06-20 | Stop reason: HOSPADM

## 2022-06-19 RX ORDER — DIMETHICONE, CAMPHOR (SYNTHETIC), MENTHOL, AND PHENOL 1.1; .5; .625; .5 G/100G; G/100G; G/100G; G/100G
OINTMENT TOPICAL PRN
Status: DISCONTINUED | OUTPATIENT
Start: 2022-06-19 | End: 2022-06-20 | Stop reason: HOSPADM

## 2022-06-19 RX ORDER — PROCHLORPERAZINE MALEATE 10 MG
10 TABLET ORAL EVERY 6 HOURS PRN
Status: CANCELLED | OUTPATIENT
Start: 2022-06-20

## 2022-06-19 RX ORDER — PROCHLORPERAZINE EDISYLATE 5 MG/ML
10 INJECTION INTRAMUSCULAR; INTRAVENOUS EVERY 6 HOURS PRN
Status: CANCELLED | OUTPATIENT
Start: 2022-06-20

## 2022-06-19 RX ORDER — HEPARIN SODIUM (PORCINE) LOCK FLUSH IV SOLN 100 UNIT/ML 100 UNIT/ML
500 SOLUTION INTRAVENOUS PRN
Status: DISCONTINUED | OUTPATIENT
Start: 2022-06-19 | End: 2022-06-20 | Stop reason: HOSPADM

## 2022-06-19 RX ORDER — ONDANSETRON 4 MG/1
8 TABLET, FILM COATED ORAL ONCE
Status: CANCELLED | OUTPATIENT
Start: 2022-06-20 | End: 2022-06-20

## 2022-06-19 RX ORDER — PROCHLORPERAZINE EDISYLATE 5 MG/ML
10 INJECTION INTRAMUSCULAR; INTRAVENOUS EVERY 6 HOURS PRN
Status: DISCONTINUED | OUTPATIENT
Start: 2022-06-19 | End: 2022-06-20 | Stop reason: HOSPADM

## 2022-06-19 RX ORDER — SODIUM CHLORIDE 9 MG/ML
INJECTION, SOLUTION INTRAVENOUS ONCE
Status: CANCELLED | OUTPATIENT
Start: 2022-06-20 | End: 2022-06-20

## 2022-06-19 RX ORDER — 0.9 % SODIUM CHLORIDE 0.9 %
1000 INTRAVENOUS SOLUTION INTRAVENOUS ONCE
Status: CANCELLED | OUTPATIENT
Start: 2022-06-20 | End: 2022-06-20

## 2022-06-19 RX ORDER — PETROLATUM, MENTHOL, UNSPECIFIED FORM, CAMPHOR (SYNTHETIC), AND PHENOL 59.14; 1; 1; .6 G/100G; G/100G; G/100G; G/100G
PASTE TOPICAL PRN
Status: CANCELLED | OUTPATIENT
Start: 2022-06-20

## 2022-06-19 RX ORDER — PROCHLORPERAZINE MALEATE 10 MG
10 TABLET ORAL EVERY 6 HOURS PRN
Status: DISCONTINUED | OUTPATIENT
Start: 2022-06-19 | End: 2022-06-20 | Stop reason: HOSPADM

## 2022-06-19 RX ORDER — MAGNESIUM SULFATE IN WATER 40 MG/ML
4000 INJECTION, SOLUTION INTRAVENOUS PRN
Status: DISCONTINUED | OUTPATIENT
Start: 2022-06-19 | End: 2022-06-20 | Stop reason: HOSPADM

## 2022-06-19 RX ORDER — ONDANSETRON 2 MG/ML
8 INJECTION INTRAMUSCULAR; INTRAVENOUS ONCE
Status: CANCELLED | OUTPATIENT
Start: 2022-06-20 | End: 2022-06-20

## 2022-06-19 RX ORDER — OXYCODONE HYDROCHLORIDE 5 MG/1
10 TABLET ORAL EVERY 4 HOURS PRN
Status: CANCELLED | OUTPATIENT
Start: 2022-06-20

## 2022-06-19 RX ORDER — POTASSIUM CHLORIDE 20 MEQ/1
40 TABLET, EXTENDED RELEASE ORAL PRN
Status: CANCELLED | OUTPATIENT
Start: 2022-06-20

## 2022-06-19 RX ORDER — OXYCODONE HYDROCHLORIDE 5 MG/1
5 TABLET ORAL EVERY 4 HOURS PRN
Status: CANCELLED | OUTPATIENT
Start: 2022-06-20

## 2022-06-19 RX ORDER — POTASSIUM CHLORIDE 29.8 MG/ML
80 INJECTION INTRAVENOUS PRN
Status: CANCELLED | OUTPATIENT
Start: 2022-06-20

## 2022-06-19 RX ORDER — ONDANSETRON 4 MG/1
8 TABLET, FILM COATED ORAL ONCE
Status: DISCONTINUED | OUTPATIENT
Start: 2022-06-19 | End: 2022-06-20 | Stop reason: HOSPADM

## 2022-06-19 RX ADMIN — SODIUM CHLORIDE 1000 MG: 900 INJECTION INTRAVENOUS at 09:04

## 2022-06-19 RX ADMIN — SODIUM CHLORIDE 1000 ML: 9 INJECTION, SOLUTION INTRAVENOUS at 08:43

## 2022-06-19 RX ADMIN — SODIUM CHLORIDE, PRESERVATIVE FREE 500 UNITS: 5 INJECTION INTRAVENOUS at 10:54

## 2022-06-19 NOTE — PROGRESS NOTES
Short Stay Communication Note  Riana Rodarte  Diagnosis: Diffuse Large B-cell Lymphoma  Primary MD: Dr. Pat Diaz  Treatment: Melphalan Fludarabine/Cytoxan  Day +28 of Car-T [place]ushzi   Pt seen in outpatient infusion today. Labs drawn and reviewed. CBC: Recent Labs     06/17/22  0830 06/18/22  0846 06/19/22  0845   WBC 3.5* 3.6* 3.2*   HGB 9.3* 9.4* 9.2*   HCT 27.3* 27.6* 27.8*   MCV 97.4 96.4 97.6    152 163     BMP/Mag:  Recent Labs     06/17/22  0830 06/18/22  0846 06/19/22  0845    139 140   K 4.0 3.8 4.0    104 104   CO2 25 27 27   PHOS 2.7 2.9 3.1   BUN 18 17 17   CREATININE 1.0 0.9 1.0   MG 1.80  --   --      Standing parameters for replacement for this patient:   1 unit of pack red blood cells for a hemoglobin < or equal to 7.0   1 pack of platelets for a platelet count less than or equal to 10.0  40 MeQ of Potassium administered for a potassium level less than or equal to 3.4  4g of Magnesium Sulfate for a magnesum level less than or equal to 1.4  No transfusions required for the above lab values. Urinalysis last done: 6/19/2022 Urinalysis next due: 6/26/2022    Chest X-Ray last done: 6/13/2022 Chest X-Ray next due: 6/20/2022    Symptoms addressed and reported to care team this date:   Treatments this date: IV Fluids, Invanz    Reviewed medication schedule with pt and caregiver. Both able to verbalize all medications and schedule. Pt to be seen again tomorrow. Patient and caregiver verbalized understanding of discharge instructions including when and how to call the doctor and when to report  to the ER. Discharged ambulatory to home. Port flushed, heparin locked, de-accessed.  Electronically signed by Nabila Haile RN on 6/19/2022 at 10:46 AM

## 2022-06-19 NOTE — PROGRESS NOTES
[facility] Progress Note      2022    Lyndon Elliott    :  1940    MRN:  [id number]    Referring MD: JOAQUIM Arvizu - NP  [place], [place] [address]    Subjective: he has no complaints      ECOG PS:  (1) Restricted in physically strenuous activity, ambulatory and able to do work of light nature    KPS: 80% Normal activity with effort; some signs or symptoms of disease    Isolation:  None     Medications    Scheduled Meds:    Continuous Infusions:    PRN Meds:. ROS:  As noted above, otherwise remainder of 10-point ROS negative      Physical Exam:     Vital Signs:  /67   Pulse 56   Temp 97.7 °F (36.5 °C) (Oral)   Resp 20   Wt 179 lb 0.2 oz (81.2 kg)   SpO2 99%   BMI 24.28 kg/m²     Weight:    Wt Readings from Last 3 Encounters:   22 179 lb 0.2 oz (81.2 kg)   22 177 lb 4 oz (80.4 kg)   22 176 lb 9.4 oz (80.1 kg)       General: Awake, alert and oriented    HEENT: normocephalic, alopecia, PERRL, no scleral erythema or icterus, Oral mucosa moist and intact, throat clear.    NECK: supple   BACK: Straight,  SKIN: warm dry and intact without lesions rashes or masses  CHEST: CTA bilaterally without use of accessory muscles  CV: Normal S1 S2, RRR, no MRG  ABD: NT ND normoactive BS, no palpable masses or hepatosplenomegaly  EXTREMITIES: chronic edema in LLE, denies calf tenderness  NEURO: CN II - XII grossly intact  CATHETER:  Left IJ PAC (22, [facility]): CDI    Laboratory Data:  CBC:   Recent Labs     22  0830 22  0846 22  0845   WBC 3.5* 3.6* 3.2*   HGB 9.3* 9.4* 9.2*   HCT 27.3* 27.6* 27.8*   MCV 97.4 96.4 97.6    152 163     BMP/Mag:  Recent Labs     22  0830 22  0846 22  0845    139 140   K 4.0 3.8 4.0    104 104   CO2 25 27 27   PHOS 2.7 2.9 3.1   BUN 18 17 17   CREATININE 1.0 0.9 1.0   MG 1.80  --   --      LIVP:   Recent Labs     22  0830   AST 21   ALT 19   BILIDIR <0.2 BILITOT <0.2   ALKPHOS 99     Coags:   No results for input(s): PROTIME, INR, APTT in the last 72 hours. Uric Acid   Recent Labs     06/17/22  0830   LABURIC 5.5     Lab Results   Component Value Date    CRP <3.0 06/19/2022    CRP <3.0 06/18/2022    CRP <3.0 06/17/2022     Lab Results   Component Value Date    FERRITIN 496.1 (H) 06/19/2022    FERRITIN 487.2 (H) 06/18/2022    FERRITIN 521.3 (H) 06/17/2022         PROBLEM LIST:          1. Follicular lymphoma with BM involvement  2. DLBC NHL  3. Atrial fibrillation w/ RVR (5/2022)  4. Grade 1 CRS  5. Parainfluenza 3 virus / PNA (5/2022)  5. Enterobacter bacteremia / sepsis (6/2022)      TREATMENT:            FL:   1  CHOP (1/1995)  2. Rituxan (11/2003 - 8/2004)  3. R-CHOP (7/2007)      DLBCL:   1.  R-[place]/Ox x 2 cycles (1/31/22 & 2/14/22)  2. Chip-BR x 2 cycles  (3/22/22 & 4/20/22)  3. CAR-T w/ Jasper Vincent                      Lymphodepleting Chemotherapy:  Fludarabine and cyclophosphamide   Disease Status at time of Infusion:  Progressive Disease  CAR-T Infusion Date: 5/23/22  CAR-T Product:  Breyanzi  Batch ID Number:  [id number]      ASSESSMENT AND PLAN:           1. 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 These may be infectious. These were not described on the previous PET/CT. Pulmonary lymphoma would be difficult to exclude. If the pulmonary nodule represents lymphoma, this would represent a Deauville score 5 exam. If this is infectious, overall the exam would represent a Deauville score 1. Previously described lymphadenopathy in FDG activity in the left retroperitoneum, left iliac region and in left upper thigh has completely resolved.  No residual lymphadenopathy or FDG avid lymph node identified      PLAN:   S/p lymphodepleting Chemotherapy w/ Fludarabine and cyclophosphamide & CAR-T w/ Breyanzi (5/23/22)      Day + 27     2. CRS / Neuro:   - Ferritin & CRP trending down   - H/o Grade 1 CRS  - Monitor CRP and Ferrtin closely       Lab Results   Component Value Date    CRP <3.0 06/19/2022    CRP <3.0 06/18/2022    CRP <3.0 06/17/2022     Lab Results   Component Value Date    FERRITIN 496.1 (H) 06/19/2022    FERRITIN 487.2 (H) 06/18/2022    FERRITIN 521.3 (H) 06/17/2022     - Neuro checks w/ CARTOX 10-point assessment Q4hrs    Toxicity Grading    CRS stGstrstastdstest:st st1st ICANS stGstrstastdstest:st st1st ICE Score:  CAR-T Score: 10    3. ID:  Afebrile, recently admitted with sepsis (POA) r/t Enterobacter bacteremia, fever from CRS d/t CAR-T was ruled out. - CXR & UA 6/13/22: Negative   - Restart Levaquin and Diflucan ppx if ANC < 1.5  - RR Viral Panel (5/28/22): Parainfluenza 3 Virus  - Blood Cx x 2 bottles (6/7/22): Enterobacter   - LA (6/7/22): 2.6 & Procalcitonin - 47, 90 (6/8/22); trended down  - Cont Acyclovir ppx  - Cont daily Ertapenem (6/10/22) Day +  13/14 of total therapy     Abx history:  Merrem x 2 days (6/8/22-6/9/22)  Vanco x 2 days (6/7/22)  Invanz & Cefepime Day x 1 day (6/7/22)     4. Heme: Anemia and thrombocytopenia from chemotherapy   - Transfuse for Hgb < 7 and Platelets < 50W  - No transfusion today     5. Metabolic: Hyperglycemia otherwise stable electrolytes & renal fx  TLS:  No evidence, s/p allopurinol (stopped  6/2/22)  -1 L NS bolus daily     6. Cardiac:   - H/o A. Fib w/ RVR (5/28/22)  - Echo (3/21/22): Centric mild left ventricular hypertrophy. Left ventricular ejection fraction 55 to 60% with no regional wall motion abnormalities noted. Intermittent diastolic dysfunction. A. Fib:    - Cont Metoprolol 25 mg BID (started 5/29/22)  - No indication for AC at this time (per cardiology)     7.  GI / Nutrition:    Nutrition: Appetite and oral intake is good. - S/p taking Astragalus supplement (stopped 5/18/22 d/t interaction with Cytoxan) - can resume on d/c   - Cont low microbial diet   - Follow closely with dietary     - Disposition:  Will be seen daily in Outpatient Infusion for Invanz, CAR-T assessment, labs and MD visit.     Renita Watts MD

## 2022-06-20 ENCOUNTER — HOSPITAL ENCOUNTER (OUTPATIENT)
Dept: ONCOLOGY | Age: 82
Setting detail: INFUSION SERIES
Discharge: HOME | End: 2022-06-20
Payer: MEDICARE

## 2022-06-20 ENCOUNTER — HOSPITAL ENCOUNTER (OUTPATIENT)
Dept: GENERAL RADIOLOGY | Age: 82
Discharge: HOME | End: 2022-06-20
Payer: MEDICARE

## 2022-06-20 VITALS
OXYGEN SATURATION: 98 % | RESPIRATION RATE: 20 BRPM | SYSTOLIC BLOOD PRESSURE: 148 MMHG | WEIGHT: 179.9 LBS | DIASTOLIC BLOOD PRESSURE: 78 MMHG | HEART RATE: 59 BPM | BODY MASS INDEX: 24.4 KG/M2 | TEMPERATURE: 97.6 F

## 2022-06-20 DIAGNOSIS — C83.30 DIFFUSE LARGE B-CELL LYMPHOMA, UNSPECIFIED BODY REGION (HCC): Primary | ICD-10-CM

## 2022-06-20 LAB
ALBUMIN SERPL-MCNC: 3.8 G/DL (ref 3.4–5)
ALP BLD-CCNC: 101 U/L (ref 40–129)
ALT SERPL-CCNC: 22 U/L (ref 10–40)
ANION GAP SERPL CALCULATED.3IONS-SCNC: 8 MMOL/L (ref 3–16)
AST SERPL-CCNC: 20 U/L (ref 15–37)
BASOPHILS ABSOLUTE: 0 K/UL (ref 0–0.2)
BASOPHILS RELATIVE PERCENT: 0.5 %
BILIRUB SERPL-MCNC: <0.2 MG/DL (ref 0–1)
BILIRUBIN DIRECT: <0.2 MG/DL (ref 0–0.3)
BILIRUBIN, INDIRECT: ABNORMAL MG/DL (ref 0–1)
BUN BLDV-MCNC: 18 MG/DL (ref 7–20)
C-REACTIVE PROTEIN: <3 MG/L (ref 0–5.1)
CALCIUM SERPL-MCNC: 8.8 MG/DL (ref 8.3–10.6)
CHLORIDE BLD-SCNC: 104 MMOL/L (ref 99–110)
CO2: 27 MMOL/L (ref 21–32)
CREAT SERPL-MCNC: 0.9 MG/DL (ref 0.8–1.3)
EOSINOPHILS ABSOLUTE: 0.4 K/UL (ref 0–0.6)
EOSINOPHILS RELATIVE PERCENT: 10.2 %
FERRITIN: 449 NG/ML (ref 30–400)
GFR AFRICAN AMERICAN: >60
GFR NON-AFRICAN AMERICAN: >60
GLUCOSE BLD-MCNC: 112 MG/DL (ref 70–99)
HCT VFR BLD CALC: 27.8 % (ref 40.5–52.5)
HEMOGLOBIN: 9.2 G/DL (ref 13.5–17.5)
INR BLD: 1.11 (ref 0.87–1.14)
LACTATE DEHYDROGENASE: 150 U/L (ref 100–190)
LYMPHOCYTES ABSOLUTE: 0.4 K/UL (ref 1–5.1)
LYMPHOCYTES RELATIVE PERCENT: 11 %
MAGNESIUM: 1.8 MG/DL (ref 1.8–2.4)
MCH RBC QN AUTO: 32.6 PG (ref 26–34)
MCHC RBC AUTO-ENTMCNC: 33.2 G/DL (ref 31–36)
MCV RBC AUTO: 98.1 FL (ref 80–100)
MONOCYTES ABSOLUTE: 0.5 K/UL (ref 0–1.3)
MONOCYTES RELATIVE PERCENT: 12.7 %
NEUTROPHILS ABSOLUTE: 2.4 K/UL (ref 1.7–7.7)
NEUTROPHILS RELATIVE PERCENT: 65.6 %
PDW BLD-RTO: 17 % (ref 12.4–15.4)
PHOSPHORUS: 2.8 MG/DL (ref 2.5–4.9)
PLATELET # BLD: 175 K/UL (ref 135–450)
PMV BLD AUTO: 9.2 FL (ref 5–10.5)
POTASSIUM SERPL-SCNC: 4.2 MMOL/L (ref 3.5–5.1)
PROTHROMBIN TIME: 14.2 SEC (ref 11.7–14.5)
RBC # BLD: 2.84 M/UL (ref 4.2–5.9)
SODIUM BLD-SCNC: 139 MMOL/L (ref 136–145)
TOTAL PROTEIN: 6 G/DL (ref 6.4–8.2)
URIC ACID, SERUM: 5.2 MG/DL (ref 3.5–7.2)
WBC # BLD: 3.6 K/UL (ref 4–11)

## 2022-06-20 PROCEDURE — 84550 ASSAY OF BLOOD/URIC ACID: CPT

## 2022-06-20 PROCEDURE — 82728 ASSAY OF FERRITIN: CPT

## 2022-06-20 PROCEDURE — 84100 ASSAY OF PHOSPHORUS: CPT

## 2022-06-20 PROCEDURE — 2580000003 HC RX 258: Performed by: NURSE PRACTITIONER

## 2022-06-20 PROCEDURE — 80048 BASIC METABOLIC PNL TOTAL CA: CPT

## 2022-06-20 PROCEDURE — 96360 HYDRATION IV INFUSION INIT: CPT

## 2022-06-20 PROCEDURE — 96361 HYDRATE IV INFUSION ADD-ON: CPT

## 2022-06-20 PROCEDURE — 71045 X-RAY EXAM CHEST 1 VIEW: CPT

## 2022-06-20 PROCEDURE — 6360000002 HC RX W HCPCS: Performed by: NURSE PRACTITIONER

## 2022-06-20 PROCEDURE — 96365 THER/PROPH/DIAG IV INF INIT: CPT

## 2022-06-20 PROCEDURE — 36592 COLLECT BLOOD FROM PICC: CPT

## 2022-06-20 PROCEDURE — 85025 COMPLETE CBC W/AUTO DIFF WBC: CPT

## 2022-06-20 PROCEDURE — 83615 LACTATE (LD) (LDH) ENZYME: CPT

## 2022-06-20 PROCEDURE — 80076 HEPATIC FUNCTION PANEL: CPT

## 2022-06-20 PROCEDURE — 85610 PROTHROMBIN TIME: CPT

## 2022-06-20 PROCEDURE — 86140 C-REACTIVE PROTEIN: CPT

## 2022-06-20 PROCEDURE — 83735 ASSAY OF MAGNESIUM: CPT

## 2022-06-20 RX ORDER — PETROLATUM, MENTHOL, UNSPECIFIED FORM, CAMPHOR (SYNTHETIC), AND PHENOL 59.14; 1; 1; .6 G/100G; G/100G; G/100G; G/100G
PASTE TOPICAL PRN
OUTPATIENT
Start: 2022-06-21

## 2022-06-20 RX ORDER — DIMETHICONE, CAMPHOR (SYNTHETIC), MENTHOL, AND PHENOL 1.1; .5; .625; .5 G/100G; G/100G; G/100G; G/100G
OINTMENT TOPICAL PRN
Status: DISCONTINUED | OUTPATIENT
Start: 2022-06-20 | End: 2022-06-21 | Stop reason: HOSPADM

## 2022-06-20 RX ORDER — HEPARIN SODIUM (PORCINE) LOCK FLUSH IV SOLN 100 UNIT/ML 100 UNIT/ML
500 SOLUTION INTRAVENOUS PRN
Status: DISCONTINUED | OUTPATIENT
Start: 2022-06-20 | End: 2022-06-21 | Stop reason: HOSPADM

## 2022-06-20 RX ORDER — POTASSIUM CHLORIDE 29.8 MG/ML
20 INJECTION INTRAVENOUS PRN
Status: DISCONTINUED | OUTPATIENT
Start: 2022-06-20 | End: 2022-06-21 | Stop reason: HOSPADM

## 2022-06-20 RX ORDER — POTASSIUM CHLORIDE 29.8 MG/ML
80 INJECTION INTRAVENOUS PRN
OUTPATIENT
Start: 2022-06-21

## 2022-06-20 RX ORDER — PROCHLORPERAZINE EDISYLATE 5 MG/ML
10 INJECTION INTRAMUSCULAR; INTRAVENOUS EVERY 6 HOURS PRN
Status: DISCONTINUED | OUTPATIENT
Start: 2022-06-20 | End: 2022-06-21 | Stop reason: HOSPADM

## 2022-06-20 RX ORDER — HEPARIN SODIUM (PORCINE) LOCK FLUSH IV SOLN 100 UNIT/ML 100 UNIT/ML
500 SOLUTION INTRAVENOUS PRN
OUTPATIENT
Start: 2022-06-21

## 2022-06-20 RX ORDER — 0.9 % SODIUM CHLORIDE 0.9 %
1000 INTRAVENOUS SOLUTION INTRAVENOUS ONCE
Status: COMPLETED | OUTPATIENT
Start: 2022-06-20 | End: 2022-06-20

## 2022-06-20 RX ORDER — POTASSIUM CHLORIDE 20 MEQ/1
40 TABLET, EXTENDED RELEASE ORAL PRN
OUTPATIENT
Start: 2022-06-21

## 2022-06-20 RX ORDER — ONDANSETRON 4 MG/1
8 TABLET, FILM COATED ORAL ONCE
OUTPATIENT
Start: 2022-06-21 | End: 2022-06-21

## 2022-06-20 RX ORDER — ONDANSETRON 2 MG/ML
8 INJECTION INTRAMUSCULAR; INTRAVENOUS ONCE
Status: DISCONTINUED | OUTPATIENT
Start: 2022-06-20 | End: 2022-06-21 | Stop reason: HOSPADM

## 2022-06-20 RX ORDER — SODIUM CHLORIDE 9 MG/ML
INJECTION, SOLUTION INTRAVENOUS ONCE
Status: DISCONTINUED | OUTPATIENT
Start: 2022-06-20 | End: 2022-06-21 | Stop reason: HOSPADM

## 2022-06-20 RX ORDER — PROCHLORPERAZINE MALEATE 10 MG
10 TABLET ORAL EVERY 6 HOURS PRN
Status: DISCONTINUED | OUTPATIENT
Start: 2022-06-20 | End: 2022-06-21 | Stop reason: HOSPADM

## 2022-06-20 RX ORDER — MAGNESIUM SULFATE IN WATER 40 MG/ML
4000 INJECTION, SOLUTION INTRAVENOUS PRN
Status: DISCONTINUED | OUTPATIENT
Start: 2022-06-20 | End: 2022-06-21 | Stop reason: HOSPADM

## 2022-06-20 RX ORDER — PROCHLORPERAZINE MALEATE 10 MG
10 TABLET ORAL EVERY 6 HOURS PRN
OUTPATIENT
Start: 2022-06-21

## 2022-06-20 RX ORDER — ONDANSETRON 2 MG/ML
8 INJECTION INTRAMUSCULAR; INTRAVENOUS ONCE
OUTPATIENT
Start: 2022-06-21 | End: 2022-06-21

## 2022-06-20 RX ORDER — POTASSIUM CHLORIDE 20 MEQ/1
40 TABLET, EXTENDED RELEASE ORAL PRN
Status: DISCONTINUED | OUTPATIENT
Start: 2022-06-20 | End: 2022-06-21 | Stop reason: HOSPADM

## 2022-06-20 RX ORDER — OXYCODONE HYDROCHLORIDE 5 MG/1
5 TABLET ORAL EVERY 4 HOURS PRN
Status: DISCONTINUED | OUTPATIENT
Start: 2022-06-20 | End: 2022-06-21 | Stop reason: HOSPADM

## 2022-06-20 RX ORDER — OXYCODONE HYDROCHLORIDE 5 MG/1
5 TABLET ORAL EVERY 4 HOURS PRN
OUTPATIENT
Start: 2022-06-21

## 2022-06-20 RX ORDER — MAGNESIUM SULFATE IN WATER 40 MG/ML
4000 INJECTION, SOLUTION INTRAVENOUS PRN
OUTPATIENT
Start: 2022-06-21

## 2022-06-20 RX ORDER — SODIUM CHLORIDE 9 MG/ML
INJECTION, SOLUTION INTRAVENOUS ONCE
OUTPATIENT
Start: 2022-06-21 | End: 2022-06-21

## 2022-06-20 RX ORDER — OXYCODONE HYDROCHLORIDE 5 MG/1
10 TABLET ORAL EVERY 4 HOURS PRN
Status: DISCONTINUED | OUTPATIENT
Start: 2022-06-20 | End: 2022-06-21 | Stop reason: HOSPADM

## 2022-06-20 RX ORDER — OXYCODONE HYDROCHLORIDE 5 MG/1
10 TABLET ORAL EVERY 4 HOURS PRN
OUTPATIENT
Start: 2022-06-21

## 2022-06-20 RX ORDER — 0.9 % SODIUM CHLORIDE 0.9 %
1000 INTRAVENOUS SOLUTION INTRAVENOUS ONCE
OUTPATIENT
Start: 2022-06-21 | End: 2022-06-21

## 2022-06-20 RX ORDER — PROCHLORPERAZINE EDISYLATE 5 MG/ML
10 INJECTION INTRAMUSCULAR; INTRAVENOUS EVERY 6 HOURS PRN
OUTPATIENT
Start: 2022-06-21

## 2022-06-20 RX ORDER — ONDANSETRON 4 MG/1
8 TABLET, FILM COATED ORAL ONCE
Status: DISCONTINUED | OUTPATIENT
Start: 2022-06-20 | End: 2022-06-21 | Stop reason: HOSPADM

## 2022-06-20 RX ADMIN — SODIUM CHLORIDE 1000 ML: 9 INJECTION, SOLUTION INTRAVENOUS at 08:57

## 2022-06-20 RX ADMIN — SODIUM CHLORIDE 1000 MG: 900 INJECTION INTRAVENOUS at 09:01

## 2022-06-20 RX ADMIN — SODIUM CHLORIDE, PRESERVATIVE FREE 500 UNITS: 5 INJECTION INTRAVENOUS at 11:24

## 2022-06-20 NOTE — PROGRESS NOTES
[facility] Progress Note      2022    Afshan Reusing    :  1940    MRN:  [id number]    Referring MD: Lisa Cruz, APRN - NP  [address] [place],  [address]    Subjective:d doing well. Last day of antibiotics today. He feels well. Will go to [facility] on wednesday     ECOG PS:  (1) Restricted in physically strenuous activity, ambulatory and able to do work of light nature    KPS: 80% Normal activity with effort; some signs or symptoms of disease    Isolation:  None     Medications    Scheduled Meds:    Continuous Infusions:    PRN Meds:. ROS:  As noted above, otherwise remainder of 10-point ROS negative      Physical Exam:     Vital Signs:  BP (!) 148/78   Pulse 59   Temp 97.6 °F (36.4 °C) (Oral)   Resp 20   Wt 179 lb 14.3 oz (81.6 kg)   SpO2 98%   BMI 24.40 kg/m²     Weight:    Wt Readings from Last 3 Encounters:   22 179 lb 14.3 oz (81.6 kg)   22 179 lb 0.2 oz (81.2 kg)   22 177 lb 4 oz (80.4 kg)       General: Awake, alert and oriented    HEENT: normocephalic, alopecia, PERRL, no scleral erythema or icterus, Oral mucosa moist and intact, throat clear.    NECK: supple   BACK: Straight,  SKIN: warm dry and intact without lesions rashes or masses  CHEST: CTA bilaterally without use of accessory muscles  CV: Normal S1 S2, RRR, no MRG  ABD: NT ND normoactive BS, no palpable masses or hepatosplenomegaly  EXTREMITIES: chronic edema in LLE, denies calf tenderness  NEURO: CN II - XII grossly intact  CATHETER:  Left IJ PAC (22, [facility]): CDI    Laboratory Data:  CBC:   Recent Labs     22  0846 22  0845 22  0855   WBC 3.6* 3.2* 3.6*   HGB 9.4* 9.2* 9.2*   HCT 27.6* 27.8* 27.8*   MCV 96.4 97.6 98.1    163 175     BMP/Mag:  Recent Labs     22  0846 22  0845 22  0855    140 139   K 3.8 4.0 4.2    104 104   CO2 27 27 27   PHOS 2.9 3.1 2.8   BUN 17 17 18   CREATININE 0.9 1.0 0.9   MG  --   --  1.80 LIVP:   Recent Labs     06/20/22  0855   AST 20   ALT 22   BILIDIR <0.2   BILITOT <0.2   ALKPHOS 101     Coags:   Recent Labs     06/20/22  0855   PROTIME 14.2   INR 1.11     Uric Acid   Recent Labs     06/20/22  0855   LABURIC 5.2     Lab Results   Component Value Date    CRP <3.0 06/20/2022    CRP <3.0 06/19/2022    CRP <3.0 06/18/2022     Lab Results   Component Value Date    FERRITIN 449.0 (H) 06/20/2022    FERRITIN 496.1 (H) 06/19/2022    FERRITIN 487.2 (H) 06/18/2022         PROBLEM LIST:          1. Follicular lymphoma with BM involvement  2. DLBC NHL  3. Atrial fibrillation w/ RVR (5/2022)  4. Grade 1 CRS  5. Parainfluenza 3 virus / PNA (5/2022)  5. Enterobacter bacteremia / sepsis (6/2022)      TREATMENT:            FL:   1  CHOP (1/1995)  2. Rituxan (11/2003 - 8/2004)  3. R-CHOP (7/2007)      DLBCL:   1.  R-[place]/Ox x 2 cycles (1/31/22 & 2/14/22)  2. Chip-BR x 2 cycles  (3/22/22 & 4/20/22)  3. CAR-T w/ Mitchell Lara                      Lymphodepleting Chemotherapy:  Fludarabine and cyclophosphamide   Disease Status at time of Infusion:  Progressive Disease  CAR-T Infusion Date: 5/23/22  CAR-T Product:  Breyanzi  Batch ID Number:  [id number]      ASSESSMENT AND PLAN:           1. 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 These may be infectious. These were not described on the previous PET/CT. Pulmonary lymphoma would be difficult to exclude. If the pulmonary nodule represents lymphoma, this would represent a Deauville score 5 exam. If this is infectious, overall the exam would represent a Deauville score 1.  Previously described lymphadenopathy in FDG activity in the left retroperitoneum, left iliac region and in left upper thigh has completely resolved. No residual lymphadenopathy or FDG avid lymph node identified      PLAN:   S/p lymphodepleting Chemotherapy w/ Fludarabine and cyclophosphamide & CAR-T w/ Breyanzi (5/23/22)      Day + 28     2. CRS / Neuro:   - Ferritin & CRP trending down   - H/o Grade 1 CRS  - Monitor CRP and Ferrtin closely       Lab Results   Component Value Date    CRP <3.0 06/20/2022    CRP <3.0 06/19/2022    CRP <3.0 06/18/2022     Lab Results   Component Value Date    FERRITIN 449.0 (H) 06/20/2022    FERRITIN 496.1 (H) 06/19/2022    FERRITIN 487.2 (H) 06/18/2022     - Neuro checks w/ CARTOX 10-point assessment Q4hrs    Toxicity Grading    CRS stGstrstastdstest:st st1st ICANS stGstrstastdstest:st st1st ICE Score:  CAR-T Score: 10    3. ID:  Afebrile, recently admitted with sepsis (POA) r/t Enterobacter bacteremia, fever from CRS d/t CAR-T was ruled out. - CXR & UA 6/13/22: Negative   - Restart Levaquin and Diflucan ppx if ANC < 1.5  - RR Viral Panel (5/28/22): Parainfluenza 3 Virus  - Blood Cx x 2 bottles (6/7/22): Enterobacter   - LA (6/7/22): 2.6 & Procalcitonin - 47, 90 (6/8/22); trended down  - Cont Acyclovir ppx  - Cont daily Ertapenem (6/10/22) Day +  14/14 of total therapy     Abx history:  Merrem x 2 days (6/8/22-6/9/22)  Vanco x 2 days (6/7/22)  Invanz & Cefepime Day x 1 day (6/7/22)     4. Heme: Anemia and thrombocytopenia from chemotherapy   - Transfuse for Hgb < 7 and Platelets < 66A  - No transfusion today     5. Metabolic: Hyperglycemia otherwise stable electrolytes & renal fx  TLS:  No evidence, s/p allopurinol (stopped  6/2/22)  -1 L NS bolus daily     6. Cardiac:   - H/o A. Fib w/ RVR (5/28/22)  - Echo (3/21/22): Centric mild left ventricular hypertrophy. Left ventricular ejection fraction 55 to 60% with no regional wall motion abnormalities noted. Intermittent diastolic dysfunction.   A. Fib:    - Cont Metoprolol 25 mg BID (started 5/29/22)  - No indication for AC at this time (per cardiology)     7. GI / Nutrition:    Nutrition: Appetite and oral intake is good. - S/p taking Astragalus supplement (stopped 5/18/22 d/t interaction with Cytoxan) - can resume on d/c   - Cont low microbial diet   - Follow closely with dietary     - Disposition:Will follow up with [facility] on Wednesday.     Maury Coto DO

## 2022-06-20 NOTE — PROGRESS NOTES
Short Stay Communication Note  Riana Rodarte  Diagnosis: Diffuse Large B-cell Lymphoma  Primary MD: Dr. Pat Diaz  Treatment: Melphalan Fludarabine/Cytoxan  Day +29 of Car-T Kar[place]   Pt seen in outpatient infusion today. Labs drawn and reviewed. CBC:   Recent Labs     06/18/22  0846 06/19/22  0845 06/20/22  0855   WBC 3.6* 3.2* 3.6*   HGB 9.4* 9.2* 9.2*   HCT 27.6* 27.8* 27.8*   MCV 96.4 97.6 98.1    163 175     BMP/Mag:  Recent Labs     06/18/22  0846 06/19/22  0845    140   K 3.8 4.0    104   CO2 27 27   PHOS 2.9 3.1   BUN 17 17   CREATININE 0.9 1.0     Standing parameters for replacement for this patient:   1 unit of pack red blood cells for a hemoglobin < or equal to 7.0   1 pack of platelets for a platelet count less than or equal to 10.0  40 MeQ of Potassium administered for a potassium level less than or equal to 3.4  4g of Magnesium Sulfate for a magnesum level less than or equal to 1.4  No transfusions required for the above lab values. Urinalysis last done: 6/19/2022 Urinalysis next due: 6/26/2022    Chest X-Ray last done: 6/13/2022 Chest X-Ray next due: 6/20/2022    Symptoms addressed and reported to care team this date:   Treatments this date: Lab, IV Fluids, Invanz    Reviewed medication schedule with pt and caregiver. Both able to verbalize all medications and schedule. Pt to be seen again tomorrow. Patient and caregiver verbalized understanding of discharge instructions including when and how to call the doctor and when to report  to the ER. Discharged ambulatory to home. Port flushed, heparin locked, de-accessed.  Electronically signed by Karlee Kam RN on 6/20/2022 at 10:11 AM

## 2022-07-11 LAB
CULTURE, FUNGUS BLOOD: NORMAL
CULTURE, FUNGUS BLOOD: NORMAL
FUNGUS (MYCOLOGY) CULTURE: NORMAL
FUNGUS STAIN: NORMAL

## 2022-07-12 LAB — CULTURE, FUNGUS BLOOD: NORMAL

## 2022-11-18 ENCOUNTER — HOSPITAL ENCOUNTER (OUTPATIENT)
Dept: PHYSICAL THERAPY | Age: 82
Setting detail: THERAPIES SERIES
Discharge: HOME | End: 2022-11-18
Payer: MEDICARE

## 2022-11-18 PROCEDURE — 97530 THERAPEUTIC ACTIVITIES: CPT

## 2022-11-18 PROCEDURE — 97110 THERAPEUTIC EXERCISES: CPT

## 2022-11-18 PROCEDURE — 97161 PT EVAL LOW COMPLEX 20 MIN: CPT

## 2022-11-18 NOTE — FLOWSHEET NOTE
[facility] Outpatient Therapy  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), Edema (R60.0)   LLE Lymphedema  Insurance/Certification information:   Medicare, BMN, no prior auth  Physician Information:  Alfonso Flores MD   Plan of care signed:    [] Yes  [x] No    Date of Patient follow up with Physician: unknown     Progress Report: []  Yes  [x]  No     Date Range for reporting period:  Beginnin22  Ending:      Progress report due (10 Rx/or 30 days whichever is less):      Recertification due (POC duration/ or 90 days whichever is less): 23     Visit # Insurance Allowable Auth Needed    BMN []Yes   [x]No     RESTRICTIONS/PRECAUTIONS: over 63 y.o., past cancer  Latex Allergy:  [x]NO      []YES  Preferred Language for Healthcare:   [x]English       []other:  PT G-CODES   Functional assessment tool used: LLIS  Initial Eval      15% impaired         Pain level:  denies    SUBJECTIVE:    Onset date: Pt reports that he was diagnosed with lymphoma 28 years ago. His lymph issues related to his lymphoma have always involved his lymph nodes in his neck. He began to notice swelling in his LLE in 2021 while tailgating at a [employer] football game. He had maybe had some swelling prior to that, but it had never been as painful as during that tailgating event. He thought the pain and swelling was due to his arthritis from his broken leg 30 years ago. He had gone to [facility] for a cortisone shot, which helped. When it started again in 2021, he assumed it was just time for another shot. When he went back to [facility], they told him it wasn't arthritis.   He does not recall the severity of the swelling, or if it was coming and going, he just remembers that it was gradually getting worse and the pain was terrible. It continued to get worse until that [place], he went to get chemo at [facility], got a sonogram, then a biopsy of his lymph nodes in his L knee in December 2021. At that time his entire leg was swollen. It took time for his leg swelling to go away, but he wasn't sure if this was due to the chemo, biopsy, or time.   He is confused as to why the swelling has remained in his foot, which led him to see his MD.  Course of tx: custom compression stocking, ankle pumps (which he does not feel are effective)  Pain Level, Description, Location: denies  Aggravating factors: nothing  Alleviating factors: nothing    OBJECTIVE: See eval  Observation:    Lower Extremity skin:  Pitting (0-no pitting; 1+ tissue return to normal immediately; 2+ tissue return 15-30 seconds; 3+ tissue return 1-1.5 mins; 4+ tissue return 2-3 mins; N/A - indurated): N/A LLE  Color (dusky/mottled/red streaks/other): slightly darker on LLE vs RLE  Skin Texture (rough/dry/moist/normal): slightly dryer LLE vs RLE  Skin Temperature (normal/cool/uneven/warm/hot): normal  Edema Rebound*: (quick/slow/fibrotic tissue): fibrotic LLE below knee  *pressure applied x10 seconds  Scars: broken leg LLE medial/lateral scars from repair 30 years ago  Hyperkeratosis: (abnormal thickening of the outer layer of the skin): lower LLE  Hyperplasia: lower LLE  Hyperpigmentation: lower LLE     Signs of Constriction: none  Condition of Nailbeds (discolored/red/white/swollen): Good capillary refill noted for all toes bilaterally (<3 seconds)     Skin Breakdown (indicate size & number-also note location on body drawings): none     Fistulas (an abnormal passageway): none  Tinea (fungus): none  Papilloma (benign tumor arising from an epithelial layer): none  Fibrotic areas: lower LLE  Lymphorrhea (flow of lymph from a cut or ruptured lymph vessels): none  Warts (a local growth of the outer layer of skin): none  Ulcers: none     Stemmer sign (positive/negative): positive     Posture: forward head, rounded shoulders  Palpation: no tenderness to palpation  Sensation: intact to light touch over all BLE dermatomes, pt denies numbness/tingling        LE AROM                                                   Right                Left  DF: [facility]                 WFL  PF:                                     WFL                 WFL  Knee Flexion:                    [facility]                 WFL        LE Strength:                                                   Right                Left  Hip flexion:                        5/5 5/5     Knee Extension:    5/5 5/5  Knee Flexion:                    5/5 5/5   DF:                         5/5 5/5  PF:                         5/5 5/5          GIRTH MEASUREMENT FLOW SHEET:     R Lower Extremity  (cm) @ IE L Lower Extremity  (cm) @ IE    Great toe (figure 8)  8 8   Largest Foot Girth (6cm from MTP) 22.5 23   T-angle (end DF) 25.5 26.5   Largest Calf Girth (35cm from floor) 26.5 28   Knee 42 42   Largest Thigh Girth (10cm from knee) 43.5 45.5   Smallest Ankle Girth 24 26   Length from Smallest ankle to 2fingers below patella 36 36   Other N/A N/A      Gait Assessment: normal arcelia, slight forward flexion     Exercises/Interventions: Exercises in bold performed in department today. Items not bolded are carried forward from prior visits for continuity of the record. Exercise/Equipment Resistance/Repetitions HEP Other comments   Self MLD 5-7reps [x] PT instructing pt in self-MLD for HEP. Pt performed 5-7 reps stationary circles at short neck (no cervical due to pt's age), pressure to abdomen with deep breathing, and 5-7 stationary circles to groin and posterior knees.   PT demonstrating appropriate pressure for self-MLD to pt, pt verbalized understanding and able to demonstrate appropriate strokes. Seated ankle pumps [id number] [x]    Seated marches [id number] [x]    Seated LAQs [id number] [x]    Standing heel raises [id number] [x]      []      []      []      []      []      []      []      []      []      []      []      []      []      Home Exercise Program: Self MLD twice a day to short neck (no cervical due to pt's age), abdomen, groin, and posterior knees. Self MLD encouraged to be done prior to use of compression pump for improved clearance. Keep skin moist and clean. 2x10 reps ankle pumps, marches, LAQs, heel raises, twice a day. Ambulate as much as possible. Pt given written handout of all HEP. Therapeutic Exercise:   [x] (14971) Provided verbal/tactile cueing for activities related to strengthening, flexibility, endurance, ROM for improvements in LE, proximal hip, and core control with self-care, mobility, lifting, ambulation. NMR:  [] (97727) Provided verbal/tactile cueing for activities related to improving balance, coordination, kinesthetic sense, posture, motor skill, proprioception to assist with LE, proximal hip, and core control in self-care, mobility, lifting, ambulation and eccentric single leg control. Therapeutic Activities:    [x] (51039) Provided verbal/tactile cueing for activities related to improving balance, coordination, kinesthetic sense, posture, motor skill, proprioception and motor activation to allow for proper function of core, proximal hip and LE with self-care and ADLs and functional mobility. Pt educated on definition of lymphedema as well as anatomy of lymphatic system. Pt educated on parts of CDT, including MLD, compression, exercise, and skin care. PT also discussed importance of elevation for reduction of swelling, as well as necessity to keep skin moist and clean. Pt given handout of signs and symptoms of infection and reduction of lymphedema.   Pt educated on pitting edema, skin quality, and prognosis of skin recovery. Pt educated on importance of compression and PT discussed options for future purchase including custom compression garments and velcro options. Pt educated on difference between circular knit compression stocking vs flat knit (custom). Pt has been sized for custom garments, but feels these do not work at this point. PT recommending [employer] Basic Velcro wrap (Small, Regular) for LLE. Pt given handout with recommended garments. Pt may purchase garments through an online site such as [email or web address], [employer], [employer], etc. or may attempt to find garments in-store at [facility], [address] [place] [address], etc. per pt preference. Pt educated to not don garments until PT is able to assess fit to avoid pt being unable to return/exchange them if they are too small/large. Pt educated on benefits and use of a lymphedema pump. Pt giving PT permission to send their information to a pump company to assess if a pump will be covered by insurance. Pt educated that if he would like to be seen more often, or would like to go to a clinic that has lymph pumps on site, PT is able to facilitate a transfer to a different clinic. Pt educated that lymphedema is a chronic condition. The symptoms may resolve, but there is always a potential that swelling will return if the pt does not remain diligent with compression and lymph care at home. Ultimately, it is this PT's goal to help pt become independent with lymph care at home. Pt may be discharged prior to achieving full reduction of swelling, as this could take months/years to fully reduce depending on severity. However, pt should feel fully confident in their ability to manage their symptoms at discharge. Pt verbalized understanding to all education.     Gait Training:    [] (67319) Provided training and instruction to the patient for proper LE, core and proximal hip recruitment and positioning and eccentric body weight control with ambulation re-education including up and down stairs     Manual Treatments:  PROM / STM / Oscillations-Mobs:  G-I, II, III, IV (PA's, Inf., Post.)  [] (08396) Provided manual therapy to mobilize LE, proximal hip and/or LS spine soft tissue/joints for the purpose of modulating pain, promoting relaxation,  increasing ROM, reducing/eliminating soft tissue swelling/inflammation/restriction, improving soft tissue extensibility and allowing for proper ROM for normal function with self-care, mobility, lifting and ambulation. Home Exercise Program:    [x] (77146) Reviewed/Progressed HEP activities related to strengthening, flexibility, endurance, ROM of core, proximal hip and LE for functional self-care, mobility, lifting and ambulation/stair navigation   [] (88899) Reviewed/Progressed HEP activities related to improving balance, coordination, kinesthetic sense, posture, motor skill, proprioception of core, proximal hip and LE for self-care, mobility, lifting, and ambulation/stair navigation      Modalities:    [] Electric Stimulation:   [] Ultrasound:   [] Other:       Charges:  Timed Code Treatment Minutes: 125 (20 min low eval, 90 min TA, 15 min TE)   Total Treatment Minutes: 125      [x] EVAL (LOW) 85529 (typically 20 minutes face-to-face)  [] EVAL (MOD) 50998 (typically 30 minutes face-to-face)  [] EVAL (HIGH) 12674 (typically 45 minutes face-to-face)  [] RE-EVAL     [x] QF(22185) x   1    [] NMR (58765) x       [] Manual (89741) x       [x] TA (95502) x   6    [] Gait Training [phone]) x       [] ES(attended) (60952)  [] ES (un) ([phone])   [] DRY NEEDLE 1 OR 2 MUSCLES  [] DRY NEEDLE 3+ MUSCLES  [] Mech Traction (09179)  [] Ultrasound (98235)  [] Other:    If BWC Please Indicate Time In/Out  CPT Code Time in Time out                                   GOALS:   Pt's Goal: \"To get rid of swelling in my foot. \"  [] Progressing: [] Met: [] Not Met: [] Adjusted     Short term goals (to be met in 6 weeks) Short term goal 1: Pt will verbalize at least 3 ways to reduce risk of lymphedema   [] Progressing: [] Met: [] Not Met: [] Adjusted  Short term goal 2: Pt will verbalize at least 3 signs/symptoms of infection  [] Progressing: [] Met: [] Not Met: [] Adjusted     Long term goals (to be met in 12 weeks)   Long term goal 1: Pt will verbalize good understanding of techniques (HEP and MLD) to perform if lymphedema is exacerbated in the future. [] Progressing: [] Met: [] Not Met: [] Adjusted  Long term goal 2: Pt will be independent with compression techniques if appropriate. [] Progressing: [] Met: [] Not Met: [] Adjusted  Long term goal 3: Pt will improve on LLIS by 5 points. [] Progressing: [] Met: [] Not Met: [] Adjusted  Long term goal 4: Pt will decrease girth at each position on LE by 1 cm. [] Progressing: [] Met: [] Not Met: [] Adjusted    ASSESSMENT:  Pt is an [de-identified] y.o. male with diagnosis of LLE lymphedema presenting with stubborn swelling in foot and fibrosis. Pt's symptoms are limiting him from donning desired footwear and impacting his balance. Pt's symptoms are also significantly affecting his self-image. Pt will benefit from CDT to decrease swelling, improve balance, and reduce risk of infection. Treatment/Activity Tolerance:  [x] Patient tolerated treatment well [] Patient limited by fatique  [] Patient limited by pain  [] Patient limited by other medical complications  [] Other:     Overall Progression Towards Functional goals/ Treatment Progress Update:  [] Patient is progressing as expected towards functional goals listed. [] Progression is slowed due to complexities/Impairments listed. [] Progression has been slowed due to co-morbidities.   [x] Plan just implemented, too soon to assess goals progression <30days   [] Goals require adjustment due to lack of progress  [] Patient is not progressing as expected and requires additional follow up with physician  [] Other    Prognosis for POC: [x] Good [] Fair  [] Poor    Patient requires continued skilled intervention: [x] Yes  [] No        PLAN: See eval, 1-2x/week, for 12 weeks  [] Continue per plan of care [] Alter current plan (see comments)  [x] Plan of care initiated [] Hold pending MD visit [] Discharge    Electronically signed by: Rut Camara PT    Note: If patient does not return for scheduled/recommended follow up visits, this note will serve as a discharge from care along with the most recent update on progress.

## 2022-11-18 NOTE — PROGRESS NOTES
Lymphedema Life Impact Scale    Patient: Silvia Ho  : 1940  MRN: [id number]  Date: 2022  Electronically Signed by: Curt Delgado PT    Listed below are symptoms or problems reported by many individuals with lymphedema. Please indicate to what extent these problems associated with your lymphedema has affected you in the past week. [place] the number which best describes your symptom level. I. Physical Concerns (NOTE: If swelling and symptoms are the same in both limbs, rate them the same; otherwise, rate only the worst limb)    1 The amount of pain associated with my lymphedema is: [x]0  No Pain []1 []2  []3 []4  Severe Pain   2 The amount of limb heaviness associated with my lymphedema is: []0  No heaviness []1 [x]2 []3 []4  Extremely heavy   3 The amount of skin tightness associated with my lymphedema is: []0  No tightness []1 [x]2 []3 []4  Extremely tight   4 The size of my swollen limb(s) seems: []0  Normal size [x]1 []2 []3 []4  Extremely large   5 Lymphedema affects the movement of my swollen limbs:  [x]0  Normal movement []1 []2 []3 []4  Extremely limited   6 The strength of my swollen limb(s) is: [x]0  Normal strength []1 []2 []3 []4  Extremely weak     II. Psychosocial Concerns    7   Lymphedema affects my body image (how I think I look): [x]0  Not at all []1 []2  []3 []4  Completely   8 Lymphedema affects my socializing with others [x]0  No interference []1 []2 []3 []4  Interferes completely   9   Lymphedema affects my intimate relations with spouse or partner (rate 0 if not applicable). [x]0  No interference []1 []2 []3 []4  Interferes completely   10 Lymphedema gets me down (i.e., I have feelings of depression, frustration, or anger due to the lymphedema [x]0  Never []1 []2 []3 []4  Constantly   11 I must rely on others for help due to my lymphedema.  [x]0  Not at all []1 []2 []3 []4  Completely   12   I know what to do to manage my lymphedema []0  Good understand-ing  []1 []2 []3 [x]4  No Understand-ing     III. Functional Concerns    13   Lymphedema affects my ability to perform self-care activities (i.e., eating, dressing, hygiene). [x]0  No interference []1 []2  []3 []4  Interferes completely   14 Lymphedema affects my ability to perform routine home or work-related activities. [x]0  No interference []1 []2 []3 []4  Interferes completely   15   Lymphedema affects my performance of preferred leisure activities. [x]0  No interference []1 []2 []3 []4  Interferes completely   16 Lymphedema affects the proper fit of clothing/shoes. []0  Fits normally [x]1 []2 []3 []4  Unable to wear   17 Lymphedema affects my sleep. [x]0  No interference []1 []2 []3 []4  Interferes completely       III.  Infection Occurrence    18   In the past year, I have become ill with an infection in my swollen limb requiring oral antibiotics or hospitalization [x]0   []1x []2x  []3x []4+         15% impaired

## 2022-11-18 NOTE — PROGRESS NOTES
[facility]  [address], [place] [address], [address]  Phone: [phone]   Fax: [phone]                                                       Jama Cisneros    Dear Dr. Ynes Christy,    We had the pleasure of evaluating the following patient for physical therapy services at [facility] ([facility]). A summary of our findings can be found in the initial assessment below. This includes our plan of care. If you have any questions or concerns regarding these findings, please do not hesitate to contact me at the office phone number checked above. Thank you for the referral.       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   [] Yes, Patient intake triggered further evaluation      [] C-SSRS Screening completed  [] PCP notified via Plan of Care  [] Emergency services notified    Chart reviewed and pt assessed for rehab services. SUBJECTIVE   Onset date: Pt reports that he was diagnosed with lymphoma 28 years ago. His lymph issues related to his lymphoma have always involved his lymph nodes in his neck. He began to notice swelling in his LLE in 2021 while tailgating at a [employer] football game.   He had maybe had some swelling prior to that, but it had never been as painful as during that tailgating event. He thought the pain and swelling was due to his arthritis from his broken leg 30 years ago. He had gone to [facility] for a cortisone shot, which helped. When it started again in 2021, he assumed it was just time for another shot. When he went back to [facility], they told him it wasn't arthritis. He does not recall the severity of the swelling, or if it was coming and going, he just remembers that it was gradually getting worse and the pain was terrible. It continued to get worse until that Jose Armando, he went to get chemo at [facility], got a sonogram, then a biopsy of his lymph nodes in his L knee in 2021. At that time his entire leg was swollen. It took time for his leg swelling to go away, but he wasn't sure if this was due to the chemo, biopsy, or time. He is confused as to why the swelling has remained in his foot, which led him to see his MD.  Course of tx: custom compression stocking, ankle pumps (which he does not feel are effective)  Pain Level, Description, Location: denies  Aggravating factors: nothing  Alleviating factors: nothing    CONSERVATIVE TREATMENT:   Patient has tried conservative treatments (compression/exercise/elevation): Yes   Instructed on self-manual lymphatic drainage techniques (self-MLD): No   Instructed on appropriate skin/nail care practices: Yes   Compression garments of at least 20-30mmH-5 months   Bandaging: None  Elevation: >1 year   Exercise: >1 year    Current Functional Limitations:   []Yes   [x]No  Functional Complaints:      PLOF:   [x]No functional limitations   []Pre-exisiting limitations:  Pt's sleep is affected?    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  Hobbies: \"fix things\"    Equipment:    []Rolling walker   []Standard walker   []Rollator   []Chato-walker  []Wheelchair   []Quad cane   []Straight cane  []Bedside commode  []Hospital bed  Other:       Relevant Medical History: cancer    Co-morbidities/Complexities (which will affect course of rehabilitation):   []None           Arthritic conditions   []Rheumatoid arthritis (M05.9)  []Osteoarthritis (M19.91)   Cardiovascular conditions   []Hypertension (I10)  []Hyperlipidemia (E78.5)  []Angina pectoris (I20)  []Atherosclerosis (I70)   Musculoskeletal conditions   []Disc pathology   []Congenital spine pathologies   []Prior surgical intervention  []Osteoporosis (M81.8)  []Osteopenia (M85.8)   Endocrine conditions   []Hypothyroid (E03.9)  []Hyperthyroid Gastrointestinal conditions   []Constipation (O71.27)   Metabolic conditions   []Morbid obesity (E66.01)  []Diabetes type 1(E10.65) or 2 (E11.65)   []Neuropathy (G60.9)     Pulmonary conditions   []Asthma (J45)  []Coughing   []COPD (J44.9)   Psychological Disorders  []Anxiety (F41.9)  []Depression (F32.9)   []Other:   []Covid-19     []Other:              OBJECTIVE   Lower Extremity skin:  Pitting (0-no pitting; 1+ tissue return to normal immediately; 2+ tissue return 15-30 seconds; 3+ tissue return 1-1.5 mins; 4+ tissue return 2-3 mins; N/A - indurated): N/A LLE  Color (dusky/mottled/red streaks/other): slightly darker on LLE vs RLE  Skin Texture (rough/dry/moist/normal): slightly dryer LLE vs RLE  Skin Temperature (normal/cool/uneven/warm/hot): normal  Edema Rebound*: (quick/slow/fibrotic tissue): fibrotic LLE below knee  *pressure applied x10 seconds  Scars: broken leg LLE medial/lateral scars from repair 30 years ago  Hyperkeratosis: (abnormal thickening of the outer layer of the skin): lower LLE  Hyperplasia: lower LLE  Hyperpigmentation: lower LLE     Signs of Constriction: none  Condition of Nailbeds (discolored/red/white/swollen): Good capillary refill noted for all toes bilaterally (<3 seconds)    Skin Breakdown (indicate size & number-also note location on body drawings): none    Fistulas (an abnormal passageway): none  Tinea (fungus): none  Papilloma (benign tumor arising from an epithelial layer): none  Fibrotic areas: lower LLE  Lymphorrhea (flow of lymph from a cut or ruptured lymph vessels): none  Warts (a local growth of the outer layer of skin): none  Ulcers: none    Stemmer sign (positive/negative): positive    Posture: forward head, rounded shoulders  Palpation: no tenderness to palpation  Sensation: intact to light touch over all BLE dermatomes, pt denies numbness/tingling    LE AROM       Right  Left  DF:    [facility]   WFL  PF:    WFL  WFL  Knee Flexion:  [facility]  WF  LE Strength:       Right  Left  Hip flexion:  5/5  5/5     Knee Extension: 5/5    5/5  Knee Flexion:  5/5  5/5   DF:   5/5  5/5  PF:   5/5  5/5        GIRTH MEASUREMENT FLOW SHEET:    R Lower Extremity  (cm) @ IE L Lower Extremity  (cm) @ IE    Great toe (figure 8)  8 8   Largest Foot Girth (6cm from MTP) 22.5 23   T-angle (end DF) 25.5 26.5   Largest Calf Girth (35cm from floor) 26.5 28   Knee 42 42   Largest Thigh Girth (10cm from knee) 43.5 45.5   Smallest Ankle Girth 24 26   Length from Smallest ankle to 2fingers below patella 36 36   Other N/A N/A     Gait Assessment: normal arcelia, slight forward flexion    Stage of lymphedema:  [] Latency stage/Lymphangiopathy (Stage 0 / Prestage / Subclinical stage):    No swelling  Reduced transport capacity (TC)  \"Normal\" tissue consistency  [] Stage 1 (reversible stage):  Edema is soft (pitting)  No secondary tissue changes  Elevation reduces swelling  [x] Stage 2 (spontaneously irreversible stage)  Lyphostatic fibrosis  Hardening of the tissue (no pitting)  Stemmer sign positive   Frequent infections   [] Stage 3 (lymphostatic elephantiasis)  Extreme increase in volume and tissue texture with typical skin changes (papillomas, deep skinfolds, etc.)  Stemmer sign positive               [x] Patient history, allergies, meds reviewed. Medical chart reviewed. See intake form. Review Of Systems (ROS):  [x]Performed Review of systems (Integumentary, CardioPulmonary, Neurological) by intake and observation. Intake form has been scanned into medical record. Patient has been instructed to contact their primary care physician regarding ROS issues if not already being addressed at this time. Barriers to/and or personal factors that will affect rehab potential:              [x]Age  [x]Sex    []Smoker              []Motivation/Lack of Motivation                        [x]Co-Morbidities              []Cognitive Function, education/learning barriers              []Environmental, home barriers              []profession/work barriers  []past PT/medical experience  []other:  Justification:     Falls Risk Assessment (30 days):   [x] Falls Risk assessed and no intervention required. [] Falls Risk assessed and Patient requires intervention due to being higher risk   TUG score (>12s at risk):     [] Falls education provided, including:     ASSESSMENT   Assessment: Pt is an [de-identified] y.o. male with diagnosis of LLE lymphedema presenting with stubborn swelling in foot and fibrosis. Pt's symptoms are limiting him from donning desired footwear and impacting his balance. Pt's symptoms are also significantly affecting his self-image. Pt will benefit from CDT to decrease swelling, improve balance, and reduce risk of infection.       Functional Impairments:     [x]Increased swelling/girth of limb or breast/trunk   []Cording    []Decreased spinal/LE functional ROM   []Abnormal reflexes/sensation/myotomal/dermatomal deficits   []Decreased LE/core strength and neuromuscular control    []Decreased LE functional strength   []other:       Functional Activity Limitations (from functional questionnaire and intake)   []Reduced ability to tolerate prolonged functional positions   []Reduced ability or difficulty with changes of positions or transfers between positions   []Reduced ability to maintain good posture and demonstrate good body mechanics with sitting, bending, and lifting   [x] Reduced ability or tolerance with driving and/or computer work   []Reduced ability to perform lifting, reaching, carrying tasks   []Reduced ability to sleep    []other:     Participation Restrictions   []Reduced participation in self care activities   []Reduced participation in home management activities   [x]Reduced participation in work activities   [x]Reduced participation in social activities. [x]Reduced participation in sport/recreational activities. Classification :    []Signs/symptoms consistent with post-surgical status including decreased ROM, strength and function.     [x]Signs/symptoms consistent with lymphedema   []Signs/symptoms consistent with cording   []Signs/symptoms consistent with fibrosis of breast   []Signs/symptoms consistent with pathology which may benefit from Dry Needling   []Signs/symptoms which may limit the use of advanced manual therapy techniques: (Elevated CV risk profile, recent trauma, intolerance to end range positions, prior TIA, visual issues, LE neurological compromise )  Treatment/Activity Tolerance:   []Excellent   [x]Good    []Fair   []Poor  [x ] Patient tolerated treatment well [ ] Patient limited by fatigue [ ] Patient limited by pain  [ ] Patient limited by other medical complications [ ] Other:  Prognosis/Rehab Potential:      []Excellent   [x]Good    []Fair   []Poor    Physical Therapy Evaluation Complexity Justification  [x] A history of present problem with:  [] no personal factors and/or comorbidities that impact the plan of care;  [x]1-2 personal factors and/or comorbidities that impact the plan of care  []3 personal factors and/or comorbidities that impact the plan of care  [x] An examination of body systems using standardized tests and measures addressing any of the following: body structures and functions (impairments), activity limitations, and/or participation restrictions;:  [] a total of 1-2 or more elements   [x] a total of 3 or more elements   [] a total of 4 or more elements   [x] A clinical presentation with:  [x] stable and/or uncomplicated characteristics   [] evolving clinical presentation with changing characteristics  [] unstable and unpredictable characteristics;   [x] Clinical decision making of [x] low, [] moderate, [] high complexity using standardized patient assessment instrument and/or measurable assessment of functional outcome. [x] EVAL (LOW) 00198 (typically 20 minutes face-to-face)  [] EVAL (MOD) 43166 (typically 30 minutes face-to-face)  [] EVAL (HIGH) 32363 (typically 45 minutes face-to-face)  [] RE-EVAL    Patient Requires Follow-up: Yes   Time in: 915  Time out: 1120  Timed Code Treatment Minutes: 125 (20 min low eval, 90 min TA, 15 min TE)  Total Treatment Minutes:  [facility] for next session:  Begin MLD, Education re: possible compression needs and alternatives. Frequency/Duration:  1-2 days per week for 12 Weeks:  Interventions:  [x]  (73240, 56342) Therapeutic exercise and activities including: strength training, ROM, for upper extremity. [x]  (20336) Manual therapy as indicated including manual lymph drainage, compression wrapping, compression garments   []  Modalities as needed that may include: thermal agents, E-stim, Biofeedback, US, iontophoresis as indicated  [x]  Patient education on activity modification, progression of HEP. []  Other:     Plan of care initiated. Treatment will include therapeutic exercise and activities, manual therapy including manual lymph drainage, compression wrapping if indicated, compression garments if appropriate, and patient education, instruction in HEP.        PT G-CODES   Functional assessment tool used: LLIS  Initial Eval 11/18     15% impaired       GOALS   Pt's Goal: \"To get rid of swelling in my foot. \"  [] Progressing: [] Met: [] Not Met: [] Adjusted    Short term goals (to be met in 6 weeks)   Short term goal 1: Pt will verbalize at least 3 ways to reduce risk of lymphedema   [] Progressing: [] Met: [] Not Met: [] Adjusted  Short term goal 2: Pt will verbalize at least 3 signs/symptoms of infection  [] Progressing: [] Met: [] Not Met: [] Adjusted    Long term goals (to be met in 12 weeks)   Long term goal 1: Pt will verbalize good understanding of techniques (HEP and MLD) to perform if lymphedema is exacerbated in the future. [] Progressing: [] Met: [] Not Met: [] Adjusted  Long term goal 2: Pt will be independent with compression techniques if appropriate. [] Progressing: [] Met: [] Not Met: [] Adjusted  Long term goal 3: Pt will improve on LLIS by 5 points. [] Progressing: [] Met: [] Not Met: [] Adjusted  Long term goal 4: Pt will decrease girth at each position on LE by 1 cm.   [] Progressing: [] Met: [] Not Met: [] Adjusted    Electronically signed by: Kareen Flores, PT

## 2022-11-29 ENCOUNTER — HOSPITAL ENCOUNTER (OUTPATIENT)
Dept: PHYSICAL THERAPY | Age: 82
Setting detail: THERAPIES SERIES
Discharge: HOME | End: 2022-11-29
Payer: MEDICARE

## 2022-11-29 PROCEDURE — 97140 MANUAL THERAPY 1/> REGIONS: CPT

## 2022-11-29 PROCEDURE — 97530 THERAPEUTIC ACTIVITIES: CPT

## 2022-11-29 NOTE — FLOWSHEET NOTE
The [facility] 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), Edema (R60.0)   LLE Lymphedema  Insurance/Certification information:   Medicare, BMN, no prior auth  Physician Information:  Pamela Naidu MD   Plan of care signed:    [x] Yes  [] No    Date of Patient follow up with Physician: unknown     Progress Report: []  Yes  [x]  No     Date Range for reporting period:  Beginnin22  Ending:      Progress report due (10 Rx/or 30 days whichever is less): 36/21/15     Recertification due (POC duration/ or 90 days whichever is less): 23     Visit # Insurance Allowable Auth Needed    BMN []Yes   [x]No     RESTRICTIONS/PRECAUTIONS: over 63 y.o., past cancer  Latex Allergy:  [x]NO      []YES  Preferred Language for Healthcare:   [x]English       []other:  PT G-CODES   Functional assessment tool used: LLIS  Initial Eval      15% impaired         Pain level:  denies    SUBJECTIVE:  Pt states that he has not noticed a difference in his swelling since his last visit. He has been wearing his knee high velcro garment daily for the past week. He does not feel like it is providing much compression to his foot. Today is the first day that he wore his thigh high off the shelf garment because it is easier to get it on and he wasn't sure what would happen during his therapy session. He has been doing his normal exercise routine daily, which includes the therex instructed by PT. He has tried his self-MLD and has had no issues.     OBJECTIVE: See eval  Observation:   : continued induration noted to L lower leg with palpation        GIRTH MEASUREMENT FLOW SHEET:     R Lower Extremity  (cm) @ IE L Lower Extremity  (cm) @ IE    Great toe (figure 8)  8 8   Largest Foot Girth (6cm from MTP) 22.5 23   T-angle (end DF) 25.5 26.5   Largest Calf Girth (35cm from floor) 26.5 28   Knee 42 42   Largest Thigh Girth (10cm from knee) 43.5 45.5   Smallest Ankle Girth 24 26   Length from Smallest ankle to 2fingers below patella 36 36   Other N/A N/A      Gait Assessment: normal arcelia, slight forward flexion     Exercises/Interventions: Exercises in bold performed in department today. Items not bolded are carried forward from prior visits for continuity of the record. Exercise/Equipment Resistance/Repetitions HEP Other comments   Self MLD 5-7reps [x] PT instructing pt in self-MLD for HEP. Pt performed 5-7 reps stationary circles at short neck (no cervical due to pt's age), pressure to abdomen with deep breathing, and 5-7 stationary circles to groin and posterior knees. PT demonstrating appropriate pressure for self-MLD to pt, pt verbalized understanding and able to demonstrate appropriate strokes. Seated ankle pumps [id number] [x]    Seated marches [id number] [x]    Seated LAQs [id number] [x]    Standing heel raises [id number] [x]      []      []      []      []      []      []      []      []      []      []      []      []      []      Home Exercise Program: Self MLD twice a day to short neck (no cervical due to pt's age), abdomen, groin, and posterior knees. Self MLD encouraged to be done prior to use of compression pump for improved clearance. Keep skin moist and clean. 2x10 reps ankle pumps, marches, LAQs, heel raises, twice a day. Ambulate as much as possible. Pt given written handout of all HEP. Therapeutic Exercise:   [] (19556) Provided verbal/tactile cueing for activities related to strengthening, flexibility, endurance, ROM for improvements in LE, proximal hip, and core control with self-care, mobility, lifting, ambulation.     NMR:  [] (61078) Provided verbal/tactile cueing for activities related to improving balance, coordination, kinesthetic sense, posture, motor skill, proprioception to assist with LE, proximal hip, and core control in self-care, mobility, lifting, ambulation and eccentric single leg control. Therapeutic Activities:    [x] (09747) Provided verbal/tactile cueing for activities related to improving balance, coordination, kinesthetic sense, posture, motor skill, proprioception and motor activation to allow for proper function of core, proximal hip and LE with self-care and ADLs and functional mobility. Pt able to list 3 signs and symptoms of infection including \"fever, redness, and swelling. \"  Pt required cues for 3 ways to reduce lymphedema. Pt initially stating, \"ice and antibiotics. \"  PT clarifying that she is asking about reduction of lymph swelling vs infection swelling. Pt then stating, \"compression garments, massage, and watching out for infection,\" with cuing. Pt educated that one goal is for the pump to clear some excess fluid from his foot and for the velcro garment to keep the swelling out. He may notice some stubbornness of swelling in his foot due to the induration of his lower leg. Once the pump is able to break up some of that induration, it is likely that the swelling in his foot will reduce. Pt may also experience that the speeds of the reduction of swelling in his foot and the return of the elasticity of his skin on his lower leg may differ, as skin recovery typically takes longer to return to normal after fluid has been cleared. Pt verbalized understanding. Pt may purchase an additional compressive piece for foot, however this will add bulk and make donning shoes difficult. Pt would like to hold off on that option for the time being. Rep from [facility] collaborating with PT this session to perform a pump demo and education for a basic lymphedema pump.   Pump placed on pt's LLE for approximately 20-25 min to assess pt's tolerance for low/medium compression setting. Pt did not feel that low compression provided enough pressure to his foot, he prefers the medium setting. Pt educated to use pump for 1 hr at least once a day (two times preferable), he should remove his velcro wraps for treatments. He should continue to wear his velcro wraps for 12 hrs on/1 hr off outside of using his pumps, perform his self-MLD (especially for pump treatment to prepare heart for fluid), and LE therex for improved clearance. Pt should remove pumps or decrease setting if pressure becomes uncomfortable or if he notices wounds forming. Pump may be cleaned with soap and water or wipes, should not be placed in the washer/dryer. Pt educated on set-up of pump sleeve and device for treatments. Pt educated that he should use pumps either laying in supine or sitting with BLEs elevated and slight recline to back for improved clearance. Pt also educated that a pump rep may come out to his home if desired for set-up in home. If pt feels that the basic pump is not providing enough clearance to the swelling of his foot, PT can reach out to pump rep to initiate potential switch to Flexitouch. Pt verbalized understanding. Pump rep performing follow up measurements to LLE after demo. Pt noted to have 0 cm decrease in foot girth (vs previous session), 1.4 cm decrease in ankle girth, .3cm decrease in calf girth, .8cm decrease in knee girth, 0cm decrease in thigh girth.     Gait Training:    [] (43330) Provided training and instruction to the patient for proper LE, core and proximal hip recruitment and positioning and eccentric body weight control with ambulation re-education including up and down stairs     Manual Treatments:  PROM / STM / Oscillations-Mobs:  G-I, II, III, IV (PA's, Inf., Post.)  [x] (77457) Provided manual therapy to mobilize LE, proximal hip and/or LS spine soft tissue/joints for the purpose of modulating pain, promoting relaxation, increasing ROM, reducing/eliminating soft tissue swelling/inflammation/restriction, improving soft tissue extensibility and allowing for proper ROM for normal function with self-care, mobility, lifting and ambulation. MLD in supine to short neck (no cervical due to pt's age), abdomen, and B groin simultaneously with pump demo on LLE. Pt with no reports of shortness of breath, chest tightness, pain, or diaphoresis during MLD session. Every effort to maintain pt's modesty was maintained, only exposing location being treated. Home Exercise Program:    [x] (57208) Reviewed/Progressed HEP activities related to strengthening, flexibility, endurance, ROM of core, proximal hip and LE for functional self-care, mobility, lifting and ambulation/stair navigation   [] (90716) Reviewed/Progressed HEP activities related to improving balance, coordination, kinesthetic sense, posture, motor skill, proprioception of core, proximal hip and LE for self-care, mobility, lifting, and ambulation/stair navigation      Modalities:    [] Electric Stimulation:   [] Ultrasound:   [] Other:       Charges:  Timed Code Treatment Minutes: 60 (15 min manual, 45 min TA)   Total Treatment Minutes: 60      [] EVAL (LOW) 92408 (typically 20 minutes face-to-face)  [] EVAL (MOD) 45689 (typically 30 minutes face-to-face)  [] EVAL (HIGH) 48332 (typically 45 minutes face-to-face)  [] RE-EVAL     [] GC(13100) x       [] NMR (14399) x       [x] Manual (73464) x  1     [x] TA (54969) x   3    [] Gait Training ([id number]) x       [] ES(attended) (12262)  [] ES (un) (22 585591)   [] DRY NEEDLE 1 OR 2 MUSCLES  [] DRY NEEDLE 3+ MUSCLES  [] Mech Traction (51827)  [] Ultrasound (62130)  [] Other:    If BW Please Indicate Time In/Out  CPT Code Time in Time out                                   GOALS:   Pt's Goal: \"To get rid of swelling in my foot. \"  [x] Progressing: [] Met: [] Not Met: [] Adjusted     Short term goals (to be met in 6 weeks)   Short term goal 1: Pt will verbalize at least 3 ways to reduce risk of lymphedema   [x] Progressing: [] Met: [] Not Met: [] Adjusted  Short term goal 2: Pt will verbalize at least 3 signs/symptoms of infection  [] Progressing: [x] Met: [] Not Met: [] Adjusted     Long term goals (to be met in 12 weeks)   Long term goal 1: Pt will verbalize good understanding of techniques (HEP and MLD) to perform if lymphedema is exacerbated in the future. [x] Progressing: [] Met: [] Not Met: [] Adjusted  Long term goal 2: Pt will be independent with compression techniques if appropriate. [x] Progressing: [] Met: [] Not Met: [] Adjusted  Long term goal 3: Pt will improve on LLIS by 5 points. [x] Progressing: [] Met: [] Not Met: [] Adjusted  Long term goal 4: Pt will decrease girth at each position on LE by 1 cm. [x] Progressing: [] Met: [] Not Met: [] Adjusted    ASSESSMENT:  Pt tolerated MLD and pump demo well this session. Pt feeling that velcro wrap does not provide enough compression to his foot. Pt educated that the pump may be required to clear out fluid from foot, and velcro wrap will maintain progress. Pt verbalized understanding. Pt will continue to benefit from CDT to decrease swelling, improve balance, and reduce risk of infection. Treatment/Activity Tolerance:  [x] Patient tolerated treatment well [] Patient limited by fatique  [] Patient limited by pain  [] Patient limited by other medical complications  [] Other:     Overall Progression Towards Functional goals/ Treatment Progress Update:  [] Patient is progressing as expected towards functional goals listed. [] Progression is slowed due to complexities/Impairments listed. [] Progression has been slowed due to co-morbidities.   [x] Plan just implemented, too soon to assess goals progression <30days   [] Goals require adjustment due to lack of progress  [] Patient is not progressing as expected and requires additional follow up with physician  [] Other    Prognosis for POC: [x] Good [] Fair  [] Poor    Patient requires continued skilled intervention: [x] Yes  [] No        PLAN: See eval, 1-2x/week, for 12 weeks  [x] Continue per plan of care [] Alter current plan (see comments)  [] Plan of care initiated [] Hold pending MD visit [] Discharge    Electronically signed by: Princess Karyna PT    Note: If patient does not return for scheduled/recommended follow up visits, this note will serve as a discharge from care along with the most recent update on progress.

## 2022-12-07 ENCOUNTER — HOSPITAL ENCOUNTER (OUTPATIENT)
Dept: PHYSICAL THERAPY | Age: 82
Setting detail: THERAPIES SERIES
Discharge: HOME | End: 2022-12-07
Payer: MEDICARE

## 2022-12-07 PROCEDURE — 97140 MANUAL THERAPY 1/> REGIONS: CPT

## 2022-12-07 PROCEDURE — 97530 THERAPEUTIC ACTIVITIES: CPT

## 2022-12-07 NOTE — PROGRESS NOTES
The [facility] Outpatient Therapy  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), Edema (R60.0)   LLE Lymphedema  Insurance/Certification information:   Medicare, BMN, no prior auth  Physician Information:  Alfonso Flores MD   Plan of care signed:    [x] Yes  [] No    Date of Patient follow up with Physician: unknown     Progress Report: [x]  Yes  []  No     Date Range for reporting period:  Beginnin22  Endin22    Progress report due (10 Rx/or 30 days whichever is less): 32     Recertification due (POC duration/ or 90 days whichever is less): 23     Visit # Insurance Allowable Auth Needed   3/24 BMN []Yes   [x]No     RESTRICTIONS/PRECAUTIONS: over 63 y.o., past cancer  Latex Allergy:  [x]NO      []YES  Preferred Language for Healthcare:   [x]English       []other:  PT G-CODES   Functional assessment tool used: LLIS  Initial Eval      15% impaired 6% impaired         Pain level:  denies    SUBJECTIVE:  Pt states that he quit wearing the velcro garment, his foot and thigh/groin were swelling by the end of the day. The sock was not providing enough compression for him. He has decided to wear his thigh length garment. \"I think the only thing that is really going to help me at this point is that pump. I am really active, so I am exercising all of the time. I just need to get the swelling out of my foot because it is messing up my balance. \"  Pt reports that he is performing his self massage daily. He is independent with his home program and compression.     OBJECTIVE: See eval  Observation:   : continued induration noted to L lower leg with palpation  : induration L lower leg        GIRTH MEASUREMENT FLOW SHEET:     R Lower Extremity  (cm) @ IE L Lower Extremity  (cm) @ IE  R Lower Extremity  12/7 L Lower Extremity  12/7   Great toe (figure 8)  8 8 8 8   Largest Foot Girth (6cm from MTP) 22.5 23 22.5 22.5   T-angle (end DF) 35.5 36.5 35.5 35.5   Largest Calf Girth (35cm from floor) 36.5 38 36 37   Knee 42 42 42 42   Largest Thigh Girth (10cm from knee) 43.5 45.5 43.5 44.5   Smallest Ankle Girth 24 26 24 27   Length from Smallest ankle to 2fingers below patella 36 36 36 36   Other N/A N/A N/A N/A      Gait Assessment: normal arcelia, slight forward flexion     Exercises/Interventions: Exercises in bold performed in department today. Items not bolded are carried forward from prior visits for continuity of the record. Exercise/Equipment Resistance/Repetitions HEP Other comments   Self MLD 5-7reps [x] PT instructing pt in self-MLD for HEP. Pt performed 5-7 reps stationary circles at short neck (no cervical due to pt's age), pressure to abdomen with deep breathing, and 5-7 stationary circles to groin and posterior knees. PT demonstrating appropriate pressure for self-MLD to pt, pt verbalized understanding and able to demonstrate appropriate strokes. Seated ankle pumps [id number] [x]    Seated marches [id number] [x]    Seated LAQs [id number] [x]    Standing heel raises [id number] [x]      []      []      []      []      []      []      []      []      []      []      []      []      []      Home Exercise Program: Self MLD twice a day to short neck (no cervical due to pt's age), abdomen, groin, and posterior knees. Self MLD encouraged to be done prior to use of compression pump for improved clearance. Keep skin moist and clean. 2x10 reps ankle pumps, marches, LAQs, heel raises, twice a day. Ambulate as much as possible. Pt given written handout of all HEP.     Therapeutic Exercise:   [] (00212) Provided verbal/tactile cueing for activities related to strengthening, flexibility, endurance, ROM for improvements in LE, proximal hip, and core control with self-care, mobility, lifting, ambulation. NMR:  [] (20470) Provided verbal/tactile cueing for activities related to improving balance, coordination, kinesthetic sense, posture, motor skill, proprioception to assist with LE, proximal hip, and core control in self-care, mobility, lifting, ambulation and eccentric single leg control. Therapeutic Activities:    [x] (09620) Provided verbal/tactile cueing for activities related to improving balance, coordination, kinesthetic sense, posture, motor skill, proprioception and motor activation to allow for proper function of core, proximal hip and LE with self-care and ADLs and functional mobility. Pt able to list 3 ways to reduce swelling including, \"exercise, compression sleeve, and pump. \"    PT educating pt that she has submitted her portion of the letter of medical necessity for his pump. His MD will need to submit his portion. Then the pump company should have what they need to approve the pump for shipping. Pt and PT in agreement that pt will decrease frequency to every other week. Pt does not feel benefit from MLD or self-MLD, but did feel significant benefit from use of the pump during his demo last session. Pt is independent with his home program and compression. He should alert this PT if he feels like he needs to resume once a week sessions.     Gait Training:    [] (20007) Provided training and instruction to the patient for proper LE, core and proximal hip recruitment and positioning and eccentric body weight control with ambulation re-education including up and down stairs     Manual Treatments:  PROM / STM / Oscillations-Mobs:  G-I, II, III, IV (PA's, Inf., Post.)  [x] (94412) Provided manual therapy to mobilize LE, proximal hip and/or LS spine soft tissue/joints for the purpose of modulating pain, promoting relaxation,  increasing ROM, reducing/eliminating soft tissue swelling/inflammation/restriction, improving soft tissue extensibility and allowing for proper ROM for normal function with self-care, mobility, lifting and ambulation. MLD in supine to short neck (no cervical due to pt's age), abdomen, and LLE. Posterior LLE performed in hooklying due to difficulty achieving prone on low mat. Pt with no reports of shortness of breath, chest tightness, pain, or diaphoresis during MLD session. Every effort to maintain pt's modesty was maintained, only exposing location being treated. Home Exercise Program:    [x] (77883) Reviewed/Progressed HEP activities related to strengthening, flexibility, endurance, ROM of core, proximal hip and LE for functional self-care, mobility, lifting and ambulation/stair navigation   [] (31430) Reviewed/Progressed HEP activities related to improving balance, coordination, kinesthetic sense, posture, motor skill, proprioception of core, proximal hip and LE for self-care, mobility, lifting, and ambulation/stair navigation      Modalities:    [] Electric Stimulation:   [] Ultrasound:   [] Other:       Charges:  Timed Code Treatment Minutes: 53 (45 manual, 8 min TA)   Total Treatment Minutes: 53      [] EVAL (LOW) 50790 (typically 20 minutes face-to-face)  [] EVAL (MOD) 51902 (typically 30 minutes face-to-face)  [] EVAL (HIGH) 79902 (typically 45 minutes face-to-face)  [] RE-EVAL     [] ZX(75824) x       [] NMR (92533) x       [x] Manual (57645) x  3     [x] TA (17044) x   1    [] Gait Training (Y0772434) x       [] ES(attended) (43429)  [] ES (un) (22 054407)   [] DRY NEEDLE 1 OR 2 MUSCLES  [] DRY NEEDLE 3+ MUSCLES  [] Mech Traction (83256)  [] Ultrasound (53416)  [] Other:    If BW Please Indicate Time In/Out  CPT Code Time in Time out                                   GOALS:   Pt's Goal: \"To get rid of swelling in my foot. \"  [x] Progressing: [] Met: [] Not Met: [] Adjusted     Short term goals (to be met in 6 weeks) Short term goal 1: Pt will verbalize at least 3 ways to reduce risk of lymphedema   [] Progressing: [x] Met: [] Not Met: [] Adjusted  Short term goal 2: Pt will verbalize at least 3 signs/symptoms of infection  [] Progressing: [x] Met: [] Not Met: [] Adjusted     Long term goals (to be met in 12 weeks)   Long term goal 1: Pt will verbalize good understanding of techniques (HEP and MLD) to perform if lymphedema is exacerbated in the future. [] Progressing: [x] Met: [] Not Met: [] Adjusted  Long term goal 2: Pt will be independent with compression techniques if appropriate. [] Progressing: [x] Met: [] Not Met: [] Adjusted  Long term goal 3: Pt will improve on LLIS by 5 points. [] Progressing: [x] Met: [] Not Met: [] Adjusted  Long term goal 4: Pt will decrease girth at each position on LE by 1 cm. [x] Progressing: [] Met: [] Not Met: [] Adjusted    ASSESSMENT:  Pt tolerated MLD well this session. Continue per initial plan of Care, pt has 21 visits remaining on current POC. Pt is progressing towards goals although still with induration and stubborn swelling in L foot, affecting his balance. Further improvement expected with additional therapy. Pt is independent with home program and states that he is feeling minimal benefit from MLD and self-MLD. Pt and PT in agreement to decreased frequency to every other week. Pt will continue to benefit from CDT to decrease swelling, improve balance, and reduce risk of infection. Treatment/Activity Tolerance:  [x] Patient tolerated treatment well [] Patient limited by fatique  [] Patient limited by pain  [] Patient limited by other medical complications  [] Other:     Overall Progression Towards Functional goals/ Treatment Progress Update:  [x] Patient is progressing as expected towards functional goals listed. [] Progression is slowed due to complexities/Impairments listed. [] Progression has been slowed due to co-morbidities.   [] Plan just implemented, too soon to assess goals progression <30days   [] Goals require adjustment due to lack of progress  [] Patient is not progressing as expected and requires additional follow up with physician  [] Other    Prognosis for POC: [x] Good [] Fair  [] Poor    Patient requires continued skilled intervention: [x] Yes  [] No        PLAN: See eval, 1-2x/week, for 12 weeks (updated to 2x/month 12/7/22)  [x] Continue per plan of care [x] Alter current plan (see comments)  [] Plan of care initiated [] Hold pending MD visit [] Discharge    Electronically signed by: Cosme Nolan, PT    Note: If patient does not return for scheduled/recommended follow up visits, this note will serve as a discharge from care along with the most recent update on progress.

## 2022-12-13 ENCOUNTER — APPOINTMENT (OUTPATIENT)
Dept: PHYSICAL THERAPY | Age: 82
End: 2022-12-13
Payer: MEDICARE

## 2022-12-20 ENCOUNTER — HOSPITAL ENCOUNTER (OUTPATIENT)
Dept: PHYSICAL THERAPY | Age: 82
Setting detail: THERAPIES SERIES
Discharge: HOME | End: 2022-12-20
Payer: MEDICARE

## 2022-12-20 PROCEDURE — 97530 THERAPEUTIC ACTIVITIES: CPT

## 2022-12-20 PROCEDURE — 97140 MANUAL THERAPY 1/> REGIONS: CPT

## 2022-12-20 NOTE — PROGRESS NOTES
Lymphedema Life Impact Scale    Patient: Vianney Mendez  : 1940  MRN: [id number]  Date: 2022  Electronically Signed by: Peter Del Rosario PT    Listed below are symptoms or problems reported by many individuals with lymphedema. Please indicate to what extent these problems associated with your lymphedema has affected you in the past week. [place] the number which best describes your symptom level. I. Physical Concerns (NOTE: If swelling and symptoms are the same in both limbs, rate them the same; otherwise, rate only the worst limb)    1 The amount of pain associated with my lymphedema is: [x]0  No Pain []1 []2  []3 []4  Severe Pain   2 The amount of limb heaviness associated with my lymphedema is: [x]0  No heaviness []1 []2 []3 []4  Extremely heavy   3 The amount of skin tightness associated with my lymphedema is: []0  No tightness [x]1 []2 []3 []4  Extremely tight   4 The size of my swollen limb(s) seems: []0  Normal size [x]1 []2 []3 []4  Extremely large   5 Lymphedema affects the movement of my swollen limbs:  [x]0  Normal movement []1 []2 []3 []4  Extremely limited   6 The strength of my swollen limb(s) is: [x]0  Normal strength []1 []2 []3 []4  Extremely weak     II. Psychosocial Concerns    7   Lymphedema affects my body image (how I think I look): [x]0  Not at all []1 []2  []3 []4  Completely   8 Lymphedema affects my socializing with others [x]0  No interference []1 []2 []3 []4  Interferes completely   9   Lymphedema affects my intimate relations with spouse or partner (rate 0 if not applicable). [x]0  No interference []1 []2 []3 []4  Interferes completely   10 Lymphedema gets me down (i.e., I have feelings of depression, frustration, or anger due to the lymphedema [x]0  Never []1 []2 []3 []4  Constantly   11 I must rely on others for help due to my lymphedema.  [x]0  Not at all []1 []2 []3 []4  Completely   12   I know what to do to manage my lymphedema [x]0  Good understand-ing  []1 []2 []3 []4  No Understand-ing     III. Functional Concerns    13   Lymphedema affects my ability to perform self-care activities (i.e., eating, dressing, hygiene). [x]0  No interference []1 []2  []3 []4  Interferes completely   14 Lymphedema affects my ability to perform routine home or work-related activities. [x]0  No interference []1 []2 []3 []4  Interferes completely   15   Lymphedema affects my performance of preferred leisure activities. [x]0  No interference []1 []2 []3 []4  Interferes completely   16 Lymphedema affects the proper fit of clothing/shoes. [x]0  Fits normally []1 []2 []3 []4  Unable to wear   17 Lymphedema affects my sleep. [x]0  No interference []1 []2 []3 []4  Interferes completely       III.  Infection Occurrence    18   In the past year, I have become ill with an infection in my swollen limb requiring oral antibiotics or hospitalization [x]0   []1x []2x  []3x []4+           3% impaired

## 2022-12-20 NOTE — PROGRESS NOTES
[facility] Outpatient Therapy  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), Edema (R60.0)   LLE Lymphedema  Insurance/Certification information:   Medicare, BMN, no prior auth  Physician Information:  Nigel Obando MD   Plan of care signed:    [x] Yes  [] No    Date of Patient follow up with Physician: unknown     Progress Report: [x]  Yes  []  No     Date Range for reporting period:  Beginnin22  Endin22    Progress report due (10 Rx/or 30 days whichever is less):      Recertification due (POC duration/ or 90 days whichever is less): 23     Visit # Insurance Allowable Auth Needed    BMN []Yes   [x]No     RESTRICTIONS/PRECAUTIONS: over 63 y.o., past cancer  Latex Allergy:  [x]NO      []YES  Preferred Language for Healthcare:   [x]English       []other:  PT G-CODES   Functional assessment tool used: LLIS  Initial Eval      15% impaired 6% impaired 3% impaired         Pain level:  denies    SUBJECTIVE:  Pt reporting that he feels confident with his HEP and self-massage. He is independent with his compression needs at home. He is just waiting on his pumps to be delivered. He states that he has noticed a possible decrease in his swelling. \"It might even be gone before I start using those pumps. \"  He is still wearing his thigh high compression sleeve daily. He plans to bring in his velcro wrap to donate to the clinic next visit.     OBJECTIVE: See eval  Observation:   : continued induration noted to L lower leg with palpation  : induration L lower leg  : continued induration L lower leg        GIRTH MEASUREMENT FLOW SHEET:     R Lower Extremity  (cm) @ IE L Lower Extremity  (cm) @ IE  R Lower Extremity  12/7 L Lower Extremity  12/7 R Lower Extremity  12/20 L Lower Extremity  12/20   Great toe (figure 8)  8 8 8 8 8 8   Largest Foot Girth (6cm from MTP) 22.5 23 22.5 22.5 22.5 22.5   T-angle (end DF) 35.5 36.5 35.5 35.5 35 35   Largest Calf Girth (35cm from floor) 36.5 38 36 37 35 36   Knee 42 42 42 42 41 41   Largest Thigh Girth (10cm from knee) 43.5 45.5 43.5 44.5 42.5 43.5   Smallest Ankle Girth 24 26 24 27 23.5 25   Length from Smallest ankle to 2fingers below patella 36 36 36 36 36 36   Other N/A N/A N/A N/A N/A N/A      Gait Assessment: normal arcelia, slight forward flexion     Exercises/Interventions: Exercises in bold performed in department today. Items not bolded are carried forward from prior visits for continuity of the record. Exercise/Equipment Resistance/Repetitions HEP Other comments   Self MLD 5-7reps [x] PT instructing pt in self-MLD for HEP. Pt performed 5-7 reps stationary circles at short neck (no cervical due to pt's age), pressure to abdomen with deep breathing, and 5-7 stationary circles to groin and posterior knees. PT demonstrating appropriate pressure for self-MLD to pt, pt verbalized understanding and able to demonstrate appropriate strokes. Seated ankle pumps [id number] [x]    Seated marches [id number] [x]    Seated LAQs [id number] [x]    Standing heel raises [id number] [x]      []      []      []      []      []      []      []      []      []      []      []      []      []      Home Exercise Program: Self MLD twice a day to short neck (no cervical due to pt's age), abdomen, groin, and posterior knees. Self MLD encouraged to be done prior to use of compression pump for improved clearance. Keep skin moist and clean. 2x10 reps ankle pumps, marches, LAQs, heel raises, twice a day. Ambulate as much as possible. Pt given written handout of all HEP.     Therapeutic Exercise:   [] (38697) Provided verbal/tactile cueing for activities related to strengthening, flexibility, endurance, ROM for improvements in LE, proximal hip, and core control with self-care, mobility, lifting, ambulation. NMR:  [] (64599) Provided verbal/tactile cueing for activities related to improving balance, coordination, kinesthetic sense, posture, motor skill, proprioception to assist with LE, proximal hip, and core control in self-care, mobility, lifting, ambulation and eccentric single leg control. Therapeutic Activities:    [x] (21736) Provided verbal/tactile cueing for activities related to improving balance, coordination, kinesthetic sense, posture, motor skill, proprioception and motor activation to allow for proper function of core, proximal hip and LE with self-care and ADLs and functional mobility. Pt stating that he will be leaving for vacation next week and will not be returning until the end of January. He feels independent with his HEP, self-massage, and compression needs. He is just waiting on his pump to be delivered to begin using that. PT and pt in agreement that today will be a progress note sent in to his MD for an update, and that hopefully his next appointment in February will be a discharge session as long as pt continues to feel independent and has no issues with his pump. Pt would like to donate his velcro wrap to this clinic. PT educating pt that it may have to be used as a demo garment due to difficulty with sanitation. Pt verbalized understanding, but also stated that if it can be donated to a patient that otherwise can't afford a garment, he would be happy to have it go to someone in need as well.     Gait Training:    [] (22874) Provided training and instruction to the patient for proper LE, core and proximal hip recruitment and positioning and eccentric body weight control with ambulation re-education including up and down stairs     Manual Treatments:  PROM / STM / Oscillations-Mobs:  G-I, II, III, IV (PA's, Inf., Post.)  [x] (97577) Provided manual therapy to mobilize LE, proximal hip and/or LS spine soft tissue/joints for the purpose of modulating pain, promoting relaxation,  increasing ROM, reducing/eliminating soft tissue swelling/inflammation/restriction, improving soft tissue extensibility and allowing for proper ROM for normal function with self-care, mobility, lifting and ambulation. MLD in supine to short neck (no cervical due to pt's age), abdomen, R groin, and LLE. Posterior LLE performed in hooklying due to difficulty achieving prone on low mat. Pt with no reports of shortness of breath, chest tightness, pain, or diaphoresis during MLD session. Every effort to maintain pt's modesty was maintained, only exposing location being treated.       Home Exercise Program:    [x] (38983) Reviewed/Progressed HEP activities related to strengthening, flexibility, endurance, ROM of core, proximal hip and LE for functional self-care, mobility, lifting and ambulation/stair navigation   [] (41794) Reviewed/Progressed HEP activities related to improving balance, coordination, kinesthetic sense, posture, motor skill, proprioception of core, proximal hip and LE for self-care, mobility, lifting, and ambulation/stair navigation      Modalities:    [] Electric Stimulation:   [] Ultrasound:   [] Other:       Charges:  Timed Code Treatment Minutes: 55 (45 manual, 10 min TA)   Total Treatment Minutes: 55      [] EVAL (LOW) 53273 (typically 20 minutes face-to-face)  [] EVAL (MOD) 55649 (typically 30 minutes face-to-face)  [] EVAL (HIGH) 06104 (typically 45 minutes face-to-face)  [] RE-EVAL     [] DP(58348) x       [] NMR (24073) x       [x] Manual (48700) x  3     [x] TA (01307) x   1    [] Gait Training ([id number]) x       [] ES(attended) (58235)  [] ES (un) (22 [id number])   [] DRY NEEDLE 1 OR 2 MUSCLES  [] DRY NEEDLE 3+ MUSCLES  [] Mech Traction (31382)  [] Ultrasound (72620)  [] Other:    If [facility] Please Indicate Time In/Out  CPT Code Time in Time out                                   GOALS:   Pt's Goal: \"To get rid of swelling in my foot. \"  [x] Progressing: [] Met: [] Not Met: [] Adjusted     Short term goals (to be met in 6 weeks)   Short term goal 1: Pt will verbalize at least 3 ways to reduce risk of lymphedema   [] Progressing: [x] Met: [] Not Met: [] Adjusted  Short term goal 2: Pt will verbalize at least 3 signs/symptoms of infection  [] Progressing: [x] Met: [] Not Met: [] Adjusted     Long term goals (to be met in 12 weeks)   Long term goal 1: Pt will verbalize good understanding of techniques (HEP and MLD) to perform if lymphedema is exacerbated in the future. [] Progressing: [x] Met: [] Not Met: [] Adjusted  Long term goal 2: Pt will be independent with compression techniques if appropriate. [] Progressing: [x] Met: [] Not Met: [] Adjusted  Long term goal 3: Pt will improve on LLIS by 5 points. [] Progressing: [x] Met: [] Not Met: [] Adjusted  Long term goal 4: Pt will decrease girth at each position on LE by 1 cm. [x] Progressing: [] Met: [] Not Met: [] Adjusted    ASSESSMENT:  Pt tolerated MLD well this session. Continue per initial plan of Care, pt has 20 visits remaining on current POC. Pt is progressing towards goals although still with induration and stubborn swelling in L foot, affecting his balance. Pt does note that he feels the swelling is slowly decreasing from his foot and is hopeful that it will be fully resolved after he begins to use his lymph pump. Further improvement expected with additional therapy. Pt continues to report that he is independent with his home program and compression needs. Pt will be on vacation until the end of January. PT and pt in hopes that his next session after returning from vacation will be his d/c session as long as he has no issues with his pump. Pt will continue to benefit from CDT to decrease swelling, improve balance, and reduce risk of infection.       Treatment/Activity Tolerance:  [x] Patient tolerated treatment well [] Patient limited by fatique  [] Patient limited by pain  [] Patient limited by other medical complications  [] Other:     Overall Progression Towards Functional goals/ Treatment Progress Update:  [x] Patient is progressing as expected towards functional goals listed. [] Progression is slowed due to complexities/Impairments listed. [] Progression has been slowed due to co-morbidities. [] Plan just implemented, too soon to assess goals progression <30days   [] Goals require adjustment due to lack of progress  [] Patient is not progressing as expected and requires additional follow up with physician  [] Other    Prognosis for POC: [x] Good [] Fair  [] Poor    Patient requires continued skilled intervention: [x] Yes  [] No        PLAN: See bria, 1-2x/week, for 12 weeks (updated to 2x/month 12/7/22)  [x] Continue per plan of care [] Alter current plan (see comments)  [] Plan of care initiated [] Hold pending MD visit [] Discharge    Electronically signed by: Kareen Flores PT    Note: If patient does not return for scheduled/recommended follow up visits, this note will serve as a discharge from care along with the most recent update on progress.

## 2024-03-05 NOTE — PROGRESS NOTES
CAR-T(T0) Progress Note      Arlin Marshly [facility]                            05/23/2022                        Start time:1200 Completion time:1215    CAR-T Type: Breyanzi      LOT NO: [id number] Volume: 2.4 mL  LOT NO: [id number] Volume: 2.4 mL  LOT NO: [id number] Volume: 2.4 mL      Catheter lumen used for infusion: Red lumen of R trifusion             Positive Blood Return: Yes      Premeds Given: 650 mg PO tylenol, 25mg PO benadryl    Adverse Reaction(s) and treatment: Baseline vital signs obtained prior to infusion and monitoring completed throughout per [facility] protocol - see flowsheets. All IVFs turned down to [facility] during stem cell infusion. Stem cell product(s) verified with 2nd RN Lillie CALZADA prior to infusion using 2 patient identifiers. Infused via gravity with rate controlled by Purvi Jimenez RN per [facility] protocols. Emergency medications epinephrine, benadryl and morhpine available as needed. Emergency medical equipment available as needed including telemetry monitoring throughout infusion, supplemental O2, suction equipment, and crash cart. RN at bedside throughout infusion.      Fortino Chacko RN
Pt admitted strictyly for CAR-T Mateuszi under observation from OP infusion. Infusion and post infusion NS bolus completed without issue, pt tolerated well. VSS throughout. Pt to be discharged to home. CVC heparin locked per protocol. All discharge instructions/medications/future appointments gone over with pt and wife at bedside. Both verbalized understanding of instructions. Will closely monitor until off unit.      Electronically signed by Justice Lawton RN on 5/23/2022 at 2:31 PM
4 = No assist / stand by assistance

## (undated) DEVICE — SYRINGE MED 10ML SLIP TIP BLNT FILL AND LUERLOCK DISP

## (undated) DEVICE — PORT INSERTION: Brand: MEDLINE INDUSTRIES, INC.

## (undated) DEVICE — GLOVE SURG SZ 7 CRM LTX FREE POLYISOPRENE POLYMER BEAD ANTI

## (undated) DEVICE — SUTURE MCRYL SZ 4-0 L27IN ABSRB UD L19MM PS-2 1/2 CIR PRIM Y426H

## (undated) DEVICE — CONNECTOR IV TB L28MM CLR VLV ACCS NDLLSS DISP MAXPLUS

## (undated) DEVICE — SUTURE PROL SZ 2-0 L36IN NONABSORBABLE BLU SH L26MM 1/2 CIR 8523H

## (undated) DEVICE — TOWEL,STOP FLAG GOLD N-W: Brand: MEDLINE

## (undated) DEVICE — CAP IV VLV LUER ACCS DISINFECT SWABPACK XT

## (undated) DEVICE — DRESSING GERM DIA1IN CNTR H DIA7MM BLU CHG ANTIMIC PROTCT

## (undated) DEVICE — HICKMAN TRIFUSION TRIPLE-LUMEN LONG-TERM CENTRAL VENOUS CATHETER 12F INTERMEDIATE TRAY (23CM): Brand: HICKMAN TRIFUSION